# Patient Record
Sex: FEMALE | Race: WHITE | Employment: OTHER | URBAN - METROPOLITAN AREA
[De-identification: names, ages, dates, MRNs, and addresses within clinical notes are randomized per-mention and may not be internally consistent; named-entity substitution may affect disease eponyms.]

---

## 2017-02-03 RX ORDER — FUROSEMIDE 20 MG/1
20 TABLET ORAL DAILY
Qty: 30 TAB | Refills: 3 | Status: SHIPPED | OUTPATIENT
Start: 2017-02-03 | End: 2017-06-14 | Stop reason: SDUPTHER

## 2017-02-27 ENCOUNTER — OFFICE VISIT (OUTPATIENT)
Dept: CARDIOLOGY CLINIC | Age: 62
End: 2017-02-27

## 2017-02-27 VITALS
WEIGHT: 176.9 LBS | RESPIRATION RATE: 16 BRPM | SYSTOLIC BLOOD PRESSURE: 110 MMHG | HEART RATE: 49 BPM | BODY MASS INDEX: 31.34 KG/M2 | DIASTOLIC BLOOD PRESSURE: 68 MMHG | OXYGEN SATURATION: 99 % | HEIGHT: 63 IN

## 2017-02-27 DIAGNOSIS — I20.9 ANGINA, CLASS II (HCC): ICD-10-CM

## 2017-02-27 DIAGNOSIS — E78.00 HYPERCHOLESTEREMIA: ICD-10-CM

## 2017-02-27 DIAGNOSIS — I25.10 ASHD (ARTERIOSCLEROTIC HEART DISEASE): Primary | ICD-10-CM

## 2017-02-27 DIAGNOSIS — I10 ESSENTIAL HYPERTENSION: ICD-10-CM

## 2017-02-27 RX ORDER — NAPROXEN SODIUM 220 MG
220 TABLET ORAL 2 TIMES DAILY WITH MEALS
COMMUNITY
End: 2018-10-16

## 2017-02-27 RX ORDER — BUDESONIDE AND FORMOTEROL FUMARATE DIHYDRATE 160; 4.5 UG/1; UG/1
2 AEROSOL RESPIRATORY (INHALATION) 2 TIMES DAILY
COMMUNITY
Start: 2017-02-21

## 2017-02-27 NOTE — PROGRESS NOTES
NAME:  Tim Hemphill   :   1955   MRN:   [id number]   PCP:  Shane Tee MD           Subjective: The patient is a 57y.o. year old female  who returns for a routine follow-up. Since the last visit, patient reports no change in exercise tolerance, chest pain,  medication intolerance, palpitations, PND/orthopnea wheezing, sputum, syncope, dizziness or light headedness. Recent onset of SOB, edema. Seen by pcp, improved with lasix. Here for further evaluation. Past Medical History:   Diagnosis Date    Asthma     coughing couple weeks ago    Bloating     CAD (coronary artery disease)     Cancer ([facility] [address]     esophageal/GE junction    Chest pain     Chest pain     Chronic pain     left shoulder    Congestion of throat     Cough     Depression     & anxiety    Diabetes (HCC)     IDDM    Dyspepsia and other specified disorders of function of stomach     Fatigue     GERD (gastroesophageal reflux disease)     Headache(784.0)     Hypercholesterolemia     Hypertension     Multinodular goiter     Nausea & vomiting     Stool color black     Stress     Weakness         ICD-10-CM ICD-9-CM    1. ASHD (arteriosclerotic heart disease) I25.10 414.00 STRESS TEST LEXISCAN/CARDIOLITE   2. Hypercholesteremia E78.00 272.0 AMB POC EKG ROUTINE W/ 12 LEADS, INTER & REP   3. Essential hypertension I10 401.9    4.  Angina, class II ([facility] I20.9 413.9       Social History   Substance Use Topics    Smoking status: Never Smoker    Smokeless tobacco: Never Used    Alcohol use 0.5 oz/week     1 Glasses of wine per week      Comment: rarely glass of wine      Family History   Problem Relation Age of Onset    Diabetes Father     Cancer Father      intestinal    Heart Disease Father     Hypertension Father     Heart Disease Brother     Diabetes Brother     Diabetes Mother     Lung Disease Mother     Heart Disease Mother     Hypertension Mother     Diabetes Maternal Grandmother     Diabetes Maternal Grandfather     Elevated Lipids Maternal Aunt     Thyroid Disease Neg Hx         Review of Systems  General: Pt denies excessive weight gain or loss. Pt is able to conduct ADL's  HEENT: Denies blurred vision, headaches, epistaxis and difficulty swallowing. Respiratory: Denies shortness of breath, CAMP, wheezing or stridor. Cardiovascular: Denies precordial pain, palpitations, edema or PND  Gastrointestinal: Denies poor appetite, indigestion, abdominal pain or blood in stool  Musculoskeletal: Denies pain or swelling from muscles or joints  Neurologic: Denies tremor, paresthesias, or sensory motor disturbance  Skin: Denies rash, itching or texture change. Objective:       Vitals:    02/27/17 1350 02/27/17 1401   BP: 118/66 110/68   Pulse: (!) 49    Resp: 16    SpO2: 99%    Weight: 176 lb 14.4 oz (80.2 kg)    Height: 5' 3\" (1.6 m)     Body mass index is 31.34 kg/(m^2). General PE  Mental Status - Alert. General Appearance - Not in acute distress. Chest and Lung Exam   Inspection: Accessory muscles - No use of accessory muscles in breathing. Auscultation:   Breath sounds: - Normal.    Cardiovascular   Inspection: Jugular vein - Bilateral - Inspection Normal.  Palpation/Percussion:   Apical Impulse: - Normal.  Auscultation: Rhythm - Regular. Heart Sounds - S1 WNL and S2 WNL. No S3 or S4. Murmurs & Other Heart Sounds: Auscultation of the heart reveals - No Murmurs. Peripheral Vascular   Upper Extremity: Inspection - Bilateral - No Cyanotic nailbeds or Digital clubbing. Lower Extremity:   Palpation: Edema - Bilateral - No edema. Data Review:     EKG -EKG: normal EKG, normal sinus rhythm, unchanged from previous tracings. Medications reviewed  Current Outpatient Prescriptions   Medication Sig    SYMBICORT 160-4.5 mcg/actuation HFA inhaler Take 2 Puffs by inhalation two (2) times a day.     naproxen sodium (ALEVE) 220 mg tablet Take 220 mg by mouth two (2) times daily (with meals).  furosemide (LASIX) 20 mg tablet Take 1 Tab by mouth daily.  diphenhydrAMINE (BENADRYL ALLERGY) 25 mg tablet Take 25 mg by mouth nightly as needed for Sleep.  valsartan (DIOVAN) 160 mg tablet TAKE 1 TABLET DAILY    ONETOUCH ULTRA TEST strip CHECK TWICE A DAY    RANEXA 500 mg SR tablet TAKE 1 TABLET EVERY 12 HOURS    atorvastatin (LIPITOR) 40 mg tablet TAKE 1 TABLET NIGHTLY    pneumococcal 13 michelle conj dip (PREVNAR 13, PF,) 0.5 mL syrg injection 0.5 mL by IntraMUSCular route PRIOR TO DISCHARGE for 1 dose.  montelukast (SINGULAIR) 10 mg tablet TAKE 1 TABLET DAILY    Insulin Needles, Disposable, (CLICKFINE) 31 gauge x 5/16\" ndle USE DAILY AS DIRECTED    hydrocortisone (ANUSOL-HC) 25 mg supp     lidocaine (XYLOCAINE) 2 % jelly     NITRO-BID 2 % ointment     insulin aspart (NOVOLOG FLEXPEN) 100 unit/mL inpn 4 units every evening with blood sugars over 120    insulin glargine (LANTUS SOLOSTAR) 100 unit/mL (3 mL) pen INJECT 34 UNITS UNDER THE SKIN EVERY MORNING    metoclopramide HCl (REGLAN) 10 mg tablet Take 10 mg by mouth daily.  nystatin (MYCOSTATIN) topical cream     VARICELLA-ZOSTER VACINE LIVE 19,400 unit/0.65 mL susr injection     albuterol (PROVENTIL HFA, VENTOLIN HFA, PROAIR HFA) 90 mcg/actuation inhaler Take 2 Puffs by inhalation as needed.  polyethylene glycol (MIRALAX) 17 gram packet Take 17 g by mouth daily.  cetirizine (ZYRTEC) 10 mg tablet Take 10 mg by mouth daily.  melatonin 5 mg Tab Take 5 mg by mouth daily.  Dexlansoprazole (DEXILANT) 60 mg CpDM Take 60 mg by mouth daily.  aspirin 81 mg tablet Take 81 mg by mouth.  pyridoxine (VITAMIN B-6) 100 mg tablet Take 100 mg by mouth every morning.  ERGOCALCIFEROL, VITAMIN D2, (VITAMIN D PO) Take 2,000 Units by mouth daily.     mometasone (ASMANEX TWISTHALER) 220 mcg (60 doses) inhaler USE 1 INHALATION TWICE A DAY (DISCARD 45 DAYS AFTER OPENING)     No current facility-administered medications for this visit. Assessment:       ICD-10-CM ICD-9-CM    1. ASHD (arteriosclerotic heart disease) I25.10 414.00 STRESS TEST LEXISCAN/CARDIOLITE   2. Hypercholesteremia E78.00 272.0 AMB POC EKG ROUTINE W/ 12 LEADS, INTER & REP   3. Essential hypertension I10 401.9    4. Angina, class II ([address] I20.9 413.9         Plan:     Patient presents with SOB, edema. Diastolic CHF v/s progression of CAD. She had moderate CAD by cath few years ago. Continue diuretic. Evaluate for ischemia. She is unable to exercise due to her SOB.   Continue current care and follow up after stress test.    Jyothi Ernst MD

## 2017-02-27 NOTE — MR AVS SNAPSHOT
Visit Information Date & Time Provider Department Dept. Phone Encounter #  
 2/27/2017  1:45 PM [address], [address] [facility] [phone] [id number] Follow-up Instructions Return in about 4 weeks (around 3/27/2017). Routing History Your Appointments 3/3/2017  1:00 PM  
STRESS TEST with NUCLEAR, [facility]) Appt Note: Per Dr Brandyn Baird met til July 1, 2017 5'3\", 176.14LBS. REM STRESS TEST LEXISCAN/CARDIOLITE [[id number] Custom] [address] [facility]  
[phone] [address] [facility]  
  
    
 3/24/2017  1:30 PM  
ROUTINE CARE with Stevie Goldstein, [address] [place] [address],[address] [facility]) Appt Note: 3 month follow up  
 [facility] [phone] [id number]  
[address] [facility] [address] [facility] Upcoming Health Maintenance Date Due  
 EYE EXAM RETINAL OR DILATED Q1 7/28/2016 Pneumococcal 19-64 Highest Risk (3 of 3 - PPSV23) 1/9/2017 HEMOGLOBIN A1C Q6M 6/22/2017 FOOT EXAM Q1 9/14/2017 MICROALBUMIN Q1 9/14/2017 LIPID PANEL Q1 9/14/2017 COLONOSCOPY 5/13/2018 BREAST CANCER SCRN MAMMOGRAM 7/8/2018 PAP AKA CERVICAL CYTOLOGY 12/4/2018 DTaP/Tdap/Td series (2 - Td) 9/14/2026 Allergies as of 2/27/2017  Review Complete On: 2/27/2017 By: [place] Re Severity Noted Reaction Type Reactions Adhesive Low 04/25/2012   Topical Other (comments)  
 redness Current Immunizations  Reviewed on 4/26/2012 Name Date Influenza Vaccine (Quad) PF 10/13/2016, 10/14/2015 Influenza Vaccine Split 10/26/2011 Pneumococcal Conjugate (PCV-13) 11/14/2016 Pneumococcal Vaccine (Unspecified Type) 4/26/2009 Tdap 9/14/2016 Zoster Vaccine, Live 11/18/2015 Not reviewed this visit You Were Diagnosed With   
  
 Codes Comments ASHD (arteriosclerotic heart disease)    -  Primary ICD-10-CM: I25.10 ICD-9-CM: 414.00 Hypercholesteremia     ICD-10-CM: E78.00 ICD-9-CM: 272.0 Essential hypertension     ICD-10-CM: I10 
ICD-9-CM: 401.9 Angina, class II ([address]     ICD-10-CM: I20.9 ICD-9-CM: 413.9 Vitals BP  
  
  
  
  
  
 110/68 (BP 1 Location: Right arm, BP Patient Position: Sitting) Vitals History BMI and BSA Data Body Mass Index Body Surface Area  
 31.34 kg/m 2 1.89 m 2 Preferred Pharmacy Pharmacy Name Phone [facility] [address] [phone] Your Updated Medication List  
  
   
This list is accurate as of: 2/27/17  2:36 PM.  Always use your most recent med list.  
  
  
  
  
 albuterol 90 mcg/actuation inhaler Commonly known as:  PROVENTIL HFA, VENTOLIN HFA, PROAIR HFA Take 2 Puffs by inhalation as needed. ALEVE 220 mg tablet Generic drug:  naproxen sodium Take 220 mg by mouth two (2) times daily (with meals). aspirin 81 mg tablet Take 81 mg by mouth. atorvastatin 40 mg tablet Commonly known as:  LIPITOR  
TAKE 1 TABLET NIGHTLY  
  
 BENADRYL ALLERGY 25 mg tablet Generic drug:  diphenhydrAMINE Take 25 mg by mouth nightly as needed for Sleep. DEXILANT 60 mg Cpdb Generic drug:  Dexlansoprazole Take 60 mg by mouth daily. furosemide 20 mg tablet Commonly known as:  LASIX Take 1 Tab by mouth daily. hydrocortisone 25 mg Supp Commonly known as:  ANUSOL-HC  
  
 insulin aspart 100 unit/mL Inpn Commonly known as:  Rose Lock 4 units every evening with blood sugars over 120  
  
 insulin glargine 100 unit/mL (3 mL) pen Commonly known as:  LANTUS SOLOSTAR INJECT 34 UNITS UNDER THE SKIN EVERY MORNING Insulin Needles (Disposable) 31 gauge x 5/16\" Ndle Commonly known as:  CLICKFINE  
USE DAILY AS DIRECTED  
  
 lidocaine 2 % jelly Commonly known as:  XYLOCAINE  
  
 melatonin Tab tablet Take 5 mg by mouth daily. metoclopramide HCl 10 mg tablet Commonly known as:  REGLAN Take 10 mg by mouth daily. MIRALAX 17 gram packet Generic drug:  polyethylene glycol Take 17 g by mouth daily. mometasone 220 mcg (60 doses) inhaler Commonly known as:  ASMANEX TWISTHALER  
USE 1 INHALATION TWICE A DAY (DISCARD 45 DAYS AFTER OPENING)  
  
 montelukast 10 mg tablet Commonly known as:  SINGULAIR  
TAKE 1 TABLET DAILY NITRO-BID 2 % ointment Generic drug:  nitroglycerin  
  
 nystatin topical cream  
Commonly known as:  MYCOSTATIN  
  
 ONETOUCH ULTRA TEST strip Generic drug:  glucose blood VI test strips CHECK TWICE A DAY  
  
 pneumococcal 13 michelle conj dip 0.5 mL Syrg injection Commonly known as:  PREVNAR 13 (PF)  
0.5 mL by IntraMUSCular route PRIOR TO DISCHARGE for 1 dose. RANEXA 500 mg SR tablet Generic drug:  ranolazine ER  
TAKE 1 TABLET EVERY 12 HOURS  
  
 SYMBICORT 160-4.5 mcg/actuation HFA inhaler Generic drug:  budesonide-formoterol Take 2 Puffs by inhalation two (2) times a day. valsartan 160 mg tablet Commonly known as:  DIOVAN  
TAKE 1 TABLET DAILY  
  
 varicella-zoster vacine live 19,400 unit/0.65 mL Susr injection Generic drug:  varicella zoster vacine live VITAMIN B-6 100 mg tablet Generic drug:  pyridoxine (vitamin B6) Take 100 mg by mouth every morning. VITAMIN D2 PO Take 2,000 Units by mouth daily. ZyrTEC 10 mg tablet Generic drug:  cetirizine Take 10 mg by mouth daily. We Performed the Following AMB POC EKG ROUTINE W/ 12 LEADS, INTER & REP [83351 CPT(R)] Follow-up Instructions Return in about 4 weeks (around 3/27/2017). To-Do List   
 Around 02/28/2017 ECG:  STRESS TEST LEXISCAN/CARDIOLITE Introducing [facility]! Dear Lalo Perez: Thank you for requesting a [employer] account.   Our records indicate that you already have an active [employer] account. You can access your account anytime at [email or web address] Did you know that you can access your hospital and ER discharge instructions at any time in [employer]? You can also review all of your test results from your hospital stay or ER visit. Additional Information If you have questions, please visit the Frequently Asked Questions section of the [employer] website at [email or web address] Remember, [employer] is NOT to be used for urgent needs. For medical emergencies, dial 911. Now available from your iPhone and Android! Please provide this summary of care documentation to your next provider. Your primary care clinician is listed as Stevie Goldstein. If you have any questions after today's visit, please call [phone].

## 2017-02-27 NOTE — PROGRESS NOTES
Chief Complaint   Patient presents with    Cholesterol Problem     annual f/u    Hypertension     \"    Foot Swelling     minimal swelling to R foot    Hand Swelling     R hand

## 2017-03-03 ENCOUNTER — CLINICAL SUPPORT (OUTPATIENT)
Dept: CARDIOLOGY CLINIC | Age: 62
End: 2017-03-03

## 2017-03-03 DIAGNOSIS — R93.1 ABNORMAL NUCLEAR CARDIAC IMAGING TEST: Primary | ICD-10-CM

## 2017-03-03 DIAGNOSIS — I25.10 ASHD (ARTERIOSCLEROTIC HEART DISEASE): ICD-10-CM

## 2017-03-07 ENCOUNTER — TELEPHONE (OUTPATIENT)
Dept: CARDIOLOGY CLINIC | Age: 62
End: 2017-03-07

## 2017-03-07 NOTE — TELEPHONE ENCOUNTER
----- Message from [address], MD sent at 3/7/2017 12:19 PM EST -----  Stress test abnormal, f/u to discuss.  thx.

## 2017-03-07 NOTE — TELEPHONE ENCOUNTER
Verified patient with two identifiers. Spoke with patient regarding STRESS test results.   Pt has an appt with Dr. Emile Brown on 3/27

## 2017-03-08 ENCOUNTER — TELEPHONE (OUTPATIENT)
Dept: CARDIOLOGY CLINIC | Age: 62
End: 2017-03-08

## 2017-03-08 NOTE — TELEPHONE ENCOUNTER
Verified patient with two identifiers. Spoke with pt c/o stress test, pt not having any symptoms or distress. Dr. Elham Willis would like to see pt sooner than 3/27 if possible. Agustin, can you call pt and schedule for this week or next, thanks!

## 2017-03-14 ENCOUNTER — OFFICE VISIT (OUTPATIENT)
Dept: CARDIOLOGY CLINIC | Age: 62
End: 2017-03-14

## 2017-03-14 VITALS
SYSTOLIC BLOOD PRESSURE: 94 MMHG | DIASTOLIC BLOOD PRESSURE: 68 MMHG | HEART RATE: 56 BPM | HEIGHT: 63 IN | OXYGEN SATURATION: 97 % | WEIGHT: 176.8 LBS | RESPIRATION RATE: 16 BRPM | BODY MASS INDEX: 31.33 KG/M2

## 2017-03-14 DIAGNOSIS — E11.9 DIABETES MELLITUS WITHOUT COMPLICATION (HCC): ICD-10-CM

## 2017-03-14 DIAGNOSIS — I10 ESSENTIAL HYPERTENSION: ICD-10-CM

## 2017-03-14 DIAGNOSIS — E78.00 HYPERCHOLESTEREMIA: ICD-10-CM

## 2017-03-14 DIAGNOSIS — I25.10 ASHD (ARTERIOSCLEROTIC HEART DISEASE): Primary | ICD-10-CM

## 2017-03-14 DIAGNOSIS — I20.9 ANGINA, CLASS II (HCC): ICD-10-CM

## 2017-03-14 NOTE — PROGRESS NOTES
Erick Rooney NP  Subjective:    Samantha Forbes is a 57 y.o. female is here for test results. Continues with CAMP and experiences at times left anterior chest discomfort which she is unsure if reflux related. Findings: The overall quality of the study is excellent. Attenuation artifact was noted. Left ventricular cavity is noted to be normal on the rest and stress studies. No TID noted. SPECT images demonstrate a medium, reversible abnormality of mild severity in the inferior region on the stress and rest images. Gated SPECT images reveals normal myocardial thickening and wall motion. The left ventricular ejection fraction was calculated to be 74 %. Impression:   Myocardial perfusion imaging is abnormal: there is a medium area of ischemia in the inferior region. Overall left ventricular systolic function was normal. Compared to the study from 07/02/2014, the current study reveals inferior wall ischemia. These test results indicate high likelihood for the presence of angiographically significant coronary artery disease.         SUMMARY:  Left ventricle: Systolic function was vigorous. Ejection fraction was  estimated in the range of 55 % to 60 %. No obvious wall motion  abnormalities identified in the views obtained. INDICATIONS: Evaluate for heart failure    PROCEDURE: This was a routine study. The study included complete 2D  imaging, M-mode, complete spectral Doppler, and color Doppler. Systolic  blood pressure was 137 mmHg, at the start of the study. Diastolic blood  pressure was 69 mmHg, at the start of the study. This was a technically  difficult study. LEFT VENTRICLE: Size was normal. Systolic function was vigorous. Ejection  fraction was estimated in the range of 55 % to 60 %. No obvious wall  motion abnormalities identified in the views obtained.  Wall thickness was  normal.    RIGHT VENTRICLE: The size was normal. Systolic function was normal.    LEFT ATRIUM: Size was normal.    RIGHT ATRIUM: Size was normal.    MITRAL VALVE: Normal valve structure. There was normal leaflet separation. DOPPLER: There was no evidence for stenosis. There was no regurgitation. AORTIC VALVE: The valve was trileaflet. Leaflets exhibited normal  thickness and normal cuspal separation. DOPPLER: There was no stenosis. There was no regurgitation. TRICUSPID VALVE: Normal valve structure. There was normal leaflet  separation. DOPPLER: There was trivial regurgitation. Pulmonary artery  systolic pressure was within the normal range. PULMONIC VALVE: Normal valve structure. DOPPLER: There was no  regurgitation.       Patient Active Problem List    Diagnosis Date Noted    Diabetes mellitus without complication ([address] 64/07/9905    ASHD (arteriosclerotic heart disease) 06/18/2014    Syncope 06/18/2014    Bradycardia 06/18/2014    Multinodular goiter     Angina, class II ([address] 04/18/2013    Family history of coronary artery disease 04/18/2013    S/P cardiac cath 04/18/2013    Hypercholesteremia 02/18/2013    HTN (hypertension) 02/18/2013    Asthma 02/18/2013    Depression 02/18/2013    Thyroid nodule 02/18/2013    Shoulder capsulitis 02/18/2013    Incisional hernia 02/10/2011    Esophageal cancer ([address] 02/10/2011      Hui Flaherty MD  Past Medical History:   Diagnosis Date    Asthma     coughing couple weeks ago    Bloating     CAD (coronary artery disease)     Cancer ([address] 2010    esophageal/GE junction    Chest pain     Chest pain     Chronic pain     left shoulder    Congestion of throat     Cough     Depression     & anxiety    Diabetes (HCC)     IDDM    Dyspepsia and other specified disorders of function of stomach     Fatigue     GERD (gastroesophageal reflux disease)     Headache(784.0)     Hypercholesterolemia     Hypertension     Multinodular goiter     Nausea & vomiting     Stool color black     Stress     Weakness       Past Surgical History:   Procedure Laterality Date    EXTRACTION ERUPTED TOOTH/EXR      HX ENDOSCOPY  4/2016    HX GI  2010    gastroesophagectomy    HX GYN  1983    lap btl    HX HEENT  1957    tonsils as child    HX HERNIA REPAIR  04/26/2012    HX LAP CHOLECYSTECTOMY  1995    HX ORTHOPAEDIC  02/20/2015    right shoulder manipulation    HX OTHER SURGICAL      left shoulder surgery for frozen surgery    HX OTHER SURGICAL  6/24/2015    mammogram     HX PELVIC LAPAROSCOPY  1989    HX VASCULAR ACCESS      port a cath and removal    NV EGD BALLOON DILATION ESOPHAGUS <30 MM DIAM  4/20/2010          Allergies   Allergen Reactions    Adhesive Other (comments)     redness      Family History   Problem Relation Age of Onset    Diabetes Father     Cancer Father      intestinal    Heart Disease Father     Hypertension Father     Heart Disease Brother     Diabetes Brother     Diabetes Mother     Lung Disease Mother     Heart Disease Mother     Hypertension Mother     Diabetes Maternal Grandmother     Diabetes Maternal Grandfather     Elevated Lipids Maternal Aunt     Thyroid Disease Neg Hx       Current Outpatient Prescriptions   Medication Sig    SYMBICORT 160-4.5 mcg/actuation HFA inhaler Take 2 Puffs by inhalation two (2) times a day.  naproxen sodium (ALEVE) 220 mg tablet Take 220 mg by mouth two (2) times daily (with meals).  furosemide (LASIX) 20 mg tablet Take 1 Tab by mouth daily.  diphenhydrAMINE (BENADRYL ALLERGY) 25 mg tablet Take 25 mg by mouth nightly as needed for Sleep.  valsartan (DIOVAN) 160 mg tablet TAKE 1 TABLET DAILY    ONETOUCH ULTRA TEST strip CHECK TWICE A DAY    RANEXA 500 mg SR tablet TAKE 1 TABLET EVERY 12 HOURS    atorvastatin (LIPITOR) 40 mg tablet TAKE 1 TABLET NIGHTLY    pneumococcal 13 michelle conj dip (PREVNAR 13, PF,) 0.5 mL syrg injection 0.5 mL by IntraMUSCular route PRIOR TO DISCHARGE for 1 dose.     montelukast (SINGULAIR) 10 mg tablet TAKE 1 TABLET DAILY    Insulin Needles, Disposable, (CLICKFINE) 31 gauge x 5/16\" ndle USE DAILY AS DIRECTED    hydrocortisone (ANUSOL-HC) 25 mg supp     lidocaine (XYLOCAINE) 2 % jelly     NITRO-BID 2 % ointment     insulin aspart (NOVOLOG FLEXPEN) 100 unit/mL inpn 4 units every evening with blood sugars over 120    insulin glargine (LANTUS SOLOSTAR) 100 unit/mL (3 mL) pen INJECT 34 UNITS UNDER THE SKIN EVERY MORNING    metoclopramide HCl (REGLAN) 10 mg tablet Take 10 mg by mouth daily.  nystatin (MYCOSTATIN) topical cream     VARICELLA-ZOSTER VACINE LIVE 19,400 unit/0.65 mL susr injection     albuterol (PROVENTIL HFA, VENTOLIN HFA, PROAIR HFA) 90 mcg/actuation inhaler Take 2 Puffs by inhalation as needed.  polyethylene glycol (MIRALAX) 17 gram packet Take 17 g by mouth daily.  cetirizine (ZYRTEC) 10 mg tablet Take 10 mg by mouth daily.  melatonin 5 mg Tab Take 5 mg by mouth daily.  Dexlansoprazole (DEXILANT) 60 mg CpDM Take 60 mg by mouth daily.  aspirin 81 mg tablet Take 81 mg by mouth.  pyridoxine (VITAMIN B-6) 100 mg tablet Take 100 mg by mouth every morning.  ERGOCALCIFEROL, VITAMIN D2, (VITAMIN D PO) Take 2,000 Units by mouth daily.  mometasone (ASMANEX TWISTHALER) 220 mcg (60 doses) inhaler USE 1 INHALATION TWICE A DAY (DISCARD 45 DAYS AFTER OPENING)     No current facility-administered medications for this visit. Vitals:    03/14/17 1026 03/14/17 1036   BP: 110/68 94/68   Pulse: (!) 56    Resp: 16    SpO2: 97%    Weight: 176 lb 12.8 oz (80.2 kg)    Height: 5' 3\" (1.6 m)      Social History     Social History    Marital status:      Spouse name: N/A    Number of children: N/A    Years of education: N/A     Occupational History    Not on file.      Social History Main Topics    Smoking status: Never Smoker    Smokeless tobacco: Never Used    Alcohol use 0.5 oz/week     1 Glasses of wine per week      Comment: rarely glass of wine    Drug use: No    Sexual activity: Not on file     Other Topics Concern    Not on file     Social History Narrative    Lives in [facility] with . Has a 45 yo daughter and an adopted 26 yo daughter. 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 I have reviewed the nurses notes, vitals, problem list, allergy list, medical history, family medical, social history and medications. Review of Symptoms:    General: Pt denies excessive weight gain or loss. Pt is able to conduct ADL's  HEENT: Denies blurred vision, headaches, epistaxis and difficulty swallowing. Respiratory: Denies shortness of breath, +CAMP, no wheezing or stridor. Cardiovascular: Denies precordial pain, palpitations, edema or PND  Gastrointestinal: Denies poor appetite, indigestion, abdominal pain or blood in stool      Physical Exam:      General: Well developed, in no acute distress. HEENT: No carotid bruits, no JVD, trach is midline. Heart:  Normal S1/S2 negative S3 or S4. Regular, no murmur, gallop or rub.   Respiratory: Clear bilaterally x 4, no wheezing or rales  Abdomen:   Soft, non-tender, bowel sounds are active.   Extremities:  No edema, normal cap refill, no cyanosis. Neuro: A&Ox3, speech clear, gait stable. Skin: Skin color is normal. No rashes or lesions. Non diaphoretic  Vascular: 2+ pulses symmetric in all extremities      Cardiographics    ECG:   Results for orders placed or performed in visit on 10/15/15   CARDIAC HOLTER MONITOR, 24 HOURS    Narrative    ECG Monitor/24 hours, Complete    Reason for Holter Monitor   BRADYCARDIA    Heartbeat    Slowest 40  Average 52  Fastest  89          Results:   Underlying Rhythm: Normal sinus rhythm      Atrial Arrhythmias: premature atrial contractions; frequent             AV Conduction: normal    Ventricular Arrhythmias: premature ventricular contractions; rare    ST Segment Analysis:non-specific changes     Symptom Correlation:  yes    Comment:   Frequent PAC's.      Laila Lozano MD             Results for orders placed or performed during the hospital encounter of 02/04/11   EKG, 12 LEAD, INITIAL   Result Value Ref Range    Ventricular Rate 45 BPM    Atrial Rate 45 BPM    P-R Interval 152 ms    QRS Duration 122 ms    Q-T Interval 466 ms    QTC Calculation (Bezet) 403 ms    Calculated P Axis 40 degrees    Calculated R Axis -16 degrees    Calculated T Axis 25 degrees    Diagnosis       Marked sinus bradycardia  Right bundle branch block  Left ventricular hypertrophy with QRS widening  No previous ECGs available  Confirmed by Jhon Lopez ([id number]) on 2/4/2011 11:15:10 AM        Labs:  Lab Results   Component Value Date/Time    Sodium 139 12/22/2016 02:00 PM    Potassium 4.4 12/22/2016 02:00 PM    Chloride 99 12/22/2016 02:00 PM    CO2 23 12/22/2016 02:00 PM    Anion gap 5 04/26/2012 10:00 AM    Glucose 158 12/22/2016 02:00 PM    BUN 14 12/22/2016 02:00 PM    Creatinine 0.89 12/22/2016 02:00 PM    BUN/Creatinine ratio 16 12/22/2016 02:00 PM    GFR est AA 80 12/22/2016 02:00 PM    GFR est non-AA 69 12/22/2016 02:00 PM    Calcium 8.8 12/22/2016 02:00 PM    AST (SGOT) 28 09/14/2016 01:49 PM    Alk. phosphatase 62 09/14/2016 01:49 PM    Protein, total 6.4 09/14/2016 01:49 PM    Albumin 4.4 09/14/2016 01:49 PM    Globulin 3.4 02/01/2010 04:40 AM    A-G Ratio 2.2 09/14/2016 01:49 PM    ALT (SGPT) 34 09/14/2016 01:49 PM      Lab Results   Component Value Date/Time    WBC 6.4 02/02/2016 01:33 PM    HGB 10.5 02/02/2016 01:33 PM    HCT 32.4 02/02/2016 01:33 PM    PLATELET 398 87/25/0292 01:33 PM    MCV 83 02/02/2016 01:33 PM    All Cardiac Markers in the last 24 hours:  No results found for: CPK, CKMMB, CKMB, RCK3, CKMBT, CKNDX, CKND1, PELON, TROPT, TROIQ, AGAPITO, TROPT, TNIPOC, BNP, BNPP              Assessment:     Assessment:         ICD-10-CM ICD-9-CM    1. ASHD (arteriosclerotic heart disease) I25.10 414.00    2. Hypercholesteremia E78.00 272.0    3. Essential hypertension I10 401.9    4.  Diabetes mellitus without complication ([facility] U79.7 250. 00    5. Angina, class II ([facility] I20.9 413.9        No orders of the defined types were placed in this encounter. Plan:     Patient presents with abnormal NST with continued CAMP and episodes of chest discomfort. Presently on Ranexa, due to low BP unable to add additional therapy risking hypotension. Discussed risk and benefits of coronary angiography and is willing to proceed. Sangita Og NP        [facility]    3/14/2017         Agree with note as outlined by  NP. I confirm findings in history and physical exam. No additional findings noted. Agree with plan as outlined above.      Rosa Blount MD

## 2017-03-15 LAB
ALBUMIN SERPL-MCNC: 4.2 G/DL (ref 3.6–4.8)
ALBUMIN/GLOB SERPL: 1.6 {RATIO} (ref 1.2–2.2)
ALP SERPL-CCNC: 73 IU/L (ref 39–117)
ALT SERPL-CCNC: 24 IU/L (ref 0–32)
AST SERPL-CCNC: 17 IU/L (ref 0–40)
BASOPHILS # BLD AUTO: 0.1 X10E3/UL (ref 0–0.2)
BASOPHILS NFR BLD AUTO: 0 %
BILIRUB SERPL-MCNC: 0.3 MG/DL (ref 0–1.2)
BUN SERPL-MCNC: 17 MG/DL (ref 8–27)
BUN/CREAT SERPL: 17 (ref 11–26)
CALCIUM SERPL-MCNC: 9.3 MG/DL (ref 8.7–10.3)
CHLORIDE SERPL-SCNC: 96 MMOL/L (ref 96–106)
CK SERPL-CCNC: 31 U/L (ref 24–173)
CO2 SERPL-SCNC: 24 MMOL/L (ref 18–29)
CREAT SERPL-MCNC: 1.03 MG/DL (ref 0.57–1)
EOSINOPHIL # BLD AUTO: 0.1 X10E3/UL (ref 0–0.4)
EOSINOPHIL NFR BLD AUTO: 1 %
ERYTHROCYTE [DISTWIDTH] IN BLOOD BY AUTOMATED COUNT: 15.3 % (ref 12.3–15.4)
GLOBULIN SER CALC-MCNC: 2.7 G/DL (ref 1.5–4.5)
GLUCOSE SERPL-MCNC: 118 MG/DL (ref 65–99)
HCT VFR BLD AUTO: 39.1 % (ref 34–46.6)
HGB BLD-MCNC: 12.6 G/DL (ref 11.1–15.9)
IMM GRANULOCYTES # BLD: 0.1 X10E3/UL (ref 0–0.1)
IMM GRANULOCYTES NFR BLD: 1 %
INTERPRETATION: NORMAL
LYMPHOCYTES # BLD AUTO: 1.3 X10E3/UL (ref 0.7–3.1)
LYMPHOCYTES NFR BLD AUTO: 11 %
Lab: NORMAL
MCH RBC QN AUTO: 29.4 PG (ref 26.6–33)
MCHC RBC AUTO-ENTMCNC: 32.2 G/DL (ref 31.5–35.7)
MCV RBC AUTO: 91 FL (ref 79–97)
MONOCYTES # BLD AUTO: 0.9 X10E3/UL (ref 0.1–0.9)
MONOCYTES NFR BLD AUTO: 8 %
NEUTROPHILS # BLD AUTO: 9.4 X10E3/UL (ref 1.4–7)
NEUTROPHILS NFR BLD AUTO: 79 %
PLATELET # BLD AUTO: 283 X10E3/UL (ref 150–379)
POTASSIUM SERPL-SCNC: 4.6 MMOL/L (ref 3.5–5.2)
PROT SERPL-MCNC: 6.9 G/DL (ref 6–8.5)
RBC # BLD AUTO: 4.28 X10E6/UL (ref 3.77–5.28)
SODIUM SERPL-SCNC: 139 MMOL/L (ref 134–144)
WBC # BLD AUTO: 11.8 X10E3/UL (ref 3.4–10.8)

## 2017-03-23 DIAGNOSIS — I20.9 ANGINA, CLASS II (HCC): Primary | ICD-10-CM

## 2017-03-23 DIAGNOSIS — I25.10 ASHD (ARTERIOSCLEROTIC HEART DISEASE): ICD-10-CM

## 2017-03-23 DIAGNOSIS — I10 ESSENTIAL HYPERTENSION: ICD-10-CM

## 2017-03-23 DIAGNOSIS — E78.00 HYPERCHOLESTEREMIA: ICD-10-CM

## 2017-03-24 ENCOUNTER — OFFICE VISIT (OUTPATIENT)
Dept: INTERNAL MEDICINE CLINIC | Age: 62
End: 2017-03-24

## 2017-03-24 VITALS
WEIGHT: 176.4 LBS | SYSTOLIC BLOOD PRESSURE: 113 MMHG | TEMPERATURE: 97.9 F | BODY MASS INDEX: 31.25 KG/M2 | HEIGHT: 63 IN | HEART RATE: 60 BPM | DIASTOLIC BLOOD PRESSURE: 72 MMHG | OXYGEN SATURATION: 96 %

## 2017-03-24 DIAGNOSIS — E78.00 HYPERCHOLESTEREMIA: ICD-10-CM

## 2017-03-24 DIAGNOSIS — M19.049 HAND ARTHRITIS: ICD-10-CM

## 2017-03-24 DIAGNOSIS — R06.09 DOE (DYSPNEA ON EXERTION): ICD-10-CM

## 2017-03-24 DIAGNOSIS — R60.0 LOCALIZED EDEMA: ICD-10-CM

## 2017-03-24 DIAGNOSIS — J45.20 MILD INTERMITTENT ASTHMA WITHOUT COMPLICATION: ICD-10-CM

## 2017-03-24 DIAGNOSIS — E11.9 DIABETES MELLITUS WITHOUT COMPLICATION (HCC): Primary | ICD-10-CM

## 2017-03-24 DIAGNOSIS — I20.9 ANGINA, CLASS II (HCC): ICD-10-CM

## 2017-03-24 DIAGNOSIS — I10 ESSENTIAL HYPERTENSION: ICD-10-CM

## 2017-03-24 DIAGNOSIS — C15.9 MALIGNANT NEOPLASM OF ESOPHAGUS, UNSPECIFIED LOCATION (HCC): Chronic | ICD-10-CM

## 2017-03-24 LAB
ALBUMIN SERPL-MCNC: 3.8 G/DL (ref 3.6–4.8)
ALBUMIN/GLOB SERPL: 1.7 {RATIO} (ref 1.2–2.2)
ALP SERPL-CCNC: 62 IU/L (ref 39–117)
ALT SERPL-CCNC: 24 IU/L (ref 0–32)
AST SERPL-CCNC: 24 IU/L (ref 0–40)
BILIRUB SERPL-MCNC: 0.2 MG/DL (ref 0–1.2)
BUN SERPL-MCNC: 17 MG/DL (ref 8–27)
BUN/CREAT SERPL: 18 (ref 11–26)
CALCIUM SERPL-MCNC: 8.3 MG/DL (ref 8.7–10.3)
CHLORIDE SERPL-SCNC: 101 MMOL/L (ref 96–106)
CO2 SERPL-SCNC: 22 MMOL/L (ref 18–29)
CREAT SERPL-MCNC: 0.96 MG/DL (ref 0.57–1)
ERYTHROCYTE [DISTWIDTH] IN BLOOD BY AUTOMATED COUNT: 15.5 % (ref 12.3–15.4)
GLOBULIN SER CALC-MCNC: 2.3 G/DL (ref 1.5–4.5)
GLUCOSE SERPL-MCNC: 130 MG/DL (ref 65–99)
HBA1C MFR BLD HPLC: 6.8 %
HCT VFR BLD AUTO: 36.9 % (ref 34–46.6)
HGB BLD-MCNC: 12 G/DL (ref 11.1–15.9)
INR PPP: 1.1 (ref 0.8–1.2)
MCH RBC QN AUTO: 29.3 PG (ref 26.6–33)
MCHC RBC AUTO-ENTMCNC: 32.5 G/DL (ref 31.5–35.7)
MCV RBC AUTO: 90 FL (ref 79–97)
PLATELET # BLD AUTO: 250 X10E3/UL (ref 150–379)
POTASSIUM SERPL-SCNC: 3.9 MMOL/L (ref 3.5–5.2)
PROT SERPL-MCNC: 6.1 G/DL (ref 6–8.5)
PROTHROMBIN TIME: 11.2 SEC (ref 9.1–12)
RBC # BLD AUTO: 4.1 X10E6/UL (ref 3.77–5.28)
SODIUM SERPL-SCNC: 139 MMOL/L (ref 134–144)
WBC # BLD AUTO: 6.9 X10E3/UL (ref 3.4–10.8)

## 2017-03-24 NOTE — PROGRESS NOTES
HISTORY OF PRESENT ILLNESS  Joyce Edwards is a 57 y.o. female. HPI   Last here 12/22/16. Pt is here to f/u on chronic conditions    BP today is 113/72  Continues diovan 160mg daily   She has been taking lasix daily with improvement     Last visit, pt had been having significant sob   Pt has seen pulmonologist since this- saw Dr. Anne Weathers (pulm)  Reviewed his notes: adult asthma, PFTs completed, gave symbicort BID, d/c asmanex   She has been seeing cardio since this- has seen Dr. Wen Stewart twice   Reviewed stress test: abnormal  Reviewed last note from Dr. Wen Stewart (cardio) 3/14/17  Patient presents with abnormal NST with continued CAMP and episodes of chest discomfort. Presently on Ranexa, due to low BP unable to add additional therapy risking hypotension. Discussed risk and benefits of coronary angiography and is willing to proceed.    Pt had cardiac cath scheduled 3/20/17 but did not make this, has rescheduled   She has cardiac cath pending on 3/27/17 to evaluate further      BS at home running around 90s-108 in the AM fasting, this AM was 44- this is her only low reading   Pt is not sure why she had the low sugar of 44 as she ate sandwich and ice cream last night   Pt felt fatigued with this reading but otherwise did not have sx   Denies feeling jittery and shaking with this   The low sugar was recorded when she woke up, did not wake her up in the night   Her next lowest sugar reading was 65 and did not have sx with this  She has not been checking her sugars prior to bed or in the afternoons   Continues lantus 27 U qam and novolog 4 U with dinner with BS over 120  She also takes ASA 81mg daily  Discussed she should be having sx with very low sugars and that these are life-saving  She is hesitant to change her insulin dosing, addressed that a low reading of 44 with no sx is concerning, she should be having sx with this   As last a1c was at goal, will decrease her lantus from 27 to 25 U qhs   Advised monitoring her sugars more frequently    Reviewed last labs 12/16  Heart failure lab elevated, a1c improved   Reviewed labs per cardio  CBC nl, CK nl, kidney nl, liver nl    Pt follows with Dr. Yasmani Salcido (hemo/onc), last saw him in 6/16  This physician follows her blood counts    Continues lipitor 40mg daily for cholesterol-- at goal in September     Wt is down 2 lbs since last visit  Discussed diet and weight loss     Pt is now taking symbicort BID per pulmonary  Also continues on sinuglair for her asthma sx which works well   This has improved her breathing overall     Continues dexilant BID and reglan daily, controls reflux sx  She occasionally takes additional dose reglan in the evenings with sx  Denies significant sx of dysphagia recently   She avoids trigger foods and rarely has reflux or aspiration  Pt follows with Dr. Rose Angeles, (GI), last saw him in 4/16  Recall has h/o esophageal cancer    Last visit, pt had been having significant stiffness to her hands and had deformed joints with this   She has completed hand therapy in the past with this and wears a brace  I had referred her to f/u with Dr. Antonia Hughes (rheum)   She has not yet had a chance to do this as she has been busy with other evaluations  Advised her to work on scheduling this     Of note, her  has bladder cancer and she is caring for him at home     PREVENTIVE:    Colonoscopy: 5/2013, Dr. Rose Angeles, repeat 5 years    EGD: 4/16, Dr. Rose Angeles, h/o esophageal cancer, polyp on recent EGD, biopsy nl  Pap: Dr. Xiang Hernandez, 1/31/17  Mammogram: 7/16 negative  Dexa: 7/29/15 normal, due 2018  Tdap: 9/14/2016  Pneumovax: 4/26/2009  [id number]: 11/14/2016  Zostavax: 11/18/2016  Flu shot: 10/13/2016  Foot exam: 3/24/17 normal  Microalbumin: 9/16 minimal  A1c:11/12 6.8, 2/13 7.1, 5/13 7.4, 9/13 7.1, 12/13 6.4, 3/14 6.2, 6/14 6.7 , 9/14 6.0, 12/14 6.8, 4/15 7.8, 7/15 7.1, 10/15 7.3, 1/16 7.2, 5/16 7.0, 9/16 6.7, 12/16 6.2 3/17 6.8  Eye exam: Dr. Yesi Caban, 11/09/16, will get notes  Hep C screen: 11/15 negative  Lipids: 9/16, LDL 73    Patient Active Problem List    Diagnosis Date Noted    Diabetes mellitus without complication ([facility] 35/03/0962    ASHD (arteriosclerotic heart disease) 06/18/2014    Syncope 06/18/2014    Bradycardia 06/18/2014    Multinodular goiter     Angina, class II ([facility] 04/18/2013    Family history of coronary artery disease 04/18/2013    S/P cardiac cath 04/18/2013    Hypercholesteremia 02/18/2013    HTN (hypertension) 02/18/2013    Asthma 02/18/2013    Depression 02/18/2013    Thyroid nodule 02/18/2013    Shoulder capsulitis 02/18/2013    Incisional hernia 02/10/2011    Esophageal cancer ([facility] 02/10/2011     Current Outpatient Prescriptions   Medication Sig Dispense Refill    SYMBICORT 160-4.5 mcg/actuation HFA inhaler Take 2 Puffs by inhalation two (2) times a day.  naproxen sodium (ALEVE) 220 mg tablet Take 220 mg by mouth two (2) times daily (with meals).  furosemide (LASIX) 20 mg tablet Take 1 Tab by mouth daily. 30 Tab 3    diphenhydrAMINE (BENADRYL ALLERGY) 25 mg tablet Take 25 mg by mouth nightly as needed for Sleep.  mometasone (ASMANEX TWISTHALER) 220 mcg (60 doses) inhaler USE 1 INHALATION TWICE A DAY (DISCARD 45 DAYS AFTER OPENING) 3 Inhaler 1    valsartan (DIOVAN) 160 mg tablet TAKE 1 TABLET DAILY 90 Tab 1    ONETOUCH ULTRA TEST strip CHECK TWICE A  Strip 12    RANEXA 500 mg SR tablet TAKE 1 TABLET EVERY 12 HOURS 180 Tab 1    atorvastatin (LIPITOR) 40 mg tablet TAKE 1 TABLET NIGHTLY 90 Tab 1    pneumococcal 13 michelle conj dip (PREVNAR 13, PF,) 0.5 mL syrg injection 0.5 mL by IntraMUSCular route PRIOR TO DISCHARGE for 1 dose.  1 Syringe 0    montelukast (SINGULAIR) 10 mg tablet TAKE 1 TABLET DAILY 90 Tab 2    Insulin Needles, Disposable, (CLICKFINE) 31 gauge x 5/16\" ndle USE DAILY AS DIRECTED 100 Package 10    hydrocortisone (ANUSOL-HC) 25 mg supp   2    lidocaine (XYLOCAINE) 2 % jelly   2    NITRO-BID 2 % ointment   2    insulin aspart (NOVOLOG FLEXPEN) 100 unit/mL inpn 4 units every evening with blood sugars over 120 5 Pen 3    insulin glargine (LANTUS SOLOSTAR) 100 unit/mL (3 mL) pen INJECT 34 UNITS UNDER THE SKIN EVERY MORNING 3 Each 3    metoclopramide HCl (REGLAN) 10 mg tablet Take 10 mg by mouth daily.  nystatin (MYCOSTATIN) topical cream   0    VARICELLA-ZOSTER VACINE LIVE 19,400 unit/0.65 mL susr injection   0    albuterol (PROVENTIL HFA, VENTOLIN HFA, PROAIR HFA) 90 mcg/actuation inhaler Take 2 Puffs by inhalation as needed. 1 Inhaler 3    polyethylene glycol (MIRALAX) 17 gram packet Take 17 g by mouth daily.  cetirizine (ZYRTEC) 10 mg tablet Take 10 mg by mouth daily.  melatonin 5 mg Tab Take 5 mg by mouth daily.  Dexlansoprazole (DEXILANT) 60 mg CpDM Take 60 mg by mouth daily.  aspirin 81 mg tablet Take 81 mg by mouth.  pyridoxine (VITAMIN B-6) 100 mg tablet Take 100 mg by mouth every morning.  ERGOCALCIFEROL, VITAMIN D2, (VITAMIN D PO) Take 2,000 Units by mouth daily.        Past Surgical History:   Procedure Laterality Date    EXTRACTION ERUPTED TOOTH/EXR      HX ENDOSCOPY  4/2016    HX GI  2010    gastroesophagectomy    HX GYN  1983    lap btl    HX HEENT  1957    tonsils as child    HX HERNIA REPAIR  04/26/2012    HX LAP CHOLECYSTECTOMY  1995    HX ORTHOPAEDIC  02/20/2015    right shoulder manipulation    HX OTHER SURGICAL      left shoulder surgery for frozen surgery    HX OTHER SURGICAL  6/24/2015    mammogram     HX PELVIC LAPAROSCOPY  1989    HX VASCULAR ACCESS      port a cath and removal    FL EGD BALLOON DILATION ESOPHAGUS <30 MM DIAM  4/20/2010           Lab Results  Component Value Date/Time   WBC 6.9 03/23/2017 12:09 PM   Hemoglobin (POC) 11.9 01/28/2010 12:02 PM   HGB 12.0 03/23/2017 12:09 PM   Hematocrit (POC) 35 01/28/2010 12:02 PM   HCT 36.9 03/23/2017 12:09 PM   PLATELET 470 90/15/1544 12:09 PM   MCV 90 03/23/2017 12:09 PM       Lab Results  Component Value Date/Time   Cholesterol, total 147 09/14/2016 01:49 PM   HDL Cholesterol 55 09/14/2016 01:49 PM   LDL, calculated 73 09/14/2016 01:49 PM   Triglyceride 95 09/14/2016 01:49 PM       Lab Results  Component Value Date/Time   GFR est AA 72 03/23/2017 12:09 PM   GFR est non-AA 63 03/23/2017 12:09 PM   Creatinine (POC) 0.9 01/06/2012 10:42 AM   Creatinine 0.96 03/23/2017 12:09 PM   BUN 17 03/23/2017 12:09 PM   BUN (POC) 14 01/28/2010 12:02 PM   Sodium (POC) 141 01/28/2010 12:02 PM   Sodium 139 03/23/2017 12:09 PM   Potassium 3.9 03/23/2017 12:09 PM   Potassium (POC) 4.0 01/28/2010 12:02 PM   Chloride (POC) 103 01/28/2010 12:02 PM   Chloride 101 03/23/2017 12:09 PM   CO2 22 03/23/2017 12:09 PM         Review of Systems   Respiratory: Negative for shortness of breath. Cardiovascular: Negative for chest pain. Physical Exam   Constitutional: She is oriented to person, place, and time. She appears well-developed and well-nourished. No distress. HENT:   Head: Normocephalic and atraumatic. Mouth/Throat: Oropharynx is clear and moist. No oropharyngeal exudate. Eyes: Conjunctivae and EOM are normal. Right eye exhibits no discharge. Left eye exhibits no discharge. Neck: Normal range of motion. Neck supple. Cardiovascular: Normal rate, regular rhythm, normal heart sounds and intact distal pulses. Exam reveals no gallop and no friction rub. No murmur heard. Pulmonary/Chest: Effort normal and breath sounds normal. No respiratory distress. She has no wheezes. She has no rales. She exhibits no tenderness. Musculoskeletal: Normal range of motion. She exhibits edema (1+ pitting BLE). She exhibits no tenderness or deformity. Lymphadenopathy:     She has no cervical adenopathy. Neurological: She is alert and oriented to person, place, and time. Coordination normal.   Monofilament nl BLE, good peripheral pulses, no ulcers     Skin: Skin is warm and dry. No rash noted.  She is not diaphoretic. No erythema. No pallor. Psychiatric: She has a normal mood and affect. Her behavior is normal.       ASSESSMENT and PLAN    ICD-10-CM ICD-9-CM    1. Diabetes mellitus without complication ([facility]    BS have been well controlled recently, of concern is she reports low BS this mornings with minimal sx, she notes she did not really pay attention and does not think this occurs frequently, will have her decrease her insulin to 25 U from 27 U if recurrent lows     discussed persistent low sugars are quite dangerous and that if she has recurrent lows without sx we will need to decrease her insulin significantly lower. continue novolog 4 U with BS over 120 prior to dinner. E11.9 250.00 AMB POC HEMOGLOBIN A1C      HM DIABETES FOOT EXAM   2. Angina, class II ([facility]    Has heart cath scheduled for Monday   I20.9 413.9    3. Essential hypertension    Well controlled on diovan 160mg, continue   I10 401.9    4. Hypercholesteremia    Controlled on lipitor   E78.00 272.0    5. Mild intermittent asthma without complication    Now on symbicort, asmanex has been stopped, continues to follow with Dr. Mccarty Dire   J45.20 493.90    6. CAMP (dyspnea on exertion)    Multifactorial, likely related to cardiac etiology and asthma playing a role as well, now on lasix which seems to be improving her sx, had elevated BMP, has heart cath pending and her inhalers have been adjusted   R06.09 786.09    7. Localized edema    Stable, continue lasix   R60.0 782.3    8. Malignant neoplasm of esophagus, unspecified location [facility])    Sees Dr. Trisha Wills for this, continues on dexilant and reglan daily per him   C15.9 150.9    9. Hand arthritis    She will see rheumatology once her cardiac evaluation is complete M12.9 716.94           Written by Renetta Ding, as dictated by Ivan Koo MD.    Current diagnosis and concerns discussed with pt at length.  Understands risks and benefits or current treatment plan and medications and accepts the treatment and medication with any possible risks.   Pt asks appropriate questions which were answered.   Pt instructed to call with any concerns or problems. This note will not be viewable in [address]

## 2017-03-24 NOTE — MR AVS SNAPSHOT
Visit Information Date & Time Provider Department Dept. Phone Encounter #  
 3/24/2017  1:30 PM Ricky Mcmullen, [address] [place] [address],[address] [phone] [id number] Follow-up Instructions Return in about 3 months (around 6/24/2017). Upcoming Health Maintenance Date Due  
 EYE EXAM RETINAL OR DILATED Q1 7/28/2016 Pneumococcal 19-64 Highest Risk (3 of 3 - PPSV23) 1/9/2017 HEMOGLOBIN A1C Q6M 6/22/2017 FOOT EXAM Q1 9/14/2017 MICROALBUMIN Q1 9/14/2017 LIPID PANEL Q1 9/14/2017 COLONOSCOPY 5/13/2018 BREAST CANCER SCRN MAMMOGRAM 7/8/2018 PAP AKA CERVICAL CYTOLOGY 12/4/2018 DTaP/Tdap/Td series (2 - Td) 9/14/2026 Allergies as of 3/24/2017  Review Complete On: 3/24/2017 By: Jhonny Leos Severity Noted Reaction Type Reactions Adhesive Low 04/25/2012   Topical Other (comments)  
 redness Current Immunizations  Reviewed on 4/26/2012 Name Date Influenza Vaccine (Quad) PF 10/13/2016, 10/14/2015 Influenza Vaccine Split 10/26/2011 Pneumococcal Conjugate (PCV-13) 11/14/2016 Pneumococcal Vaccine (Unspecified Type) 4/26/2009 Tdap 9/14/2016 Zoster Vaccine, Live 11/18/2015 Not reviewed this visit You Were Diagnosed With   
  
 Codes Comments Diabetes mellitus without complication ([facility]    -  Primary ICD-10-CM: E11.9 ICD-9-CM: 250.00 Angina, class II ([facility]     ICD-10-CM: I20.9 ICD-9-CM: 413.9 Essential hypertension     ICD-10-CM: I10 
ICD-9-CM: 401.9 Hypercholesteremia     ICD-10-CM: E78.00 ICD-9-CM: 272.0 Mild intermittent asthma without complication     INJ-22-AH: J45.20 ICD-9-CM: 493.90   
 CAMP (dyspnea on exertion)     ICD-10-CM: R06.09 
ICD-9-CM: 786.09 Localized edema     ICD-10-CM: R60.0 ICD-9-CM: 782.3 Malignant neoplasm of esophagus, unspecified location [facility])     ICD-10-CM: C15.9 ICD-9-CM: 150.9 Hand arthritis     ICD-10-CM: M12.9 ICD-9-CM: 716.94 Vitals BP Pulse Temp Height(growth percentile) Weight(growth percentile) SpO2  
 113/72 (BP 1 Location: Right arm, BP Patient Position: Sitting) 60 97.9 °F (36.6 °C) (Oral) 5' 3\" (1.6 m) 176 lb 6.4 oz (80 kg) 96% BMI OB Status Smoking Status 31.25 kg/m2 Postmenopausal Never Smoker BMI and BSA Data Body Mass Index Body Surface Area  
 31.25 kg/m 2 1.89 m 2 Preferred Pharmacy Pharmacy Name Phone [facility]PHARM[facility] [address] [place] [address] [phone] Your Updated Medication List  
  
   
This list is accurate as of: 3/24/17  2:01 PM.  Always use your most recent med list.  
  
  
  
  
 albuterol 90 mcg/actuation inhaler Commonly known as:  PROVENTIL HFA, VENTOLIN HFA, PROAIR HFA Take 2 Puffs by inhalation as needed. ALEVE 220 mg tablet Generic drug:  naproxen sodium Take 220 mg by mouth two (2) times daily (with meals). aspirin 81 mg tablet Take 81 mg by mouth. atorvastatin 40 mg tablet Commonly known as:  LIPITOR  
TAKE 1 TABLET NIGHTLY  
  
 BENADRYL ALLERGY 25 mg tablet Generic drug:  diphenhydrAMINE Take 25 mg by mouth nightly as needed for Sleep. DEXILANT 60 mg Cpdb Generic drug:  Dexlansoprazole Take 60 mg by mouth daily. furosemide 20 mg tablet Commonly known as:  LASIX Take 1 Tab by mouth daily. hydrocortisone 25 mg Supp Commonly known as:  ANUSOL-HC  
  
 insulin aspart 100 unit/mL Inpn Commonly known as:  Jordanldjossie Brennanino 4 units every evening with blood sugars over 120  
  
 insulin glargine 100 unit/mL (3 mL) pen Commonly known as:  LANTUS SOLOSTAR INJECT 34 UNITS UNDER THE SKIN EVERY MORNING Insulin Needles (Disposable) 31 gauge x 5/16\" Ndle Commonly known as:  CLICKFINE  
USE DAILY AS DIRECTED  
  
 lidocaine 2 % jelly Commonly known as:  XYLOCAINE  
  
 melatonin Tab tablet Take 5 mg by mouth daily. metoclopramide HCl 10 mg tablet Commonly known as:  REGLAN Take 10 mg by mouth daily. MIRALAX 17 gram packet Generic drug:  polyethylene glycol Take 17 g by mouth daily. mometasone 220 mcg (60 doses) inhaler Commonly known as:  ASMANEX TWISTHALER  
USE 1 INHALATION TWICE A DAY (DISCARD 45 DAYS AFTER OPENING)  
  
 montelukast 10 mg tablet Commonly known as:  SINGULAIR  
TAKE 1 TABLET DAILY NITRO-BID 2 % ointment Generic drug:  nitroglycerin  
  
 nystatin topical cream  
Commonly known as:  MYCOSTATIN  
  
 ONETOUCH ULTRA TEST strip Generic drug:  glucose blood VI test strips CHECK TWICE A DAY  
  
 pneumococcal 13 michelle conj dip 0.5 mL Syrg injection Commonly known as:  PREVNAR 13 (PF)  
0.5 mL by IntraMUSCular route PRIOR TO DISCHARGE for 1 dose. RANEXA 500 mg SR tablet Generic drug:  ranolazine ER  
TAKE 1 TABLET EVERY 12 HOURS  
  
 SYMBICORT 160-4.5 mcg/actuation HFA inhaler Generic drug:  budesonide-formoterol Take 2 Puffs by inhalation two (2) times a day. valsartan 160 mg tablet Commonly known as:  DIOVAN  
TAKE 1 TABLET DAILY  
  
 varicella-zoster vacine live 19,400 unit/0.65 mL Susr injection Generic drug:  varicella zoster vacine live VITAMIN B-6 100 mg tablet Generic drug:  pyridoxine (vitamin B6) Take 100 mg by mouth every morning. VITAMIN D2 PO Take 2,000 Units by mouth daily. ZyrTEC 10 mg tablet Generic drug:  cetirizine Take 10 mg by mouth daily. We Performed the Following AMB POC HEMOGLOBIN A1C [58586 CPT(R)]  DIABETES FOOT EXAM [7 Custom] Follow-up Instructions Return in about 3 months (around 6/24/2017). Patient Instructions Decrease lantus to 25units Introducing [facility]! Dear Shayy Mcdonald: Thank you for requesting a [employer] account. Our records indicate that you already have an active [employer] account. You can access your account anytime at [email or web address] Did you know that you can access your hospital and ER discharge instructions at any time in [employer]? You can also review all of your test results from your hospital stay or ER visit. Additional Information If you have questions, please visit the Frequently Asked Questions section of the [employer] website at [email or web address] Remember, [employer] is NOT to be used for urgent needs. For medical emergencies, dial 911. Now available from your iPhone and Android! Please provide this summary of care documentation to your next provider. Your primary care clinician is listed as Jamia Gaston. If you have any questions after today's visit, please call [phone].

## 2017-03-27 ENCOUNTER — HOSPITAL ENCOUNTER (OUTPATIENT)
Dept: CARDIAC CATH/INVASIVE PROCEDURES | Age: 62
Setting detail: OBSERVATION
Discharge: HOME | End: 2017-03-28
Attending: INTERNAL MEDICINE | Admitting: INTERNAL MEDICINE
Payer: COMMERCIAL

## 2017-03-27 LAB
ATRIAL RATE: 56 BPM
CALCULATED P AXIS, ECG09: 28 DEGREES
CALCULATED R AXIS, ECG10: -19 DEGREES
CALCULATED T AXIS, ECG11: 9 DEGREES
DIAGNOSIS, 93000: NORMAL
GLUCOSE BLD STRIP.AUTO-MCNC: 115 MG/DL (ref 65–100)
GLUCOSE BLD STRIP.AUTO-MCNC: 139 MG/DL (ref 65–100)
GLUCOSE BLD STRIP.AUTO-MCNC: 148 MG/DL (ref 65–100)
GLUCOSE BLD STRIP.AUTO-MCNC: 63 MG/DL (ref 65–100)
GLUCOSE BLD STRIP.AUTO-MCNC: 69 MG/DL (ref 65–100)
GLUCOSE BLD STRIP.AUTO-MCNC: 80 MG/DL (ref 65–100)
P-R INTERVAL, ECG05: 170 MS
Q-T INTERVAL, ECG07: 488 MS
QRS DURATION, ECG06: 118 MS
QTC CALCULATION (BEZET), ECG08: 470 MS
SERVICE CMNT-IMP: ABNORMAL
SERVICE CMNT-IMP: NORMAL
SERVICE CMNT-IMP: NORMAL
VENTRICULAR RATE, ECG03: 56 BPM

## 2017-03-27 PROCEDURE — 77030015766

## 2017-03-27 PROCEDURE — 93041 RHYTHM ECG TRACING: CPT

## 2017-03-27 PROCEDURE — 82962 GLUCOSE BLOOD TEST: CPT

## 2017-03-27 PROCEDURE — 99218 HC RM OBSERVATION: CPT

## 2017-03-27 PROCEDURE — C1769 GUIDE WIRE: HCPCS

## 2017-03-27 PROCEDURE — 74011250636 HC RX REV CODE- 250/636: Performed by: INTERNAL MEDICINE

## 2017-03-27 PROCEDURE — 77030004549 HC CATH ANGI DX PRF MRTM -A

## 2017-03-27 PROCEDURE — 77030019698 HC SYR ANGI MDLON MRTM -A

## 2017-03-27 PROCEDURE — 77030012468 HC VLV BLEEDBK CNTRL ABBT -B

## 2017-03-27 PROCEDURE — 77030028837 HC SYR ANGI PWR INJ COEU -A

## 2017-03-27 PROCEDURE — 77030008543 HC TBNG MON PRSS MRTM -A

## 2017-03-27 PROCEDURE — 74011636320 HC RX REV CODE- 636/320: Performed by: INTERNAL MEDICINE

## 2017-03-27 PROCEDURE — 74011250637 HC RX REV CODE- 250/637

## 2017-03-27 PROCEDURE — 74011000250 HC RX REV CODE- 250

## 2017-03-27 PROCEDURE — 74011000258 HC RX REV CODE- 258: Performed by: INTERNAL MEDICINE

## 2017-03-27 PROCEDURE — C1874 STENT, COATED/COV W/DEL SYS: HCPCS

## 2017-03-27 PROCEDURE — 74011250636 HC RX REV CODE- 250/636

## 2017-03-27 PROCEDURE — C1894 INTRO/SHEATH, NON-LASER: HCPCS

## 2017-03-27 PROCEDURE — 74011636320 HC RX REV CODE- 636/320

## 2017-03-27 PROCEDURE — 93458 L HRT ARTERY/VENTRICLE ANGIO: CPT

## 2017-03-27 PROCEDURE — 74011250637 HC RX REV CODE- 250/637: Performed by: INTERNAL MEDICINE

## 2017-03-27 PROCEDURE — C1887 CATHETER, GUIDING: HCPCS

## 2017-03-27 PROCEDURE — 77030019569 HC BND COMPR RAD TERU -B

## 2017-03-27 PROCEDURE — 77030010221 HC SPLNT WR POS TELE -B

## 2017-03-27 RX ORDER — LANOLIN ALCOHOL/MO/W.PET/CERES
6 CREAM (GRAM) TOPICAL DAILY
Status: DISCONTINUED | OUTPATIENT
Start: 2017-03-28 | End: 2017-03-28 | Stop reason: HOSPADM

## 2017-03-27 RX ORDER — FENTANYL CITRATE 50 UG/ML
25-50 INJECTION, SOLUTION INTRAMUSCULAR; INTRAVENOUS
Status: DISCONTINUED | OUTPATIENT
Start: 2017-03-27 | End: 2017-03-27

## 2017-03-27 RX ORDER — DEXTROSE 50 % IN WATER (D50W) INTRAVENOUS SYRINGE
Status: COMPLETED
Start: 2017-03-27 | End: 2017-03-27

## 2017-03-27 RX ORDER — CLOPIDOGREL 300 MG/1
600 TABLET, FILM COATED ORAL ONCE
Status: COMPLETED | OUTPATIENT
Start: 2017-03-27 | End: 2017-03-27

## 2017-03-27 RX ORDER — VERAPAMIL HYDROCHLORIDE 2.5 MG/ML
INJECTION, SOLUTION INTRAVENOUS
Status: COMPLETED
Start: 2017-03-27 | End: 2017-03-27

## 2017-03-27 RX ORDER — MIDAZOLAM HYDROCHLORIDE 1 MG/ML
.5-2 INJECTION, SOLUTION INTRAMUSCULAR; INTRAVENOUS
Status: DISCONTINUED | OUTPATIENT
Start: 2017-03-27 | End: 2017-03-27

## 2017-03-27 RX ORDER — HEPARIN SODIUM 200 [USP'U]/100ML
INJECTION, SOLUTION INTRAVENOUS
Status: COMPLETED
Start: 2017-03-27 | End: 2017-03-27

## 2017-03-27 RX ORDER — LIDOCAINE HYDROCHLORIDE 10 MG/ML
INJECTION, SOLUTION EPIDURAL; INFILTRATION; INTRACAUDAL; PERINEURAL
Status: COMPLETED
Start: 2017-03-27 | End: 2017-03-27

## 2017-03-27 RX ORDER — SODIUM CHLORIDE 0.9 % (FLUSH) 0.9 %
5-10 SYRINGE (ML) INJECTION EVERY 8 HOURS
Status: DISCONTINUED | OUTPATIENT
Start: 2017-03-27 | End: 2017-03-28 | Stop reason: HOSPADM

## 2017-03-27 RX ORDER — HEPARIN SODIUM 200 [USP'U]/100ML
500 INJECTION, SOLUTION INTRAVENOUS ONCE
Status: COMPLETED | OUTPATIENT
Start: 2017-03-27 | End: 2017-03-27

## 2017-03-27 RX ORDER — VALSARTAN 80 MG/1
160 TABLET ORAL DAILY
Status: DISCONTINUED | OUTPATIENT
Start: 2017-03-28 | End: 2017-03-28 | Stop reason: HOSPADM

## 2017-03-27 RX ORDER — LANOLIN ALCOHOL/MO/W.PET/CERES
100 CREAM (GRAM) TOPICAL
Status: DISCONTINUED | OUTPATIENT
Start: 2017-03-28 | End: 2017-03-28 | Stop reason: HOSPADM

## 2017-03-27 RX ORDER — BIVALIRUDIN 250 MG/5ML
INJECTION, POWDER, LYOPHILIZED, FOR SOLUTION INTRAVENOUS
Status: DISCONTINUED
Start: 2017-03-27 | End: 2017-03-28 | Stop reason: HOSPADM

## 2017-03-27 RX ORDER — HEPARIN SODIUM 1000 [USP'U]/ML
INJECTION, SOLUTION INTRAVENOUS; SUBCUTANEOUS
Status: COMPLETED
Start: 2017-03-27 | End: 2017-03-27

## 2017-03-27 RX ORDER — MIDAZOLAM HYDROCHLORIDE 1 MG/ML
INJECTION, SOLUTION INTRAMUSCULAR; INTRAVENOUS
Status: COMPLETED
Start: 2017-03-27 | End: 2017-03-27

## 2017-03-27 RX ORDER — DEXTROSE 50 % IN WATER (D50W) INTRAVENOUS SYRINGE
12.5-25 AS NEEDED
Status: DISCONTINUED | OUTPATIENT
Start: 2017-03-27 | End: 2017-03-28 | Stop reason: HOSPADM

## 2017-03-27 RX ORDER — POLYETHYLENE GLYCOL 3350 17 G/17G
17 POWDER, FOR SOLUTION ORAL DAILY
Status: DISCONTINUED | OUTPATIENT
Start: 2017-03-28 | End: 2017-03-28 | Stop reason: HOSPADM

## 2017-03-27 RX ORDER — SODIUM CHLORIDE 9 MG/ML
INJECTION, SOLUTION INTRAVENOUS
Status: DISCONTINUED
Start: 2017-03-27 | End: 2017-03-28 | Stop reason: HOSPADM

## 2017-03-27 RX ORDER — HEPARIN SODIUM 1000 [USP'U]/ML
2500 INJECTION, SOLUTION INTRAVENOUS; SUBCUTANEOUS ONCE
Status: COMPLETED | OUTPATIENT
Start: 2017-03-27 | End: 2017-03-27

## 2017-03-27 RX ORDER — LIDOCAINE HYDROCHLORIDE 10 MG/ML
1-30 INJECTION, SOLUTION EPIDURAL; INFILTRATION; INTRACAUDAL; PERINEURAL
Status: DISCONTINUED | OUTPATIENT
Start: 2017-03-27 | End: 2017-03-27

## 2017-03-27 RX ORDER — SODIUM CHLORIDE 0.9 % (FLUSH) 0.9 %
5-10 SYRINGE (ML) INJECTION AS NEEDED
Status: DISCONTINUED | OUTPATIENT
Start: 2017-03-27 | End: 2017-03-28 | Stop reason: HOSPADM

## 2017-03-27 RX ORDER — MAGNESIUM SULFATE 100 %
4 CRYSTALS MISCELLANEOUS AS NEEDED
Status: DISCONTINUED | OUTPATIENT
Start: 2017-03-27 | End: 2017-03-28 | Stop reason: HOSPADM

## 2017-03-27 RX ORDER — SODIUM CHLORIDE 9 MG/ML
75 INJECTION, SOLUTION INTRAVENOUS CONTINUOUS
Status: DISPENSED | OUTPATIENT
Start: 2017-03-27 | End: 2017-03-27

## 2017-03-27 RX ORDER — INSULIN GLARGINE 100 [IU]/ML
34 INJECTION, SOLUTION SUBCUTANEOUS DAILY
Status: DISCONTINUED | OUTPATIENT
Start: 2017-03-28 | End: 2017-03-28 | Stop reason: HOSPADM

## 2017-03-27 RX ORDER — CLOPIDOGREL BISULFATE 75 MG/1
75 TABLET ORAL DAILY
Status: DISCONTINUED | OUTPATIENT
Start: 2017-03-28 | End: 2017-03-28 | Stop reason: HOSPADM

## 2017-03-27 RX ORDER — PANTOPRAZOLE SODIUM 40 MG/1
40 TABLET, DELAYED RELEASE ORAL
Status: DISCONTINUED | OUTPATIENT
Start: 2017-03-27 | End: 2017-03-28 | Stop reason: HOSPADM

## 2017-03-27 RX ORDER — ATORVASTATIN CALCIUM 40 MG/1
40 TABLET, FILM COATED ORAL
Status: DISCONTINUED | OUTPATIENT
Start: 2017-03-27 | End: 2017-03-28 | Stop reason: HOSPADM

## 2017-03-27 RX ORDER — INSULIN LISPRO 100 [IU]/ML
INJECTION, SOLUTION INTRAVENOUS; SUBCUTANEOUS
Status: DISCONTINUED | OUTPATIENT
Start: 2017-03-27 | End: 2017-03-28 | Stop reason: HOSPADM

## 2017-03-27 RX ORDER — ADENOSINE 3 MG/ML
INJECTION, SOLUTION INTRAVENOUS
Status: DISCONTINUED
Start: 2017-03-27 | End: 2017-03-28 | Stop reason: HOSPADM

## 2017-03-27 RX ORDER — SODIUM CHLORIDE 9 MG/ML
100 INJECTION, SOLUTION INTRAVENOUS CONTINUOUS
Status: DISCONTINUED | OUTPATIENT
Start: 2017-03-27 | End: 2017-03-27

## 2017-03-27 RX ORDER — CLOPIDOGREL 300 MG/1
TABLET, FILM COATED ORAL
Status: COMPLETED
Start: 2017-03-27 | End: 2017-03-27

## 2017-03-27 RX ORDER — ASPIRIN 81 MG/1
81 TABLET ORAL DAILY
Status: DISCONTINUED | OUTPATIENT
Start: 2017-03-28 | End: 2017-03-28 | Stop reason: HOSPADM

## 2017-03-27 RX ORDER — VERAPAMIL HYDROCHLORIDE 2.5 MG/ML
2.5 INJECTION, SOLUTION INTRAVENOUS ONCE
Status: COMPLETED | OUTPATIENT
Start: 2017-03-27 | End: 2017-03-27

## 2017-03-27 RX ORDER — FENTANYL CITRATE 50 UG/ML
INJECTION, SOLUTION INTRAMUSCULAR; INTRAVENOUS
Status: COMPLETED
Start: 2017-03-27 | End: 2017-03-27

## 2017-03-27 RX ORDER — FLUTICASONE FUROATE AND VILANTEROL 100; 25 UG/1; UG/1
1 POWDER RESPIRATORY (INHALATION) DAILY
Status: DISCONTINUED | OUTPATIENT
Start: 2017-03-28 | End: 2017-03-28 | Stop reason: HOSPADM

## 2017-03-27 RX ORDER — DIPHENHYDRAMINE HCL 25 MG
25 CAPSULE ORAL
Status: DISCONTINUED | OUTPATIENT
Start: 2017-03-27 | End: 2017-03-28 | Stop reason: HOSPADM

## 2017-03-27 RX ORDER — MONTELUKAST SODIUM 10 MG/1
10 TABLET ORAL
Status: DISCONTINUED | OUTPATIENT
Start: 2017-03-27 | End: 2017-03-28 | Stop reason: HOSPADM

## 2017-03-27 RX ADMIN — FENTANYL CITRATE 25 MCG: 50 INJECTION, SOLUTION INTRAMUSCULAR; INTRAVENOUS at 13:19

## 2017-03-27 RX ADMIN — FENTANYL CITRATE 25 MCG: 50 INJECTION, SOLUTION INTRAMUSCULAR; INTRAVENOUS at 13:40

## 2017-03-27 RX ADMIN — CLOPIDOGREL BISULFATE 600 MG: 300 TABLET, FILM COATED ORAL at 13:47

## 2017-03-27 RX ADMIN — FENTANYL CITRATE 25 MCG: 50 INJECTION, SOLUTION INTRAMUSCULAR; INTRAVENOUS at 13:29

## 2017-03-27 RX ADMIN — HEPARIN SODIUM 2500 UNITS: 1000 INJECTION, SOLUTION INTRAVENOUS; SUBCUTANEOUS at 12:59

## 2017-03-27 RX ADMIN — ADENOSINE 140 MCG/KG/MIN: 3 SOLUTION INTRAVENOUS at 13:39

## 2017-03-27 RX ADMIN — MIDAZOLAM HYDROCHLORIDE 1 MG: 1 INJECTION, SOLUTION INTRAMUSCULAR; INTRAVENOUS at 12:44

## 2017-03-27 RX ADMIN — IOPAMIDOL 110 ML: 755 INJECTION, SOLUTION INTRAVENOUS at 13:44

## 2017-03-27 RX ADMIN — LIDOCAINE HYDROCHLORIDE 1 ML: 10 INJECTION, SOLUTION EPIDURAL; INFILTRATION; INTRACAUDAL; PERINEURAL at 12:58

## 2017-03-27 RX ADMIN — Medication 10 ML: at 21:21

## 2017-03-27 RX ADMIN — MIDAZOLAM HYDROCHLORIDE 1 MG: 1 INJECTION, SOLUTION INTRAMUSCULAR; INTRAVENOUS at 13:19

## 2017-03-27 RX ADMIN — VERAPAMIL HYDROCHLORIDE 2.5 MG: 2.5 INJECTION INTRAVENOUS at 12:59

## 2017-03-27 RX ADMIN — IOPAMIDOL 18 ML: 755 INJECTION, SOLUTION INTRAVENOUS at 13:11

## 2017-03-27 RX ADMIN — HEPARIN SODIUM 1000 UNITS: 200 INJECTION, SOLUTION INTRAVENOUS at 12:51

## 2017-03-27 RX ADMIN — CLOPIDOGREL 600 MG: 300 TABLET, FILM COATED ORAL at 13:47

## 2017-03-27 RX ADMIN — FENTANYL CITRATE 25 MCG: 50 INJECTION, SOLUTION INTRAMUSCULAR; INTRAVENOUS at 12:51

## 2017-03-27 RX ADMIN — HEPARIN SODIUM 1000 UNITS: 200 INJECTION, SOLUTION INTRAVENOUS at 12:52

## 2017-03-27 RX ADMIN — VERAPAMIL HYDROCHLORIDE 2.5 MG: 2.5 INJECTION, SOLUTION INTRAVENOUS at 12:59

## 2017-03-27 RX ADMIN — NITROGLYCERIN 200 MCG: 5 INJECTION, SOLUTION INTRAVENOUS at 12:59

## 2017-03-27 RX ADMIN — MONTELUKAST SODIUM 10 MG: 10 TABLET, FILM COATED ORAL at 21:21

## 2017-03-27 RX ADMIN — BIVALIRUDIN 1.75 MG/KG/HR: 250 INJECTION, POWDER, LYOPHILIZED, FOR SOLUTION INTRAVENOUS at 13:24

## 2017-03-27 RX ADMIN — SODIUM CHLORIDE 100 ML/HR: 900 INJECTION, SOLUTION INTRAVENOUS at 11:37

## 2017-03-27 RX ADMIN — DEXTROSE MONOHYDRATE 12.5 G: 25 INJECTION, SOLUTION INTRAVENOUS at 14:11

## 2017-03-27 NOTE — PROCEDURES
LM-normal  LAD-mid 60% at D! LCX-normal .   RCA-PLB 75%. LV-605. Right radial access. No complications. EBL-minimal.   Specimen-none. Reviewed with Dr. Green Noss. FFR of LAD, PCI of PLB.

## 2017-03-27 NOTE — PROGRESS NOTES
Pt is a 57 y.o  female admitted for a Cardiac Cath. Pt was alert, oriented and in no distress resting in bed with her  at the bedside. Demographic information verified and all is correct. Pt lives with her  in a 1 story home with 3 steps to the entrance. Prior to admission, pt was independent with her ADL's and IADL's and drives. Denies using DME or having home health in the past. Pt had outpt PT for her shoulders in the past. Preferred pharmacy is [facility] on [facility] [employer]. Pt visits her PCP q 3 mths. Pt's  can transport her home at discharge. CM provided pt with the Observation letter, pt understood and signed it. Copy given to pt. Care Management Interventions  PCP Verified by CM: Yes (Dr. Jez Enamorado - saw PCP last Friday)  Mode of Transport at Discharge:  Other (see comment) ( can transport)  Transition of Care Consult (CM Consult): Discharge Planning  Discharge Durable Medical Equipment: No  Physical Therapy Consult: No  Occupational Therapy Consult: No  Speech Therapy Consult: No  Current Support Network: Lives with Spouse, Own Home (lives with  in a 1 story ranch with 3 steps to the entrance)  Confirm Follow Up Transport: Family  Discharge Location  Discharge Placement: [address] [employer] [address], [address]

## 2017-03-27 NOTE — H&P (VIEW-ONLY)
Ade Medel NP  Subjective:    Diane David is a 57 y.o. female is here for test results. Continues with CAMP and experiences at times left anterior chest discomfort which she is unsure if reflux related. Findings: The overall quality of the study is excellent. Attenuation artifact was noted. Left ventricular cavity is noted to be normal on the rest and stress studies. No TID noted. SPECT images demonstrate a medium, reversible abnormality of mild severity in the inferior region on the stress and rest images. Gated SPECT images reveals normal myocardial thickening and wall motion. The left ventricular ejection fraction was calculated to be 74 %. Impression:   Myocardial perfusion imaging is abnormal: there is a medium area of ischemia in the inferior region. Overall left ventricular systolic function was normal. Compared to the study from 07/02/2014, the current study reveals inferior wall ischemia. These test results indicate high likelihood for the presence of angiographically significant coronary artery disease.         SUMMARY:  Left ventricle: Systolic function was vigorous. Ejection fraction was  estimated in the range of 55 % to 60 %. No obvious wall motion  abnormalities identified in the views obtained. INDICATIONS: Evaluate for heart failure    PROCEDURE: This was a routine study. The study included complete 2D  imaging, M-mode, complete spectral Doppler, and color Doppler. Systolic  blood pressure was 137 mmHg, at the start of the study. Diastolic blood  pressure was 69 mmHg, at the start of the study. This was a technically  difficult study. LEFT VENTRICLE: Size was normal. Systolic function was vigorous. Ejection  fraction was estimated in the range of 55 % to 60 %. No obvious wall  motion abnormalities identified in the views obtained.  Wall thickness was  normal.    RIGHT VENTRICLE: The size was normal. Systolic function was normal.    LEFT ATRIUM: Size was normal.    RIGHT ATRIUM: Size was normal.    MITRAL VALVE: Normal valve structure. There was normal leaflet separation. DOPPLER: There was no evidence for stenosis. There was no regurgitation. AORTIC VALVE: The valve was trileaflet. Leaflets exhibited normal  thickness and normal cuspal separation. DOPPLER: There was no stenosis. There was no regurgitation. TRICUSPID VALVE: Normal valve structure. There was normal leaflet  separation. DOPPLER: There was trivial regurgitation. Pulmonary artery  systolic pressure was within the normal range. PULMONIC VALVE: Normal valve structure. DOPPLER: There was no  regurgitation.       Patient Active Problem List    Diagnosis Date Noted    Diabetes mellitus without complication ([address] 65/92/9412    ASHD (arteriosclerotic heart disease) 06/18/2014    Syncope 06/18/2014    Bradycardia 06/18/2014    Multinodular goiter     Angina, class II ([address] 04/18/2013    Family history of coronary artery disease 04/18/2013    S/P cardiac cath 04/18/2013    Hypercholesteremia 02/18/2013    HTN (hypertension) 02/18/2013    Asthma 02/18/2013    Depression 02/18/2013    Thyroid nodule 02/18/2013    Shoulder capsulitis 02/18/2013    Incisional hernia 02/10/2011    Esophageal cancer ([address] 02/10/2011      Roberto Wells MD  Past Medical History:   Diagnosis Date    Asthma     coughing couple weeks ago    Bloating     CAD (coronary artery disease)     Cancer ([address] 2010    esophageal/GE junction    Chest pain     Chest pain     Chronic pain     left shoulder    Congestion of throat     Cough     Depression     & anxiety    Diabetes (HCC)     IDDM    Dyspepsia and other specified disorders of function of stomach     Fatigue     GERD (gastroesophageal reflux disease)     Headache(784.0)     Hypercholesterolemia     Hypertension     Multinodular goiter     Nausea & vomiting     Stool color black     Stress     Weakness       Past Surgical History:   Procedure Laterality Date    EXTRACTION ERUPTED TOOTH/EXR      HX ENDOSCOPY  4/2016    HX GI  2010    gastroesophagectomy    HX GYN  1983    lap btl    HX HEENT  1957    tonsils as child    HX HERNIA REPAIR  04/26/2012    HX LAP CHOLECYSTECTOMY  1995    HX ORTHOPAEDIC  02/20/2015    right shoulder manipulation    HX OTHER SURGICAL      left shoulder surgery for frozen surgery    HX OTHER SURGICAL  6/24/2015    mammogram     HX PELVIC LAPAROSCOPY  1989    HX VASCULAR ACCESS      port a cath and removal    DC EGD BALLOON DILATION ESOPHAGUS <30 MM DIAM  4/20/2010          Allergies   Allergen Reactions    Adhesive Other (comments)     redness      Family History   Problem Relation Age of Onset    Diabetes Father     Cancer Father      intestinal    Heart Disease Father     Hypertension Father     Heart Disease Brother     Diabetes Brother     Diabetes Mother     Lung Disease Mother     Heart Disease Mother     Hypertension Mother     Diabetes Maternal Grandmother     Diabetes Maternal Grandfather     Elevated Lipids Maternal Aunt     Thyroid Disease Neg Hx       Current Outpatient Prescriptions   Medication Sig    SYMBICORT 160-4.5 mcg/actuation HFA inhaler Take 2 Puffs by inhalation two (2) times a day.  naproxen sodium (ALEVE) 220 mg tablet Take 220 mg by mouth two (2) times daily (with meals).  furosemide (LASIX) 20 mg tablet Take 1 Tab by mouth daily.  diphenhydrAMINE (BENADRYL ALLERGY) 25 mg tablet Take 25 mg by mouth nightly as needed for Sleep.  valsartan (DIOVAN) 160 mg tablet TAKE 1 TABLET DAILY    ONETOUCH ULTRA TEST strip CHECK TWICE A DAY    RANEXA 500 mg SR tablet TAKE 1 TABLET EVERY 12 HOURS    atorvastatin (LIPITOR) 40 mg tablet TAKE 1 TABLET NIGHTLY    pneumococcal 13 michelle conj dip (PREVNAR 13, PF,) 0.5 mL syrg injection 0.5 mL by IntraMUSCular route PRIOR TO DISCHARGE for 1 dose.     montelukast (SINGULAIR) 10 mg tablet TAKE 1 TABLET DAILY    Insulin Needles, Disposable, (CLICKFINE) 31 gauge x 5/16\" ndle USE DAILY AS DIRECTED    hydrocortisone (ANUSOL-HC) 25 mg supp     lidocaine (XYLOCAINE) 2 % jelly     NITRO-BID 2 % ointment     insulin aspart (NOVOLOG FLEXPEN) 100 unit/mL inpn 4 units every evening with blood sugars over 120    insulin glargine (LANTUS SOLOSTAR) 100 unit/mL (3 mL) pen INJECT 34 UNITS UNDER THE SKIN EVERY MORNING    metoclopramide HCl (REGLAN) 10 mg tablet Take 10 mg by mouth daily.  nystatin (MYCOSTATIN) topical cream     VARICELLA-ZOSTER VACINE LIVE 19,400 unit/0.65 mL susr injection     albuterol (PROVENTIL HFA, VENTOLIN HFA, PROAIR HFA) 90 mcg/actuation inhaler Take 2 Puffs by inhalation as needed.  polyethylene glycol (MIRALAX) 17 gram packet Take 17 g by mouth daily.  cetirizine (ZYRTEC) 10 mg tablet Take 10 mg by mouth daily.  melatonin 5 mg Tab Take 5 mg by mouth daily.  Dexlansoprazole (DEXILANT) 60 mg CpDM Take 60 mg by mouth daily.  aspirin 81 mg tablet Take 81 mg by mouth.  pyridoxine (VITAMIN B-6) 100 mg tablet Take 100 mg by mouth every morning.  ERGOCALCIFEROL, VITAMIN D2, (VITAMIN D PO) Take 2,000 Units by mouth daily.  mometasone (ASMANEX TWISTHALER) 220 mcg (60 doses) inhaler USE 1 INHALATION TWICE A DAY (DISCARD 45 DAYS AFTER OPENING)     No current facility-administered medications for this visit. Vitals:    03/14/17 1026 03/14/17 1036   BP: 110/68 94/68   Pulse: (!) 56    Resp: 16    SpO2: 97%    Weight: 176 lb 12.8 oz (80.2 kg)    Height: 5' 3\" (1.6 m)      Social History     Social History    Marital status:      Spouse name: N/A    Number of children: N/A    Years of education: N/A     Occupational History    Not on file.      Social History Main Topics    Smoking status: Never Smoker    Smokeless tobacco: Never Used    Alcohol use 0.5 oz/week     1 Glasses of wine per week      Comment: rarely glass of wine    Drug use: No    Sexual activity: Not on file     Other Topics Concern    Not on file     Social History Narrative    Lives in [facility] with . Has a 45 yo daughter and an adopted 24 yo daughter. 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 I have reviewed the nurses notes, vitals, problem list, allergy list, medical history, family medical, social history and medications. Review of Symptoms:    General: Pt denies excessive weight gain or loss. Pt is able to conduct ADL's  HEENT: Denies blurred vision, headaches, epistaxis and difficulty swallowing. Respiratory: Denies shortness of breath, +CAMP, no wheezing or stridor. Cardiovascular: Denies precordial pain, palpitations, edema or PND  Gastrointestinal: Denies poor appetite, indigestion, abdominal pain or blood in stool      Physical Exam:      General: Well developed, in no acute distress. HEENT: No carotid bruits, no JVD, trach is midline. Heart:  Normal S1/S2 negative S3 or S4. Regular, no murmur, gallop or rub.   Respiratory: Clear bilaterally x 4, no wheezing or rales  Abdomen:   Soft, non-tender, bowel sounds are active.   Extremities:  No edema, normal cap refill, no cyanosis. Neuro: A&Ox3, speech clear, gait stable. Skin: Skin color is normal. No rashes or lesions. Non diaphoretic  Vascular: 2+ pulses symmetric in all extremities      Cardiographics    ECG:   Results for orders placed or performed in visit on 10/15/15   CARDIAC HOLTER MONITOR, 24 HOURS    Narrative    ECG Monitor/24 hours, Complete    Reason for Holter Monitor   BRADYCARDIA    Heartbeat    Slowest 40  Average 52  Fastest  89          Results:   Underlying Rhythm: Normal sinus rhythm      Atrial Arrhythmias: premature atrial contractions; frequent             AV Conduction: normal    Ventricular Arrhythmias: premature ventricular contractions; rare    ST Segment Analysis:non-specific changes     Symptom Correlation:  yes    Comment:   Frequent PAC's.      Elaine John MD             Results for orders placed or performed during the hospital encounter of 02/04/11   EKG, 12 LEAD, INITIAL   Result Value Ref Range    Ventricular Rate 45 BPM    Atrial Rate 45 BPM    P-R Interval 152 ms    QRS Duration 122 ms    Q-T Interval 466 ms    QTC Calculation (Bezet) 403 ms    Calculated P Axis 40 degrees    Calculated R Axis -16 degrees    Calculated T Axis 25 degrees    Diagnosis       Marked sinus bradycardia  Right bundle branch block  Left ventricular hypertrophy with QRS widening  No previous ECGs available  Confirmed by Triny Alvarez ([id number]) on 2/4/2011 11:15:10 AM        Labs:  Lab Results   Component Value Date/Time    Sodium 139 12/22/2016 02:00 PM    Potassium 4.4 12/22/2016 02:00 PM    Chloride 99 12/22/2016 02:00 PM    CO2 23 12/22/2016 02:00 PM    Anion gap 5 04/26/2012 10:00 AM    Glucose 158 12/22/2016 02:00 PM    BUN 14 12/22/2016 02:00 PM    Creatinine 0.89 12/22/2016 02:00 PM    BUN/Creatinine ratio 16 12/22/2016 02:00 PM    GFR est AA 80 12/22/2016 02:00 PM    GFR est non-AA 69 12/22/2016 02:00 PM    Calcium 8.8 12/22/2016 02:00 PM    AST (SGOT) 28 09/14/2016 01:49 PM    Alk. phosphatase 62 09/14/2016 01:49 PM    Protein, total 6.4 09/14/2016 01:49 PM    Albumin 4.4 09/14/2016 01:49 PM    Globulin 3.4 02/01/2010 04:40 AM    A-G Ratio 2.2 09/14/2016 01:49 PM    ALT (SGPT) 34 09/14/2016 01:49 PM      Lab Results   Component Value Date/Time    WBC 6.4 02/02/2016 01:33 PM    HGB 10.5 02/02/2016 01:33 PM    HCT 32.4 02/02/2016 01:33 PM    PLATELET 095 60/11/9481 01:33 PM    MCV 83 02/02/2016 01:33 PM    All Cardiac Markers in the last 24 hours:  No results found for: CPK, CKMMB, CKMB, RCK3, CKMBT, CKNDX, CKND1, PELON, TROPT, TROIQ, AGAPITO, TROPT, TNIPOC, BNP, BNPP              Assessment:     Assessment:         ICD-10-CM ICD-9-CM    1. ASHD (arteriosclerotic heart disease) I25.10 414.00    2. Hypercholesteremia E78.00 272.0    3. Essential hypertension I10 401.9    4.  Diabetes mellitus without complication ([facility] B51.6 250. 00    5. Angina, class II ([facility] I20.9 413.9        No orders of the defined types were placed in this encounter. Plan:     Patient presents with abnormal NST with continued CAMP and episodes of chest discomfort. Presently on Ranexa, due to low BP unable to add additional therapy risking hypotension. Discussed risk and benefits of coronary angiography and is willing to proceed. Yayo Carrero NP        [facility]    3/14/2017         Agree with note as outlined by  NP. I confirm findings in history and physical exam. No additional findings noted. Agree with plan as outlined above.      [address] MD

## 2017-03-27 NOTE — INTERVAL H&P NOTE
H&P Update:  Shane Mcknight was seen and examined. History and physical has been reviewed. The patient has been examined.  There have been no significant clinical changes since the completion of the originally dated History and Physical.    Signed By: Chuck May MD     March 27, 2017 11:14 AM

## 2017-03-27 NOTE — IP AVS SNAPSHOT
[address] [facility] 
[phone] Patient: Yaneth Kuhn MRN: [id number] OZC:[id number] You are allergic to the following Allergen Reactions Adhesive Other (comments)  
 redness Recent Documentation Height Weight Breastfeeding? BMI OB Status Smoking Status 1.6 m 79.8 kg No 31.18 kg/m2 Postmenopausal Never Smoker Emergency Contacts Name Discharge Info Relation Home Work Mobile Brad Osborn DISCHARGE CAREGIVER [3] Spouse [3] [phone]  [phone] About your hospitalization You were admitted on:  March 27, 2017 You last received care in the:  [facility] 2 [facility] You were discharged on:  March 28, 2017 Unit phone number:  [phone] Why you were hospitalized Your primary diagnosis was:  Not on File Your diagnoses also included:  S/P Cardiac Cath Providers Seen During Your Hospitalizations Provider Role Specialty Primary office phone Marion Everett MD Attending Provider Cardiology [phone] Your Primary Care Physician (PCP) Primary Care Physician Office Phone Office Fax Carter Ley [phone] Follow-up Information Follow up With Details Comments Contact Info Celeste Horton, [address] [facility] [address] [facility] 
[phone] Marion Everett MD Schedule an appointment as soon as possible for a visit in 2 weeks  [address] [place] [address] 
[phone] Marion Everett MD In 2 weeks April 14 at 10:00 am [address] [place] [address] 
[phone] Your Appointments Friday April 14, 2017 10:00 AM EDT HOSPITAL FOLLOW-UP with Marion Everett MD  
[facility] [address] [address] [phone] Current Discharge Medication List  
  
START taking these medications Dose & Instructions Dispensing Information Comments Morning Noon Evening Bedtime  
 clopidogrel 75 mg Tab Commonly known as:  PLAVIX Your last dose was: Your next dose is:    
   
   
 Dose:  75 mg Take 1 Tab by mouth daily. Indications: Thrombosis Prevention after PCI Quantity:  30 Tab Refills:  11 CONTINUE these medications which have NOT CHANGED Dose & Instructions Dispensing Information Comments Morning Noon Evening Bedtime  
 albuterol 90 mcg/actuation inhaler Commonly known as:  PROVENTIL HFA, VENTOLIN HFA, PROAIR HFA Your last dose was: Your next dose is:    
   
   
 Dose:  2 Puff Take 2 Puffs by inhalation as needed. Quantity:  1 Inhaler Refills:  3 ALEVE 220 mg tablet Generic drug:  naproxen sodium Your last dose was: Your next dose is:    
   
   
 Dose:  220 mg Take 220 mg by mouth two (2) times daily (with meals). Refills:  0  
     
   
   
   
  
 aspirin 81 mg tablet Your last dose was: Your next dose is:    
   
   
 Dose:  81 mg Take 81 mg by mouth. Refills:  0  
     
   
   
   
  
 atorvastatin 40 mg tablet Commonly known as:  LIPITOR Your last dose was: Your next dose is: TAKE 1 TABLET NIGHTLY Quantity:  90 Tab Refills:  1 BENADRYL ALLERGY 25 mg tablet Generic drug:  diphenhydrAMINE Your last dose was: Your next dose is:    
   
   
 Dose:  25 mg Take 25 mg by mouth nightly as needed for Sleep. Refills:  0 DEXILANT 60 mg Cpdb Generic drug:  Dexlansoprazole Your last dose was: Your next dose is:    
   
   
 Dose:  60 mg Take 60 mg by mouth daily. Refills:  0  
     
   
   
   
  
 furosemide 20 mg tablet Commonly known as:  LASIX Your last dose was: Your next dose is:    
   
   
 Dose:  20 mg Take 1 Tab by mouth daily. Quantity:  30 Tab Refills:  3  
     
   
   
   
  
 hydrocortisone 25 mg Supp Commonly known as:  ANUSOL-HC Your last dose was: Your next dose is:    
   
   
  Refills:  2  
     
   
   
   
  
 insulin aspart 100 unit/mL Inpn Commonly known as:  Rose Lock Your last dose was: Your next dose is:    
   
   
 4 units every evening with blood sugars over 120 Quantity:  5 Pen Refills:  3  
     
   
   
   
  
 insulin glargine 100 unit/mL (3 mL) pen Commonly known as:  LANTUS SOLOSTAR Your last dose was: Your next dose is: INJECT 34 UNITS UNDER THE SKIN EVERY MORNING Quantity:  3 Each Refills:  3 Insulin Needles (Disposable) 31 gauge x 5/16\" Ndle Commonly known as:  CLICKFINE Your last dose was: Your next dose is: USE DAILY AS DIRECTED Quantity:  100 Package Refills:  10  
     
   
   
   
  
 lidocaine 2 % jelly Commonly known as:  XYLOCAINE Your last dose was: Your next dose is:    
   
   
  Refills:  2  
     
   
   
   
  
 melatonin Tab tablet Your last dose was: Your next dose is:    
   
   
 Dose:  5 mg Take 5 mg by mouth daily. Refills:  0  
     
   
   
   
  
 metoclopramide HCl 10 mg tablet Commonly known as:  REGLAN Your last dose was: Your next dose is:    
   
   
 Dose:  10 mg Take 10 mg by mouth daily. Refills:  0 MIRALAX 17 gram packet Generic drug:  polyethylene glycol Your last dose was: Your next dose is:    
   
   
 Dose:  17 g Take 17 g by mouth daily. Refills:  0  
     
   
   
   
  
 montelukast 10 mg tablet Commonly known as:  SINGULAIR Your last dose was: Your next dose is: TAKE 1 TABLET DAILY Quantity:  90 Tab Refills:  2 NITRO-BID 2 % ointment Generic drug:  nitroglycerin Your last dose was: Your next dose is:    
   
   
  Refills:  2  
     
   
   
   
  
 nystatin topical cream  
Commonly known as:  MYCOSTATIN Your last dose was: Your next dose is:    
   
   
  Refills:  0  
     
   
   
   
  
 ONETOUCH ULTRA TEST strip Generic drug:  glucose blood VI test strips Your last dose was: Your next dose is: CHECK TWICE A DAY Quantity:  200 Strip Refills:  12 RANEXA 500 mg SR tablet Generic drug:  ranolazine ER Your last dose was: Your next dose is: TAKE 1 TABLET EVERY 12 HOURS Quantity:  180 Tab Refills:  1 SYMBICORT 160-4.5 mcg/actuation HFA inhaler Generic drug:  budesonide-formoterol Your last dose was: Your next dose is:    
   
   
 Dose:  2 Puff Take 2 Puffs by inhalation two (2) times a day. Refills:  0  
     
   
   
   
  
 valsartan 160 mg tablet Commonly known as:  DIOVAN Your last dose was: Your next dose is: TAKE 1 TABLET DAILY Quantity:  90 Tab Refills:  1 VITAMIN B-6 100 mg tablet Generic drug:  pyridoxine (vitamin B6) Your last dose was: Your next dose is:    
   
   
 Dose:  100 mg Take 100 mg by mouth every morning. Refills:  0  
     
   
   
   
  
 VITAMIN D2 PO Your last dose was: Your next dose is:    
   
   
 Dose:  2000 Units Take 2,000 Units by mouth daily. Refills:  0 ZyrTEC 10 mg tablet Generic drug:  cetirizine Your last dose was: Your next dose is:    
   
   
 Dose:  10 mg Take 10 mg by mouth daily. Refills:  0 Where to Get Your Medications These medications were sent to [address] [facility], [address] [place] [address] Phone:  [phone]  
  clopidogrel 75 mg Tab Discharge Instructions [address] [facility], [place], [address] [facility]  [phone] Patient ID: 
Mary Gonzales [id number] 
19 y.o. 
1955 Admit Date: 3/27/2017 Discharge Date: 3/28/2017 Admitting Physician: Rere Kennedy MD  
 
Discharge Physician: Gisel Morgan NP/ Dr. Donna Russell Admission Diagnoses:  
CATH 75 CATH 75 Discharge Diagnoses: Active Problems: S/P cardiac cath (4/18/2013) Overview: 4/18/13 LM-normal. LAD-mid 50-70% after D!. LCX-normal. RCA-dominant, PDA  
    30%, PLB-30%. LV-60%. FFR of mid LAD. Rufus Olivia 3/27/17: KATHERINE PDA Discharge Condition: Good Cardiology Procedures this Admission:  Left heart catheterization with PCI Disposition: home Reference discharge instructions provided by nursing for diet and activity. Signed: 
Gisel Morgan NP 
3/28/2017 
9:26 AM 
 
 
Radial Cardiac Catheterization/Angiography Discharge Instructions It is normal to feel tired the first couple days. Take it easy and follow the physicians instructions. CHECK THE CATHETER INSERTION SITE DAILY: 
 
Remove the wrist dressing 24 hours after the procedure. You may shower 24 hours after the procedure. Wash with soap and water and pat dry. Gentle cleaning of the site with soap and water is sufficient, cover with a dry clean dressing or bandage. Do not apply creams or powders to the area. No soaking the wrist for 3 days. Leave the puncture site open to air after 24 hours post-procedure. CALL THE PHYSICIANS:  
 
If the site becomes red, swollen or feels warm to the touch If there is bleeding or drainage or if there is unusual pain at the radial site. If there is any minor oozing, you may apply a band-aid and remove after 12 hours. If the bleeding continues, hold pressure with the middle finger against the puncture site and the thumb against the back of the wrist,call 911 to be transported to the hospital. 
DO NOT DRIVE YOURSELF, [address]. ACTIVITY:  
For the first 24 hours do not manipulate the wrist. 
No lifting, pushing or pulling over 3-5 pounds with the affected wrist for 7 daysand no straining the insertion site. Do not life grocery bags or the garbage can, do not run the vacuum  or  for 7 days. Start with short walks as in the hospital and gradually increase as tolerated each day. It is recommended to walk 30 minutes 5-7 days per week. Follow your physicians instructions on activity. Avoid walking outside in extremes of heat or cold. Walk inside when it is cold and windy or hot and humid. Things to keep in mind: 
No driving for at least 24 hours, or as designated by your physician. Limit the number of times you go up and down the stairs Take rests and pace yourself with activity. Be careful and do not strain with bowel movements. MEDICATIONS: 
 
Take all medications as prescribed Call your physician if you have any questions Keep an updated list of your medications with you at all times and give a list to your physician and pharmacist 
 
SIGNS AND SYMPTOMS:  
Be cautious of symptoms of angina or recurrent symptoms such as chest discomfort, unusual shortness of breath or fatigue. These could be symptoms of restenosis, a new blockage or a heart attack. If your symptoms are relieved with rest it is still recommended that you notify your physician of recurrent chest pain or discomfort.  
For CHEST PAIN or symptoms of angina not relieved with rest:  If the discomfort is not relieved with rest, and you have been prescribed Nitroglycerin, take as directed (taken under the tongue, one at a time 5 minutes apart for a total of 3 doses). If the discomfort is not relieved after the 3rd nitroglycerin, call 911. If you have not been prescribed Nitroglycerin  and your chest discomfort is not relieved with rest, call 911. AFTER CARE:  
Follow up with your physician as instructed. Follow a heart healthy diet with proper portion control, daily stress management, daily exercise, blood pressure and cholesterol control , and smoking cessation. Discharge Orders None [facility]! Dear Suni Hernandez: Thank you for requesting a [employer] account. Our records indicate that you already have an active [employer] account. You can access your account anytime at [email or web address] Did you know that you can access your hospital and ER discharge instructions at any time in [employer]? You can also review all of your test results from your hospital stay or ER visit. Additional Information If you have questions, please visit the Frequently Asked Questions section of the [employer] website at [email or web address] Remember, [employer] is NOT to be used for urgent needs. For medical emergencies, dial 911. Now available from your iPhone and Android! General Information Please provide this summary of care documentation to your next provider. Patient Signature:  ____________________________________________________________ Date:  ____________________________________________________________  
  
Charity Artist Provider Signature:  ____________________________________________________________ Date:  ____________________________________________________________

## 2017-03-28 VITALS
TEMPERATURE: 98.1 F | OXYGEN SATURATION: 98 % | HEART RATE: 68 BPM | SYSTOLIC BLOOD PRESSURE: 141 MMHG | HEIGHT: 63 IN | WEIGHT: 176 LBS | RESPIRATION RATE: 18 BRPM | DIASTOLIC BLOOD PRESSURE: 67 MMHG | BODY MASS INDEX: 31.18 KG/M2

## 2017-03-28 LAB
GLUCOSE BLD STRIP.AUTO-MCNC: 89 MG/DL (ref 65–100)
SERVICE CMNT-IMP: NORMAL

## 2017-03-28 PROCEDURE — 82962 GLUCOSE BLOOD TEST: CPT

## 2017-03-28 PROCEDURE — 74011250637 HC RX REV CODE- 250/637: Performed by: INTERNAL MEDICINE

## 2017-03-28 PROCEDURE — 74011250636 HC RX REV CODE- 250/636: Performed by: INTERNAL MEDICINE

## 2017-03-28 PROCEDURE — 99218 HC RM OBSERVATION: CPT

## 2017-03-28 RX ORDER — CLOPIDOGREL BISULFATE 75 MG/1
75 TABLET ORAL DAILY
Qty: 30 TAB | Refills: 11 | Status: SHIPPED | OUTPATIENT
Start: 2017-03-28 | End: 2017-04-14 | Stop reason: SDUPTHER

## 2017-03-28 RX ADMIN — INSULIN GLARGINE 27 UNITS: 100 INJECTION, SOLUTION SUBCUTANEOUS at 08:28

## 2017-03-28 RX ADMIN — ASPIRIN 81 MG: 81 TABLET, COATED ORAL at 08:29

## 2017-03-28 RX ADMIN — VALSARTAN 160 MG: 80 TABLET ORAL at 08:30

## 2017-03-28 RX ADMIN — POLYETHYLENE GLYCOL 3350 17 G: 17 POWDER, FOR SOLUTION ORAL at 08:29

## 2017-03-28 RX ADMIN — FLUTICASONE FUROATE AND VILANTEROL TRIFENATATE 1 PUFF: 100; 25 POWDER RESPIRATORY (INHALATION) at 08:29

## 2017-03-28 RX ADMIN — CLOPIDOGREL BISULFATE 75 MG: 75 TABLET, FILM COATED ORAL at 08:30

## 2017-03-28 RX ADMIN — Medication 100 MG: at 08:28

## 2017-03-28 NOTE — CARDIO/PULMONARY
C/P Rehab Note:    Chart Reviewed. Pt with Abnormal Stress Testing S/p PCI/STENT,(3/27/17), LV EF 60%. Pt is on Plavix. PMH significant for:  -DM  -Depression  -HTN  hyperlipidemia  -CAD    Pt is a non smoker. Met with pt who was sitting up in bed,  at her side. Reviewed Role of Cardiac Rehab Nurse. Printed material given and discussed re: heart healthy habits, the cardiac diet, medication management, what to expect following coronary angioplasty, and post cardiac catheterization instructions. Discussed post catheterization restrictions, including:no manipulating the wrist for 24 hours, no lifting for 7 days, no soaking the wrist for 3 days . Also discussed what to do if bleeding or bruising at the cath insertion site is observed. Reviewed the cardiac diet (low NA/fat/CHOL), the importance of medication compliance. Reviewed indications, dosing instructions and side effects of Plavix. Pt to monitor for any unusual signs & symptoms and when to call the doctor. Discussed foods to avoid in diet and heart healthy substitutes. Discussed benefits of C/P Rehab. Pt replied not interested in attendning. Reviewed rational for exercising and recommended a walking program.     Pt and  verbalized understanding and without further questions.

## 2017-03-28 NOTE — PROGRESS NOTES
[address] [address]  [phone]      Cardiology Progress Note      3/28/2017 9:27 AM    Admit Date: 3/27/2017    Admit Diagnosis:   CATH 75  CATH 75    Subjective:     Cordelia Pat is a 57 y.o. female who underwent an elective cardiac cath yesterday and received a stent. Ms. Nguyen Case states she's feeling well today. She denies any chest pain, SOB, palpitations.       Visit Vitals    /67    Pulse 68    Temp 98.1 °F (36.7 °C)    Resp 18    Ht 5' 3\" (1.6 m)    Wt 79.8 kg (176 lb)    SpO2 98%    Breastfeeding No    BMI 31.18 kg/m2       Current Facility-Administered Medications   Medication Dose Route Frequency    bivalirudin (ANGIOMAX) 250 mg injection        0.9% sodium chloride infusion        adenosine (ADENOSCAN) 3 mg/mL injection        ADDaptor        sodium chloride (NS) flush 5-10 mL  5-10 mL IntraVENous Q8H    sodium chloride (NS) flush 5-10 mL  5-10 mL IntraVENous PRN    fluticasone-vilanterol (BREO ELLIPTA) 100mcg-25mcg/puff  1 Puff Inhalation DAILY    diphenhydrAMINE (BENADRYL) capsule 25 mg  25 mg Oral QHS PRN    valsartan (DIOVAN) tablet 160 mg  160 mg Oral DAILY    atorvastatin (LIPITOR) tablet 40 mg  40 mg Oral QHS    montelukast (SINGULAIR) tablet 10 mg  10 mg Oral QHS    insulin glargine (LANTUS) injection 34 Units  34 Units SubCUTAneous DAILY    polyethylene glycol (MIRALAX) packet 17 g  17 g Oral DAILY    melatonin tablet 6 mg  6 mg Oral DAILY    pantoprazole (PROTONIX) tablet 40 mg  40 mg Oral QHS    pyridoxine (vitamin B6) (VITAMIN B-6) tablet 100 mg  100 mg Oral 7am    aspirin delayed-release tablet 81 mg  81 mg Oral DAILY    clopidogrel (PLAVIX) tablet 75 mg  75 mg Oral DAILY    insulin lispro (HUMALOG) injection   SubCUTAneous AC&HS    glucose chewable tablet 16 g  4 Tab Oral PRN    dextrose (D50W) injection syrg 12.5-25 g  12.5-25 g IntraVENous PRN    glucagon (GLUCAGEN) injection 1 mg  1 mg IntraMUSCular PRN Objective:      Physical Exam:  General Appearance:  pleasant, obese,  female sitting in bed in NAD   Chest:   Clear  Cardiovascular:  Regular rate and rhythm, no murmur.   Abdomen:   Soft, non-tender, bowel sounds are active.   Extremities: palpable distal pulses; no edema   Skin:  Warm and dry. R radial site CDI     Data Review:   No results for input(s): WBC, HGB, HCT, PLT, HGBEXT, HCTEXT, PLTEXT in the last 72 hours. No results for input(s): NA, K, CL, CO2, GLU, BUN, CREA, CA, MG, PHOS, ALB, TBIL, TBILI, SGOT, ALT, INR in the last 72 hours. No lab exists for component: INREXT    No results for input(s): TROIQ, CPK, CKMB in the last 72 hours. Intake/Output Summary (Last 24 hours) at 03/28/17 0901  Last data filed at 03/27/17 1732   Gross per 24 hour   Intake              480 ml   Output                0 ml   Net              480 ml        Telemetry: SB-SR      Assessment:     Active Problems:    S/P cardiac cath (4/18/2013)      Overview: 4/18/13 LM-normal. LAD-mid 50-70% after D!. LCX-normal. RCA-dominant, PDA       30%, PLB-30%. LV-60%. FFR of mid LAD. ta            3/27/17: KATHERINE PDA        Plan:     Kendra De La Paz is recovering post-procedure. R radial site dressing is CDI without swelling or bleeding. VSS. Rhythm sinus. Kendra De La Paz denies complaints at this time. If recovery continues to progress without complication, discharge is planned for later today. Ms. Zuly Mcnamara is starting on plavix. She is continuing ASA, statin, and ranexa. She is not on a BB due to bradycardia.         Rahul Underwood, JOS  DNP, RN, AGACNP-BC

## 2017-03-28 NOTE — PROGRESS NOTES
oob and ambulated with steady gait. No assist needed. Tolerated well. pts hr running 48-60 per pt this is her baseline. She is asymptomatic. communicated this with Maricel Regalado NP, Dc paperwork reviewed with patient and her . Aware of fu apts, medications including side effects. Verbalized understanding with no further questions.  Cath site c/d/i no bleeding or hematoma

## 2017-03-28 NOTE — DISCHARGE INSTRUCTIONS
[address] [place] [address], [address] [facility]  [phone]        Patient ID:  Karen Kc  [id number]  16 y.o.  1955    Admit Date: 3/27/2017    Discharge Date: 3/28/2017     Admitting Physician: Robina Causey MD     Discharge Physician: Lani Campa NP/ Dr. Tamara Hugo    Admission Diagnoses:   CATH 75  CATH 75    Discharge Diagnoses: Active Problems:    S/P cardiac cath (4/18/2013)      Overview: 4/18/13 LM-normal. LAD-mid 50-70% after D!. LCX-normal. RCA-dominant, PDA       30%, PLB-30%. LV-60%. FFR of mid LAD. ta            3/27/17: KATHERINE PDA        Discharge Condition: Good    Cardiology Procedures this Admission:  Left heart catheterization with PCI    Disposition: home    Reference discharge instructions provided by nursing for diet and activity. Signed:  Lani Campa NP  3/28/2017  9:26 AM      Radial Cardiac Catheterization/Angiography Discharge Instructions    It is normal to feel tired the first couple days. Take it easy and follow the physicians instructions. CHECK THE CATHETER INSERTION SITE DAILY:    Remove the wrist dressing 24 hours after the procedure. You may shower 24 hours after the procedure. Wash with soap and water and pat dry. Gentle cleaning of the site with soap and water is sufficient, cover with a dry clean dressing or bandage. Do not apply creams or powders to the area. No soaking the wrist for 3 days. Leave the puncture site open to air after 24 hours post-procedure. CALL THE PHYSICIANS:     If the site becomes red, swollen or feels warm to the touch  If there is bleeding or drainage or if there is unusual pain at the radial site. If there is any minor oozing, you may apply a band-aid and remove after 12 hours.    If the bleeding continues, hold pressure with the middle finger against the puncture site and the thumb against the back of the wrist,call 911 to be transported to the hospital.  DO NOT DRIVE YOURSELF, OR HAVE ANYONE ELSE DRIVE YOU - CALL 888. ACTIVITY:   For the first 24 hours do not manipulate the wrist.  No lifting, pushing or pulling over 3-5 pounds with the affected wrist for 7 daysand no straining the insertion site. Do not life grocery bags or the garbage can, do not run the vacuum  or  for 7 days. Start with short walks as in the hospital and gradually increase as tolerated each day. It is recommended to walk 30 minutes 5-7 days per week. Follow your physicians instructions on activity. Avoid walking outside in extremes of heat or cold. Walk inside when it is cold and windy or hot and humid. Things to keep in mind:  No driving for at least 24 hours, or as designated by your physician. Limit the number of times you go up and down the stairs  Take rests and pace yourself with activity. Be careful and do not strain with bowel movements. MEDICATIONS:    Take all medications as prescribed  Call your physician if you have any questions  Keep an updated list of your medications with you at all times and give a list to your physician and pharmacist    SIGNS AND SYMPTOMS:   Be cautious of symptoms of angina or recurrent symptoms such as chest discomfort, unusual shortness of breath or fatigue. These could be symptoms of restenosis, a new blockage or a heart attack. If your symptoms are relieved with rest it is still recommended that you notify your physician of recurrent chest pain or discomfort. For CHEST PAIN or symptoms of angina not relieved with rest:  If the discomfort is not relieved with rest, and you have been prescribed Nitroglycerin, take as directed (taken under the tongue, one at a time 5 minutes apart for a total of 3 doses). If the discomfort is not relieved after the 3rd nitroglycerin, call 911. If you have not been prescribed Nitroglycerin  and your chest discomfort is not relieved with rest, call 911.      AFTER CARE:   Follow up with your physician as instructed. Follow a heart healthy diet with proper portion control, daily stress management, daily exercise, blood pressure and cholesterol control , and smoking cessation.

## 2017-03-28 NOTE — PROGRESS NOTES
Bedside shift change report given to Elbert French (oncoming nurse) by Melina Biswas (offgoing nurse). Report included the following information SBAR, Kardex, ED Summary, Intake/Output, Recent Results and Cardiac Rhythm Sinus Giovanni France.

## 2017-03-29 ENCOUNTER — PATIENT OUTREACH (OUTPATIENT)
Dept: INTERNAL MEDICINE CLINIC | Age: 62
End: 2017-03-29

## 2017-03-29 NOTE — Clinical Note
ALISA Call completed; declined to schedule ALISA w/ pcp due to recent f/u on 3/24/17 and being advised to f/u in 3 months. Patient has f/u scheduled with Dr. Clarissa Alvarez; reports compliance with Plavix. Able to resolve on 4/29/17.

## 2017-03-29 NOTE — PROGRESS NOTES
[address] Hospitalization           Referral from [place]. Patient admitted to [facility] (Outpatient/ Observation) on 3/27/17 and discharged on 3/28/17 s/p cardiac catheterization (Dr. Rito Carias). NN has updated care team members in the patient's chart. RRAT score:   Low Risk            10       Total Score        3 Relationship with PCP    4 More than 1 Admission in calendar year    3 Charlson Comorbidity Score        Criteria that do not apply:    Patient Living Status    Patient Length of Stay > 5    Patient Insurance is Medicare, Medicaid or Self Pay                  Advance Medical Directive on file in EMR? No.  Patient was evaluated by care management during hospitalization. Per notes, Discharge Disposition from [facility]: Home. Recommended Post-Hospital Discharge follow-up (see below as listed on Hospital Discharge AVS/Instructions). Follow-up Information     Follow up With Details Comments John Ortega MD     [address] [facility]  [address] [facility]  [phone]     Renata Mullen MD Schedule an appointment as soon as possible for a visit in 2 weeks   [address] [facility]  [phone]     Clemente Leiva MD In 2 weeks April 14 at 10:00 am [address] [facility]  [phone]                 Most recent HIPAA form in the patient's chart (dated 9/14/16) was reviewed; authorized individual(s) listed on HIPAA form include Remedios Augustin. NN follow-up phone call  NN contacted the patient today to complete NN post-hospital discharge assessment. Two patient identifiers verified. NN introduced self to the patient, including NN role and purpose of NN follow-up phone call; patient verbalizes understanding.      NN inquired about the patient's current condition and how the patient is feeling; patient states, \"Just tired, but they say that's normal.\"    Patient denies fever, chills, dizziness, chest pain, nausea, vomiting, lower extremity swelling. Patient reports dyspnea on exertion at baseline due to COPD; denies increased shortness of breath with ambulation/activity and shortness of breath at rest.     Patient denies redness, swelling, bleeding, drainage, warmth at catheter insertion site (right radial); states, \"It looks fine. \"          See details of Functional Assessment below as reported by the Patient. Living Situation/Support System: Lives with her ; reports a good support system. ADLs: Independent with ADLs. Mobility: Independent with Ambulation; denies history of falls within the past 6 months. Transportation: Denies any concerns regarding transportation. Medication Management: Independently manages her medications. Financial Status: Denies any financial concerns regarding her medications. Barriers to care? no       Allergies Reviewed with patient and Medication Reconciliation completed and updated. Patient verbalizes understanding of self management of medications and reports compliance with prescribed medication regimen. Med Rec during this call  Current Outpatient Prescriptions   Medication Sig    clopidogrel (PLAVIX) 75 mg tab Take 1 Tab by mouth daily. Indications: Thrombosis Prevention after PCI    SYMBICORT 160-4.5 mcg/actuation HFA inhaler Take 2 Puffs by inhalation two (2) times a day.  naproxen sodium (ALEVE) 220 mg tablet Take 220 mg by mouth two (2) times daily (with meals).  furosemide (LASIX) 20 mg tablet Take 1 Tab by mouth daily.  diphenhydrAMINE (BENADRYL ALLERGY) 25 mg tablet Take 25 mg by mouth nightly as needed for Sleep.     valsartan (DIOVAN) 160 mg tablet TAKE 1 TABLET DAILY    ONETOUCH ULTRA TEST strip CHECK TWICE A DAY    RANEXA 500 mg SR tablet TAKE 1 TABLET EVERY 12 HOURS    atorvastatin (LIPITOR) 40 mg tablet TAKE 1 TABLET NIGHTLY    montelukast (SINGULAIR) 10 mg tablet TAKE 1 TABLET DAILY    Insulin Needles, Disposable, (CLICKFINE) 31 gauge x 5/16\" ndle USE DAILY AS DIRECTED    hydrocortisone (ANUSOL-HC) 25 mg supp     lidocaine (XYLOCAINE) 2 % jelly     NITRO-BID 2 % ointment     insulin aspart (NOVOLOG FLEXPEN) 100 unit/mL inpn 4 units every evening with blood sugars over 120    insulin glargine (LANTUS SOLOSTAR) 100 unit/mL (3 mL) pen INJECT 34 UNITS UNDER THE SKIN EVERY MORNING    metoclopramide HCl (REGLAN) 10 mg tablet Take 10 mg by mouth daily.  nystatin (MYCOSTATIN) topical cream     albuterol (PROVENTIL HFA, VENTOLIN HFA, PROAIR HFA) 90 mcg/actuation inhaler Take 2 Puffs by inhalation as needed.  polyethylene glycol (MIRALAX) 17 gram packet Take 17 g by mouth daily.  cetirizine (ZYRTEC) 10 mg tablet Take 10 mg by mouth daily.  melatonin 5 mg Tab Take 5 mg by mouth daily.  Dexlansoprazole (DEXILANT) 60 mg CpDM Take 60 mg by mouth daily.  aspirin 81 mg tablet Take 81 mg by mouth.  pyridoxine (VITAMIN B-6) 100 mg tablet Take 100 mg by mouth every morning.  ERGOCALCIFEROL, VITAMIN D2, (VITAMIN D PO) Take 2,000 Units by mouth daily. No current facility-administered medications for this visit. There are no discontinued medications. New medications at discharge include: Plavix  Medication(s) changed at hospital discharge include: N/A  Medication(s) discontinued at hospital discharge include: N/A  Patient able to fill/pickup new medications without difficulty: Yes; reports compliance with Plavix as prescribed at hospital discharge. Any Questions/Concerns regarding new Medication(s) and/or Medication Change(s): Denies any questions/concerns. Patient verbalizes understanding of discharge instructions that were provided to her upon discharge from [facility], including activity restrictions. Red Flags reviewed with patient and patient understands when to contact Dr. Deonna Pardo office versus use of EMS.  Patient states that she has a follow-up appointment scheduled with Dr. Lotus Waite on 4/14/17. Opportunity for patient to ask NN questions was provided. NN contact information provided to patient and patient advised to contact NN as needed. Red Flags:  CALL THE PHYSICIANS:      If the site becomes red, swollen or feels warm to the touch  If there is bleeding or drainage or if there is unusual pain at the radial site. If there is any minor oozing, you may apply a band-aid and remove after 12 hours. If the bleeding continues, hold pressure with the middle finger against the puncture site and the thumb against the back of the wrist,call 911 to be transported to the hospital.  DO NOT DRIVE YOURSELF, [address] PLAN        -Patient to attend Transitions Of Care Appointment with Dr. Vanessa Laura - patient declines to schedule at this time; reports has f/u scheduled with Dr. Lotus Waite and recently saw PCP on 3/24/17 and was advised to f/u in 3 months.  -Patient to attend follow-up appointment(s) with Surgeon(if applicable) and/or Speciality Provider(s): Dr. Lotus Waite on 4/14/17.    -Patient to contact this NN and/or PCP office with any questions/concerns.  -Notified of availability of PCP on-call physician after-hours and on the weekends for non-emergent/non-life threatening medical questions/concerns; patient verbalizes understanding.  -Goals:  Goals        Post Hospitalization     Prevent complications post hospitalization. 3/29/17: NN Initial ALISA call completed; declines to schedule ALISA appt with PCP due to recent f/u with PCP on 3/24/17 at which time she was advised to f/u in 3 months. Patient has f/u scheduled with Dr. Lotus Waite (cardiology) on 4/14/17. Patient reports compliance with Plavix as prescribed at hospital discharge. Patient expressed no questions, concerns or needs for this NN at this time. Patient verbalized understanding of all information discussed.   NN contact information provided and patient advised to contact NN as needed. NN will route this encounter to Dr. Sabino Gonzales for notification/review. NN will route this encounter to Ambulatory CM NN for notification/review and continued follow-up during ALISA period. NN will route this encounter to Ambulatory Cardiology NN associated with [facility] for notification/review. This note will not be viewable in [address]

## 2017-04-12 RX ORDER — ATORVASTATIN CALCIUM 40 MG/1
TABLET, FILM COATED ORAL
Qty: 90 TAB | Refills: 0 | Status: SHIPPED | OUTPATIENT
Start: 2017-04-12 | End: 2017-07-11 | Stop reason: SDUPTHER

## 2017-04-14 ENCOUNTER — OFFICE VISIT (OUTPATIENT)
Dept: CARDIOLOGY CLINIC | Age: 62
End: 2017-04-14

## 2017-04-14 VITALS
WEIGHT: 178.3 LBS | OXYGEN SATURATION: 98 % | RESPIRATION RATE: 18 BRPM | HEIGHT: 63 IN | DIASTOLIC BLOOD PRESSURE: 68 MMHG | SYSTOLIC BLOOD PRESSURE: 122 MMHG | HEART RATE: 48 BPM | BODY MASS INDEX: 31.59 KG/M2

## 2017-04-14 DIAGNOSIS — I10 ESSENTIAL HYPERTENSION: ICD-10-CM

## 2017-04-14 DIAGNOSIS — E78.00 HYPERCHOLESTEREMIA: ICD-10-CM

## 2017-04-14 DIAGNOSIS — I25.10 ASHD (ARTERIOSCLEROTIC HEART DISEASE): Primary | ICD-10-CM

## 2017-04-14 DIAGNOSIS — R00.1 BRADYCARDIA: ICD-10-CM

## 2017-04-14 RX ORDER — CLOPIDOGREL BISULFATE 75 MG/1
75 TABLET ORAL DAILY
Qty: 90 TAB | Refills: 3 | Status: SHIPPED | OUTPATIENT
Start: 2017-04-14 | End: 2018-07-02

## 2017-04-14 NOTE — PROGRESS NOTES
Subjective/HPI:     Jaja Bowen is a 57 y.o. female is here for routine f/u from [facility] and stenting. .  The patient denies chest pain/ shortness of breath, orthopnea, PND, LE edema, palpitations, syncope, presyncope or fatigue. SUMMARY:    --  CORONARY CIRCULATION:  --  Right PDA: There was a 80 % stenosis. There was TIGIST grade 2 flow  through the vessel (partial perfusion). --  1ST LESION INTERVENTIONS:  --  A successful drug-eluting stent was performed on the 80 % lesion in  the 1st right posterolateral segment. Following intervention there was an  excellent angiographic appearance with a 0 % residual stenosis. --  A  Synergy RX [id number] drug-eluting stent at a maximum inflation pressure of  14 chito. --  2ND LESION INTERVENTIONS:  --  A was performed on the lesion in the proximal LAD. --  Steady baseline values were obtained. Mean arterial pressure and mean  distal coronary pressures were then obtained at maximum hyperemia. FFR was  calculated to be 0.86. Based on the results of this study, the lesion was  judged to be non-significant and no intervention was performed. DISPOSITION: The patient left the catheterization laboratory in stable  condition.       PCP Provider  Elijah Callaway MD  Past Medical History:   Diagnosis Date    Asthma     coughing couple weeks ago    Bloating     CAD (coronary artery disease)     Cancer ([facility] [address] 2010    esophageal/GE junction    Chest pain     Chest pain     Chronic pain     left shoulder    Congestion of throat     Cough     Depression     & anxiety    Diabetes ([facility] [address]     IDDM    Dyspepsia and other specified disorders of function of stomach     Fatigue     GERD (gastroesophageal reflux disease)     Headache     Hypercholesterolemia     Hypertension     Multinodular goiter     Nausea & vomiting     Stool color black     Stress     Weakness       Past Surgical History:   Procedure Laterality Date    EXTRACTION ERUPTED TOOTH/EXR  HX ENDOSCOPY  4/2016    HX GI  2010    gastroesophagectomy    HX GYN  1983    lap btl    HX HEENT  1957    tonsils as child    HX HERNIA REPAIR  04/26/2012    HX LAP CHOLECYSTECTOMY  1995    HX ORTHOPAEDIC  02/20/2015    right shoulder manipulation    HX OTHER SURGICAL      left shoulder surgery for frozen surgery    HX OTHER SURGICAL  6/24/2015    mammogram     HX PELVIC LAPAROSCOPY  1989    HX VASCULAR ACCESS      port a cath and removal    MS EGD BALLOON DILATION ESOPHAGUS <30 MM DIAM  4/20/2010          Allergies   Allergen Reactions    Adhesive Other (comments)     redness      Family History   Problem Relation Age of Onset    Diabetes Father     Cancer Father      intestinal    Heart Disease Father     Hypertension Father     Heart Disease Brother     Diabetes Brother     Diabetes Mother     Lung Disease Mother     Heart Disease Mother     Hypertension Mother     Diabetes Maternal Grandmother     Diabetes Maternal Grandfather     Elevated Lipids Maternal Aunt     Thyroid Disease Neg Hx       Current Outpatient Prescriptions   Medication Sig    atorvastatin (LIPITOR) 40 mg tablet TAKE 1 TABLET NIGHTLY    clopidogrel (PLAVIX) 75 mg tab Take 1 Tab by mouth daily. Indications: Thrombosis Prevention after PCI    SYMBICORT 160-4.5 mcg/actuation HFA inhaler Take 2 Puffs by inhalation two (2) times a day.  naproxen sodium (ALEVE) 220 mg tablet Take 220 mg by mouth two (2) times daily (with meals).  furosemide (LASIX) 20 mg tablet Take 1 Tab by mouth daily.  diphenhydrAMINE (BENADRYL ALLERGY) 25 mg tablet Take 25 mg by mouth nightly as needed for Sleep.     valsartan (DIOVAN) 160 mg tablet TAKE 1 TABLET DAILY    ONETOUCH ULTRA TEST strip CHECK TWICE A DAY    RANEXA 500 mg SR tablet TAKE 1 TABLET EVERY 12 HOURS    montelukast (SINGULAIR) 10 mg tablet TAKE 1 TABLET DAILY    Insulin Needles, Disposable, (CLICKFINE) 31 gauge x 5/16\" ndle USE DAILY AS DIRECTED    hydrocortisone (ANUSOL-HC) 25 mg supp     lidocaine (XYLOCAINE) 2 % jelly     NITRO-BID 2 % ointment     insulin aspart (NOVOLOG FLEXPEN) 100 unit/mL inpn 4 units every evening with blood sugars over 120    insulin glargine (LANTUS SOLOSTAR) 100 unit/mL (3 mL) pen INJECT 34 UNITS UNDER THE SKIN EVERY MORNING    metoclopramide HCl (REGLAN) 10 mg tablet Take 10 mg by mouth daily.  nystatin (MYCOSTATIN) topical cream     albuterol (PROVENTIL HFA, VENTOLIN HFA, PROAIR HFA) 90 mcg/actuation inhaler Take 2 Puffs by inhalation as needed.  polyethylene glycol (MIRALAX) 17 gram packet Take 17 g by mouth daily.  cetirizine (ZYRTEC) 10 mg tablet Take 10 mg by mouth daily.  melatonin 5 mg Tab Take 5 mg by mouth daily.  Dexlansoprazole (DEXILANT) 60 mg CpDM Take 60 mg by mouth daily.  aspirin 81 mg tablet Take 81 mg by mouth.  pyridoxine (VITAMIN B-6) 100 mg tablet Take 100 mg by mouth every morning.  ERGOCALCIFEROL, VITAMIN D2, (VITAMIN D PO) Take 2,000 Units by mouth daily. No current facility-administered medications for this visit. Vitals:    04/14/17 0956 04/14/17 1002   BP: 120/72 122/68   Pulse: (!) 48    Resp: 18    SpO2: 98%    Weight: 178 lb 4.8 oz (80.9 kg)    Height: 5' 3\" (1.6 m)      Social History     Social History    Marital status:      Spouse name: N/A    Number of children: N/A    Years of education: N/A     Occupational History    Not on file. Social History Main Topics    Smoking status: Never Smoker    Smokeless tobacco: Never Used    Alcohol use 0.5 oz/week     1 Glasses of wine per week      Comment: rarely glass of wine    Drug use: No    Sexual activity: Not on file     Other Topics Concern    Not on file     Social History Narrative    Lives in [place] with . Has a 45 yo daughter and an adopted 24 yo daughter. 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          I have reviewed the nurses notes, vitals, problem list, allergy list, medical history, family, social history and medications. Review of Symptoms:    General: Pt denies excessive weight gain or loss. Pt is able to conduct ADL's  HEENT: Denies blurred vision, headaches, epistaxis and difficulty swallowing. Respiratory: Denies shortness of breath, CAMP, wheezing or stridor. Cardiovascular: Denies precordial pain, palpitations, edema or PND  Gastrointestinal: Denies poor appetite, indigestion, abdominal pain or blood in stool  Musculoskeletal: Denies pain or swelling from muscles or joints  Neurologic: Denies tremor, paresthesias, or sensory motor disturbance  Skin: Denies rash, itching or texture change. Physical Exam:      General: Well developed, in no acute distress, cooperative and alert  HEENT: No carotid bruits, no JVD, trach is midline. Neck Supple, PEERL, EOM intact. Heart:  Normal S1/S2 negative S3 or S4. Regular, no murmur, gallop or rub.   Respiratory: Clear bilaterally x 4, no wheezing or rales  Abdomen:   Soft, non-tender, no masses, bowel sounds are active.   Extremities:  No edema, normal cap refill, no cyanosis, atraumatic. Neuro: A&Ox3, speech clear, gait stable. Skin: Skin color is normal. No rashes or lesions. Non diaphoretic  Vascular: 2+ pulses symmetric in all extremities    Cardiographics    ECG: ***  Results for orders placed or performed in visit on 10/15/15   CARDIAC HOLTER MONITOR, 24 HOURS    Narrative    ECG Monitor/24 hours, Complete    Reason for Holter Monitor   BRADYCARDIA    Heartbeat    Slowest 40  Average 52  Fastest  89          Results:   Underlying Rhythm: Normal sinus rhythm      Atrial Arrhythmias: premature atrial contractions; frequent             AV Conduction: normal    Ventricular Arrhythmias: premature ventricular contractions; rare    ST Segment Analysis:non-specific changes     Symptom Correlation:  yes    Comment:   Frequent PAC's.      Ramon Ruiz MD             Results for orders placed or performed during the hospital encounter of 02/04/11   EKG, 12 LEAD, INITIAL   Result Value Ref Range    Ventricular Rate 45 BPM    Atrial Rate 45 BPM    P-R Interval 152 ms    QRS Duration 122 ms    Q-T Interval 466 ms    QTC Calculation (Bezet) 403 ms    Calculated P Axis 40 degrees    Calculated R Axis -16 degrees    Calculated T Axis 25 degrees    Diagnosis       Marked sinus bradycardia  Right bundle branch block  Left ventricular hypertrophy with QRS widening  No previous ECGs available  Confirmed by Cristian Mccoy ([id number]) on 2/4/2011 11:15:10 AM         Cardiology Labs:  Lab Results   Component Value Date/Time    Cholesterol, total 147 09/14/2016 01:49 PM    HDL Cholesterol 55 09/14/2016 01:49 PM    LDL, calculated 73 09/14/2016 01:49 PM    Triglyceride 95 09/14/2016 01:49 PM       Lab Results   Component Value Date/Time    Sodium 139 03/23/2017 12:09 PM    Potassium 3.9 03/23/2017 12:09 PM    Chloride 101 03/23/2017 12:09 PM    CO2 22 03/23/2017 12:09 PM    Anion gap 5 04/26/2012 10:00 AM    Glucose 130 03/23/2017 12:09 PM    BUN 17 03/23/2017 12:09 PM    Creatinine 0.96 03/23/2017 12:09 PM    BUN/Creatinine ratio 18 03/23/2017 12:09 PM    GFR est AA 72 03/23/2017 12:09 PM    GFR est non-AA 63 03/23/2017 12:09 PM    Calcium 8.3 03/23/2017 12:09 PM    Bilirubin, total 0.2 03/23/2017 12:09 PM    AST (SGOT) 24 03/23/2017 12:09 PM    Alk. phosphatase 62 03/23/2017 12:09 PM    Protein, total 6.1 03/23/2017 12:09 PM    Albumin 3.8 03/23/2017 12:09 PM    Globulin 3.4 02/01/2010 04:40 AM    A-G Ratio 1.7 03/23/2017 12:09 PM    ALT (SGPT) 24 03/23/2017 12:09 PM           Assessment:     Assessment:     Brandon Leyden was seen today for heart problem. Diagnoses and all orders for this visit:    ASHD (arteriosclerotic heart disease)  -     AMB POC EKG ROUTINE W/ 12 LEADS, INTER & REP    Hypercholesteremia    Essential hypertension    Bradycardia        ICD-10-CM ICD-9-CM    1.  ASHD (arteriosclerotic heart disease) I25.10 414.00 AMB POC EKG ROUTINE W/ 12 LEADS, INTER & REP   2. Hypercholesteremia E78.00 272.0    3. Essential hypertension I10 401.9    4. Bradycardia R00.1 427.89      Orders Placed This Encounter    AMB POC EKG ROUTINE W/ 12 LEADS, INTER & REP     Order Specific Question:   Reason for Exam:     Answer:   ROUTINE        Plan:     Patient presents doing well and is stable from cardiac stand point. Continue current care and f/u in *** months.

## 2017-04-14 NOTE — PROGRESS NOTES
NAME:  Aron Salas   :   1955   MRN:   [id number]   PCP:  Sonny King MD           Subjective: The patient is a 57y.o. year old female  who returns for a routine follow-up from [facility] to [facility] right posterolateral segment. Since the last visit, patient reports no change in exercise tolerance, chest pain, edema, medication intolerance, palpitations, shortness of breath, PND/orthopnea wheezing, sputum, syncope, dizziness or light headedness. Doing well. Past Medical History:   Diagnosis Date    Asthma     coughing couple weeks ago    Bloating     CAD (coronary artery disease)     Cancer ([facility]     esophageal/GE junction    Chest pain     Chest pain     Chronic pain     left shoulder    Congestion of throat     Cough     Depression     & anxiety    Diabetes ([facility]     IDDM    Dyspepsia and other specified disorders of function of stomach     Fatigue     GERD (gastroesophageal reflux disease)     Headache     Hypercholesterolemia     Hypertension     Multinodular goiter     Nausea & vomiting     Stool color black     Stress     Weakness        Social History   Substance Use Topics    Smoking status: Never Smoker    Smokeless tobacco: Never Used    Alcohol use 0.5 oz/week     1 Glasses of wine per week      Comment: rarely glass of wine      Family History   Problem Relation Age of Onset    Diabetes Father     Cancer Father      intestinal    Heart Disease Father     Hypertension Father     Heart Disease Brother     Diabetes Brother     Diabetes Mother     Lung Disease Mother     Heart Disease Mother     Hypertension Mother     Diabetes Maternal Grandmother     Diabetes Maternal Grandfather     Elevated Lipids Maternal Aunt     Thyroid Disease Neg Hx         Review of Systems  Constitutional: Negative for fever, chills, and diaphoresis. Respiratory: Negative for cough, hemoptysis, sputum production, shortness of breath and wheezing.    Cardiovascular: Negative for chest pain, palpitations, orthopnea, claudication, leg swelling and PND. Gastrointestinal: Negative for heartburn, nausea, vomiting, blood in stool and melena. Genitourinary: Negative for dysuria and flank pain. Musculoskeletal: Negative for joint pain and back pain. Skin: Negative for rash. Neurological: Negative for focal weakness, seizures, loss of consciousness, weakness and headaches. Endo/Heme/Allergies: Does not bruise/bleed easily. Psychiatric/Behavioral: Negative for memory loss. The patient does not have insomnia. Objective:       Vitals:    04/14/17 0956 04/14/17 1002   BP: 120/72 122/68   Pulse: (!) 48    Resp: 18    SpO2: 98%    Weight: 178 lb 4.8 oz (80.9 kg)    Height: 5' 3\" (1.6 m)     Body mass index is 31.58 kg/(m^2). General PE    Gen: NAD     Mental Status - Alert. General Appearance - Not in acute distress. Neck - no JVD     Chest and Lung Exam     Inspection: Accessory muscles - No use of accessory muscles in breathing. Auscultation:   Breath sounds: - Normal.     Cardiovascular   Inspection: Jugular vein - Bilateral - Inspection Normal.   Palpation/Percussion:   Apical Impulse: - Normal.   Auscultation: Rhythm - Regular. Heart Sounds - S1 WNL and S2 WNL. No S3 or S4. Murmurs & Other Heart Sounds: Auscultation of the heart reveals - No Murmurs. Peripheral Vascular   Upper Extremity: Inspection - Bilateral - No Cyanotic nailbeds or Digital clubbing. Normal right radial.   Lower Extremity:   Palpation: Edema - Bilateral - No edema. Abdomen: Soft, non-tender, bowel sounds are active. Neuro: A&O times 3, CN and motor grossly WNL      Data Review:     EKG -  Marked sinus  Bradycardia   -Right bundle branch block. Ventricular rate 45.          Allergies reviewed  Allergies   Allergen Reactions    Adhesive Other (comments)     redness       Medications reviewed  Current Outpatient Prescriptions   Medication Sig    clopidogrel (PLAVIX) 75 mg tab Take 1 Tab by mouth daily. Indications: Thrombosis Prevention after PCI    atorvastatin (LIPITOR) 40 mg tablet TAKE 1 TABLET NIGHTLY    SYMBICORT 160-4.5 mcg/actuation HFA inhaler Take 2 Puffs by inhalation two (2) times a day.  naproxen sodium (ALEVE) 220 mg tablet Take 220 mg by mouth two (2) times daily (with meals).  furosemide (LASIX) 20 mg tablet Take 1 Tab by mouth daily.  diphenhydrAMINE (BENADRYL ALLERGY) 25 mg tablet Take 25 mg by mouth nightly as needed for Sleep.  valsartan (DIOVAN) 160 mg tablet TAKE 1 TABLET DAILY    ONETOUCH ULTRA TEST strip CHECK TWICE A DAY    RANEXA 500 mg SR tablet TAKE 1 TABLET EVERY 12 HOURS    montelukast (SINGULAIR) 10 mg tablet TAKE 1 TABLET DAILY    Insulin Needles, Disposable, (CLICKFINE) 31 gauge x 5/16\" ndle USE DAILY AS DIRECTED    hydrocortisone (ANUSOL-HC) 25 mg supp     lidocaine (XYLOCAINE) 2 % jelly     NITRO-BID 2 % ointment     insulin aspart (NOVOLOG FLEXPEN) 100 unit/mL inpn 4 units every evening with blood sugars over 120    insulin glargine (LANTUS SOLOSTAR) 100 unit/mL (3 mL) pen INJECT 34 UNITS UNDER THE SKIN EVERY MORNING    metoclopramide HCl (REGLAN) 10 mg tablet Take 10 mg by mouth daily.  nystatin (MYCOSTATIN) topical cream     albuterol (PROVENTIL HFA, VENTOLIN HFA, PROAIR HFA) 90 mcg/actuation inhaler Take 2 Puffs by inhalation as needed.  polyethylene glycol (MIRALAX) 17 gram packet Take 17 g by mouth daily.  cetirizine (ZYRTEC) 10 mg tablet Take 10 mg by mouth daily.  melatonin 5 mg Tab Take 5 mg by mouth daily.  Dexlansoprazole (DEXILANT) 60 mg CpDM Take 60 mg by mouth daily.  aspirin 81 mg tablet Take 81 mg by mouth.  pyridoxine (VITAMIN B-6) 100 mg tablet Take 100 mg by mouth every morning.  ERGOCALCIFEROL, VITAMIN D2, (VITAMIN D PO) Take 2,000 Units by mouth daily. No current facility-administered medications for this visit. Assessment:       ICD-10-CM ICD-9-CM    1.  ASHD (arteriosclerotic heart disease) I25.10 414.00 AMB POC EKG ROUTINE W/ 12 LEADS, INTER & REP   2. Hypercholesteremia E78.00 272.0    3. Essential hypertension I10 401.9    4. Bradycardia R00.1 427.89         Orders Placed This Encounter    AMB POC EKG ROUTINE W/ 12 LEADS, INTER & REP     Order Specific Question:   Reason for Exam:     Answer:   ROUTINE    clopidogrel (PLAVIX) 75 mg tab     Sig: Take 1 Tab by mouth daily. Indications: Thrombosis Prevention after PCI     Dispense:  90 Tab     Refill:  3       Patient Active Problem List   Diagnosis Code    Incisional hernia K43.2    Esophageal cancer (HCC) C15.9    Hypercholesteremia E78.00    HTN (hypertension) I10    Asthma J45.909    Depression F32.9    Thyroid nodule E04.1    Shoulder capsulitis M75.80    Angina, class II ([facility] I20.9    Family history of coronary artery disease Z80.55    S/P cardiac cath Z98.890    Multinodular goiter E04.2    ASHD (arteriosclerotic heart disease) I25.10    Syncope R55    Bradycardia R00.1    Diabetes mellitus without complication ([facility] F18.8       Plan:     Patient presents for follow up. We will see her back in 6 mo. 1. ASHD -s/p KATHERINE 1st right posterolateral segment. Symptoms resolved, on plavix. 2. Bradycardia - remains unchanged and asymptomatic. 3. On statin. 4. HTN - normotensive. Missy Galarza, [address] Cardiology    4/14/2017         Agree with note as outlined by  NP. I confirm findings in history and physical exam. No additional findings noted. Agree with plan as outlined above.      Elyse Abrams MD

## 2017-04-30 RX ORDER — MONTELUKAST SODIUM 10 MG/1
TABLET ORAL
Qty: 90 TAB | Refills: 1 | Status: SHIPPED | OUTPATIENT
Start: 2017-04-30 | End: 2017-10-27 | Stop reason: SDUPTHER

## 2017-05-02 ENCOUNTER — PATIENT OUTREACH (OUTPATIENT)
Dept: INTERNAL MEDICINE CLINIC | Age: 62
End: 2017-05-02

## 2017-05-02 NOTE — PROGRESS NOTES
NNTOCIP Post Hospitalization       - Patient attended [facility] appointment with Dr. Clara Jeffries on 4/14/17; was advised to follow-up in 6 months. To the best of this NN's knowledge, this patient had no additional ED visits or Hospital Admissions during 30 day ALISA period following admission to [facility]. ALISA period has ended. Post-Hospitalization Episode Resolved. Patient has NN contact information if any questions/concerns arise in the future.       Future Appointments:  6/28/17 - Dr. Sherly Salazar  11/2/17 - Dr. Clara Jeffries

## 2017-05-03 RX ORDER — VALSARTAN 160 MG/1
TABLET ORAL
Qty: 90 TAB | Refills: 0 | Status: SHIPPED | OUTPATIENT
Start: 2017-05-03 | End: 2017-07-19 | Stop reason: SDUPTHER

## 2017-05-04 RX ORDER — RANOLAZINE 500 MG/1
TABLET, FILM COATED, EXTENDED RELEASE ORAL
Qty: 180 TAB | Refills: 0 | Status: SHIPPED | OUTPATIENT
Start: 2017-05-04 | End: 2017-07-19 | Stop reason: SDUPTHER

## 2017-05-22 RX ORDER — PEN NEEDLE, DIABETIC 30 GX3/16"
NEEDLE, DISPOSABLE MISCELLANEOUS
Qty: 100 PACKAGE | Refills: 10 | Status: SHIPPED | OUTPATIENT
Start: 2017-05-22 | End: 2018-04-09 | Stop reason: SDUPTHER

## 2017-06-01 ENCOUNTER — OFFICE VISIT (OUTPATIENT)
Dept: INTERNAL MEDICINE CLINIC | Age: 62
End: 2017-06-01

## 2017-06-01 VITALS
HEIGHT: 63 IN | TEMPERATURE: 98.2 F | HEART RATE: 53 BPM | SYSTOLIC BLOOD PRESSURE: 144 MMHG | RESPIRATION RATE: 16 BRPM | WEIGHT: 180 LBS | BODY MASS INDEX: 31.89 KG/M2 | OXYGEN SATURATION: 97 % | DIASTOLIC BLOOD PRESSURE: 76 MMHG

## 2017-06-01 DIAGNOSIS — L02.214 ABSCESS OF GROIN, LEFT: Primary | ICD-10-CM

## 2017-06-01 RX ORDER — SULFAMETHOXAZOLE AND TRIMETHOPRIM 800; 160 MG/1; MG/1
1 TABLET ORAL 2 TIMES DAILY
Qty: 20 TAB | Refills: 0 | Status: SHIPPED | OUTPATIENT
Start: 2017-06-01 | End: 2017-06-11

## 2017-06-01 NOTE — PROGRESS NOTES
Reviewed record in preparation for visit and have obtained necessary documentation. Identified pt with two pt identifiers(name and ). Chief Complaint   Patient presents with    Cyst     in groin area       Health Maintenance Due   Topic Date Due    EYE EXAM RETINAL OR DILATED Q1  2016    Pneumococcal 19-64 Highest Risk (3 of 3 - PPSV23) 2017       Ms. Asad Michael has a reminder for a \"due or due soon\" health maintenance. I have asked that she discuss health maintenance topic(s) due with Her  primary care provider. Coordination of Care Questionnaire:  :     1) Have you been to an emergency room, urgent care clinic since your last visit? no   Hospitalized since your last visit? no             2) Have you seen or consulted any other health care providers outside of [address] [place] [address] since your last visit? no  (Include any pap smears or colon screenings in this section.)      Patient is accompanied by self I have received verbal consent from Dina Coto to discuss any/all medical information while they are present in the room.

## 2017-06-01 NOTE — PATIENT INSTRUCTIONS

## 2017-06-01 NOTE — PROGRESS NOTES
Demi Esquivel is a 57 y.o. female who presents for evaluation of ruptured cyst/abscess in left groin/labial area. Ruptured yesterday. Unable to express any more purulent material today when in shower. Had cyst in same area last year. Denies f/c or other systemic issues.       ROS:  Constitutional: negative for fevers, chills, anorexia and weight loss  Eyes:   negative for visual disturbance and irritation  ENT:   negative for tinnitus,sore throat,nasal congestion,ear pain,hoarseness  Respiratory:  negative for cough, hemoptysis, dyspnea,wheezing  CV:   negative for chest pain, palpitations, lower extremity edema  GI:   negative for nausea, vomiting, diarrhea, abdominal pain,melena  Genitourinary: negative for frequency, dysuria and hematuria  Musculoskel: negative for myalgias, arthralgias, back pain, muscle weakness, joint pain  Neurological:  negative for headaches, dizziness, focal weakness, numbness  Psychiatric:     Negative for depression or anxiety      Past Medical History:   Diagnosis Date    Asthma     coughing couple weeks ago    Bloating     CAD (coronary artery disease)     Cancer ([facility] 2010    esophageal/GE junction    Chest pain     Chest pain     Chronic pain     left shoulder    Congestion of throat     Cough     Depression     & anxiety    Diabetes ([facility]     IDDM    Dyspepsia and other specified disorders of function of stomach     Fatigue     GERD (gastroesophageal reflux disease)     Headache     Hypercholesterolemia     Hypertension     Multinodular goiter     Nausea & vomiting     Stool color black     Stress     Weakness        Past Surgical History:   Procedure Laterality Date    EXTRACTION ERUPTED TOOTH/EXR      HX ENDOSCOPY  4/2016    HX GI  2010    gastroesophagectomy    HX GYN  1983    lap btl    HX HEENT  1957    tonsils as child    HX HERNIA REPAIR  04/26/2012    HX LAP CHOLECYSTECTOMY  1995    HX ORTHOPAEDIC  02/20/2015    right shoulder manipulation    HX OTHER SURGICAL      left shoulder surgery for frozen surgery    HX OTHER SURGICAL  6/24/2015    mammogram     HX PELVIC LAPAROSCOPY  1989    HX VASCULAR ACCESS      port a cath and removal    HI EGD BALLOON DILATION ESOPHAGUS <30 MM DIAM  4/20/2010            Family History   Problem Relation Age of Onset    Diabetes Father     Cancer Father      intestinal    Heart Disease Father     Hypertension Father     Heart Disease Brother     Diabetes Brother     Diabetes Mother     Lung Disease Mother     Heart Disease Mother     Hypertension Mother     Diabetes Maternal Grandmother     Diabetes Maternal Grandfather     Elevated Lipids Maternal Aunt     Thyroid Disease Neg Hx        Social History     Social History    Marital status:      Spouse name: N/A    Number of children: N/A    Years of education: N/A     Occupational History    Not on file. Social History Main Topics    Smoking status: Never Smoker    Smokeless tobacco: Never Used    Alcohol use 0.5 oz/week     1 Glasses of wine per week      Comment: rarely glass of wine    Drug use: No    Sexual activity: Not on file     Other Topics Concern    Not on file     Social History Narrative    Lives in [place] with . Has a 43 yo daughter and an adopted 26 yo daughter. 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               Visit Vitals    /76 (BP 1 Location: Left arm, BP Patient Position: Sitting)    Pulse (!) 53    Temp 98.2 °F (36.8 °C) (Oral)    Resp 16    Ht 5' 3\" (1.6 m)    Wt 180 lb (81.6 kg)    SpO2 97%    BMI 31.89 kg/m2       Physical Examination:   General - Well appearing female  HEENT - PERRL, TM no erythema/opacification, normal nasal turbinates, no oropharyngeal erythema or exudate, MMM  Neck - supple, no bruits, no thyroidomegaly, no lymphadenopathy  Pulm - clear to auscultation bilaterally  Cardio - RRR, normal S1 S2, no murmur  Abd - soft, nontender, no masses, no HSM  Extrem - no edema, +2 distal pulses  Neuro-  No focal deficits, CN intact     Assessment/Plan:    1. Left groin cyst--has already ruptured. rx for bactrim. Offered to have her see gen sx, but she declines at this time. Has seen dr Roddy Lefort in past.  2.  Dm, type 2--last a1c 6.8, continue same meds    rtc prn, follows with dr Buddy Webber. Has appt end of June.         Leavy Carrel III, DO

## 2017-06-07 RX ORDER — INSULIN ASPART 100 [IU]/ML
INJECTION, SOLUTION INTRAVENOUS; SUBCUTANEOUS
Qty: 5 PEN | Refills: 3 | Status: SHIPPED | OUTPATIENT
Start: 2017-06-07 | End: 2017-06-28

## 2017-06-13 RX ORDER — INSULIN LISPRO 100 [IU]/ML
INJECTION, SOLUTION INTRAVENOUS; SUBCUTANEOUS
Qty: 1 PACKAGE | Refills: 0 | Status: SHIPPED | OUTPATIENT
Start: 2017-06-13 | End: 2018-07-19 | Stop reason: SDUPTHER

## 2017-06-14 RX ORDER — FUROSEMIDE 20 MG/1
TABLET ORAL
Qty: 30 TAB | Refills: 3 | Status: SHIPPED | OUTPATIENT
Start: 2017-06-14 | End: 2017-10-16 | Stop reason: SDUPTHER

## 2017-06-28 ENCOUNTER — OFFICE VISIT (OUTPATIENT)
Dept: INTERNAL MEDICINE CLINIC | Age: 62
End: 2017-06-28

## 2017-06-28 VITALS
HEIGHT: 63 IN | DIASTOLIC BLOOD PRESSURE: 73 MMHG | BODY MASS INDEX: 31.89 KG/M2 | OXYGEN SATURATION: 97 % | RESPIRATION RATE: 16 BRPM | WEIGHT: 180 LBS | HEART RATE: 60 BPM | TEMPERATURE: 98.1 F | SYSTOLIC BLOOD PRESSURE: 131 MMHG

## 2017-06-28 DIAGNOSIS — E11.9 DIABETES MELLITUS WITHOUT COMPLICATION (HCC): ICD-10-CM

## 2017-06-28 DIAGNOSIS — R60.0 LOCALIZED EDEMA: ICD-10-CM

## 2017-06-28 DIAGNOSIS — Z00.00 PHYSICAL EXAM, ANNUAL: Primary | ICD-10-CM

## 2017-06-28 DIAGNOSIS — E78.00 HYPERCHOLESTEREMIA: ICD-10-CM

## 2017-06-28 DIAGNOSIS — I10 ESSENTIAL HYPERTENSION: ICD-10-CM

## 2017-06-28 DIAGNOSIS — I25.10 ASHD (ARTERIOSCLEROTIC HEART DISEASE): ICD-10-CM

## 2017-06-28 DIAGNOSIS — J45.20 MILD INTERMITTENT ASTHMA WITHOUT COMPLICATION: ICD-10-CM

## 2017-06-28 RX ORDER — ALBUTEROL SULFATE 90 UG/1
2 AEROSOL, METERED RESPIRATORY (INHALATION) AS NEEDED
Qty: 1 INHALER | Refills: 3 | Status: SHIPPED | OUTPATIENT
Start: 2017-06-28 | End: 2017-12-29 | Stop reason: SDUPTHER

## 2017-06-28 NOTE — PROGRESS NOTES
HISTORY OF PRESENT ILLNESS  Shahbaz Martinez is a 57 y.o. female. HPI   Last here 3/24/17. Pt is here to f/u on chronic conditions    BP today is 131/73  BP checked at home occasionally within goal ranges  Continues diovan 160mg daily   She has been taking lasix daily with improvement   Pt is also on ranexa to prevent cp which helps    Pt had been having some sob   She had cardiac cath completed 3/27/17  Reviewed last notes from Dr. Filiberto Douglas (cardio) 4/14/17  1. ASHD -s/p KATHERINE 1st right posterolateral segment. Symptoms resolved, on plavix. 2. Bradycardia - remains unchanged and asymptomatic. 3. On statin. 4. HTN - normotensive.    Sx have resolved- doing well      BS at home running around 90s-106 in the AM fasting, a few lower in the 80s  No further low readings as she had last visit   Continues lantus 27 U qam and now on humalog 4 U with dinner with BS over 120  She takes the humalog about 3-4 days per week  She also takes ASA 81mg daily    Pt has not been evaluated for DANIELLE in the past  She snores rarely per her   Her airway is quite obstructed on exam  Discussed completing sleep evaluation   She declines further evaluation for now      Reviewed last labs 3/17  Kidney nl, liver nl   Repeat labs today     Pt follows with Dr. Mario Faulkner (hemo/onc)  Last saw him yesterday, 6/28/17  She brought in records from this- reviewed  CMP pending, blood counts normal  Per pt, all stable and will follow annually     Continues lipitor 40mg daily for cholesterol     Wt is up 4 lbs since last visit   Discussed diet and weight loss       Pt follows with Dr. Maria Luisa Santos (pulm)   She has f/u pending 7/24/17 with him  Continues symbicort BID for breathing/asthma per pulm which helps  Also continues on sinuglair for her asthma sx which works well   This has improved her breathing overall   She requests refill of her albuterol     Continues dexilant BID and reglan daily, controls reflux sx  She occasionally takes additional dose reglan in the evenings with sx  Denies significant sx of dysphagia recently   She avoids trigger foods and rarely has reflux or aspiration  Recall has h/o esophageal cancer    I have provided referral to see rheumatology  She has not yet followed up with Dr. Janelle Vazquez (rheum)    Pt has abscess to groin in 6/17   She saw Dr. Kimi Guerra (PCP) for this  She was treated with bactrim for this  Resolved     Of note, her  has bladder cancer and she is caring for him at home      PREVENTIVE:    Colonoscopy: 5/2013, Dr. Daiana Pinto, repeat 5 years    EGD: 4/16, Dr. Daiana Pinto, h/o esophageal cancer, polyp on recent EGD, biopsy nl  Pap: Dr. Juan Herrera, 1/31/17  Mammogram: 7/16 negative  Dexa: 7/29/15 normal, due 2018  Tdap: 9/14/2016  Pneumovax: 4/26/2009  [id number]: 11/14/2016  Zostavax: 11/18/2016  Flu shot: 10/13/2016  Foot exam: 6/28/17  Microalbumin: 6/17 ordered  A1c:11/12 6.8, 2/13 7.1, 5/13 7.4, 9/13 7.1, 12/13 6.4, 3/14 6.2, 6/14 6.7 , 9/14 6.0, 12/14 6.8, 4/15 7.8, 7/15 7.1, 10/15 7.3, 1/16 7.2, 5/16 7.0, 9/16 6.7, 12/16 6.2, 3/17 6.8, 6/17   Eye exam: Dr. Abram Garcia, 11/09/16, will get notes  Hep C screen: 11/15 negative  Lipids: 9/16 LDL 73       Patient Active Problem List    Diagnosis Date Noted    Diabetes mellitus without complication ([facility] 30/88/5624    ASHD (arteriosclerotic heart disease) 06/18/2014    Syncope 06/18/2014    Bradycardia 06/18/2014    Multinodular goiter     Angina, class II ([facility] 04/18/2013    Family history of coronary artery disease 04/18/2013    S/P cardiac cath 04/18/2013    Hypercholesteremia 02/18/2013    HTN (hypertension) 02/18/2013    Asthma 02/18/2013    Depression 02/18/2013    Thyroid nodule 02/18/2013    Shoulder capsulitis 02/18/2013    Incisional hernia 02/10/2011    [facility] 02/10/2011     Current Outpatient Prescriptions   Medication Sig Dispense Refill    furosemide (LASIX) 20 mg tablet TAKE 1 TABLET BY MOUTH EVERY DAY 30 Tab 3    insulin lispro (HUMALOG) 100 unit/mL kwikpen 4 units every evening with blood sugars over 120 1 Package 0    insulin aspart (NOVOLOG FLEXPEN) 100 unit/mL inpn 4 units every evening with blood sugars over 120 5 Pen 3    Insulin Needles, Disposable, (CLICKFINE) 31 gauge x 5/16\" ndle USE DAILY AS DIRECTED 100 Package 10    RANEXA 500 mg SR tablet TAKE 1 TABLET EVERY 12 HOURS 180 Tab 0    valsartan (DIOVAN) 160 mg tablet TAKE 1 TABLET DAILY 90 Tab 0    montelukast (SINGULAIR) 10 mg tablet TAKE 1 TABLET DAILY 90 Tab 1    clopidogrel (PLAVIX) 75 mg tab Take 1 Tab by mouth daily. Indications: Thrombosis Prevention after PCI 90 Tab 3    atorvastatin (LIPITOR) 40 mg tablet TAKE 1 TABLET NIGHTLY 90 Tab 0    SYMBICORT 160-4.5 mcg/actuation HFA inhaler Take 2 Puffs by inhalation two (2) times a day.  naproxen sodium (ALEVE) 220 mg tablet Take 220 mg by mouth two (2) times daily (with meals).  diphenhydrAMINE (BENADRYL ALLERGY) 25 mg tablet Take 25 mg by mouth nightly as needed for Sleep.  ONETOUCH ULTRA TEST strip CHECK TWICE A  Strip 12    hydrocortisone (ANUSOL-HC) 25 mg supp   2    lidocaine (XYLOCAINE) 2 % jelly   2    NITRO-BID 2 % ointment   2    insulin glargine (LANTUS SOLOSTAR) 100 unit/mL (3 mL) pen INJECT 34 UNITS UNDER THE SKIN EVERY MORNING 3 Each 3    metoclopramide HCl (REGLAN) 10 mg tablet Take 10 mg by mouth daily.  nystatin (MYCOSTATIN) topical cream   0    albuterol (PROVENTIL HFA, VENTOLIN HFA, PROAIR HFA) 90 mcg/actuation inhaler Take 2 Puffs by inhalation as needed. 1 Inhaler 3    polyethylene glycol (MIRALAX) 17 gram packet Take 17 g by mouth daily.  cetirizine (ZYRTEC) 10 mg tablet Take 10 mg by mouth daily.  melatonin 5 mg Tab Take 5 mg by mouth daily.  Dexlansoprazole (DEXILANT) 60 mg CpDM Take 60 mg by mouth daily.  aspirin 81 mg tablet Take 81 mg by mouth.  pyridoxine (VITAMIN B-6) 100 mg tablet Take 100 mg by mouth every morning.       ERGOCALCIFEROL, VITAMIN D2, (VITAMIN D PO) Take 2,000 Units by mouth daily. Past Surgical History:   Procedure Laterality Date    EXTRACTION ERUPTED TOOTH/EXR      HX ENDOSCOPY  4/2016    HX GI  2010    gastroesophagectomy    HX GYN  1983    lap btl    HX HEENT  1957    tonsils as child    HX HERNIA REPAIR  04/26/2012    HX LAP CHOLECYSTECTOMY  1995    HX ORTHOPAEDIC  02/20/2015    right shoulder manipulation    HX OTHER SURGICAL      left shoulder surgery for frozen surgery    HX OTHER SURGICAL  6/24/2015    mammogram     HX PELVIC LAPAROSCOPY  1989    HX VASCULAR ACCESS      port a cath and removal    VA EGD BALLOON DILATION ESOPHAGUS <30 MM DIAM  4/20/2010           Lab Results  Component Value Date/Time   WBC 6.9 03/23/2017 12:09 PM   HGB 12.0 03/23/2017 12:09 PM   Hemoglobin (POC) 11.9 01/28/2010 12:02 PM   HCT 36.9 03/23/2017 12:09 PM   Hematocrit (POC) 35 01/28/2010 12:02 PM   PLATELET 450 10/37/2956 12:09 PM   MCV 90 03/23/2017 12:09 PM     Lab Results  Component Value Date/Time   Cholesterol, total 147 09/14/2016 01:49 PM   HDL Cholesterol 55 09/14/2016 01:49 PM   LDL, calculated 73 09/14/2016 01:49 PM   Triglyceride 95 09/14/2016 01:49 PM     Lab Results  Component Value Date/Time   GFR est non-AA 63 03/23/2017 12:09 PM   GFRNA, POC >60 01/06/2012 10:42 AM   GFR est AA 72 03/23/2017 12:09 PM   GFRAA, POC >60 01/06/2012 10:42 AM   Creatinine 0.96 03/23/2017 12:09 PM   Creatinine (POC) 0.9 01/06/2012 10:42 AM   BUN 17 03/23/2017 12:09 PM   BUN (POC) 14 01/28/2010 12:02 PM   Sodium 139 03/23/2017 12:09 PM   Sodium (POC) 141 01/28/2010 12:02 PM   Potassium 3.9 03/23/2017 12:09 PM   Potassium (POC) 4.0 01/28/2010 12:02 PM   Chloride 101 03/23/2017 12:09 PM   Chloride (POC) 103 01/28/2010 12:02 PM   CO2 22 03/23/2017 12:09 PM   Magnesium 1.9 02/08/2010 03:00 AM          Review of Systems   Respiratory: Negative for shortness of breath. Cardiovascular: Negative for chest pain.        Physical Exam   Constitutional: She is oriented to person, place, and time. She appears well-developed and well-nourished. No distress. HENT:   Head: Normocephalic and atraumatic. Right Ear: External ear normal.   Left Ear: External ear normal.   Mouth/Throat: Oropharynx is clear and moist. No oropharyngeal exudate. Mild cerumen BL ears   Eyes: Conjunctivae and EOM are normal. Right eye exhibits no discharge. Left eye exhibits no discharge. Neck: Normal range of motion. Neck supple. Cardiovascular: Normal rate, regular rhythm, normal heart sounds and intact distal pulses. Exam reveals no gallop and no friction rub. No murmur heard. Pulmonary/Chest: Effort normal and breath sounds normal. No respiratory distress. She has no wheezes. She has no rales. She exhibits no tenderness. Abdominal: Soft. She exhibits no distension and no mass. There is no tenderness. There is no rebound and no guarding. Musculoskeletal: Normal range of motion. She exhibits no edema, tenderness or deformity. Lymphadenopathy:     She has no cervical adenopathy. Neurological: She is alert and oriented to person, place, and time. Coordination normal.   Monofilament nl BLE, good peripheral pulses, no ulcers     Skin: Skin is warm and dry. No rash noted. She is not diaphoretic. No erythema. No pallor. Psychiatric: She has a normal mood and affect. Her behavior is normal.       ASSESSMENT and PLAN    ICD-10-CM ICD-9-CM    1. Physical exam, annual    Mammogram due next month, she will schedule, immunizations UTD, will get eye exam report for review, labs UTD except for a1c and microalbumin- will get those today. Z00.00 V70.0    2.  Diabetes mellitus without complication ([address]    Has been adequately controlled on lantus 27 U, no longer getting hypoglycemia, currently using humalog 4 U about 3-4 times per week with dinner, repeat a1c today, adjust medications as needed E11.9 250.00 MICROALBUMIN, UR, RAND W/ MICROALBUMIN/CREA RATIO      HEMOGLOBIN A1C WITH EAG METABOLIC PANEL, COMPREHENSIVE      HEMOGLOBIN A1C WITH EAG      LIPID PANEL      TSH 3RD GENERATION       DIABETES FOOT EXAM       DIABETES EYE EXAM   3. ASHD (arteriosclerotic heart disease)    Follows with Dr. Darius Cruz, [facility], continues on ranexa. Symptomatically stable. A83.16 358.32 METABOLIC PANEL, COMPREHENSIVE      HEMOGLOBIN A1C WITH EAG      LIPID PANEL      TSH 3RD GENERATION   4. Essential hypertension    Well controlled on diovan T39 191.5 METABOLIC PANEL, COMPREHENSIVE      HEMOGLOBIN A1C WITH EAG      LIPID PANEL      TSH 3RD GENERATION   5. Hypercholesteremia    Controlled on lipitor Z90.82 984.5 METABOLIC PANEL, COMPREHENSIVE      HEMOGLOBIN A1C WITH EAG      LIPID PANEL      TSH 3RD GENERATION   6. Mild intermittent asthma without complication    Continues on symbicort BID M78.07 725.15 METABOLIC PANEL, COMPREHENSIVE      HEMOGLOBIN A1C WITH EAG      LIPID PANEL      TSH 3RD GENERATION   7. Localized edema    Continues on lasix daily which improves her sx R60.0 782. 3             Written by Ainsley Silva, as dictated by Diane Mujica MD.    Current diagnosis and concerns discussed with pt at length. Understands risks and benefits or current treatment plan and medications and accepts the treatment and medication with any possible risks.   Pt asks appropriate questions which were answered.   Pt instructed to call with any concerns or problems. This note will not be viewable in [address]

## 2017-06-28 NOTE — MR AVS SNAPSHOT
Visit Information Date & Time Provider Department Dept. Phone Encounter #  
 6/28/2017  1:45 PM Abdoulaye Neal, [address] [place] [address],[address] [phone] [id number] Follow-up Instructions Return in about 3 months (around 9/28/2017). Your Appointments 11/2/2017  1:00 PM  
6 MONTH with Bernarda Reynoso MD  
[facility] [address] [place] [address]) Appt Note: $ 4/14/17ksr [address]  
[phone] [address] Upcoming Health Maintenance Date Due  
 EYE EXAM RETINAL OR DILATED Q1 7/28/2016 Pneumococcal 19-64 Highest Risk (3 of 3 - PPSV23) 1/9/2017 INFLUENZA AGE 9 TO ADULT 8/1/2017 MICROALBUMIN Q1 9/14/2017 LIPID PANEL Q1 9/14/2017 HEMOGLOBIN A1C Q6M 9/24/2017 FOOT EXAM Q1 3/24/2018 COLONOSCOPY 5/13/2018 BREAST CANCER SCRN MAMMOGRAM 7/8/2018 PAP AKA CERVICAL CYTOLOGY 12/4/2018 DTaP/Tdap/Td series (2 - Td) 9/14/2026 Allergies as of 6/28/2017  Review Complete On: 6/28/2017 By: Abdoulaye Neal MD  
  
 Severity Noted Reaction Type Reactions Adhesive Low 04/25/2012   Topical Other (comments)  
 redness Current Immunizations  Reviewed on 4/26/2012 Name Date Influenza Vaccine (Quad) PF 10/13/2016, 10/14/2015 Influenza Vaccine Split 10/26/2011 Pneumococcal Conjugate (PCV-13) 11/14/2016 Pneumococcal Vaccine (Unspecified Type) 4/26/2009 Tdap 9/14/2016 Zoster Vaccine, Live 11/18/2015 Not reviewed this visit You Were Diagnosed With   
  
 Codes Comments Physical exam, annual    -  Primary ICD-10-CM: Z00.00 ICD-9-CM: V70.0 Diabetes mellitus without complication ([facility]     BQH-06-GF: E11.9 ICD-9-CM: 250.00 ASHD (arteriosclerotic heart disease)     ICD-10-CM: I25.10 ICD-9-CM: 414.00 Essential hypertension     ICD-10-CM: I10 
ICD-9-CM: 401.9  Hypercholesteremia     ICD-10-CM: E78.00 
 ICD-9-CM: 272.0 Mild intermittent asthma without complication     HBU-28-QM: J45.20 ICD-9-CM: 493.90 Localized edema     ICD-10-CM: R60.0 ICD-9-CM: 575. 3 Vitals BP Pulse Temp Resp Height(growth percentile) Weight(growth percentile) 131/73 (BP 1 Location: Left arm, BP Patient Position: Sitting) 60 98.1 °F (36.7 °C) (Oral) 16 5' 3\" (1.6 m) 180 lb (81.6 kg) SpO2 BMI OB Status Smoking Status 97% 31.89 kg/m2 Postmenopausal Never Smoker BMI and BSA Data Body Mass Index Body Surface Area  
 31.89 kg/m 2 1.9 m 2 Preferred Pharmacy Pharmacy Name Phone [facility] [address] [place] [address] [phone] Your Updated Medication List  
  
   
This list is accurate as of: 6/28/17  2:04 PM.  Always use your most recent med list.  
  
  
  
  
 albuterol 90 mcg/actuation inhaler Commonly known as:  PROVENTIL HFA, VENTOLIN HFA, PROAIR HFA Take 2 Puffs by inhalation as needed. ALEVE 220 mg tablet Generic drug:  naproxen sodium Take 220 mg by mouth two (2) times daily (with meals). aspirin 81 mg tablet Take 81 mg by mouth. atorvastatin 40 mg tablet Commonly known as:  LIPITOR  
TAKE 1 TABLET NIGHTLY  
  
 BENADRYL ALLERGY 25 mg tablet Generic drug:  diphenhydrAMINE Take 25 mg by mouth nightly as needed for Sleep. clopidogrel 75 mg Tab Commonly known as:  PLAVIX Take 1 Tab by mouth daily. Indications: Thrombosis Prevention after PCI DEXILANT 60 mg Cpdb Generic drug:  Dexlansoprazole Take 60 mg by mouth daily. furosemide 20 mg tablet Commonly known as:  LASIX TAKE 1 TABLET BY MOUTH EVERY DAY  
  
 hydrocortisone 25 mg Supp Commonly known as:  ANUSOL-HC  
  
 insulin glargine 100 unit/mL (3 mL) Inpn Commonly known as:  LANTUS SOLOSTAR INJECT 34 UNITS UNDER THE SKIN EVERY MORNING  
  
 insulin lispro 100 unit/mL kwikpen Commonly known as:  HUMALOG 4 units every evening with blood sugars over 120 Insulin Needles (Disposable) 31 gauge x 5/16\" Ndle Commonly known as:  CLICKFINE  
USE DAILY AS DIRECTED  
  
 lidocaine 2 % jelly Commonly known as:  XYLOCAINE  
  
 melatonin Tab tablet Take 5 mg by mouth daily. metoclopramide HCl 10 mg tablet Commonly known as:  REGLAN Take 10 mg by mouth daily. MIRALAX 17 gram packet Generic drug:  polyethylene glycol Take 17 g by mouth daily. montelukast 10 mg tablet Commonly known as:  SINGULAIR  
TAKE 1 TABLET DAILY  
  
 nystatin topical cream  
Commonly known as:  MYCOSTATIN  
  
 ONETOUCH ULTRA TEST strip Generic drug:  glucose blood VI test strips CHECK TWICE A DAY  
  
 RANEXA 500 mg SR tablet Generic drug:  ranolazine ER  
TAKE 1 TABLET EVERY 12 HOURS  
  
 SYMBICORT 160-4.5 mcg/actuation HFA inhaler Generic drug:  budesonide-formoterol Take 2 Puffs by inhalation two (2) times a day. valsartan 160 mg tablet Commonly known as:  DIOVAN  
TAKE 1 TABLET DAILY  
  
 VITAMIN B-6 100 mg tablet Generic drug:  pyridoxine (vitamin B6) Take 100 mg by mouth every morning. VITAMIN D2 PO Take 2,000 Units by mouth daily. ZyrTEC 10 mg tablet Generic drug:  cetirizine Take 10 mg by mouth daily. We Performed the Following HEMOGLOBIN A1C WITH EAG [00679 CPT(R)]  DIABETES EYE EXAM [6 Custom]  DIABETES FOOT EXAM [7 Custom] MICROALBUMIN, UR, RAND W/ MICROALBUMIN/CREA RATIO [id number] CPT(R)] Follow-up Instructions Return in about 3 months (around 9/28/2017). To-Do List   
 06/29/2017 Lab:  HEMOGLOBIN A1C WITH EAG   
  
 06/29/2017 Lab:  LIPID PANEL   
  
 06/29/2017 Lab:  METABOLIC PANEL, COMPREHENSIVE   
  
 06/29/2017 Lab:  TSH 3RD GENERATION Patient Instructions Think about sleep apnea test 
 
Labs today Introducing [facility]!    
 Dear Lluvia Gamez: 
 Thank you for requesting a OpenBook account. Our records indicate that you already have an active OpenBook account. You can access your account anytime at [email or web address] [email or web address]/Personal Estate Manager Did you know that you can access your hospital and ER discharge instructions at any time in OpenBook? You can also review all of your test results from your hospital stay or ER visit. Additional Information If you have questions, please visit the Frequently Asked Questions section of the OpenBook website at [email or web address] Remember, OpenBook is NOT to be used for urgent needs. For medical emergencies, dial 911. Now available from your iPhone and Android! Please provide this summary of care documentation to your next provider. Your primary care clinician is listed as Marilee Palacios. If you have any questions after today's visit, please call [phone].

## 2017-06-29 LAB
ALBUMIN/CREAT UR: 4.9 MG/G CREAT (ref 0–30)
CREAT UR-MCNC: 129.5 MG/DL
EST. AVERAGE GLUCOSE BLD GHB EST-MCNC: 148 MG/DL
HBA1C MFR BLD: 6.8 % (ref 4.8–5.6)
MICROALBUMIN UR-MCNC: 6.3 UG/ML

## 2017-07-11 RX ORDER — ATORVASTATIN CALCIUM 40 MG/1
TABLET, FILM COATED ORAL
Qty: 90 TAB | Refills: 2 | Status: SHIPPED | OUTPATIENT
Start: 2017-07-11 | End: 2018-04-07 | Stop reason: SDUPTHER

## 2017-07-19 RX ORDER — VALSARTAN 160 MG/1
TABLET ORAL
Qty: 90 TAB | Refills: 1 | Status: SHIPPED | OUTPATIENT
Start: 2017-07-19 | End: 2017-12-27 | Stop reason: SDUPTHER

## 2017-07-19 RX ORDER — RANOLAZINE 500 MG/1
TABLET, FILM COATED, EXTENDED RELEASE ORAL
Qty: 180 TAB | Refills: 1 | Status: SHIPPED | OUTPATIENT
Start: 2017-07-19 | End: 2017-12-27 | Stop reason: SDUPTHER

## 2017-07-20 ENCOUNTER — HOSPITAL ENCOUNTER (OUTPATIENT)
Dept: MAMMOGRAPHY | Age: 62
Discharge: HOME | End: 2017-07-20
Attending: INTERNAL MEDICINE
Payer: COMMERCIAL

## 2017-07-20 DIAGNOSIS — Z12.31 VISIT FOR SCREENING MAMMOGRAM: ICD-10-CM

## 2017-07-20 PROCEDURE — 77067 SCR MAMMO BI INCL CAD: CPT

## 2017-09-20 ENCOUNTER — CLINICAL SUPPORT (OUTPATIENT)
Dept: INTERNAL MEDICINE CLINIC | Age: 62
End: 2017-09-20

## 2017-09-20 DIAGNOSIS — J45.20 MILD INTERMITTENT ASTHMA WITHOUT COMPLICATION: ICD-10-CM

## 2017-09-20 DIAGNOSIS — E78.00 HYPERCHOLESTEREMIA: ICD-10-CM

## 2017-09-20 DIAGNOSIS — E11.9 DIABETES MELLITUS WITHOUT COMPLICATION (HCC): ICD-10-CM

## 2017-09-20 DIAGNOSIS — I25.10 ASHD (ARTERIOSCLEROTIC HEART DISEASE): ICD-10-CM

## 2017-09-20 DIAGNOSIS — I10 ESSENTIAL HYPERTENSION: ICD-10-CM

## 2017-09-21 LAB
ALBUMIN SERPL-MCNC: 4.2 G/DL (ref 3.6–4.8)
ALBUMIN/GLOB SERPL: 2.1 {RATIO} (ref 1.2–2.2)
ALP SERPL-CCNC: 51 IU/L (ref 39–117)
ALT SERPL-CCNC: 30 IU/L (ref 0–32)
AST SERPL-CCNC: 24 IU/L (ref 0–40)
BILIRUB SERPL-MCNC: 0.5 MG/DL (ref 0–1.2)
BUN SERPL-MCNC: 19 MG/DL (ref 8–27)
BUN/CREAT SERPL: 20 (ref 12–28)
CALCIUM SERPL-MCNC: 8.7 MG/DL (ref 8.7–10.3)
CHLORIDE SERPL-SCNC: 100 MMOL/L (ref 96–106)
CHOLEST SERPL-MCNC: 140 MG/DL (ref 100–199)
CO2 SERPL-SCNC: 26 MMOL/L (ref 18–29)
CREAT SERPL-MCNC: 0.97 MG/DL (ref 0.57–1)
EST. AVERAGE GLUCOSE BLD GHB EST-MCNC: 131 MG/DL
GLOBULIN SER CALC-MCNC: 2 G/DL (ref 1.5–4.5)
GLUCOSE SERPL-MCNC: 104 MG/DL (ref 65–99)
HBA1C MFR BLD: 6.2 % (ref 4.8–5.6)
HDLC SERPL-MCNC: 51 MG/DL
LDLC SERPL CALC-MCNC: 71 MG/DL (ref 0–99)
POTASSIUM SERPL-SCNC: 4.2 MMOL/L (ref 3.5–5.2)
PROT SERPL-MCNC: 6.2 G/DL (ref 6–8.5)
SODIUM SERPL-SCNC: 140 MMOL/L (ref 134–144)
TRIGL SERPL-MCNC: 91 MG/DL (ref 0–149)
TSH SERPL DL<=0.005 MIU/L-ACNC: 3.42 UIU/ML (ref 0.45–4.5)
VLDLC SERPL CALC-MCNC: 18 MG/DL (ref 5–40)

## 2017-09-27 ENCOUNTER — OFFICE VISIT (OUTPATIENT)
Dept: INTERNAL MEDICINE CLINIC | Age: 62
End: 2017-09-27

## 2017-09-27 VITALS
DIASTOLIC BLOOD PRESSURE: 75 MMHG | SYSTOLIC BLOOD PRESSURE: 134 MMHG | RESPIRATION RATE: 16 BRPM | BODY MASS INDEX: 31.54 KG/M2 | TEMPERATURE: 97.6 F | HEART RATE: 61 BPM | WEIGHT: 178 LBS | HEIGHT: 63 IN | OXYGEN SATURATION: 97 %

## 2017-09-27 DIAGNOSIS — I10 ESSENTIAL HYPERTENSION: ICD-10-CM

## 2017-09-27 DIAGNOSIS — K21.9 GASTROESOPHAGEAL REFLUX DISEASE WITHOUT ESOPHAGITIS: ICD-10-CM

## 2017-09-27 DIAGNOSIS — I20.9 ANGINA, CLASS II (HCC): ICD-10-CM

## 2017-09-27 DIAGNOSIS — J45.20 MILD INTERMITTENT ASTHMA WITHOUT COMPLICATION: ICD-10-CM

## 2017-09-27 DIAGNOSIS — R60.0 LOCALIZED EDEMA: ICD-10-CM

## 2017-09-27 DIAGNOSIS — E11.9 DIABETES MELLITUS WITHOUT COMPLICATION (HCC): Primary | ICD-10-CM

## 2017-09-27 DIAGNOSIS — E78.00 HYPERCHOLESTEREMIA: ICD-10-CM

## 2017-09-27 NOTE — MR AVS SNAPSHOT
Visit Information Date & Time Provider Department Dept. Phone Encounter #  
 9/27/2017  2:45 PM Hui Fung, [address] [id number] Follow-up Instructions Return in about 3 months (around 12/27/2017). Your Appointments 11/2/2017  1:00 PM  
6 MONTH with Saleem Junior MD  
[facility] [address]) Appt Note: $ 4/14/17ksr [id number] [facility]  
[phone] [address] [facility] Upcoming Health Maintenance Date Due Pneumococcal 19-64 Highest Risk (3 of 3 - PPSV23) 1/9/2017 INFLUENZA AGE 9 TO ADULT 8/1/2017 EYE EXAM RETINAL OR DILATED Q1 11/9/2017 HEMOGLOBIN A1C Q6M 3/20/2018 COLONOSCOPY 5/13/2018 FOOT EXAM Q1 6/28/2018 MICROALBUMIN Q1 6/28/2018 LIPID PANEL Q1 9/20/2018 PAP AKA CERVICAL CYTOLOGY 12/4/2018 BREAST CANCER SCRN MAMMOGRAM 7/20/2019 DTaP/Tdap/Td series (2 - Td) 9/14/2026 Allergies as of 9/27/2017  Review Complete On: 9/27/2017 By: Deborah Tijerina LPN Severity Noted Reaction Type Reactions Adhesive Low 04/25/2012   Topical Other (comments)  
 redness Current Immunizations  Reviewed on 4/26/2012 Name Date Influenza Vaccine (Quad) PF 10/13/2016, 10/14/2015 Influenza Vaccine Split 10/26/2011 Pneumococcal Conjugate (PCV-13) 11/14/2016 Tdap 9/14/2016 ZZZ-RETIRED (DO NOT USE) Pneumococcal Vaccine (Unspecified Type) 4/26/2009 Zoster Vaccine, Live 11/18/2015 Not reviewed this visit You Were Diagnosed With   
  
 Codes Comments Diabetes mellitus without complication ([facility]    -  Primary ICD-10-CM: E11.9 ICD-9-CM: 250.00 Angina, class II ([facility]     ICD-10-CM: I20.9 ICD-9-CM: 413.9 Essential hypertension     ICD-10-CM: I10 
ICD-9-CM: 401.9 Hypercholesteremia     ICD-10-CM: E78.00 ICD-9-CM: 272.0 Mild intermittent asthma without complication     EH-67-GD: J45.20 ICD-9-CM: 493.90 Gastroesophageal reflux disease without esophagitis     ICD-10-CM: K21.9 ICD-9-CM: 530.81 Localized edema     ICD-10-CM: R60.0 ICD-9-CM: 875. 3 Vitals BP Pulse Temp Resp Height(growth percentile) Weight(growth percentile) 134/75 (BP 1 Location: Left arm, BP Patient Position: Sitting) 61 97.6 °F (36.4 °C) (Oral) 16 5' 3\" (1.6 m) 178 lb (80.7 kg) SpO2 BMI OB Status Smoking Status 97% 31.53 kg/m2 Postmenopausal Never Smoker Vitals History BMI and BSA Data Body Mass Index Body Surface Area  
 31.53 kg/m 2 1.89 m 2 Preferred Pharmacy Pharmacy Name Phone [address], [facility] [phone] Your Updated Medication List  
  
   
This list is accurate as of: 9/27/17  3:08 PM.  Always use your most recent med list.  
  
  
  
  
 albuterol 90 mcg/actuation inhaler Commonly known as:  PROVENTIL HFA, VENTOLIN HFA, PROAIR HFA Take 2 Puffs by inhalation as needed. ALEVE 220 mg tablet Generic drug:  naproxen sodium Take 220 mg by mouth two (2) times daily (with meals). aspirin 81 mg tablet Take 81 mg by mouth. atorvastatin 40 mg tablet Commonly known as:  LIPITOR  
TAKE 1 TABLET NIGHTLY  
  
 BENADRYL ALLERGY 25 mg tablet Generic drug:  diphenhydrAMINE Take 25 mg by mouth nightly as needed for Sleep. clopidogrel 75 mg Tab Commonly known as:  PLAVIX Take 1 Tab by mouth daily. Indications: Thrombosis Prevention after PCI DEXILANT 60 mg Cpdb Generic drug:  Dexlansoprazole Take 60 mg by mouth daily. furosemide 20 mg tablet Commonly known as:  LASIX TAKE 1 TABLET BY MOUTH EVERY DAY  
  
 hydrocortisone 25 mg Supp Commonly known as:  ANUSOL-HC  
  
 insulin glargine 100 unit/mL (3 mL) Inpn Commonly known as:  LANTUS SOLOSTAR  
 INJECT 34 UNITS UNDER THE SKIN EVERY MORNING  
  
 insulin lispro 100 unit/mL kwikpen Commonly known as:  HUMALOG  
4 units every evening with blood sugars over 120 Insulin Needles (Disposable) 31 gauge x 5/16\" Ndle Commonly known as:  CLICKFINE  
USE DAILY AS DIRECTED  
  
 lidocaine 2 % jelly Commonly known as:  XYLOCAINE  
  
 melatonin Tab tablet Take 5 mg by mouth daily. metoclopramide HCl 10 mg tablet Commonly known as:  REGLAN Take 10 mg by mouth daily. MIRALAX 17 gram packet Generic drug:  polyethylene glycol Take 17 g by mouth daily. montelukast 10 mg tablet Commonly known as:  SINGULAIR  
TAKE 1 TABLET DAILY  
  
 nystatin topical cream  
Commonly known as:  MYCOSTATIN  
  
 ONETOUCH ULTRA TEST strip Generic drug:  glucose blood VI test strips CHECK TWICE A DAY  
  
 RANEXA 500 mg SR tablet Generic drug:  ranolazine ER  
TAKE 1 TABLET EVERY 12 HOURS  
  
 SYMBICORT 160-4.5 mcg/actuation Hfaa Generic drug:  budesonide-formoterol Take 2 Puffs by inhalation two (2) times a day. valsartan 160 mg tablet Commonly known as:  DIOVAN  
TAKE 1 TABLET DAILY  
  
 VITAMIN B-6 100 mg tablet Generic drug:  pyridoxine (vitamin B6) Take 100 mg by mouth every morning. VITAMIN D2 PO Take 2,000 Units by mouth daily. ZyrTEC 10 mg tablet Generic drug:  cetirizine Take 10 mg by mouth daily. Follow-up Instructions Return in about 3 months (around 12/27/2017). To-Do List   
 09/28/2017 Lab:  HEMOGLOBIN A1C WITH EAG   
  
 09/28/2017 Lab:  METABOLIC PANEL, BASIC Introducing [facility]! Dear Heavenly Noriega: Thank you for requesting a [employer] account. Our records indicate that you already have an active [employer] account. You can access your account anytime at [email or web address] Did you know that you can access your hospital and ER discharge instructions at any time in Kreyonic? You can also review all of your test results from your hospital stay or ER visit. Additional Information If you have questions, please visit the Frequently Asked Questions section of the Kreyonic website at [email or web address] Remember, Kreyonic is NOT to be used for urgent needs. For medical emergencies, dial 911. Now available from your iPhone and Android! Please provide this summary of care documentation to your next provider. Your primary care clinician is listed as Tesha Tatum. If you have any questions after today's visit, please call [phone].

## 2017-09-27 NOTE — PROGRESS NOTES
HISTORY OF PRESENT ILLNESS  Rancho Lopez is a 57 y.o. female. HPI   Last here 6/28/17. Pt is here to f/u on chronic conditions     BP today is 134/75  Continues diovan 160mg daily   She has been taking lasix daily with improvement - stable  Has chronic edema, R chronically greater than L unchanged.    Pt is also on ranexa to prevent cp which helps    Pt follows with Dr. Caroline Mcfadden (cardio)  Last visit was 4/17  Next visit scheduled for 11/2/17  Contoinues on plavix until 3/17 and ranexa to prevent CP  C/o bruising with plavix mostly on arms      BS at home running around 90s in the AM fasting,   Pt denies low BS readings   Continues lantus 27 U qAM and humalog 4 U with dinner for BS >120  She takes the humalog about 3-4 days per week with dinner mostly if having a high carb meal   She also takes ASA 81mg daily     Pt has not been evaluated for DANIELLE in the past  She snores rarely per her   Her airway is quite obstructed on exam  Discussed completing sleep evaluation   She declines further evaluation for now       Reviewed last labs 9/17  Kidney nl, liver nl, thyroid nl  Repeat labs today      Pt follows with Dr. Chase Rodriguez (hemo/onc) for h/o adenoma of esophagus annually  Reviewed last notes 6/28/17: f/u d/t h/o adenoma of esophagus, no evidence of recurrence, more than 7 years out, if good afterwards might not need to go back      Continues lipitor 40mg daily for cholesterol      Wt is stable since last visit   Discussed diet and weight loss      Pt follows with Dr. Leela Chandra (pulm)   Reviewed last notes 7/27/17: asthma flare, continues on symbicort and albuterol, f/u in 6 months  Next visit scheduled for 2/2018  Continues symbicort BID for breathing/asthma per pulm which helps  Also continues on sinuglair for her asthma sx which works well   This has improved her breathing overall      Continues dexilant daily and reglan daily occasionally takes a second one which helps, controls reflux sx  She occasionally takes an additional dose of reglan in the evenings for reflux sx  She avoids trigger foods and rarely has reflux or aspiration  Recall has h/o esophageal cancer     I provided referral to see rheumatology in the past  She has not yet followed up with Dr. Alejandra Blackburn (rheum)  However her jt sx have improved, good ROM in her hands         Pt stated that her ears were bothering her since last visit - resolved  Pt states that she believes she was clenching her teeth at night    Reviewed mammogram 7/20/17: negative     Of note, her  has bladder cancer and she is caring for him at home.      PREVENTIVE:    Colonoscopy: 5/2013, Dr. Brandy Anne, repeat 5 years    EGD: 4/16, Dr. Brandy Anne, h/o esophageal cancer, polyp on recent EGD, biopsy nl  Pap: Dr. Stephane Prader, 1/31/17  Mammogram: 7/20/17, negative  Dexa: 7/29/15 normal, due 2018  Tdap: 9/14/2016  Pneumovax: 4/26/2009  Aruztgb71: 11/14/2016  Zostavax: 11/18/2016  Flu shot: 10/13/2016, will get in 10/17  Foot exam: 6/28/17  Microalbumin: 6/17 minimal  A1c: 9/14 6.0, 12/14 6.8, 4/15 7.8, 7/15 7.1, 10/15 7.3, 1/16 7.2, 5/16 7.0, 9/16 6.7, 12/16 6.2, 3/17 6.8, 6/17 8.8, 9/17 6.2  Eye exam: Dr. Shellie Rivero, 11/09/16, scheduled for 11/11/17  Hep C screen: 11/15 negative  Lipids: 9/17 LDL 71    Patient Active Problem List    Diagnosis Date Noted    Diabetes mellitus without complication ([facility] [address] [facility] 76/59/4194    ASHD (arteriosclerotic heart disease) 06/18/2014    Syncope 06/18/2014    Bradycardia 06/18/2014    Multinodular goiter     Angina, class II ([facility] [address] 04/18/2013    Family history of coronary artery disease 04/18/2013    S/P cardiac cath 04/18/2013    Hypercholesteremia 02/18/2013    HTN (hypertension) 02/18/2013    Asthma 02/18/2013    Depression 02/18/2013    Thyroid nodule 02/18/2013    Shoulder capsulitis 02/18/2013    Incisional hernia 02/10/2011    Esophageal cancer ([facility] [address] 02/10/2011     Current Outpatient Prescriptions   Medication Sig Dispense Refill    RANEXA 500 mg SR tablet TAKE 1 TABLET EVERY 12 HOURS 180 Tab 1    valsartan (DIOVAN) 160 mg tablet TAKE 1 TABLET DAILY 90 Tab 1    atorvastatin (LIPITOR) 40 mg tablet TAKE 1 TABLET NIGHTLY 90 Tab 2    furosemide (LASIX) 20 mg tablet TAKE 1 TABLET BY MOUTH EVERY DAY 30 Tab 3    insulin lispro (HUMALOG) 100 unit/mL kwikpen 4 units every evening with blood sugars over 120 1 Package 0    Insulin Needles, Disposable, (CLICKFINE) 31 gauge x 5/16\" ndle USE DAILY AS DIRECTED 100 Package 10    montelukast (SINGULAIR) 10 mg tablet TAKE 1 TABLET DAILY 90 Tab 1    clopidogrel (PLAVIX) 75 mg tab Take 1 Tab by mouth daily. Indications: Thrombosis Prevention after PCI 90 Tab 3    SYMBICORT 160-4.5 mcg/actuation HFA inhaler Take 2 Puffs by inhalation two (2) times a day.  naproxen sodium (ALEVE) 220 mg tablet Take 220 mg by mouth two (2) times daily (with meals).  ONETOUCH ULTRA TEST strip CHECK TWICE A  Strip 12    insulin glargine (LANTUS SOLOSTAR) 100 unit/mL (3 mL) pen INJECT 34 UNITS UNDER THE SKIN EVERY MORNING 3 Each 3    metoclopramide HCl (REGLAN) 10 mg tablet Take 10 mg by mouth daily.  polyethylene glycol (MIRALAX) 17 gram packet Take 17 g by mouth daily.  cetirizine (ZYRTEC) 10 mg tablet Take 10 mg by mouth daily.  melatonin 5 mg Tab Take 5 mg by mouth daily.  Dexlansoprazole (DEXILANT) 60 mg CpDM Take 60 mg by mouth daily.  aspirin 81 mg tablet Take 81 mg by mouth.  pyridoxine (VITAMIN B-6) 100 mg tablet Take 100 mg by mouth every morning.  ERGOCALCIFEROL, VITAMIN D2, (VITAMIN D PO) Take 2,000 Units by mouth daily.  albuterol (PROVENTIL HFA, VENTOLIN HFA, PROAIR HFA) 90 mcg/actuation inhaler Take 2 Puffs by inhalation as needed. 1 Inhaler 3    diphenhydrAMINE (BENADRYL ALLERGY) 25 mg tablet Take 25 mg by mouth nightly as needed for Sleep.       hydrocortisone (ANUSOL-HC) 25 mg supp   2    lidocaine (XYLOCAINE) 2 % jelly   2    nystatin (MYCOSTATIN) topical cream   0 Past Surgical History:   Procedure Laterality Date    EXTRACTION ERUPTED TOOTH/EXR      HX ENDOSCOPY  4/2016    HX GI  2010    gastroesophagectomy    HX GYN  1983    lap btl    HX HEENT  1957    tonsils as child    HX HERNIA REPAIR  04/26/2012    HX LAP CHOLECYSTECTOMY  1995    HX ORTHOPAEDIC  02/20/2015    right shoulder manipulation    HX OTHER SURGICAL      left shoulder surgery for frozen surgery    HX OTHER SURGICAL  6/24/2015    mammogram     HX PELVIC LAPAROSCOPY  1989    HX VASCULAR ACCESS      port a cath and removal    SD EGD BALLOON DILATION ESOPHAGUS <30 MM DIAM  4/20/2010           Lab Results  Component Value Date/Time   WBC 6.9 03/23/2017 12:09 PM   HGB 12.0 03/23/2017 12:09 PM   Hemoglobin (POC) 11.9 01/28/2010 12:02 PM   HCT 36.9 03/23/2017 12:09 PM   Hematocrit (POC) 35 01/28/2010 12:02 PM   PLATELET 840 61/74/0263 12:09 PM   MCV 90 03/23/2017 12:09 PM     Lab Results  Component Value Date/Time   Cholesterol, total 140 09/20/2017 08:41 AM   HDL Cholesterol 51 09/20/2017 08:41 AM   LDL, calculated 71 09/20/2017 08:41 AM   Triglyceride 91 09/20/2017 08:41 AM     Lab Results  Component Value Date/Time   GFR est non-AA 62 09/20/2017 08:41 AM   GFRNA, POC >60 01/06/2012 10:42 AM   GFR est AA 71 09/20/2017 08:41 AM   GFRAA, POC >60 01/06/2012 10:42 AM   Creatinine 0.97 09/20/2017 08:41 AM   Creatinine (POC) 0.9 01/06/2012 10:42 AM   BUN 19 09/20/2017 08:41 AM   BUN (POC) 14 01/28/2010 12:02 PM   Sodium 140 09/20/2017 08:41 AM   Sodium (POC) 141 01/28/2010 12:02 PM   Potassium 4.2 09/20/2017 08:41 AM   Potassium (POC) 4.0 01/28/2010 12:02 PM   Chloride 100 09/20/2017 08:41 AM   Chloride (POC) 103 01/28/2010 12:02 PM   CO2 26 09/20/2017 08:41 AM   Magnesium 1.9 02/08/2010 03:00 AM          Review of Systems   Respiratory: Negative for shortness of breath. Cardiovascular: Negative for chest pain. Physical Exam   Constitutional: She is oriented to person, place, and time.  She appears well-developed and well-nourished. No distress. HENT:   Head: Normocephalic and atraumatic. Right Ear: External ear normal.   Left Ear: External ear normal.   Mouth/Throat: Oropharynx is clear and moist. No oropharyngeal exudate. Eyes: Conjunctivae and EOM are normal. Right eye exhibits no discharge. Left eye exhibits no discharge. Neck: Normal range of motion. Neck supple. No thyromegaly present. Cardiovascular: Normal rate, regular rhythm and normal heart sounds. Exam reveals no gallop and no friction rub. No murmur heard. Pulmonary/Chest: Effort normal and breath sounds normal. No respiratory distress. She has no wheezes. She has no rales. She exhibits no tenderness. Musculoskeletal: She exhibits edema (1+ pitting BLE). She exhibits no tenderness or deformity. Lymphadenopathy:     She has no cervical adenopathy. Neurological: She is alert and oriented to person, place, and time. She has normal reflexes. She exhibits normal muscle tone. Coordination normal.   Skin: Skin is warm and dry. No rash noted. She is not diaphoretic. No erythema. No pallor. Psychiatric: She has a normal mood and affect. Her behavior is normal.       ASSESSMENT and PLAN    ICD-10-CM ICD-9-CM    1. Diabetes mellitus without complication (HCC)    Well controlled on lantus 27 U qAM, occasionally uses humalog 4U at dinner if glucose higher than 120, denies hypoglycemia, has eye exam scheduled for November M02.4 281.99 METABOLIC PANEL, BASIC      HEMOGLOBIN A1C WITH EAG   2. Angina, class II ([address]    Stable on ranexa, has f/u scheduled with Dr. Rasheeda Hernandez for November, will be on plavix until March 2018 W38.9 310.1 METABOLIC PANEL, BASIC      HEMOGLOBIN A1C WITH EAG   3. Essential hypertension    BP well controlled on diovan 160mg U23 784.7 METABOLIC PANEL, BASIC      HEMOGLOBIN A1C WITH EAG   4. Hypercholesteremia    Controlled on lipitor 40mg P73.28 610.6 METABOLIC PANEL, BASIC      HEMOGLOBIN A1C WITH EAG   5. Mild intermittent asthma without complication    Stable on symbicort BID, UTD with Dr. Leora Cabrera office, will see him again in February G50.67 024.82 METABOLIC PANEL, BASIC      HEMOGLOBIN A1C WITH EAG   6. Gastroesophageal reflux disease without esophagitis    Controlled with dexilant once daily    Also has gastroparesis, takes reglan 1-2 times per day, gets this from dr. Nini Aj, recall she has a h/o esophageal adenoma, more than 7 years out, last egd was 4/16, she will be due for COLO next year and perhaps will get an egd at this time J96.1 829.23 METABOLIC PANEL, BASIC      HEMOGLOBIN A1C WITH EAG   7. Localized edema    Stable on lasix, R leg chronically larger than L leg, duplex to r/o DVT has been done in the past, unchanged I48.4 806.4 METABOLIC PANEL, BASIC      HEMOGLOBIN A1C WITH EAG        Scribed by Kathleen Field of [address] [place] [address] [address], as dictated by Dr. Astrid Foss. Current diagnosis and concerns discussed with pt at length. Pt understands risks and benefits or current treatment plan and medications, and accepts the treatment and medication with any possible risks. Pt asks appropriate questions, which were answered. Pt was instructed to call with any concerns or problems. This note will not be viewable in [address]

## 2017-10-12 RX ORDER — INSULIN GLARGINE 100 [IU]/ML
INJECTION, SOLUTION SUBCUTANEOUS
Qty: 45 ML | Refills: 2 | Status: SHIPPED | OUTPATIENT
Start: 2017-10-12 | End: 2018-10-03 | Stop reason: SDUPTHER

## 2017-10-12 RX ORDER — BLOOD SUGAR DIAGNOSTIC
STRIP MISCELLANEOUS
Qty: 200 STRIP | Refills: 12 | Status: SHIPPED | OUTPATIENT
Start: 2017-10-12 | End: 2019-01-16 | Stop reason: SDUPTHER

## 2017-10-16 RX ORDER — FUROSEMIDE 20 MG/1
TABLET ORAL
Qty: 30 TAB | Refills: 3 | Status: SHIPPED | OUTPATIENT
Start: 2017-10-16 | End: 2018-02-06 | Stop reason: SDUPTHER

## 2017-10-19 ENCOUNTER — CLINICAL SUPPORT (OUTPATIENT)
Dept: INTERNAL MEDICINE CLINIC | Age: 62
End: 2017-10-19

## 2017-10-19 VITALS — TEMPERATURE: 97.9 F

## 2017-10-19 DIAGNOSIS — Z23 ENCOUNTER FOR IMMUNIZATION: Primary | ICD-10-CM

## 2017-10-19 NOTE — PROGRESS NOTES
After obtaining consent, and per verbal order from Dr. Sandi Pineda, patient received influenza vaccine given by Jose David Townsend LPN in Right Deltoid. Influenza Vaccine 0.5 mL IM now. Patient was observed for 10 minutes post injection. Patient tolerated injection well.

## 2017-10-27 RX ORDER — MONTELUKAST SODIUM 10 MG/1
TABLET ORAL
Qty: 90 TAB | Refills: 1 | Status: SHIPPED | OUTPATIENT
Start: 2017-10-27 | End: 2018-04-25 | Stop reason: SDUPTHER

## 2017-11-02 ENCOUNTER — OFFICE VISIT (OUTPATIENT)
Dept: CARDIOLOGY CLINIC | Age: 62
End: 2017-11-02

## 2017-11-02 VITALS
HEART RATE: 50 BPM | DIASTOLIC BLOOD PRESSURE: 70 MMHG | HEIGHT: 63 IN | SYSTOLIC BLOOD PRESSURE: 140 MMHG | BODY MASS INDEX: 31.86 KG/M2 | RESPIRATION RATE: 12 BRPM | WEIGHT: 179.8 LBS | OXYGEN SATURATION: 96 %

## 2017-11-02 DIAGNOSIS — I25.10 ASHD (ARTERIOSCLEROTIC HEART DISEASE): ICD-10-CM

## 2017-11-02 DIAGNOSIS — I10 ESSENTIAL HYPERTENSION: Primary | ICD-10-CM

## 2017-11-02 DIAGNOSIS — E78.00 HYPERCHOLESTEREMIA: ICD-10-CM

## 2017-11-02 DIAGNOSIS — E11.9 DIABETES MELLITUS WITHOUT COMPLICATION (HCC): ICD-10-CM

## 2017-11-02 NOTE — MR AVS SNAPSHOT
Visit Information Date & Time Provider Department Dept. Phone Encounter #  
 11/2/2017  1:00 PM Krista Davidson, [address] [facility] [phone] [id number] Your Appointments 12/29/2017 12:00 PM  
ROUTINE CARE with Hoda Salas, [address] [place] [address],[address]) Appt Note: 3 month follow up  
 [address] [id number]  
[address] [facility]  [address] [facility] 5/17/2018  1:15 PM  
6 MONTH with Krista Davidson MD  
[facility] [address]) Appt Note: Dr. Shelley Dumont [facility]  
[phone] [address] [facility] Upcoming Health Maintenance Date Due Pneumococcal 19-64 Highest Risk (3 of 3 - PPSV23) 1/9/2017 EYE EXAM RETINAL OR DILATED Q1 11/9/2017 HEMOGLOBIN A1C Q6M 3/20/2018 COLONOSCOPY 5/13/2018 FOOT EXAM Q1 6/28/2018 MICROALBUMIN Q1 6/28/2018 LIPID PANEL Q1 9/20/2018 PAP AKA CERVICAL CYTOLOGY 12/4/2018 BREAST CANCER SCRN MAMMOGRAM 7/20/2019 DTaP/Tdap/Td series (2 - Td) 9/14/2026 Allergies as of 11/2/2017  Review Complete On: 11/2/2017 By: Waldo Mg NP Severity Noted Reaction Type Reactions Adhesive Low 04/25/2012   Topical Other (comments)  
 redness Current Immunizations  Reviewed on 4/26/2012 Name Date Influenza Vaccine (Quad) PF 10/19/2017, 10/13/2016, 10/14/2015 Influenza Vaccine Split 10/26/2011 Pneumococcal Conjugate (PCV-13) 11/14/2016 Tdap 9/14/2016 ZZZ-RETIRED (DO NOT USE) Pneumococcal Vaccine (Unspecified Type) 4/26/2009 Zoster Vaccine, Live 11/18/2015 Not reviewed this visit You Were Diagnosed With   
  
 Codes Comments Essential hypertension    -  Primary ICD-10-CM: I10 
ICD-9-CM: 401.9 ASHD (arteriosclerotic heart disease)     ICD-10-CM: I25.10 ICD-9-CM: 414.00 Hypercholesteremia     ICD-10-CM: E78.00 ICD-9-CM: 272.0 Diabetes mellitus without complication ([facility]     COK-12-GA: E11.9 ICD-9-CM: 250.00 Vitals BP Pulse Resp Height(growth percentile) Weight(growth percentile) SpO2  
 140/70 (BP Patient Position: Sitting) (!) 50 12 5' 3\" (1.6 m) 179 lb 12.8 oz (81.6 kg) 96% BMI OB Status Smoking Status 31.85 kg/m2 Postmenopausal Never Smoker BMI and BSA Data Body Mass Index Body Surface Area  
 31.85 kg/m 2 1.9 m 2 Preferred Pharmacy Pharmacy Name Phone [address], [facility] [phone] Your Updated Medication List  
  
   
This list is accurate as of: 11/2/17  1:45 PM.  Always use your most recent med list.  
  
  
  
  
 albuterol 90 mcg/actuation inhaler Commonly known as:  PROVENTIL HFA, VENTOLIN HFA, PROAIR HFA Take 2 Puffs by inhalation as needed. ALEVE 220 mg tablet Generic drug:  naproxen sodium Take 220 mg by mouth two (2) times daily (with meals). aspirin 81 mg tablet Take 81 mg by mouth. atorvastatin 40 mg tablet Commonly known as:  LIPITOR  
TAKE 1 TABLET NIGHTLY  
  
 BENADRYL ALLERGY 25 mg tablet Generic drug:  diphenhydrAMINE Take 25 mg by mouth nightly as needed for Sleep. clopidogrel 75 mg Tab Commonly known as:  PLAVIX Take 1 Tab by mouth daily. Indications: Thrombosis Prevention after PCI DEXILANT 60 mg Cpdb Generic drug:  Dexlansoprazole Take 60 mg by mouth daily. furosemide 20 mg tablet Commonly known as:  LASIX TAKE 1 TABLET BY MOUTH EVERY DAY  
  
 hydrocortisone 25 mg Supp Commonly known as:  ANUSOL-HC  
  
 insulin lispro 100 unit/mL kwikpen Commonly known as:  HUMALOG  
4 units every evening with blood sugars over 120 Insulin Needles (Disposable) 31 gauge x 5/16\" Ndle Commonly known as:  CLICKFINE  
USE DAILY AS DIRECTED  
  
 LANTUS SOLOSTAR 100 unit/mL (3 mL) Inpn Generic drug:  insulin glargine INJECT 34 UNITS UNDER THE SKIN EVERY MORNING  
  
 lidocaine 2 % jelly Commonly known as:  XYLOCAINE  
  
 melatonin Tab tablet Take 5 mg by mouth daily. metoclopramide HCl 10 mg tablet Commonly known as:  REGLAN Take 10 mg by mouth daily. MIRALAX 17 gram packet Generic drug:  polyethylene glycol Take 17 g by mouth daily. montelukast 10 mg tablet Commonly known as:  SINGULAIR  
TAKE 1 TABLET DAILY  
  
 nystatin topical cream  
Commonly known as:  MYCOSTATIN  
  
 ONETOUCH ULTRA TEST strip Generic drug:  glucose blood VI test strips CHECK TWICE A DAY  
  
 RANEXA 500 mg SR tablet Generic drug:  ranolazine ER  
TAKE 1 TABLET EVERY 12 HOURS  
  
 SYMBICORT 160-4.5 mcg/actuation Hfaa Generic drug:  budesonide-formoterol Take 2 Puffs by inhalation two (2) times a day. valsartan 160 mg tablet Commonly known as:  DIOVAN  
TAKE 1 TABLET DAILY  
  
 VITAMIN B-6 100 mg tablet Generic drug:  pyridoxine (vitamin B6) Take 100 mg by mouth every morning. VITAMIN D2 PO Take 2,000 Units by mouth daily. ZyrTEC 10 mg tablet Generic drug:  cetirizine Take 10 mg by mouth daily. We Performed the Following AMB POC EKG ROUTINE W/ 12 LEADS, INTER & REP [03061 CPT(R)] Introducing [facility]! Dear Yoly Theodore: Thank you for requesting a [employer] account. Our records indicate that you already have an active [employer] account. You can access your account anytime at [email or web address] Did you know that you can access your hospital and ER discharge instructions at any time in [employer]? You can also review all of your test results from your hospital stay or ER visit. Additional Information If you have questions, please visit the Frequently Asked Questions section of the [employer] website at [email or web address] Remember, MyChart is NOT to be used for urgent needs. For medical emergencies, dial 911. Now available from your iPhone and Android! Please provide this summary of care documentation to your next provider. Your primary care clinician is listed as Ricky Mcmullen. If you have any questions after today's visit, please call [phone].

## 2017-11-02 NOTE — PROGRESS NOTES
Isacc Mahajan DNP, ANP-BC  Subjective/HPI:     Sal Greenwood is a 57 y.o. female is here for routine f/u. The patient denies chest pain/ shortness of breath, orthopnea, PND, LE edema, palpitations, syncope, presyncope or fatigue. Pt reports feeling well in usual state of health.       PCP Provider  Hui Flaherty MD  Past Medical History:   Diagnosis Date    Asthma     coughing couple weeks ago    Bloating     CAD (coronary artery disease)     Cancer ([facility] 2010    esophageal/GE junction    Chest pain     Chest pain     Chronic pain     left shoulder    Congestion of throat     Cough     Depression     & anxiety    Diabetes ([facility]     IDDM    Dyspepsia and other specified disorders of function of stomach     Fatigue     GERD (gastroesophageal reflux disease)     Headache(784.0)     Hypercholesterolemia     Hypertension     Multinodular goiter     Nausea & vomiting     Stool color black     Stress     Weakness       Past Surgical History:   Procedure Laterality Date    EXTRACTION ERUPTED TOOTH/EXR      HX ENDOSCOPY  4/2016    HX GI  2010    gastroesophagectomy    HX GYN  1983    lap btl    HX HEENT  1957    tonsils as child    HX HERNIA REPAIR  04/26/2012    HX LAP CHOLECYSTECTOMY  1995    HX ORTHOPAEDIC  02/20/2015    right shoulder manipulation    HX OTHER SURGICAL      left shoulder surgery for frozen surgery    HX OTHER SURGICAL  6/24/2015    mammogram     HX PELVIC LAPAROSCOPY  1989    HX VASCULAR ACCESS      port a cath and removal    [place] EGD BALLOON DILATION ESOPHAGUS <30 MM DIAM  4/20/2010          Allergies   Allergen Reactions    Adhesive Other (comments)     redness      Family History   Problem Relation Age of Onset    Diabetes Father     Cancer Father      intestinal    Heart Disease Father     Hypertension Father     Heart Disease Brother     Diabetes Brother     Diabetes Mother     Lung Disease Mother     Heart Disease Mother     Hypertension Mother     Diabetes Maternal Grandmother     Diabetes Maternal Grandfather     Elevated Lipids Maternal Aunt     Thyroid Disease Neg Hx       Current Outpatient Prescriptions   Medication Sig    montelukast (SINGULAIR) 10 mg tablet TAKE 1 TABLET DAILY    furosemide (LASIX) 20 mg tablet TAKE 1 TABLET BY MOUTH EVERY DAY    ONETOUCH ULTRA TEST strip CHECK TWICE A DAY    LANTUS SOLOSTAR 100 unit/mL (3 mL) inpn INJECT 34 UNITS UNDER THE SKIN EVERY MORNING    RANEXA 500 mg SR tablet TAKE 1 TABLET EVERY 12 HOURS    valsartan (DIOVAN) 160 mg tablet TAKE 1 TABLET DAILY    atorvastatin (LIPITOR) 40 mg tablet TAKE 1 TABLET NIGHTLY    albuterol (PROVENTIL HFA, VENTOLIN HFA, PROAIR HFA) 90 mcg/actuation inhaler Take 2 Puffs by inhalation as needed.  insulin lispro (HUMALOG) 100 unit/mL kwikpen 4 units every evening with blood sugars over 120    Insulin Needles, Disposable, (CLICKFINE) 31 gauge x 5/16\" ndle USE DAILY AS DIRECTED    clopidogrel (PLAVIX) 75 mg tab Take 1 Tab by mouth daily. Indications: Thrombosis Prevention after PCI    SYMBICORT 160-4.5 mcg/actuation HFA inhaler Take 2 Puffs by inhalation two (2) times a day.  naproxen sodium (ALEVE) 220 mg tablet Take 220 mg by mouth two (2) times daily (with meals).  diphenhydrAMINE (BENADRYL ALLERGY) 25 mg tablet Take 25 mg by mouth nightly as needed for Sleep.  metoclopramide HCl (REGLAN) 10 mg tablet Take 10 mg by mouth daily.  polyethylene glycol (MIRALAX) 17 gram packet Take 17 g by mouth daily.  cetirizine (ZYRTEC) 10 mg tablet Take 10 mg by mouth daily.  melatonin 5 mg Tab Take 5 mg by mouth daily.  Dexlansoprazole (DEXILANT) 60 mg CpDM Take 60 mg by mouth daily.  aspirin 81 mg tablet Take 81 mg by mouth.  pyridoxine (VITAMIN B-6) 100 mg tablet Take 100 mg by mouth every morning.  ERGOCALCIFEROL, VITAMIN D2, (VITAMIN D PO) Take 2,000 Units by mouth daily.     hydrocortisone (ANUSOL-HC) 25 mg supp     lidocaine (XYLOCAINE) 2 % jelly     nystatin (MYCOSTATIN) topical cream      No current facility-administered medications for this visit. Vitals:    11/02/17 1302   BP: 140/70   Pulse: (!) 50   Resp: 12   SpO2: 96%   Weight: 179 lb 12.8 oz (81.6 kg)   Height: 5' 3\" (1.6 m)     Social History     Social History    Marital status:      Spouse name: N/A    Number of children: N/A    Years of education: N/A     Occupational History    Not on file. Social History Main Topics    Smoking status: Never Smoker    Smokeless tobacco: Never Used    Alcohol use 0.5 oz/week     1 Glasses of wine per week      Comment: rarely glass of wine    Drug use: No    Sexual activity: Not on file     Other Topics Concern    Not on file     Social History Narrative    Lives in [facility] with . Has a 45 yo daughter and an adopted 24 yo daughter. 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 I have reviewed the nurses notes, vitals, problem list, allergy list, medical history, family, social history and medications. Review of Symptoms:    General: Pt denies excessive weight gain or loss. Pt is able to conduct ADL's  HEENT: Denies blurred vision, headaches, epistaxis and difficulty swallowing. Respiratory: Denies shortness of breath, CAMP, wheezing or stridor. Cardiovascular: Denies precordial pain, palpitations, edema or PND  Gastrointestinal: Denies poor appetite, indigestion, abdominal pain or blood in stool  Musculoskeletal: Denies pain or swelling from muscles or joints  Neurologic: Denies tremor, paresthesias, or sensory motor disturbance  Skin: Denies rash, itching or texture change. Physical Exam:      General: Well developed, in no acute distress, cooperative and alert  HEENT: No carotid bruits, no JVD, trach is midline. Neck Supple, PEERL, EOM intact. Heart:  Normal S1/S2 negative S3 or S4.  Regular, no murmur, gallop or rub.   Respiratory: Clear bilaterally x 4, no wheezing or rales  Abdomen:   Soft, non-tender, no masses, bowel sounds are active.   Extremities:  No edema, normal cap refill, no cyanosis, atraumatic. Neuro: A&Ox3, speech clear, gait stable. Skin: Skin color is normal. No rashes or lesions. Non diaphoretic  Vascular: 2+ pulses symmetric in all extremities    Cardiographics    ECG: sinus   Results for orders placed or performed in visit on 10/15/15   CARDIAC HOLTER MONITOR, 24 HOURS    Narrative    ECG Monitor/24 hours, Complete    Reason for Holter Monitor   BRADYCARDIA    Heartbeat    Slowest 40  Average 52  Fastest  89          Results:   Underlying Rhythm: Normal sinus rhythm      Atrial Arrhythmias: premature atrial contractions; frequent             AV Conduction: normal    Ventricular Arrhythmias: premature ventricular contractions; rare    ST Segment Analysis:non-specific changes     Symptom Correlation:  yes    Comment:   Frequent PAC's.      Elaine John MD             Results for orders placed or performed during the hospital encounter of 02/04/11   EKG, 12 LEAD, INITIAL   Result Value Ref Range    Ventricular Rate 45 BPM    Atrial Rate 45 BPM    P-R Interval 152 ms    QRS Duration 122 ms    Q-T Interval 466 ms    QTC Calculation (Bezet) 403 ms    Calculated P Axis 40 degrees    Calculated R Axis -16 degrees    Calculated T Axis 25 degrees    Diagnosis       Marked sinus bradycardia  Right bundle branch block  Left ventricular hypertrophy with QRS widening  No previous ECGs available  Confirmed by Efren Hood ([id number]) on 2/4/2011 11:15:10 AM         Cardiology Labs:  Lab Results   Component Value Date/Time    Cholesterol, total 140 09/20/2017 08:41 AM    HDL Cholesterol 51 09/20/2017 08:41 AM    LDL, calculated 71 09/20/2017 08:41 AM    Triglyceride 91 09/20/2017 08:41 AM       Lab Results   Component Value Date/Time    Sodium 140 09/20/2017 08:41 AM    Potassium 4.2 09/20/2017 08:41 AM    Chloride 100 09/20/2017 08:41 AM    CO2 26 09/20/2017 08:41 AM    Anion gap 5 04/26/2012 10:00 AM    Glucose 104 09/20/2017 08:41 AM    BUN 19 09/20/2017 08:41 AM    Creatinine 0.97 09/20/2017 08:41 AM    BUN/Creatinine ratio 20 09/20/2017 08:41 AM    GFR est AA 71 09/20/2017 08:41 AM    GFR est non-AA 62 09/20/2017 08:41 AM    Calcium 8.7 09/20/2017 08:41 AM    Bilirubin, total 0.5 09/20/2017 08:41 AM    AST (SGOT) 24 09/20/2017 08:41 AM    Alk. phosphatase 51 09/20/2017 08:41 AM    Protein, total 6.2 09/20/2017 08:41 AM    Albumin 4.2 09/20/2017 08:41 AM    Globulin 3.4 02/01/2010 04:40 AM    A-G Ratio 2.1 09/20/2017 08:41 AM    ALT (SGPT) 30 09/20/2017 08:41 AM           Assessment:     Assessment:     Diagnoses and all orders for this visit:    1. Essential hypertension  -     AMB POC EKG ROUTINE W/ 12 LEADS, INTER & REP    2. ASHD (arteriosclerotic heart disease)    3. Hypercholesteremia    4. Diabetes mellitus without complication ([facility]        ICD-10-CM ICD-9-CM    1. Essential hypertension I10 401.9 AMB POC EKG ROUTINE W/ 12 LEADS, INTER & REP   2. ASHD (arteriosclerotic heart disease) I25.10 414.00    3. Hypercholesteremia E78.00 272.0    4. Diabetes mellitus without complication ([facility] G64.2 250.00      Orders Placed This Encounter    AMB POC EKG ROUTINE W/ 12 LEADS, INTER & REP     Order Specific Question:   Reason for Exam:     Answer:   routine        Plan:     Patient presents doing well and is stable from cardiac stand point. Continue current care and f/u in 6 months. 1. CAD: Clinically stable  2. High Cholesterol: LDL 71  3. HTN: Mildly elevated, continue current medications, encouraged diet/exercise weight loss. Leora Belle NP      [facility]    11/2/2017         Agree with note as outlined by  NP. I confirm findings in history and physical exam. No additional findings noted. Agree with plan as outlined above.      Khloe Pearson MD

## 2017-11-20 ENCOUNTER — OFFICE VISIT (OUTPATIENT)
Dept: INTERNAL MEDICINE CLINIC | Age: 62
End: 2017-11-20

## 2017-11-20 VITALS
BODY MASS INDEX: 31.64 KG/M2 | TEMPERATURE: 98.4 F | OXYGEN SATURATION: 98 % | WEIGHT: 178.6 LBS | HEART RATE: 57 BPM | SYSTOLIC BLOOD PRESSURE: 144 MMHG | RESPIRATION RATE: 16 BRPM | DIASTOLIC BLOOD PRESSURE: 73 MMHG | HEIGHT: 63 IN

## 2017-11-20 DIAGNOSIS — L03.90 CELLULITIS, UNSPECIFIED CELLULITIS SITE: Primary | ICD-10-CM

## 2017-11-20 DIAGNOSIS — I87.8 VENOUS STASIS: ICD-10-CM

## 2017-11-20 RX ORDER — CEPHALEXIN 500 MG/1
500 CAPSULE ORAL 4 TIMES DAILY
Qty: 40 CAP | Refills: 0 | Status: SHIPPED | OUTPATIENT
Start: 2017-11-20 | End: 2017-11-30

## 2017-11-20 NOTE — PROGRESS NOTES
SUBJECTIVE  Ms. Cheryle Alicea presents today acutely for     Chief Complaint   Patient presents with    Bleeding/Bruising     pt here today concerned redness to lower L leg; today pt states that the leg is better; but before L lower leg has redness, tender to touch, and warm sensation        \"It was really bad a few days ago, seems to be improving; I couldn't even bear weight on the leg Saturday; I can walk on it today, but it's still so dark. \"    OBJECTIVE  Visit Vitals    /73 (BP 1 Location: Left arm, BP Patient Position: Sitting)    Pulse (!) 57    Temp 98.4 °F (36.9 °C) (Oral)    Resp 16    Ht 5' 3\" (1.6 m)    Wt 178 lb 9.6 oz (81 kg)    SpO2 98%    BMI 31.64 kg/m2     Gen: Pleasant 57 y.o.  female in NAD.   HEART: RRR, No M/G/R.   LUNGS: CTAB No W/R.   ABDOMEN: S, NT, ND, BS+.   EXTREMITIES: Warm. 2+ pitting edema. SKIN: L LE mid shin is darkly erythematous, slightly warm. ASSESSMENT / PLAN    ICD-10-CM ICD-9-CM    1. Cellulitis, unspecified cellulitis site L03.90 682.9 cephALEXin (KEFLEX) 500 mg capsule   2. Venous stasis I87.8 459.81        I have reviewed with the patient details of the assessment and plan and all questions were answered. Relevant patient education was performed. Follow-up Disposition:  Return if symptoms worsen or fail to improve.

## 2017-11-20 NOTE — PROGRESS NOTES
1. Have you been to the ER, urgent care clinic since your last visit? Hospitalized since your last visit?no    2. Have you seen or consulted any other health care providers outside of the [address] [place] [address] since your last visit? Include any pap smears or colon screening.  no

## 2017-11-20 NOTE — MR AVS SNAPSHOT
Visit Information Date & Time Provider Department Dept. Phone Encounter #  
 11/20/2017  2:30 PM Aimee Carson, [address] [phone] [id number] Follow-up Instructions Return if symptoms worsen or fail to improve. Your Appointments 12/29/2017 12:00 PM  
ROUTINE CARE with Angela Wolfe, [address] [facility]) Appt Note: 3 month follow up  
 [facility] [address] [id number]  
[address] [facility] [address] [facility] 5/17/2018  1:15 PM  
6 MONTH with [facility] MD Verónica  
[facility] Appt Note: Dr. Sumeet May [facility]  
[phone] [address] [facility] Upcoming Health Maintenance Date Due Pneumococcal 19-64 Highest Risk (3 of 3 - PPSV23) 1/9/2017 EYE EXAM RETINAL OR DILATED Q1 11/9/2017 COLONOSCOPY 5/13/2018 HEMOGLOBIN A1C Q6M 3/20/2018 FOOT EXAM Q1 6/28/2018 MICROALBUMIN Q1 6/28/2018 LIPID PANEL Q1 9/20/2018 PAP AKA CERVICAL CYTOLOGY 12/4/2018 BREAST CANCER SCRN MAMMOGRAM 7/20/2019 DTaP/Tdap/Td series (2 - Td) 9/14/2026 Allergies as of 11/20/2017  Review Complete On: 11/20/2017 By: Aimee Carson MD  
  
 Severity Noted Reaction Type Reactions Adhesive Low 04/25/2012   Topical Other (comments)  
 redness Current Immunizations  Reviewed on 4/26/2012 Name Date Influenza Vaccine (Quad) PF 10/19/2017, 10/13/2016, 10/14/2015 Influenza Vaccine Split 10/26/2011 Pneumococcal Conjugate (PCV-13) 11/14/2016 Tdap 9/14/2016 ZZZ-RETIRED (DO NOT USE) Pneumococcal Vaccine (Unspecified Type) 4/26/2009 Zoster Vaccine, Live 11/18/2015 Not reviewed this visit You Were Diagnosed With   
  
 Codes Comments Cellulitis, unspecified cellulitis site    -  Primary ICD-10-CM: L03.90 ICD-9-CM: 584. 9 Venous stasis     ICD-10-CM: I87.8 ICD-9-CM: 459.81 Vitals BP Pulse Temp Resp Height(growth percentile) Weight(growth percentile) 144/73 (BP 1 Location: Left arm, BP Patient Position: Sitting) (!) 57 98.4 °F (36.9 °C) (Oral) 16 5' 3\" (1.6 m) 178 lb 9.6 oz (81 kg) SpO2 BMI OB Status Smoking Status 98% 31.64 kg/m2 Postmenopausal Never Smoker Vitals History BMI and BSA Data Body Mass Index Body Surface Area  
 31.64 kg/m 2 1.9 m 2 Preferred Pharmacy Pharmacy Name Phone [facility] [address] [place] [address] [phone] Your Updated Medication List  
  
   
This list is accurate as of: 11/20/17  2:41 PM.  Always use your most recent med list.  
  
  
  
  
 albuterol 90 mcg/actuation inhaler Commonly known as:  PROVENTIL HFA, VENTOLIN HFA, PROAIR HFA Take 2 Puffs by inhalation as needed. ALEVE 220 mg tablet Generic drug:  naproxen sodium Take 220 mg by mouth two (2) times daily (with meals). aspirin 81 mg tablet Take 81 mg by mouth. atorvastatin 40 mg tablet Commonly known as:  LIPITOR  
TAKE 1 TABLET NIGHTLY  
  
 BENADRYL ALLERGY 25 mg tablet Generic drug:  diphenhydrAMINE Take 25 mg by mouth nightly as needed for Sleep. cephALEXin 500 mg capsule Commonly known as:  Radha Craze Take 1 Cap by mouth four (4) times daily for 10 days. clopidogrel 75 mg Tab Commonly known as:  PLAVIX Take 1 Tab by mouth daily. Indications: Thrombosis Prevention after PCI DEXILANT 60 mg Cpdb Generic drug:  Dexlansoprazole Take 60 mg by mouth daily. furosemide 20 mg tablet Commonly known as:  LASIX TAKE 1 TABLET BY MOUTH EVERY DAY  
  
 hydrocortisone 25 mg Supp Commonly known as:  ANUSOL-HC  
  
 insulin lispro 100 unit/mL kwikpen Commonly known as:  HUMALOG  
4 units every evening with blood sugars over 120 Insulin Needles (Disposable) 31 gauge x 5/16\" Ndle Commonly known as:  CLICKFINE  
USE DAILY AS DIRECTED  
  
 LANTUS SOLOSTAR 100 unit/mL (3 mL) Inpn Generic drug:  insulin glargine INJECT 34 UNITS UNDER THE SKIN EVERY MORNING  
  
 lidocaine 2 % jelly Commonly known as:  XYLOCAINE  
  
 melatonin Tab tablet Take 5 mg by mouth daily. metoclopramide HCl 10 mg tablet Commonly known as:  REGLAN Take 10 mg by mouth daily. MIRALAX 17 gram packet Generic drug:  polyethylene glycol Take 17 g by mouth daily. montelukast 10 mg tablet Commonly known as:  SINGULAIR  
TAKE 1 TABLET DAILY  
  
 nystatin topical cream  
Commonly known as:  MYCOSTATIN  
  
 ONETOUCH ULTRA TEST strip Generic drug:  glucose blood VI test strips CHECK TWICE A DAY  
  
 RANEXA 500 mg SR tablet Generic drug:  ranolazine ER  
TAKE 1 TABLET EVERY 12 HOURS  
  
 SYMBICORT 160-4.5 mcg/actuation Hfaa Generic drug:  budesonide-formoterol Take 2 Puffs by inhalation two (2) times a day. valsartan 160 mg tablet Commonly known as:  DIOVAN  
TAKE 1 TABLET DAILY  
  
 VITAMIN B-6 100 mg tablet Generic drug:  pyridoxine (vitamin B6) Take 100 mg by mouth every morning. VITAMIN D2 PO Take 2,000 Units by mouth daily. ZyrTEC 10 mg tablet Generic drug:  cetirizine Take 10 mg by mouth daily. Prescriptions Printed Refills  
 cephALEXin (KEFLEX) 500 mg capsule 0 Sig: Take 1 Cap by mouth four (4) times daily for 10 days. Class: Print Route: Oral  
  
Follow-up Instructions Return if symptoms worsen or fail to improve. Introducing [facility]! Dear Sumeet Amado: Thank you for requesting a iPrint account. Our records indicate that you already have an active iPrint account. You can access your account anytime at [email or web address] Did you know that you can access your hospital and ER discharge instructions at any time in SoCAT? You can also review all of your test results from your hospital stay or ER visit. Additional Information If you have questions, please visit the Frequently Asked Questions section of the SoCAT website at [email or web address] [email or web address]/"[email or web address][email or web address] Remember, SoCAT is NOT to be used for urgent needs. For medical emergencies, dial 911. Now available from your iPhone and Android! Please provide this summary of care documentation to your next provider. Your primary care clinician is listed as Jessica Blanco. If you have any questions after today's visit, please call [phone].

## 2017-12-18 ENCOUNTER — APPOINTMENT (OUTPATIENT)
Dept: INTERNAL MEDICINE CLINIC | Age: 62
End: 2017-12-18

## 2017-12-18 DIAGNOSIS — J45.20 MILD INTERMITTENT ASTHMA WITHOUT COMPLICATION: ICD-10-CM

## 2017-12-18 DIAGNOSIS — K21.9 GASTROESOPHAGEAL REFLUX DISEASE WITHOUT ESOPHAGITIS: ICD-10-CM

## 2017-12-18 DIAGNOSIS — I20.9 ANGINA, CLASS II (HCC): ICD-10-CM

## 2017-12-18 DIAGNOSIS — R60.0 LOCALIZED EDEMA: ICD-10-CM

## 2017-12-18 DIAGNOSIS — E78.00 HYPERCHOLESTEREMIA: ICD-10-CM

## 2017-12-18 DIAGNOSIS — E11.9 DIABETES MELLITUS WITHOUT COMPLICATION (HCC): ICD-10-CM

## 2017-12-18 DIAGNOSIS — I10 ESSENTIAL HYPERTENSION: ICD-10-CM

## 2017-12-19 LAB
BUN SERPL-MCNC: 13 MG/DL (ref 8–27)
BUN/CREAT SERPL: 16 (ref 12–28)
CALCIUM SERPL-MCNC: 8.7 MG/DL (ref 8.7–10.3)
CHLORIDE SERPL-SCNC: 99 MMOL/L (ref 96–106)
CO2 SERPL-SCNC: 26 MMOL/L (ref 18–29)
CREAT SERPL-MCNC: 0.8 MG/DL (ref 0.57–1)
EST. AVERAGE GLUCOSE BLD GHB EST-MCNC: 137 MG/DL
GLUCOSE SERPL-MCNC: 100 MG/DL (ref 65–99)
HBA1C MFR BLD: 6.4 % (ref 4.8–5.6)
POTASSIUM SERPL-SCNC: 4.1 MMOL/L (ref 3.5–5.2)
SODIUM SERPL-SCNC: 140 MMOL/L (ref 134–144)

## 2017-12-28 RX ORDER — RANOLAZINE 500 MG/1
TABLET, FILM COATED, EXTENDED RELEASE ORAL
Qty: 180 TAB | Refills: 1 | Status: SHIPPED | OUTPATIENT
Start: 2017-12-28 | End: 2018-04-05 | Stop reason: SDUPTHER

## 2017-12-28 RX ORDER — VALSARTAN 160 MG/1
TABLET ORAL
Qty: 90 TAB | Refills: 1 | Status: SHIPPED | OUTPATIENT
Start: 2017-12-28 | End: 2018-07-30

## 2017-12-29 ENCOUNTER — OFFICE VISIT (OUTPATIENT)
Dept: INTERNAL MEDICINE CLINIC | Age: 62
End: 2017-12-29

## 2017-12-29 VITALS
BODY MASS INDEX: 31.79 KG/M2 | RESPIRATION RATE: 16 BRPM | SYSTOLIC BLOOD PRESSURE: 115 MMHG | TEMPERATURE: 97.8 F | HEART RATE: 56 BPM | HEIGHT: 63 IN | WEIGHT: 179.4 LBS | DIASTOLIC BLOOD PRESSURE: 70 MMHG | OXYGEN SATURATION: 98 %

## 2017-12-29 DIAGNOSIS — C15.9 MALIGNANT NEOPLASM OF ESOPHAGUS, UNSPECIFIED LOCATION (HCC): Chronic | ICD-10-CM

## 2017-12-29 DIAGNOSIS — I25.10 ASHD (ARTERIOSCLEROTIC HEART DISEASE): ICD-10-CM

## 2017-12-29 DIAGNOSIS — E78.00 HYPERCHOLESTEREMIA: ICD-10-CM

## 2017-12-29 DIAGNOSIS — E11.9 DIABETES MELLITUS WITHOUT COMPLICATION (HCC): Primary | ICD-10-CM

## 2017-12-29 DIAGNOSIS — I10 ESSENTIAL HYPERTENSION: ICD-10-CM

## 2017-12-29 DIAGNOSIS — J45.20 MILD INTERMITTENT ASTHMA WITHOUT COMPLICATION: ICD-10-CM

## 2017-12-29 DIAGNOSIS — R60.0 LOCALIZED EDEMA: ICD-10-CM

## 2017-12-29 DIAGNOSIS — E66.9 OBESITY (BMI 30.0-34.9): ICD-10-CM

## 2017-12-29 PROBLEM — F33.9 RECURRENT DEPRESSION (HCC): Status: ACTIVE | Noted: 2017-12-29

## 2017-12-29 RX ORDER — ALBUTEROL SULFATE 90 UG/1
2 AEROSOL, METERED RESPIRATORY (INHALATION) AS NEEDED
Qty: 1 INHALER | Refills: 3 | Status: SHIPPED | OUTPATIENT
Start: 2017-12-29 | End: 2020-01-11 | Stop reason: SDUPTHER

## 2017-12-29 NOTE — MR AVS SNAPSHOT
Visit Information Date & Time Provider Department Dept. Phone Encounter #  
 12/29/2017 12:00 PM Vanessa Blackburn, [address]  [id number] Follow-up Instructions Return in about 3 months (around 3/29/2018). Your Appointments 5/17/2018  1:15 PM  
6 MONTH with MD Sahra Cornejo [facility]) Appt Note: Dr. Raquel Key [facility]  
[phone] [address] [facility] Upcoming Health Maintenance Date Due Pneumococcal 19-64 Highest Risk (3 of 3 - PPSV23) 1/9/2017 COLONOSCOPY 5/13/2018 HEMOGLOBIN A1C Q6M 6/18/2018 FOOT EXAM Q1 6/28/2018 MICROALBUMIN Q1 6/28/2018 LIPID PANEL Q1 9/20/2018 EYE EXAM RETINAL OR DILATED Q1 11/10/2018 PAP AKA CERVICAL CYTOLOGY 12/4/2018 DTaP/Tdap/Td series (2 - Td) 9/14/2026 Allergies as of 12/29/2017  Review Complete On: 12/29/2017 By: Letitia Reddy LPN Severity Noted Reaction Type Reactions Adhesive Low 04/25/2012   Topical Other (comments)  
 redness Current Immunizations  Reviewed on 4/26/2012 Name Date Influenza Vaccine (Quad) PF 10/19/2017, 10/13/2016, 10/14/2015 Influenza Vaccine Split 10/26/2011 Pneumococcal Conjugate (PCV-13) 11/14/2016 Tdap 9/14/2016 ZZZ-RETIRED (DO NOT USE) Pneumococcal Vaccine (Unspecified Type) 4/26/2009 Zoster Vaccine, Live 11/18/2015 Not reviewed this visit You Were Diagnosed With   
  
 Codes Comments Diabetes mellitus without complication ([facility]    -  Primary ICD-10-CM: E11.9 ICD-9-CM: 250.00 ASHD (arteriosclerotic heart disease)     ICD-10-CM: I25.10 ICD-9-CM: 414.00 Malignant neoplasm of esophagus, unspecified location [facility])     ICD-10-CM: C15.9 ICD-9-CM: 150.9 Hypercholesteremia     ICD-10-CM: E78.00 ICD-9-CM: 272.0  Essential hypertension     ICD-10-CM: I10 
 ICD-9-CM: 401.9 Mild intermittent asthma without complication     TUB-08-VA: J45.20 ICD-9-CM: 493.90 Localized edema     ICD-10-CM: R60.0 ICD-9-CM: 443. 3 Obesity (BMI 30.0-34.9)     ICD-10-CM: Q69.8 ICD-9-CM: 278.00 Vitals BP Pulse Temp Resp Height(growth percentile) Weight(growth percentile) 115/70 (BP 1 Location: Right arm, BP Patient Position: Sitting) (!) 56 97.8 °F (36.6 °C) (Oral) 16 5' 3\" (1.6 m) 179 lb 6.4 oz (81.4 kg) SpO2 BMI OB Status Smoking Status 98% 31.78 kg/m2 Postmenopausal Never Smoker BMI and BSA Data Body Mass Index Body Surface Area 31.78 kg/m 2 1.9 m 2 Preferred Pharmacy [facility] Phone [facility] [address] [place] [address] [phone] Your Updated Medication List  
  
   
This list is accurate as of: 12/29/17 12:13 PM.  Always use your most recent med list.  
  
  
  
  
 albuterol 90 mcg/actuation inhaler Commonly known as:  PROVENTIL HFA, VENTOLIN HFA, PROAIR HFA Take 2 Puffs by inhalation as needed. ALEVE 220 mg tablet Generic drug:  naproxen sodium Take 220 mg by mouth two (2) times daily (with meals). aspirin 81 mg tablet Take 81 mg by mouth. atorvastatin 40 mg tablet Commonly known as:  LIPITOR  
TAKE 1 TABLET NIGHTLY  
  
 BENADRYL ALLERGY 25 mg tablet Generic drug:  diphenhydrAMINE Take 25 mg by mouth nightly as needed for Sleep. clopidogrel 75 mg Tab Commonly known as:  PLAVIX Take 1 Tab by mouth daily. Indications: Thrombosis Prevention after PCI DEXILANT 60 mg Cpdb Generic drug:  Dexlansoprazole Take 60 mg by mouth daily. furosemide 20 mg tablet Commonly known as:  LASIX TAKE 1 TABLET BY MOUTH EVERY DAY  
  
 hydrocortisone 25 mg Supp Commonly known as:  ANUSOL-HC  
  
 insulin lispro 100 unit/mL kwikpen Commonly known as:  HUMALOG  
4 units every evening with blood sugars over 120 Insulin Needles (Disposable) 31 gauge x 5/16\" Ndle Commonly known as:  CLICKFINE  
USE DAILY AS DIRECTED  
  
 LANTUS SOLOSTAR 100 unit/mL (3 mL) Inpn Generic drug:  insulin glargine INJECT 34 UNITS UNDER THE SKIN EVERY MORNING  
  
 lidocaine 2 % jelly Commonly known as:  XYLOCAINE  
  
 melatonin Tab tablet Take 5 mg by mouth daily. metoclopramide HCl 10 mg tablet Commonly known as:  REGLAN Take 10 mg by mouth daily. MIRALAX 17 gram packet Generic drug:  polyethylene glycol Take 17 g by mouth daily. montelukast 10 mg tablet Commonly known as:  SINGULAIR  
TAKE 1 TABLET DAILY  
  
 nystatin topical cream  
Commonly known as:  MYCOSTATIN  
  
 ONETOUCH ULTRA TEST strip Generic drug:  glucose blood VI test strips CHECK TWICE A DAY  
  
 RANEXA 500 mg SR tablet Generic drug:  ranolazine ER  
TAKE 1 TABLET EVERY 12 HOURS  
  
 SYMBICORT 160-4.5 mcg/actuation Hfaa Generic drug:  budesonide-formoterol Take 2 Puffs by inhalation two (2) times a day. valsartan 160 mg tablet Commonly known as:  DIOVAN  
TAKE 1 TABLET DAILY  
  
 VITAMIN B-6 100 mg tablet Generic drug:  pyridoxine (vitamin B6) Take 100 mg by mouth every morning. VITAMIN D2 PO Take 2,000 Units by mouth daily. ZyrTEC 10 mg tablet Generic drug:  cetirizine Take 10 mg by mouth daily. Prescriptions Sent to Pharmacy Refills  
 albuterol (PROVENTIL HFA, VENTOLIN HFA, PROAIR HFA) 90 mcg/actuation inhaler 3 Sig: Take 2 Puffs by inhalation as needed. Class: Normal  
 Pharmacy: [address] [place] [address] #: [phone] Route: Inhalation Follow-up Instructions Return in about 3 months (around 3/29/2018). To-Do List   
 12/29/2017 Lab:  CBC W/O DIFF   
  
 12/29/2017 Lab:  HEMOGLOBIN A1C WITH EAG   
  
 12/29/2017 Lab:  METABOLIC PANEL, COMPREHENSIVE Patient Instructions Schedule colonoscopy in may Introducing [facility]! Dear Vera Yi: Thank you for requesting a [employer] account. Our records indicate that you already have an active [employer] account. You can access your account anytime at [email or web address]. Chroma [email or web address] Did you know that you can access your hospital and ER discharge instructions at any time in [employer]? You can also review all of your test results from your hospital stay or ER visit. Additional Information If you have questions, please visit the Frequently Asked Questions section of the [employer] website at [email or web address] Remember, [employer] is NOT to be used for urgent needs. For medical emergencies, dial 911. Now available from your iPhone and Android! Please provide this summary of care documentation to your next provider. Your primary care clinician is listed as Tere Olson. If you have any questions after today's visit, please call [phone].

## 2017-12-29 NOTE — PROGRESS NOTES
HISTORY OF PRESENT ILLNESS  Judah Stubbs is a 57 y.o. female. HPI   Last here 11/20/17 with Dr. Jadiel Reaves, 09/27/17 with me. Pt is here to f/u on chronic conditions      BP today is 115/70  Checks BP at [facility], reports 130s/70s or loewr  Continues diovan 160mg daily     She has been taking lasix daily with improvement - stable  Has chronic edema, R chronically greater than L unchanged  Using lasix for chronic edema  Uses keflex if she notes any erythema to the area per foster  Pt is also on ranexa to prevent cp which helps     Pt follows with Dr. Carline Garvin (cardio)  Last visit 11/2/17,  Note from Taryn Monreal (NP) reviewed: Patient presents doing well and is stable from cardiac stand point. Continue current care and f/u in 6 months. 1. CAD: Clinically stable  2. High Cholesterol: LDL 71  3. HTN: Mildly elevated, continue current medications, encouraged diet/exercise weight loss. Continues on plavix until 3/17 and ranexa to prevent CP  C/o bruising with plavix mostly on arms at [facility], which continues      BS at home running around 110s in the AM fasting,   Continues lantus 27 U qAM and humalog 4 U with dinner for BS >120  She takes the humalog about 3-4 days per week with dinner. Reports 1 week out of the past three months when she had to use it everyday.   She reports not liking the pen for humalog  She also takes ASA 81mg daily      Pt has not been evaluated for DANIELLE in the past  Recall she declines further evaluation for now       Reviewed last labs 12/17  Future labs ordered      Pt follows with Dr. Ross Estevez (hemo/onc) for h/o adenoma of esophagus annually  Reviewed last notes 6/28/17: f/u d/t h/o adenoma of esophagus, no evidence of recurrence, more than 7 years out, if good afterwards might not need to go back      Continues lipitor 40mg daily for cholesterol      Wt is stable since last visit       Pt follows with Dr. Karlene Avery (pulm)   Next visit scheduled for 2/2018  Continues symbicort BID for breathing/asthma per pulm which helps  Also continues on sinuglair for her asthma sx which works well   Doesn't have to use much  Would like a refill of albuterol - ordered    Wt stable since last visit.  Himanshu Suresh daily and reglan daily occasionally takes a second one which helps, controls reflux sx  She no longer takes an additional dose of reglan in the evenings for reflux sx as it makes her jittery  Reports she still has some trouble emptying, but reglan helps.   She avoids trigger foods and rarely has reflux or aspiration  Recall has h/o esophageal cancer      I provided referral to see rheumatology in the past  She has not yet followed up with Dr. Nathan Hoff (rheum)  However her jt sx have improved, good ROM in her hands      Pt stated that her ears were bothering her since last visit - resolved  Pt states that she believes she was clenching her teeth at night     Reviewed Dr Ekaterina Kendrick (eye) note: no diabetic retinopathy, no macular edema      Of note, her  has bladder cancer and she is caring for him at home.      PREVENTIVE:    Colonoscopy: 5/2013, Dr. Rey Naidu, repeat 5 years    EGD: 4/16, Dr. Rey Naidu, h/o esophageal cancer, polyp on recent EGD, biopsy nl  Pap: Dr. Augusta Morrow, 1/31/17, due in january  Mammogram: 7/20/17, negative  Dexa: 7/29/15 normal, due 2018  Tdap: 9/14/2016  Pneumovax: 4/26/2009  [id number]: 11/14/2016  Zostavax: 11/18/2016  Flu shot: 10/19/17  Foot exam: 6/28/17  Microalbumin: 6/17 minimal  A1c: 9/14 6.0, 12/14 6.8, 4/15 7.8, 7/15 7.1, 10/15 7.3, 1/16 7.2, 5/16 7.0, 9/16 6.7, 12/16 6.2, 3/17 6.8, 6/17 8.8, 9/17 6.2, 12/17 6.4  Eye exam: Dr. Ekaterina Kendrick, 11/09/16, scheduled for 11/11/17  Hep C screen: 11/15 negative  Lipids: 9/17 LDL 71      Patient Active Problem List    Diagnosis Date Noted    Recurrent depression ([facility] 12/29/2017    Diabetes mellitus without complication ([facility] 36/38/7453    ASHD (arteriosclerotic heart disease) 06/18/2014    Syncope 06/18/2014    Bradycardia 06/18/2014    Multinodular goiter     Angina, class II ([facility] 04/18/2013    Family history of coronary artery disease 04/18/2013    S/P cardiac cath 04/18/2013    Hypercholesteremia 02/18/2013    HTN (hypertension) 02/18/2013    Asthma 02/18/2013    Depression 02/18/2013    Thyroid nodule 02/18/2013    Shoulder capsulitis 02/18/2013    Incisional hernia 02/10/2011    Esophageal cancer (HCC) 02/10/2011     Current Outpatient Prescriptions   Medication Sig Dispense Refill    albuterol (PROVENTIL HFA, VENTOLIN HFA, PROAIR HFA) 90 mcg/actuation inhaler Take 2 Puffs by inhalation as needed. 1 Inhaler 3    valsartan (DIOVAN) 160 mg tablet TAKE 1 TABLET DAILY 90 Tab 1    RANEXA 500 mg SR tablet TAKE 1 TABLET EVERY 12 HOURS 180 Tab 1    montelukast (SINGULAIR) 10 mg tablet TAKE 1 TABLET DAILY 90 Tab 1    furosemide (LASIX) 20 mg tablet TAKE 1 TABLET BY MOUTH EVERY DAY 30 Tab 3    ONETOUCH ULTRA TEST strip CHECK TWICE A  Strip 12    LANTUS SOLOSTAR 100 unit/mL (3 mL) inpn INJECT 34 UNITS UNDER THE SKIN EVERY MORNING 45 mL 2    atorvastatin (LIPITOR) 40 mg tablet TAKE 1 TABLET NIGHTLY 90 Tab 2    insulin lispro (HUMALOG) 100 unit/mL kwikpen 4 units every evening with blood sugars over 120 1 Package 0    Insulin Needles, Disposable, (CLICKFINE) 31 gauge x 5/16\" ndle USE DAILY AS DIRECTED 100 Package 10    clopidogrel (PLAVIX) 75 mg tab Take 1 Tab by mouth daily. Indications: Thrombosis Prevention after PCI 90 Tab 3    SYMBICORT 160-4.5 mcg/actuation HFA inhaler Take 2 Puffs by inhalation two (2) times a day.  naproxen sodium (ALEVE) 220 mg tablet Take 220 mg by mouth two (2) times daily (with meals).  diphenhydrAMINE (BENADRYL ALLERGY) 25 mg tablet Take 25 mg by mouth nightly as needed for Sleep.  hydrocortisone (ANUSOL-HC) 25 mg supp   2    lidocaine (XYLOCAINE) 2 % jelly   2    metoclopramide HCl (REGLAN) 10 mg tablet Take 10 mg by mouth daily.       polyethylene glycol (MIRALAX) 17 gram packet Take 17 g by mouth daily.  cetirizine (ZYRTEC) 10 mg tablet Take 10 mg by mouth daily.  melatonin 5 mg Tab Take 5 mg by mouth daily.  Dexlansoprazole (DEXILANT) 60 mg CpDM Take 60 mg by mouth daily.  aspirin 81 mg tablet Take 81 mg by mouth.  pyridoxine (VITAMIN B-6) 100 mg tablet Take 100 mg by mouth every morning.  ERGOCALCIFEROL, VITAMIN D2, (VITAMIN D PO) Take 2,000 Units by mouth daily.       nystatin (MYCOSTATIN) topical cream   0     Past Surgical History:   Procedure Laterality Date    EXTRACTION ERUPTED TOOTH/EXR      HX ENDOSCOPY  4/2016    HX GI  2010    gastroesophagectomy    HX GYN  1983    lap btl    HX HEENT  1957    tonsils as child    HX HERNIA REPAIR  04/26/2012    HX LAP CHOLECYSTECTOMY  1995    HX ORTHOPAEDIC  02/20/2015    right shoulder manipulation    HX OTHER SURGICAL      left shoulder surgery for frozen surgery    HX OTHER SURGICAL  6/24/2015    mammogram     HX PELVIC LAPAROSCOPY  1989    HX VASCULAR ACCESS      port a cath and removal    NE EGD BALLOON DILATION ESOPHAGUS <30 MM DIAM  4/20/2010           Lab Results   Component Value Date/Time    WBC 6.9 03/23/2017 12:09 PM    HGB 12.0 03/23/2017 12:09 PM    Hemoglobin (POC) 11.9 01/28/2010 12:02 PM    HCT 36.9 03/23/2017 12:09 PM    Hematocrit (POC) 35 01/28/2010 12:02 PM    PLATELET 247 26/41/3866 12:09 PM    MCV 90 03/23/2017 12:09 PM     Lab Results   Component Value Date/Time    Hemoglobin A1c 6.4 12/18/2017 08:42 AM    Hemoglobin A1c 6.2 09/20/2017 08:41 AM    Hemoglobin A1c 6.8 06/28/2017 02:07 PM    Glucose 100 12/18/2017 08:42 AM    Glucose (POC) 89 03/28/2017 07:41 AM    Microalb/Creat ratio (ug/mg creat.) 4.9 06/28/2017 02:07 PM    LDL, calculated 71 09/20/2017 08:41 AM    Creatinine (POC) 0.9 01/06/2012 10:42 AM    Creatinine 0.80 12/18/2017 08:42 AM      Lab Results   Component Value Date/Time    Cholesterol, total 140 09/20/2017 08:41 AM    HDL Cholesterol 51 09/20/2017 08:41 AM    LDL, calculated 71 09/20/2017 08:41 AM    Triglyceride 91 09/20/2017 08:41 AM     Lab Results   Component Value Date/Time    GFR est non-AA 78 12/18/2017 08:42 AM    GFRNA, POC >60 01/06/2012 10:42 AM    GFR est AA 90 12/18/2017 08:42 AM    GFRAA, POC >60 01/06/2012 10:42 AM    Creatinine 0.80 12/18/2017 08:42 AM    Creatinine (POC) 0.9 01/06/2012 10:42 AM    BUN 13 12/18/2017 08:42 AM    BUN (POC) 14 01/28/2010 12:02 PM    Sodium 140 12/18/2017 08:42 AM    Sodium (POC) 141 01/28/2010 12:02 PM    Potassium 4.1 12/18/2017 08:42 AM    Potassium (POC) 4.0 01/28/2010 12:02 PM    Chloride 99 12/18/2017 08:42 AM    Chloride (POC) 103 01/28/2010 12:02 PM    CO2 26 12/18/2017 08:42 AM    Magnesium 1.9 02/08/2010 03:00 AM        Review of Systems   Respiratory: Negative for shortness of breath. Cardiovascular: Negative for chest pain. Physical Exam   Constitutional: She is oriented to person, place, and time. She appears well-developed and well-nourished. No distress. HENT:   Head: Normocephalic and atraumatic. Mouth/Throat: Oropharynx is clear and moist. No oropharyngeal exudate. Eyes: Conjunctivae and EOM are normal. Right eye exhibits no discharge. Left eye exhibits no discharge. Neck: Normal range of motion. Neck supple. Cardiovascular: Normal rate, regular rhythm, normal heart sounds and intact distal pulses. Exam reveals no gallop and no friction rub. No murmur heard. Pulmonary/Chest: Effort normal and breath sounds normal. No respiratory distress. She has no wheezes. She has no rales. She exhibits no tenderness. Musculoskeletal: Normal range of motion. She exhibits edema (trace pitting, bilaterally). She exhibits no tenderness or deformity. R leg slightly larger than left leg   Lymphadenopathy:     She has no cervical adenopathy. Neurological: She is alert and oriented to person, place, and time. Coordination normal.   Skin: Skin is warm and dry. She is not diaphoretic. No erythema. No pallor. echymosses on bilateral forearms   Psychiatric: She has a normal mood and affect. Her behavior is normal.       ASSESSMENT and PLAN    ICD-10-CM ICD-9-CM    1. Diabetes mellitus without complication (HCC) M46.1 720.52 METABOLIC PANEL, COMPREHENSIVE   Controlled, continue lantus 27 U qAM and humalog 4 U with dinner for BS >120, no change in dose   HEMOGLOBIN A1C WITH EAG      CBC W/O DIFF   2. ASHD (arteriosclerotic heart disease) B87.16 310.70 METABOLIC PANEL, COMPREHENSIVE   Continue f/u with Dr. Hernando Knight. Continue plavix and renexa. HEMOGLOBIN A1C WITH EAG      CBC W/O DIFF   3. Malignant neoplasm of esophagus, unspecified location (HCC) N12.0 756.8 METABOLIC PANEL, COMPREHENSIVE   F/u with Dr. Lesly Mcneill. Has been on dexilant and reglan since surgery. continue   HEMOGLOBIN A1C WITH EAG      CBC W/O DIFF   4. Hypercholesteremia T59.11 700.0 METABOLIC PANEL, COMPREHENSIVE   Controlled on lipitor in September. Continue   HEMOGLOBIN A1C WITH EAG      CBC W/O DIFF   5. Essential hypertension P77 413.7 METABOLIC PANEL, COMPREHENSIVE   Controlled, continue 160mg diovan, no change in dose   HEMOGLOBIN A1C WITH EAG      CBC W/O DIFF   6. Mild intermittent asthma without complication V46.51 218.22 METABOLIC PANEL, COMPREHENSIVE   Controlled, continue Albuterol refilled and continue symbicort   HEMOGLOBIN A1C WITH EAG      CBC W/O DIFF   7. Localized edema B89.6 851.3 METABOLIC PANEL, COMPREHENSIVE   Continue lasix   HEMOGLOBIN A1C WITH EAG      CBC W/O DIFF   8. Obesity (BMI 30.0-34. 9)    Weight stable, but above goal. Discussed weight watchers and other weight loss options E66.9 278.00      Written by Leonard Wilkes, as dictated by Bola Millan MD  Current diagnosis and concerns discussed with pt at length.  Understands risks and benefits or current treatment plan and medications and accepts the treatment and medication with any possible risks.   Pt asks appropriate questions which were answered.   Pt instructed to call with any concerns or problems.  This note will not be viewable in [employer].

## 2017-12-29 NOTE — PROGRESS NOTES
Chief Complaint   Patient presents with    Diabetes     3 months f/u     1. Have you been to the ER, urgent care clinic since your last visit? Hospitalized since your last visit? No    2. Have you seen or consulted any other health care providers outside of the [address] [place] [address] since your last visit? Include any pap smears or colon screening.  No

## 2018-01-10 ENCOUNTER — TELEPHONE (OUTPATIENT)
Dept: INTERNAL MEDICINE CLINIC | Age: 63
End: 2018-01-10

## 2018-01-10 NOTE — TELEPHONE ENCOUNTER
Pt is requesting a same day or next day appt, due to a cold that she has had for one week now and cant get rid of. Best contact number [phone].        Message received & copied from [facility]

## 2018-01-10 NOTE — TELEPHONE ENCOUNTER
Called, spoke to pt. Two pt identifiers confirmed. Pt informed that Dr. Jose Hughes does not currently have an opening on the schedule. Pt offered and accepted appt for 1/11/18 7935 w/ Dr. Arnaldo Waite. Pt verbalized understanding of information discussed w/ no further questions at this time.

## 2018-01-11 ENCOUNTER — OFFICE VISIT (OUTPATIENT)
Dept: INTERNAL MEDICINE CLINIC | Age: 63
End: 2018-01-11

## 2018-01-11 VITALS
BODY MASS INDEX: 31.47 KG/M2 | OXYGEN SATURATION: 99 % | HEIGHT: 63 IN | HEART RATE: 52 BPM | RESPIRATION RATE: 18 BRPM | TEMPERATURE: 97.8 F | WEIGHT: 177.6 LBS | DIASTOLIC BLOOD PRESSURE: 69 MMHG | SYSTOLIC BLOOD PRESSURE: 125 MMHG

## 2018-01-11 DIAGNOSIS — J40 CATARRHAL BRONCHITIS: Primary | ICD-10-CM

## 2018-01-11 DIAGNOSIS — J40 BRONCHITIS: Primary | ICD-10-CM

## 2018-01-11 RX ORDER — HYDROCODONE POLISTIREX AND CHLORPHENIRAMINE POLISTIREX 10; 8 MG/5ML; MG/5ML
1 SUSPENSION, EXTENDED RELEASE ORAL
Qty: 115 ML | Refills: 0 | Status: SHIPPED | OUTPATIENT
Start: 2018-01-11 | End: 2018-08-03

## 2018-01-11 RX ORDER — AZITHROMYCIN 250 MG/1
250 TABLET, FILM COATED ORAL SEE ADMIN INSTRUCTIONS
Qty: 6 TAB | Refills: 0 | Status: SHIPPED | OUTPATIENT
Start: 2018-01-11 | End: 2018-07-02

## 2018-01-11 NOTE — MR AVS SNAPSHOT
Visit Information Date & Time Provider Department Dept. Phone Encounter #  
 1/11/2018  8:15 AM Yancy Li, [address] [phone] [id number] Follow-up Instructions Return if symptoms worsen or fail to improve. Your Appointments 4/9/2018  1:15 PM  
ROUTINE CARE with Srini Valiente, [address] [facility]) Appt Note: 3 mon f/u  
 [place] [address] [id number]  
[address] [facility] [address] [facility] 5/17/2018  1:15 PM  
6 MONTH with Ruiz Busch MD  
[facility] Appt Note: Dr. Emi Edwards [facility]  
[phone] [address] [place] [address] Upcoming Health Maintenance Date Due Pneumococcal 19-64 Highest Risk (3 of 3 - PPSV23) 1/9/2017 COLONOSCOPY 5/13/2018 HEMOGLOBIN A1C Q6M 6/18/2018 FOOT EXAM Q1 6/28/2018 MICROALBUMIN Q1 6/28/2018 LIPID PANEL Q1 9/20/2018 EYE EXAM RETINAL OR DILATED Q1 11/10/2018 PAP AKA CERVICAL CYTOLOGY 12/4/2018 BREAST CANCER SCRN MAMMOGRAM 7/20/2019 DTaP/Tdap/Td series (2 - Td) 9/14/2026 Allergies as of 1/11/2018  Review Complete On: 1/11/2018 By: Emma Hendricks Severity Noted Reaction Type Reactions Adhesive Low 04/25/2012   Topical Other (comments)  
 redness Current Immunizations  Reviewed on 4/26/2012 Name Date Influenza Vaccine (Quad) PF 10/19/2017, 10/13/2016, 10/14/2015 Influenza Vaccine Split 10/26/2011 Pneumococcal Conjugate (PCV-13) 11/14/2016 Tdap 9/14/2016 ZZZ-RETIRED (DO NOT USE) Pneumococcal Vaccine (Unspecified Type) 4/26/2009 Zoster Vaccine, Live 11/18/2015 Not reviewed this visit You Were Diagnosed With   
  
 Codes Comments Bronchitis    -  Primary ICD-10-CM: E74 ICD-9-CM: 831 Vitals BP Pulse Temp Resp Height(growth percentile) Weight(growth percentile) 125/69 (BP 1 Location: Right arm, BP Patient Position: Sitting) (!) 52 97.8 °F (36.6 °C) (Oral) 18 5' 3\" (1.6 m) 177 lb 9.6 oz (80.6 kg) SpO2 BMI OB Status Smoking Status 99% 31.46 kg/m2 Postmenopausal Never Smoker BMI and BSA Data Body Mass Index Body Surface Area  
 31.46 kg/m 2 1.89 m 2 Preferred Pharmacy Pharmacy Name Phone [facility] [address] [place], [address] [phone] Your Updated Medication List  
  
   
This list is accurate as of: 1/11/18  8:49 AM.  Always use your most recent med list.  
  
  
  
  
 albuterol 90 mcg/actuation inhaler Commonly known as:  PROVENTIL HFA, VENTOLIN HFA, PROAIR HFA Take 2 Puffs by inhalation as needed. ALEVE 220 mg tablet Generic drug:  naproxen sodium Take 220 mg by mouth two (2) times daily (with meals). aspirin 81 mg tablet Take 81 mg by mouth. atorvastatin 40 mg tablet Commonly known as:  LIPITOR  
TAKE 1 TABLET NIGHTLY  
  
 azithromycin 250 mg tablet Commonly known as:  [place] Sly Take 1 Tab by mouth See Admin Instructions for 6 doses. Take 2 tabs on day one followed by 1 tab daily for a total of 5 days. chlorpheniramine-HYDROcodone 10-8 mg/5 mL suspension Commonly known as:  Starlet Lat Take 5 mL by mouth every twelve (12) hours as needed for Cough. Max Daily Amount: 10 mL. clopidogrel 75 mg Tab Commonly known as:  PLAVIX Take 1 Tab by mouth daily. Indications: Thrombosis Prevention after PCI DEXILANT 60 mg Cpdb Generic drug:  Dexlansoprazole Take 60 mg by mouth daily. furosemide 20 mg tablet Commonly known as:  LASIX TAKE 1 TABLET BY MOUTH EVERY DAY  
  
 hydrocortisone 25 mg Supp Commonly known as:  ANUSOL-HC  
  
 insulin lispro 100 unit/mL kwikpen Commonly known as:  HUMALOG  
4 units every evening with blood sugars over 120 Insulin Needles (Disposable) 31 gauge x 5/16\" Ndle Commonly known as:  CLICKFINE  
USE DAILY AS DIRECTED  
  
 LANTUS SOLOSTAR 100 unit/mL (3 mL) Inpn Generic drug:  insulin glargine INJECT 34 UNITS UNDER THE SKIN EVERY MORNING  
  
 lidocaine 2 % jelly Commonly known as:  XYLOCAINE  
  
 melatonin Tab tablet Take 5 mg by mouth daily. metoclopramide HCl 10 mg tablet Commonly known as:  REGLAN Take 10 mg by mouth daily. MIRALAX 17 gram packet Generic drug:  polyethylene glycol Take 17 g by mouth daily. montelukast 10 mg tablet Commonly known as:  SINGULAIR  
TAKE 1 TABLET DAILY  
  
 nystatin topical cream  
Commonly known as:  MYCOSTATIN  
  
 ONETOUCH ULTRA TEST strip Generic drug:  glucose blood VI test strips CHECK TWICE A DAY  
  
 RANEXA 500 mg SR tablet Generic drug:  ranolazine ER  
TAKE 1 TABLET EVERY 12 HOURS  
  
 SYMBICORT 160-4.5 mcg/actuation Hfaa Generic drug:  budesonide-formoterol Take 2 Puffs by inhalation two (2) times a day. valsartan 160 mg tablet Commonly known as:  DIOVAN  
TAKE 1 TABLET DAILY  
  
 VITAMIN B-6 100 mg tablet Generic drug:  pyridoxine (vitamin B6) Take 100 mg by mouth every morning. VITAMIN D2 PO Take 2,000 Units by mouth daily. ZyrTEC 10 mg tablet Generic drug:  cetirizine Take 10 mg by mouth daily. Prescriptions Printed Refills  
 chlorpheniramine-HYDROcodone (TUSSIONEX) 10-8 mg/5 mL suspension 0 Sig: Take 5 mL by mouth every twelve (12) hours as needed for Cough. Max Daily Amount: 10 mL. Class: Print Route: Oral  
  
Prescriptions Sent to Pharmacy Refills  
 azithromycin (ZITHROMAX) 250 mg tablet 0 Sig: Take 1 Tab by mouth See Admin Instructions for 6 doses. Take 2 tabs on day one followed by 1 tab daily for a total of 5 days. Class: Normal  
 Pharmacy: [address] [place] [address], [address] [place] [address] #: [phone]  Route: Oral  
 Follow-up Instructions Return if symptoms worsen or fail to improve. To-Do List   
 01/11/2018 Imaging:  XR CHEST PA LAT Patient Instructions Bronchitis: Care Instructions Your Care Instructions Bronchitis is inflammation of the bronchial tubes, which carry air to the lungs. The tubes swell and produce mucus, or phlegm. The mucus and inflamed bronchial tubes make you cough. You may have trouble breathing. Most cases of bronchitis are caused by viruses like those that cause colds. Antibiotics usually do not help and they may be harmful. Bronchitis usually develops rapidly and lasts about 2 to 3 weeks in otherwise healthy people. Follow-up care is a key part of your treatment and safety. Be sure to make and go to all appointments, and call your doctor if you are having problems. It's also a good idea to know your test results and keep a list of the medicines you take. How can you care for yourself at home? · Take all medicines exactly as prescribed. Call your doctor if you think you are having a problem with your medicine. · Get some extra rest. 
· Take an over-the-counter pain medicine, such as acetaminophen (Tylenol), ibuprofen (Advil, Motrin), or naproxen (Aleve) to reduce fever and relieve body aches. Read and follow all instructions on the label. · Do not take two or more pain medicines at the same time unless the doctor told you to. Many pain medicines have acetaminophen, which is Tylenol. Too much acetaminophen (Tylenol) can be harmful. · Take an over-the-counter cough medicine that contains dextromethorphan to help quiet a dry, hacking cough so that you can sleep. Avoid cough medicines that have more than one active ingredient. Read and follow all instructions on the label. · Breathe moist air from a humidifier, hot shower, or sink filled with hot water. The heat and moisture will thin mucus so you can cough it out. · Do not smoke. Smoking can make bronchitis worse. If you need help quitting, talk to your doctor about stop-smoking programs and medicines. These can increase your chances of quitting for good. When should you call for help? Call 911 anytime you think you may need emergency care. For example, call if: 
? · You have severe trouble breathing. ?Call your doctor now or seek immediate medical care if: 
? · You have new or worse trouble breathing. ? · You cough up dark brown or bloody mucus (sputum). ? · You have a new or higher fever. ? · You have a new rash. ? Watch closely for changes in your health, and be sure to contact your doctor if: 
? · You cough more deeply or more often, especially if you notice more mucus or a change in the color of your mucus. ? · You are not getting better as expected. Where can you learn more? Go to http://alfonzo-andi.info/. Enter H333 in the search box to learn more about \"Bronchitis: Care Instructions. \" Current as of: May 12, 2017 Content Version: 11.4 © 5320-7737 [employer]. Care instructions adapted under license by [employer] (which disclaims liability or warranty for this information). If you have questions about a medical condition or this instruction, always ask your healthcare professional. [address] any warranty or liability for your use of this information. Introducing [facility]! Dear Rhea Peter: Thank you for requesting a [employer] account. Our records indicate that you already have an active [employer] account. You can access your account anytime at [email or web address] Did you know that you can access your hospital and ER discharge instructions at any time in [employer]? You can also review all of your test results from your hospital stay or ER visit. Additional Information If you have questions, please visit the Frequently Asked Questions section of the [employer] website at [email or web address] Remember, [employer] is NOT to be used for urgent needs. For medical emergencies, dial 911. Now available from your iPhone and Android! Please provide this summary of care documentation to your next provider. Your primary care clinician is listed as Carmen Mcclellan. If you have any questions after today's visit, please call [phone].

## 2018-01-11 NOTE — PATIENT INSTRUCTIONS
Bronchitis: Care Instructions  Your Care Instructions    Bronchitis is inflammation of the bronchial tubes, which carry air to the lungs. The tubes swell and produce mucus, or phlegm. The mucus and inflamed bronchial tubes make you cough. You may have trouble breathing. Most cases of bronchitis are caused by viruses like those that cause colds. Antibiotics usually do not help and they may be harmful. Bronchitis usually develops rapidly and lasts about 2 to 3 weeks in otherwise healthy people. Follow-up care is a key part of your treatment and safety. Be sure to make and go to all appointments, and call your doctor if you are having problems. It's also a good idea to know your test results and keep a list of the medicines you take. How can you care for yourself at home? · Take all medicines exactly as prescribed. Call your doctor if you think you are having a problem with your medicine. · Get some extra rest.  · Take an over-the-counter pain medicine, such as acetaminophen (Tylenol), ibuprofen (Advil, Motrin), or naproxen (Aleve) to reduce fever and relieve body aches. Read and follow all instructions on the label. · Do not take two or more pain medicines at the same time unless the doctor told you to. Many pain medicines have acetaminophen, which is Tylenol. Too much acetaminophen (Tylenol) can be harmful. · Take an over-the-counter cough medicine that contains dextromethorphan to help quiet a dry, hacking cough so that you can sleep. Avoid cough medicines that have more than one active ingredient. Read and follow all instructions on the label. · Breathe moist air from a humidifier, hot shower, or sink filled with hot water. The heat and moisture will thin mucus so you can cough it out. · Do not smoke. Smoking can make bronchitis worse. If you need help quitting, talk to your doctor about stop-smoking programs and medicines. These can increase your chances of quitting for good.   When should you call for help? Call 911 anytime you think you may need emergency care. For example, call if:  ? · You have severe trouble breathing. ?Call your doctor now or seek immediate medical care if:  ? · You have new or worse trouble breathing. ? · You cough up dark brown or bloody mucus (sputum). ? · You have a new or higher fever. ? · You have a new rash. ? Watch closely for changes in your health, and be sure to contact your doctor if:  ? · You cough more deeply or more often, especially if you notice more mucus or a change in the color of your mucus. ? · You are not getting better as expected. Where can you learn more? Go to http://alfonzo-andi.info/. Enter H333 in the search box to learn more about \"Bronchitis: Care Instructions. \"  Current as of: May 12, 2017  Content Version: 11.4  © 5808-0712 [employer]. Care instructions adapted under license by [employer] (which disclaims liability or warranty for this information). If you have questions about a medical condition or this instruction, always ask your healthcare professional. [address] any warranty or liability for your use of this information.

## 2018-01-11 NOTE — PROGRESS NOTES
CC: URI (x 2 week)      HPI:    She is a 57 y.o. female with a hx of diabetes, COPD/ asthma who presents for evaluation of 2 weeks of respiratory symptoms. Symptoms started with cold symptoms, runny nose and cough and now cough has persisted. 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    Chest pain     Chest pain     Chronic pain     left shoulder    Congestion of throat     Cough     Depression     & anxiety    Diabetes (HCC)     IDDM    Dyspepsia and other specified disorders of function of stomach     Fatigue     GERD (gastroesophageal reflux disease)     Headache(784.0)     Hypercholesterolemia     Hypertension     Multinodular goiter     Nausea & vomiting     Stool color black     Stress     Weakness        Current Outpatient Prescriptions on File Prior to Visit   Medication Sig Dispense Refill    albuterol (PROVENTIL HFA, VENTOLIN HFA, PROAIR HFA) 90 mcg/actuation inhaler Take 2 Puffs by inhalation as needed.  1 Inhaler 3    valsartan (DIOVAN) 160 mg tablet TAKE 1 TABLET DAILY 90 Tab 1    RANEXA 500 mg SR tablet TAKE 1 TABLET EVERY 12 HOURS 180 Tab 1    montelukast (SINGULAIR) 10 mg tablet TAKE 1 TABLET DAILY 90 Tab 1    furosemide (LASIX) 20 mg tablet TAKE 1 TABLET BY MOUTH EVERY DAY 30 Tab 3    ONETOUCH ULTRA TEST strip CHECK TWICE A  Strip 12    LANTUS SOLOSTAR 100 unit/mL (3 mL) inpn INJECT 34 UNITS UNDER THE SKIN EVERY MORNING 45 mL 2    atorvastatin (LIPITOR) 40 mg tablet TAKE 1 TABLET NIGHTLY 90 Tab 2    insulin lispro (HUMALOG) 100 unit/mL kwikpen 4 units every evening with blood sugars over 120 1 Package 0    Insulin Needles, Disposable, (CLICKFINE) 31 gauge x 5/16\" ndle USE DAILY AS DIRECTED 100 Package 10    clopidogrel (PLAVIX) 75 mg tab Take 1 Tab by mouth daily. Indications: Thrombosis Prevention after PCI 90 Tab 3    SYMBICORT 160-4.5 mcg/actuation HFA inhaler Take 2 Puffs by inhalation two (2) times a day.  naproxen sodium (ALEVE) 220 mg tablet Take 220 mg by mouth two (2) times daily (with meals).  hydrocortisone (ANUSOL-HC) 25 mg supp   2    lidocaine (XYLOCAINE) 2 % jelly   2    metoclopramide HCl (REGLAN) 10 mg tablet Take 10 mg by mouth daily.  nystatin (MYCOSTATIN) topical cream   0    polyethylene glycol (MIRALAX) 17 gram packet Take 17 g by mouth daily.  cetirizine (ZYRTEC) 10 mg tablet Take 10 mg by mouth daily.  melatonin 5 mg Tab Take 5 mg by mouth daily.  Dexlansoprazole (DEXILANT) 60 mg CpDM Take 60 mg by mouth daily.  aspirin 81 mg tablet Take 81 mg by mouth.  pyridoxine (VITAMIN B-6) 100 mg tablet Take 100 mg by mouth every morning.  ERGOCALCIFEROL, VITAMIN D2, (VITAMIN D PO) Take 2,000 Units by mouth daily. No current facility-administered medications on file prior to visit.         Past Surgical History:   Procedure Laterality Date    EXTRACTION ERUPTED TOOTH/EXR      HX ENDOSCOPY  4/2016    HX GI  2010    gastroesophagectomy    HX GYN  1983    lap btl    HX HEENT  1957    tonsils as child    HX HERNIA REPAIR  04/26/2012    HX LAP CHOLECYSTECTOMY  1995    HX ORTHOPAEDIC  02/20/2015    right shoulder manipulation    HX OTHER SURGICAL      left shoulder surgery for frozen surgery    HX OTHER SURGICAL  6/24/2015    mammogram     HX PELVIC LAPAROSCOPY  1989    HX VASCULAR ACCESS      port a cath and removal    WA EGD BALLOON DILATION ESOPHAGUS <30 MM DIAM  4/20/2010            Family History   Problem Relation Age of Onset    Diabetes Father     Cancer Father      intestinal    Heart Disease Father     Hypertension Father     Heart Disease Brother     Diabetes Brother  Diabetes Mother     Lung Disease Mother     Heart Disease Mother     Hypertension Mother     Diabetes Maternal Grandmother     Diabetes Maternal Grandfather     Elevated Lipids Maternal Aunt     Thyroid Disease Neg Hx      Reviewed and no changes     Social History     Social History    Marital status:      Spouse name: N/A    Number of children: N/A    Years of education: N/A     Occupational History    Not on file. Social History Main Topics    Smoking status: Never Smoker    Smokeless tobacco: Never Used    Alcohol use 0.5 oz/week     1 Glasses of wine per week      Comment: rarely glass of wine    Drug use: No    Sexual activity: Not on file     Other Topics Concern    Not on file     Social History Narrative    Lives in [facility] with . Has a 43 yo daughter and an adopted 24 yo daughter. 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               Visit Vitals    /69 (BP 1 Location: Right arm, BP Patient Position: Sitting)    Pulse (!) 52    Temp 97.8 °F (36.6 °C) (Oral)    Resp 18    Ht 5' 3\" (1.6 m)    Wt 177 lb 9.6 oz (80.6 kg)    SpO2 99%    BMI 31.46 kg/m2       Physical Examination:   General - Well appearing female  HEENT - PERRL, TM no erythema/opacification, normal nasal turbinates, oropharynx no erythema or exudate, MMM  Neck - supple, no bruits, no TMG, no LAD  Pulm -not in respiratory distress, few expiratory wheezes no rales,  Cardio - RRR, normal S1 S2, no murmur gallops or rubs  Abd - soft, nontender, no masses, no HSM  Extrem - no edema, +2 distal pulses  Psych - normal affect, appropriate mood    Lab Results   Component Value Date/Time    WBC 6.9 03/23/2017 12:09 PM    Hemoglobin (POC) 11.9 01/28/2010 12:02 PM    HGB 12.0 03/23/2017 12:09 PM    Hematocrit (POC) 35 01/28/2010 12:02 PM    HCT 36.9 03/23/2017 12:09 PM    PLATELET 115 80/16/9634 12:09 PM    MCV 90 03/23/2017 12:09 PM     Lab Results   Component Value Date/Time Sodium 140 12/18/2017 08:42 AM    Potassium 4.1 12/18/2017 08:42 AM    Chloride 99 12/18/2017 08:42 AM    CO2 26 12/18/2017 08:42 AM    Anion gap 5 04/26/2012 10:00 AM    Glucose 100 12/18/2017 08:42 AM    BUN 13 12/18/2017 08:42 AM    Creatinine 0.80 12/18/2017 08:42 AM    BUN/Creatinine ratio 16 12/18/2017 08:42 AM    GFR est AA 90 12/18/2017 08:42 AM    GFR est non-AA 78 12/18/2017 08:42 AM    Calcium 8.7 12/18/2017 08:42 AM     Lab Results   Component Value Date/Time    Cholesterol, total 140 09/20/2017 08:41 AM    HDL Cholesterol 51 09/20/2017 08:41 AM    LDL, calculated 71 09/20/2017 08:41 AM    VLDL, calculated 18 09/20/2017 08:41 AM    Triglyceride 91 09/20/2017 08:41 AM     Lab Results   Component Value Date/Time    TSH 3.420 09/20/2017 08:41 AM     No results found for: PSA, [place] Ser, [id number], [id number], PSALT  Lab Results   Component Value Date/Time    Hemoglobin A1c 6.4 12/18/2017 08:42 AM    Hemoglobin A1c (POC) 6.8 03/24/2017 01:51 PM     No results found for: Boris Johnson VD3RIA    Lab Results   Component Value Date/Time    ALT (SGPT) 30 09/20/2017 08:41 AM    AST (SGOT) 24 09/20/2017 08:41 AM    Alk. phosphatase 51 09/20/2017 08:41 AM    Bilirubin, direct 0.12 09/11/2013 08:35 AM    Bilirubin, total 0.5 09/20/2017 08:41 AM           Assessment/Plan:    1. Bronchitis/symptoms for 2 weeks   No evidence of pneumonia- XR CHEST PA LAT  - azithromycin (ZITHROMAX) 250 mg tablet; Take 1 Tab by mouth See Admin Instructions for 6 doses. Take 2 tabs on day one followed by 1 tab daily for a total of 5 days. Dispense: 6 Tab; Refill: 0  - chlorpheniramine-HYDROcodone (TUSSIONEX) 10-8 mg/5 mL suspension; Take 5 mL by mouth every twelve (12) hours as needed for Cough. Max Daily Amount: 10 mL.   Dispense: 115 mL; Refill: 0  -patient has diabetes and would prefer to avoid steroids to avoid changes in insulin regimen - if no improvement on above therapy advised to call Follow-up Disposition:  Return if symptoms worsen or fail to improve.     Magda Ernst MD

## 2018-02-06 RX ORDER — FUROSEMIDE 20 MG/1
TABLET ORAL
Qty: 30 TAB | Refills: 3 | Status: SHIPPED | OUTPATIENT
Start: 2018-02-06 | End: 2018-05-04 | Stop reason: SDUPTHER

## 2018-03-30 ENCOUNTER — DOCUMENTATION ONLY (OUTPATIENT)
Dept: INTERNAL MEDICINE CLINIC | Age: 63
End: 2018-03-30

## 2018-03-30 NOTE — PROGRESS NOTES
Medicare Part B Preventive Services Guidelines/Limitations Date last completed and Frequency Due Date   Bone Mass Measurement  (age 72 & older, biennial) Requires diagnosis related to osteoporosis or estrogen deficiency. Biennial benefit unless patient has history of long-term glucocorticoid tx or baseline is needed because initial test was by other method Completed 7/2015    Recommended every 2 years Due now   Cardiovascular Screening Blood Tests (every 5 years)  Total cholesterol, HDL, Triglycerides Order as a panel if possible Completed 9/2017    Recommended annually Due 9/2018   Colorectal Cancer Screening  -Fecal occult blood test (annual)  -Flexible sigmoidoscopy (5y)  -Screening colonoscopy (10y)  -Barium Enema  Completed 5/2013 with Dr. Gilles Ling    Recommended every 5 years  Due 5/2018   Counseling to Prevent Tobacco Use (up to 8 sessions per year)  - Counseling greater than 3 and up to 10 minutes  - Counseling greater than 10 minutes Patients must be asymptomatic of tobacco-related conditions to receive as preventive service N/A N/A   Diabetes Screening Tests (at least every 3 years, Medicare covers annually or at 6-month intervals for prediabetic patients)    Fasting blood sugar (FBS) or glucose tolerance test (GTT) Patient must be diagnosed with one of the following:  -Hypertension, Dyslipidemia, obesity, previous impaired FBS or GTT  Or any two of the following: overweight, FH of diabetes, age ? 72, history of gestational diabetes, birth of baby weighing more than 9 pounds Completed 12/2017 with A1C 6.4    Recommended every 3-6 months for Pre-Diabetics and Diabetics Due 3/2018-6/2018   Diabetes Self-Management Training (DSMT) (no USPSTF recommendation) Requires referral by treating physician for patient with diabetes or renal disease. 10 hours of initial DSMT session of no less than 30 minutes each in a continuous 12-month period. 2 hours of follow-up DSMT in subsequent years.  N/A N/A   Glaucoma Screening (no USPSTF recommendation) Diabetes mellitus, family history, , age 48 or over,  American, age 72 or over Completed 11/2017 with Dr. Norma Key     Recommended annually Due 11/2018   Human Immunodeficiency Virus (HIV) Screening (annually for increased risk patients)  HIV-1 and HIV-2 by EIA, COY, rapid antibody test, or oral mucosa transudate Patient must be at increased risk for HIV infection per USPSTF guidelines or pregnant. Tests covered annually for patients at increased risk. Pregnant patients may receive up to 3 test during pregnancy. N/A N/A   Medical Nutrition Therapy (MNT) (for diabetes or renal disease not recommended schedule) Requires referral by treating physician for patient with diabetes or renal disease. Can be provided in same year as diabetes self-management training (DSMT), and CMS recommends medical nutrition therapy take place after DSMT. Up to 3 hours for initial year and 2 hours in subsequent years. N/A N/A   Prostate Cancer Screening (annually up to age 76)  - Digital rectal exam (HENRY)  - Prostate specific antigen (PSA) Annually (age 48 or over), HENRY not paid separately when covered E/M service is provided on same date N/A N/A   Seasonal Influenza Vaccination (annually)  Completed 10/2017    Recommended annually Due Fall 2018   Pneumococcal Vaccination (once after 72)  Pneumococcal 23 - 4/2009    Prevnar 13 - 11/2016    Both recommended once over the age of 72 Completed      Completed   Hepatitis B Vaccinations (if medium/high risk) Medium/high risk factors:  End-stage renal disease,  Hemophiliacs who received Factor VIII or IX concentrates, Clients of institutions for the mentally retarded, Persons who live in the same house as a HepB virus carrier, Homosexual men, Illicit injectable drug abusers. N/A N/A   Screening Mammography (biennial age 54-69)?  Annually (age 36 or over) Completed 7/2017    Recommended annually    Due 7/2018   Screening Pap Tests and Pelvic Examination (up to age 79 and after 79 if unknown history or abnormal study last 10 years) Every 24 months except high risk Completed 1/2017 with Dr. Morris Landau    Recommended every 2 years Due 1/2019   Ultrasound Screening for Abdominal Aortic Aneurysm (AAA) (once) Patient must be referred through [facility] and not have had a screening for abdominal aortic aneurysm before under Medicare.   Limited to patients who meet one of the following criteria:  - Men who are 73-68 years old and have smoked more than 100 cigarettes in their lifetime.  -Anyone with a FH of AAA  -Anyone recommended for screening by USPSTF Completed CT abd 10/2012 - negative Completed    Family Practice Management 2011

## 2018-04-02 ENCOUNTER — APPOINTMENT (OUTPATIENT)
Dept: INTERNAL MEDICINE CLINIC | Age: 63
End: 2018-04-02

## 2018-04-02 DIAGNOSIS — I10 ESSENTIAL HYPERTENSION: ICD-10-CM

## 2018-04-02 DIAGNOSIS — I25.10 ASHD (ARTERIOSCLEROTIC HEART DISEASE): ICD-10-CM

## 2018-04-02 DIAGNOSIS — R60.0 LOCALIZED EDEMA: ICD-10-CM

## 2018-04-02 DIAGNOSIS — J45.20 MILD INTERMITTENT ASTHMA WITHOUT COMPLICATION: ICD-10-CM

## 2018-04-02 DIAGNOSIS — C15.9 MALIGNANT NEOPLASM OF ESOPHAGUS, UNSPECIFIED LOCATION (HCC): Chronic | ICD-10-CM

## 2018-04-02 DIAGNOSIS — E11.9 DIABETES MELLITUS WITHOUT COMPLICATION (HCC): ICD-10-CM

## 2018-04-02 DIAGNOSIS — E78.00 HYPERCHOLESTEREMIA: ICD-10-CM

## 2018-04-03 LAB
ALBUMIN SERPL-MCNC: 3.9 G/DL (ref 3.6–4.8)
ALBUMIN/GLOB SERPL: 2 {RATIO} (ref 1.2–2.2)
ALP SERPL-CCNC: 62 IU/L (ref 39–117)
ALT SERPL-CCNC: 21 IU/L (ref 0–32)
AST SERPL-CCNC: 18 IU/L (ref 0–40)
BILIRUB SERPL-MCNC: 0.3 MG/DL (ref 0–1.2)
BUN SERPL-MCNC: 12 MG/DL (ref 8–27)
BUN/CREAT SERPL: 13 (ref 12–28)
CALCIUM SERPL-MCNC: 8.6 MG/DL (ref 8.7–10.3)
CHLORIDE SERPL-SCNC: 101 MMOL/L (ref 96–106)
CO2 SERPL-SCNC: 26 MMOL/L (ref 18–29)
CREAT SERPL-MCNC: 0.96 MG/DL (ref 0.57–1)
ERYTHROCYTE [DISTWIDTH] IN BLOOD BY AUTOMATED COUNT: 14.6 % (ref 12.3–15.4)
EST. AVERAGE GLUCOSE BLD GHB EST-MCNC: 163 MG/DL
GFR SERPLBLD CREATININE-BSD FMLA CKD-EPI: 62 ML/MIN/1.73
GFR SERPLBLD CREATININE-BSD FMLA CKD-EPI: 72 ML/MIN/1.73
GLOBULIN SER CALC-MCNC: 2 G/DL (ref 1.5–4.5)
GLUCOSE SERPL-MCNC: 119 MG/DL (ref 65–99)
HBA1C MFR BLD: 7.3 % (ref 4.8–5.6)
HCT VFR BLD AUTO: 37.9 % (ref 34–46.6)
HGB BLD-MCNC: 12.3 G/DL (ref 11.1–15.9)
MCH RBC QN AUTO: 30.6 PG (ref 26.6–33)
MCHC RBC AUTO-ENTMCNC: 32.5 G/DL (ref 31.5–35.7)
MCV RBC AUTO: 94 FL (ref 79–97)
PLATELET # BLD AUTO: 228 X10E3/UL (ref 150–379)
POTASSIUM SERPL-SCNC: 4.2 MMOL/L (ref 3.5–5.2)
PROT SERPL-MCNC: 5.9 G/DL (ref 6–8.5)
RBC # BLD AUTO: 4.02 X10E6/UL (ref 3.77–5.28)
SODIUM SERPL-SCNC: 141 MMOL/L (ref 134–144)
WBC # BLD AUTO: 5.3 X10E3/UL (ref 3.4–10.8)

## 2018-04-07 RX ORDER — ATORVASTATIN CALCIUM 40 MG/1
TABLET, FILM COATED ORAL
Qty: 90 TAB | Refills: 2 | Status: SHIPPED | OUTPATIENT
Start: 2018-04-07 | End: 2018-06-20 | Stop reason: SDUPTHER

## 2018-04-09 ENCOUNTER — OFFICE VISIT (OUTPATIENT)
Dept: INTERNAL MEDICINE CLINIC | Age: 63
End: 2018-04-09

## 2018-04-09 VITALS
TEMPERATURE: 97.8 F | DIASTOLIC BLOOD PRESSURE: 71 MMHG | BODY MASS INDEX: 32.25 KG/M2 | OXYGEN SATURATION: 99 % | HEIGHT: 63 IN | WEIGHT: 182 LBS | RESPIRATION RATE: 16 BRPM | HEART RATE: 51 BPM | SYSTOLIC BLOOD PRESSURE: 110 MMHG

## 2018-04-09 DIAGNOSIS — E66.9 OBESITY (BMI 30.0-34.9): ICD-10-CM

## 2018-04-09 DIAGNOSIS — L02.92 BOIL: ICD-10-CM

## 2018-04-09 DIAGNOSIS — J45.20 MILD INTERMITTENT ASTHMA WITHOUT COMPLICATION: ICD-10-CM

## 2018-04-09 DIAGNOSIS — I25.10 ASHD (ARTERIOSCLEROTIC HEART DISEASE): ICD-10-CM

## 2018-04-09 DIAGNOSIS — I10 ESSENTIAL HYPERTENSION: Primary | ICD-10-CM

## 2018-04-09 DIAGNOSIS — E78.00 HYPERCHOLESTEREMIA: ICD-10-CM

## 2018-04-09 DIAGNOSIS — E11.9 DIABETES MELLITUS WITHOUT COMPLICATION (HCC): ICD-10-CM

## 2018-04-09 DIAGNOSIS — Z12.11 COLON CANCER SCREENING: ICD-10-CM

## 2018-04-09 DIAGNOSIS — K21.9 GASTROESOPHAGEAL REFLUX DISEASE WITHOUT ESOPHAGITIS: ICD-10-CM

## 2018-04-09 DIAGNOSIS — Z00.00 WELCOME TO MEDICARE PREVENTIVE VISIT: ICD-10-CM

## 2018-04-09 DIAGNOSIS — B37.9 CANDIDA INFECTION: ICD-10-CM

## 2018-04-09 DIAGNOSIS — M89.9 BONE DISEASE: ICD-10-CM

## 2018-04-09 RX ORDER — RANITIDINE 150 MG/1
150 TABLET, FILM COATED ORAL AS NEEDED
COMMUNITY
End: 2020-02-06

## 2018-04-09 RX ORDER — NYSTATIN 100000 [USP'U]/G
POWDER TOPICAL 4 TIMES DAILY
Qty: 1 BOTTLE | Refills: 3 | Status: SHIPPED | OUTPATIENT
Start: 2018-04-09 | End: 2020-02-06

## 2018-04-09 RX ORDER — NYSTATIN 100000 U/G
CREAM TOPICAL 2 TIMES DAILY
Qty: 15 G | Refills: 1 | Status: SHIPPED | OUTPATIENT
Start: 2018-04-09 | End: 2020-02-06

## 2018-04-09 RX ORDER — SULFAMETHOXAZOLE AND TRIMETHOPRIM 800; 160 MG/1; MG/1
1 TABLET ORAL 2 TIMES DAILY
Qty: 14 TAB | Refills: 0 | Status: SHIPPED | OUTPATIENT
Start: 2018-04-09 | End: 2018-04-16

## 2018-04-09 RX ORDER — RANOLAZINE 500 MG/1
500 TABLET, EXTENDED RELEASE ORAL EVERY 12 HOURS
Qty: 180 TAB | Refills: 3 | Status: SHIPPED | OUTPATIENT
Start: 2018-04-09 | End: 2018-04-20 | Stop reason: CLARIF

## 2018-04-09 RX ORDER — PEN NEEDLE, DIABETIC 30 GX3/16"
NEEDLE, DISPOSABLE MISCELLANEOUS
Qty: 100 PACKAGE | Refills: 10 | Status: SHIPPED | OUTPATIENT
Start: 2018-04-09 | End: 2018-10-16

## 2018-04-09 NOTE — PATIENT INSTRUCTIONS
Medicare Part B Preventive Services Guidelines/Limitations Date last completed and Frequency Due Date   Bone Mass Measurement  (age 72 & older, biennial) Requires diagnosis related to osteoporosis or estrogen deficiency. Biennial benefit unless patient has history of long-term glucocorticoid tx or baseline is needed because initial test was by other method Completed 7/2015     Recommended every 2 years Due now   Cardiovascular Screening Blood Tests (every 5 years)  Total cholesterol, HDL, Triglycerides Order as a panel if possible Completed 9/2017     Recommended annually Due 9/2018   Colorectal Cancer Screening  -Fecal occult blood test (annual)  -Flexible sigmoidoscopy (5y)  -Screening colonoscopy (10y)  -Barium Enema   Completed 5/2013 with Dr. Arin Rush     Recommended every 5 years  Due 5/2018   Counseling to Prevent Tobacco Use (up to 8 sessions per year)  - Counseling greater than 3 and up to 10 minutes  - Counseling greater than 10 minutes Patients must be asymptomatic of tobacco-related conditions to receive as preventive service N/A N/A   Diabetes Screening Tests (at least every 3 years, Medicare covers annually or at 6-month intervals for prediabetic patients)     Fasting blood sugar (FBS) or glucose tolerance test (GTT) Patient must be diagnosed with one of the following:  -Hypertension, Dyslipidemia, obesity, previous impaired FBS or GTT  Or any two of the following: overweight, FH of diabetes, age ? 72, history of gestational diabetes, birth of baby weighing more than 9 pounds Completed 4/18 with A1C 7.3     Recommended every 3-6 months for Pre-Diabetics and Diabetics Due -6/2018   Diabetes Self-Management Training (DSMT) (no USPSTF recommendation) Requires referral by treating physician for patient with diabetes or renal disease. 10 hours of initial DSMT session of no less than 30 minutes each in a continuous 12-month period. 2 hours of follow-up DSMT in subsequent years.  N/A N/A   Glaucoma Screening (no USPSTF recommendation) Diabetes mellitus, family history, , age 48 or over,  American, age 72 or over Completed 11/2017 with Dr. Shellie Rivero      Recommended annually Due 11/2018   Human Immunodeficiency Virus (HIV) Screening (annually for increased risk patients)  HIV-1 and HIV-2 by EIA, COY, rapid antibody test, or oral mucosa transudate Patient must be at increased risk for HIV infection per USPSTF guidelines or pregnant. Tests covered annually for patients at increased risk. Pregnant patients may receive up to 3 test during pregnancy. N/A N/A   Medical Nutrition Therapy (MNT) (for diabetes or renal disease not recommended schedule) Requires referral by treating physician for patient with diabetes or renal disease. Can be provided in same year as diabetes self-management training (DSMT), and CMS recommends medical nutrition therapy take place after DSMT. Up to 3 hours for initial year and 2 hours in subsequent years. N/A N/A   Prostate Cancer Screening (annually up to age 76)  - Digital rectal exam (HENRY)  - Prostate specific antigen (PSA) Annually (age 48 or over), HENRY not paid separately when covered E/M service is provided on same date N/A N/A   Seasonal Influenza Vaccination (annually)   Completed 10/2017     Recommended annually Due Fall 2018   Pneumococcal Vaccination (once after 72)   Pneumococcal 23 - 4/2009     Prevnar 13 - 11/2016     Both recommended once over the age of 72 Completed        Completed   Hepatitis B Vaccinations (if medium/high risk) Medium/high risk factors:  End-stage renal disease,  Hemophiliacs who received Factor VIII or IX concentrates, Clients of institutions for the mentally retarded, Persons who live in the same house as a HepB virus carrier, Homosexual men, Illicit injectable drug abusers. N/A N/A   Screening Mammography (biennial age 54-69)?  Annually (age 36 or over) Completed 7/2017     Recommended annually  Due 7/2018   Screening Pap Tests and Pelvic Examination (up to age 79 and after 79 if unknown history or abnormal study last 10 years) Every 24 months except high risk Completed 1/2018 with Dr. Roxanna Joya     Recommended every 2 years Due 1/2021   Ultrasound Screening for Abdominal Aortic Aneurysm (AAA) (once) Patient must be referred through [facility] and not have had a screening for abdominal aortic aneurysm before under Medicare. Limited to patients who meet one of the following criteria:  - Men who are 73-68 years old and have smoked more than 100 cigarettes in their lifetime.  -Anyone with a FH of AAA  -Anyone recommended for screening by USPSTF Completed CT abd 10/2012 - negative Completed    Family Practice Management 2011     Please bring in a copy of your advanced directive to your next office visit so we can have a copy on file. Medicare Wellness Visit, Female    The best way to live healthy is to have a healthy lifestyle by eating a well-balanced diet, exercising regularly, limiting alcohol and stopping smoking. Regular physical exams and screening tests are another way to keep healthy. Preventive exams provided by your health care provider can find health problems before they become diseases or illnesses. Preventive services including immunizations, screening tests, monitoring and exams can help you take care of your own health. All people over age 72 should have a pneumovax  and and a prevnar shot to prevent pneumonia. These are once in a lifetime unless you and your provider decide differently. All people over 65 should have a yearly flu shot and a tetanus vaccine every 10 years. A bone mass density to screen for osteoporosis or thinning of the bones should be done every 2 years after 65. Screening for diabetes mellitus with a blood sugar test should be done every year.     Glaucoma is a disease of the eye due to increased ocular pressure that can lead to blindness and it should be done every year by an eye professional.    Cardiovascular screening tests that check for elevated lipids (fatty part of blood) which can lead to heart disease and strokes should be done every 5 years. Colorectal screening that evaluates for blood or polyps in your colon should be done yearly as a stool test or every five years as a flexible sigmoidoscope or every 10 years as a colonoscopy up to age 76. Breast cancer screening with a mammogram is recommended biennially  for women age 54-69. Screening for cervical cancer with a pap smear and pelvic exam is recommended for women after age 72 years every 2 years up to age 79 or when the provider and patient decide to stop. If there is a history of cervical abnormalities or other increased risk for cancer then the test is recommended yearly. Hepatitis C screening is also recommended for anyone born between [birth year] through [address]. A shingles vaccine is also recommended once in a lifetime after age 61. Your Medicare Wellness Exam is recommended annually.     Here is a list of your current Health Maintenance items with a due date:  Health Maintenance Due   Topic Date Due    Pneumococcal Vaccine (2 of 2 - PPSV23) 02/18/2018    Annual Well Visit  04/02/2018    Colonoscopy  05/13/2018     Increase lantus to 29 units    Take half dose diovan while on bactrim for 7 days

## 2018-04-09 NOTE — PROGRESS NOTES
HISTORY OF PRESENT ILLNESS  Luana Harmon is a 70 y.o. female. HPI   Last here 12/29/17.  Pt is here to f/u on chronic conditions      BP today is 110/71  Continues diovan 160mg daily   Home readings 120-130's     She has been taking lasix daily - R chronically larger than L, stable swelling actually improved overall   She states that she has improved ROM in her hands     BS at home running around 120s, 130s (occasinally) in the AM fasting  Pt denies having any low BS    Sugars have increased lately, poor diet playing a role  Continues lantus 27U qAM and humalog 4-8U with dinner for BS >120  She has been using her humalog more recently --mostly every day   She also takes ASA 81mg daily  Will increase lantus to 29U qAM  If her BS improve, will decrease lantus back down to 27U qAM    Reviewed last labs 4/18   Will get labs today     Wt is up 5 lbs since last visit   Pt reports dietary indiscretion   Pt drinks diet soda and eats junk food  Discussed diet and w/l     Pt c/o small and tender bump to abd area x enlarging over 1 week  Pt states that this bump erupts every so often  On exam, pt also had candida to her abd--redness and irritation   Also under breasts  Not tx this currently   Ordered nystatin powder and cream  Advised her to use desitin cream afterwards   Ordered bactrim   Will have her take diovan half tab while on bactrim    Pt had bronchitis in 1/18  Pt saw Dr. Fany Ivy (PCP) for her sx  Pt was provided with a zpak with improvement to sx  No steroids needed    Pt follows with Dr. Emerson Azevedo (cardio)  Last visit 11/2/17  Pt is no longer taking plavix as of 3/18  Continues ranexa daily to prevent CP  Next visit scheduled for 5/18    Pt follows annually with Dr. Shamar Fernandez (hemo/onc) for h/o adenoma of esophagus   Last visit was 6/28/17     Next visit scheduled for 7/18    Pt follows with Dr. Colton Garsia (pulm)   Last visit was 2/2018 - will get notes for review   Continues singulair daily, albuterol prn and symbicort BID for breathing/asthma per pulm, which help    Pt has not been evaluated for DANIELLE in the past  Recall she declines further evaluation for now       Continues lipitor 40mg daily for cholesterol      Continues dexilant and reglan daily for reflux, which works well  She also takes zantac qhs prn (not daily)   She avoids trigger foods and rarely has reflux/aspiration  Recall has h/o esophageal cancer    Advised her to use westcort OTC for dry skin on ears    Pt is independent (pt can drive/feed/bathe etc. herself)    Pt lives and gets along well with her      Pt had a zostavax vaccine in 2016  Discussed shingrix being a dead vaccine   Discussed it being more effective than zostavax (~90% effective)  Discussed it being a 2 part series  Ordered 04/09/18      Of note, her  has bladder cancer and she is caring for him at home. ACP not on file. SDM is her .   Provided information today.       PREVENTIVE:    Colonoscopy: 5/2013, Dr. Edward Aldridge, repeat 5 years, due 7/18 (per pt), referred   EGD: 4/16, Dr. Edward Aldridge, h/o esophageal cancer, polyp on recent EGD, biopsy nl  AAA: FMHx (grandfather)  Pap: Dr. Rafaela Morris, 2/18, has to repeat pap smear, will schedule   Mammogram: 7/20/17, negative  Dexa: 7/29/15, nl, due, ordered    Tdap: 9/14/2016  Pneumovax: 4/26/2009  Flrtyin72: 11/14/2016  Zostavax: 11/18/2016, reaction on R arm  Shingrix: ordered 04/09/18, will think about this vaccine  Flu shot: 10/19/17  Foot exam: 04/09/18   Microalbumin: 6/17, minimal  A1c:  4/15 7.8, 7/15 7.1, 10/15 7.3, 1/16 7.2, 5/16 7.0, 9/16 6.7, 12/16 6.2, 3/17 6.8, 6/17 8.8, 9/17 6.2, 12/17 6.4, 4/18 7.3  Eye exam: Dr. Cheryl Quach, 11/17, will get notes for review    Lipids: 9/17 LDL 71  Hep C screen: 11/15, negative  EKG: declines today, will get with cardio in 5/18    Patient Active Problem List    Diagnosis Date Noted    Recurrent depression ([facility] 12/29/2017    Diabetes mellitus without complication ([facility] 23/73/0990    [facility] (arteriosclerotic heart disease) 06/18/2014    Syncope 06/18/2014    Bradycardia 06/18/2014    Multinodular goiter     Angina, class II ([facility] 04/18/2013    Family history of coronary artery disease 04/18/2013    S/P cardiac cath 04/18/2013    Hypercholesteremia 02/18/2013    HTN (hypertension) 02/18/2013    Asthma 02/18/2013    Depression 02/18/2013    Thyroid nodule 02/18/2013    Shoulder capsulitis 02/18/2013    Incisional hernia 02/10/2011    Esophageal cancer (HCC) 02/10/2011     Current Outpatient Prescriptions   Medication Sig Dispense Refill    raNITIdine (ZANTAC) 150 mg tablet Take 150 mg by mouth as needed for Indigestion.  atorvastatin (LIPITOR) 40 mg tablet TAKE 1 TABLET NIGHTLY 90 Tab 2    furosemide (LASIX) 20 mg tablet TAKE 1 TABLET BY MOUTH EVERY DAY 30 Tab 3    albuterol (PROVENTIL HFA, VENTOLIN HFA, PROAIR HFA) 90 mcg/actuation inhaler Take 2 Puffs by inhalation as needed. 1 Inhaler 3    valsartan (DIOVAN) 160 mg tablet TAKE 1 TABLET DAILY 90 Tab 1    RANEXA 500 mg SR tablet TAKE 1 TABLET EVERY 12 HOURS 180 Tab 1    montelukast (SINGULAIR) 10 mg tablet TAKE 1 TABLET DAILY 90 Tab 1    ONETOUCH ULTRA TEST strip CHECK TWICE A  Strip 12    LANTUS SOLOSTAR 100 unit/mL (3 mL) inpn INJECT 34 UNITS UNDER THE SKIN EVERY MORNING 45 mL 2    insulin lispro (HUMALOG) 100 unit/mL kwikpen 4 units every evening with blood sugars over 120 1 Package 0    Insulin Needles, Disposable, (CLICKFINE) 31 gauge x 5/16\" ndle USE DAILY AS DIRECTED 100 Package 10    SYMBICORT 160-4.5 mcg/actuation HFA inhaler Take 2 Puffs by inhalation two (2) times a day.  metoclopramide HCl (REGLAN) 10 mg tablet Take 10 mg by mouth daily.  polyethylene glycol (MIRALAX) 17 gram packet Take 17 g by mouth daily.  cetirizine (ZYRTEC) 10 mg tablet Take 10 mg by mouth daily.  melatonin 5 mg Tab Take 5 mg by mouth daily.  Dexlansoprazole (DEXILANT) 60 mg CpDM Take 60 mg by mouth daily.  aspirin 81 mg tablet Take 81 mg by mouth.  pyridoxine (VITAMIN B-6) 100 mg tablet Take 100 mg by mouth every morning.  ERGOCALCIFEROL, VITAMIN D2, (VITAMIN D PO) Take 2,000 Units by mouth daily.  azithromycin (ZITHROMAX) 250 mg tablet Take 1 Tab by mouth See Admin Instructions for 6 doses. Take 2 tabs on day one followed by 1 tab daily for a total of 5 days. 6 Tab 0    chlorpheniramine-HYDROcodone (TUSSIONEX) 10-8 mg/5 mL suspension Take 5 mL by mouth every twelve (12) hours as needed for Cough. Max Daily Amount: 10 mL. 115 mL 0    clopidogrel (PLAVIX) 75 mg tab Take 1 Tab by mouth daily. Indications: Thrombosis Prevention after PCI 90 Tab 3    naproxen sodium (ALEVE) 220 mg tablet Take 220 mg by mouth two (2) times daily (with meals).       hydrocortisone (ANUSOL-HC) 25 mg supp   2    lidocaine (XYLOCAINE) 2 % jelly   2    nystatin (MYCOSTATIN) topical cream   0     Past Surgical History:   Procedure Laterality Date    EXTRACTION ERUPTED TOOTH/EXR      HX ENDOSCOPY  4/2016    HX GI  2010    gastroesophagectomy    HX GYN  1983    lap btl    HX HEENT  1957    tonsils as child    HX HERNIA REPAIR  04/26/2012    HX LAP CHOLECYSTECTOMY  1995    HX ORTHOPAEDIC  02/20/2015    right shoulder manipulation    HX OTHER SURGICAL      left shoulder surgery for frozen surgery    HX OTHER SURGICAL  6/24/2015    mammogram     HX PELVIC LAPAROSCOPY  1989    HX VASCULAR ACCESS      port a cath and removal    [place] EGD BALLOON DILATION ESOPHAGUS <30 MM DIAM  4/20/2010           Lab Results  Component Value Date/Time   WBC 5.3 04/02/2018 08:24 AM   HGB 12.3 04/02/2018 08:24 AM   Hemoglobin (POC) 11.9 01/28/2010 12:02 PM   HCT 37.9 04/02/2018 08:24 AM   Hematocrit (POC) 35 01/28/2010 12:02 PM   PLATELET 995 73/60/8295 08:24 AM   MCV 94 04/02/2018 08:24 AM     Lab Results  Component Value Date/Time   Cholesterol, total 140 09/20/2017 08:41 AM   HDL Cholesterol 51 09/20/2017 08:41 AM   LDL, calculated 71 09/20/2017 08:41 AM   Triglyceride 91 09/20/2017 08:41 AM     Lab Results  Component Value Date/Time   GFR est non-AA 62 04/02/2018 08:24 AM   GFRNA, POC >60 01/06/2012 10:42 AM   GFR est AA 72 04/02/2018 08:24 AM   GFRAA, POC >60 01/06/2012 10:42 AM   Creatinine 0.96 04/02/2018 08:24 AM   Creatinine (POC) 0.9 01/06/2012 10:42 AM   BUN 12 04/02/2018 08:24 AM   BUN (POC) 14 01/28/2010 12:02 PM   Sodium 141 04/02/2018 08:24 AM   Sodium (POC) 141 01/28/2010 12:02 PM   Potassium 4.2 04/02/2018 08:24 AM   Potassium (POC) 4.0 01/28/2010 12:02 PM   Chloride 101 04/02/2018 08:24 AM   Chloride (POC) 103 01/28/2010 12:02 PM   CO2 26 04/02/2018 08:24 AM   Magnesium 1.9 02/08/2010 03:00 AM        Review of Systems   Respiratory: Negative for shortness of breath. Cardiovascular: Negative for chest pain. Musculoskeletal: Negative for falls. Physical Exam   Constitutional: She is oriented to person, place, and time. She appears well-developed and well-nourished. No distress. HENT:   Head: Normocephalic and atraumatic. Right Ear: External ear normal.   Left Ear: External ear normal.   Mouth/Throat: Oropharynx is clear and moist. No oropharyngeal exudate. Mild cerumen to BL TM  Dry skin around L ear   Eyes: Conjunctivae and EOM are normal. Right eye exhibits no discharge. Left eye exhibits no discharge. No scleral icterus. Neck: Normal range of motion. Neck supple. No carotid bruits    Cardiovascular: Normal rate, regular rhythm, normal heart sounds and intact distal pulses. Exam reveals no gallop and no friction rub. No murmur heard. Pulmonary/Chest: Effort normal and breath sounds normal. No respiratory distress. She has no wheezes. She has no rales. She exhibits no tenderness. Abdominal: Soft. She exhibits no distension and no mass. There is no tenderness. There is no rebound and no guarding. Musculoskeletal: She exhibits edema (Trace BLE). She exhibits no tenderness or deformity.    Lymphadenopathy: She has no cervical adenopathy. Neurological: She is alert and oriented to person, place, and time. Coordination normal.   Monofilament nl BLE, good peripheral pulses, no ulcers    Skin: Skin is warm and dry. Rash noted. She is not diaphoretic. There is erythema (Abd, mild erythema, 6mm fluctulant area). No pallor. Psychiatric: She has a normal mood and affect. Her behavior is normal.       ASSESSMENT and PLAN high complex med decision making    ICD-10-CM ICD-9-CM    1. Essential hypertension    Controled on diovan, will have her take half dose of diovan while on bactrim for the next 7 days then resume nl dosing, BP well-controled    M11 889.3 METABOLIC PANEL, BASIC      HEMOGLOBIN A1C WITH EAG      MICROALBUMIN, UR, RAND W/ MICROALB/CREAT RATIO      LIPID PANEL   2. Welcome to Medicare preventive visit S94.01 H65.6 METABOLIC PANEL, BASIC      HEMOGLOBIN A1C WITH EAG      MICROALBUMIN, UR, RAND W/ MICROALB/CREAT RATIO      LIPID PANEL   3. Diabetes mellitus without complication (HCC)    Worsening control d/t dietary indiscretion, will increase lantus to 29U, continue humalog 4U with dinner   L77.0 528.90 METABOLIC PANEL, BASIC      HEMOGLOBIN A1C WITH EAG      MICROALBUMIN, UR, RAND W/ MICROALB/CREAT RATIO      LIPID PANEL       DIABETES FOOT EXAM   4. Hypercholesteremia    Controled on lipitor 40mg    C57.56 999.1 METABOLIC PANEL, BASIC      HEMOGLOBIN A1C WITH EAG      MICROALBUMIN, UR, RAND W/ MICROALB/CREAT RATIO      LIPID PANEL   5. Mild intermittent asthma without complication    Controled with symbicort, UTD with Dr. Emerson Moritz, uses albuterol prn   K88.45 301.87 METABOLIC PANEL, BASIC      HEMOGLOBIN A1C WITH EAG      MICROALBUMIN, UR, RAND W/ MICROALB/CREAT RATIO      LIPID PANEL   6. Boil    Will treat with bactrim BID for 7 days   U42.48 416.8 METABOLIC PANEL, BASIC      HEMOGLOBIN A1C WITH EAG      MICROALBUMIN, UR, RAND W/ MICROALB/CREAT RATIO      LIPID PANEL   7.  Bone disease    DEXA ordered M89.9 733.90 DEXA BONE DENSITY STUDY AXIAL      METABOLIC PANEL, BASIC      HEMOGLOBIN A1C WITH EAG      MICROALBUMIN, UR, RAND W/ MICROALB/CREAT RATIO      LIPID PANEL   8. Colon cancer screening    Screen    Z12.11 V76.51 REFERRAL TO GASTROENTEROLOGY      METABOLIC PANEL, BASIC      HEMOGLOBIN A1C WITH EAG      MICROALBUMIN, UR, RAND W/ MICROALB/CREAT RATIO      LIPID PANEL   9. ASHD (arteriosclerotic heart disease)    UTD with Dr. Omar Beck, plavix has been stopped x 3/18, she has f/u with his clinic next month   I77.35 006.88 METABOLIC PANEL, BASIC      HEMOGLOBIN A1C WITH EAG      MICROALBUMIN, UR, RAND W/ MICROALB/CREAT RATIO      LIPID PANEL   10. Gastroesophageal reflux disease without esophagitis    Controled on dexilant    P12.1 229.16 METABOLIC PANEL, BASIC      HEMOGLOBIN A1C WITH EAG      MICROALBUMIN, UR, RAND W/ MICROALB/CREAT RATIO      LIPID PANEL   11. Obesity (BMI 30.0-34. 9)    Discussed portion control, decreased carbs    V70.6 339.94 METABOLIC PANEL, BASIC      HEMOGLOBIN A1C WITH EAG      MICROALBUMIN, UR, RAND W/ MICROALB/CREAT RATIO      LIPID PANEL   12. Candida infection    Will treat with nystatin cream and then will use either nystatin powder or desitin powder as a barrier to help with reoccurrence    B37.9 112.9         Scribed by Malvin García of [address] [place] [address] [address], as dictated by Dr. Willie Alexander. Current diagnosis and concerns discussed with pt at length. Pt understands risks and benefits or current treatment plan and medications, and accepts the treatment and medication with any possible risks. Pt asks appropriate questions, which were answered. Pt was instructed to call with any concerns or problems. This note will not be viewable in [address]

## 2018-04-09 NOTE — MR AVS SNAPSHOT
[address] [facility] 
[phone] Patient: Ese Quiñones MRN: [id number] Visit Information Date & Time Provider Department Dept. Phone Encounter #  
 4/9/2018  1:15 PM Juan C Rubi, [address] [phone] [id number] Follow-up Instructions Return in about 3 months (around 7/9/2018). Your Appointments 5/17/2018  1:15 PM  
6 MONTH with Bess To MD  
[facility] Appt Note: Dr. Ros Linares [facility]  
[phone] [address] [place] [address]  
  
    
 7/9/2018  1:15 PM  
ROUTINE CARE with Juan C Rubi, [address] [facility] Appt Note: 3 month follow up  
 [facility] [address] [address]  
[address] Upcoming Health Maintenance Date Due Pneumococcal 65+ High/Highest Risk (2 of 2 - PPSV23) 2/18/2018 COLONOSCOPY 5/13/2018 FOOT EXAM Q1 6/28/2018 MICROALBUMIN Q1 6/28/2018 LIPID PANEL Q1 9/20/2018 HEMOGLOBIN A1C Q6M 10/2/2018 EYE EXAM RETINAL OR DILATED Q1 11/10/2018 MEDICARE YEARLY EXAM 4/10/2019 BREAST CANCER SCRN MAMMOGRAM 7/20/2019 GLAUCOMA SCREENING Q2Y 11/10/2019 DTaP/Tdap/Td series (2 - Td) 9/14/2026 Allergies as of 4/9/2018  Review Complete On: 4/9/2018 By: Juan C Rubi MD  
  
 Severity Noted Reaction Type Reactions Adhesive Low 04/25/2012   Topical Other (comments)  
 redness Current Immunizations  Reviewed on 4/26/2012 Name Date Influenza Vaccine (Quad) PF 10/19/2017, 10/13/2016, 10/14/2015 Influenza Vaccine Split 10/26/2011 Pneumococcal Conjugate (PCV-13) 11/14/2016 Tdap 9/14/2016 ZZZ-RETIRED (DO NOT USE) Pneumococcal Vaccine (Unspecified Type) 4/26/2009 Zoster Vaccine, Live 11/18/2015 Not reviewed this visit You Were Diagnosed With   
  
 Codes Comments Essential hypertension    -  Primary ICD-10-CM: I10 
ICD-9-CM: 401.9 Welcome to Medicare preventive visit     ICD-10-CM: Z00.00 ICD-9-CM: V70.0 Diabetes mellitus without complication ([facility]     DBO-03-NF: E11.9 ICD-9-CM: 250.00 Hypercholesteremia     ICD-10-CM: E78.00 ICD-9-CM: 272.0 Mild intermittent asthma without complication     VJT-11-YA: J45.20 ICD-9-CM: 493.90 Boil     ICD-10-CM: L02.92 
ICD-9-CM: 680.9 Bone disease     ICD-10-CM: M89.9 ICD-9-CM: 733.90 Colon cancer screening     ICD-10-CM: Z12.11 ICD-9-CM: V76.51   
 ASHD (arteriosclerotic heart disease)     ICD-10-CM: I25.10 ICD-9-CM: 414.00 Gastroesophageal reflux disease without esophagitis     ICD-10-CM: K21.9 ICD-9-CM: 530.81 Obesity (BMI 30.0-34.9)     ICD-10-CM: L90.5 ICD-9-CM: 278.00 Candida infection     ICD-10-CM: B37.9 ICD-9-CM: 112.9 Vitals BP Pulse Temp Resp Height(growth percentile) Weight(growth percentile) 110/71 (BP 1 Location: Left arm, BP Patient Position: Sitting) (!) 51 97.8 °F (36.6 °C) (Oral) 16 5' 3\" (1.6 m) 182 lb (82.6 kg) SpO2 BMI OB Status Smoking Status 99% 32.24 kg/m2 Postmenopausal Never Smoker Vitals History BMI and BSA Data Body Mass Index Body Surface Area  
 32.24 kg/m 2 1.92 m 2 Preferred Pharmacy Pharmacy Name Phone [facility] [address] [place] [address] [phone] Your Updated Medication List  
  
   
This list is accurate as of 4/9/18  3:42 PM.  Always use your most recent med list.  
  
  
  
  
 albuterol 90 mcg/actuation inhaler Commonly known as:  PROVENTIL HFA, VENTOLIN HFA, PROAIR HFA Take 2 Puffs by inhalation as needed. ALEVE 220 mg tablet Generic drug:  naproxen sodium Take 220 mg by mouth two (2) times daily (with meals). aspirin 81 mg tablet Take 81 mg by mouth. atorvastatin 40 mg tablet Commonly known as:  LIPITOR  
TAKE 1 TABLET NIGHTLY  
  
 azithromycin 250 mg tablet Commonly known as:  Elham Flatness Take 1 Tab by mouth See Admin Instructions for 6 doses. Take 2 tabs on day one followed by 1 tab daily for a total of 5 days. chlorpheniramine-HYDROcodone 10-8 mg/5 mL suspension Commonly known as:  Franceen Lan Take 5 mL by mouth every twelve (12) hours as needed for Cough. Max Daily Amount: 10 mL. clopidogrel 75 mg Tab Commonly known as:  PLAVIX Take 1 Tab by mouth daily. Indications: Thrombosis Prevention after PCI DEXILANT 60 mg Cpdb Generic drug:  Dexlansoprazole Take 60 mg by mouth daily. furosemide 20 mg tablet Commonly known as:  LASIX TAKE 1 TABLET BY MOUTH EVERY DAY  
  
 hydrocortisone 25 mg Supp Commonly known as:  ANUSOL-HC  
  
 insulin lispro 100 unit/mL kwikpen Commonly known as:  HUMALOG  
4 units every evening with blood sugars over 120 Insulin Needles (Disposable) 31 gauge x 5/16\" Ndle Commonly known as:  CLICKFINE  
USE TWICE DAILY AS DIRECTED  
  
 LANTUS SOLOSTAR U-100 INSULIN 100 unit/mL (3 mL) Inpn Generic drug:  insulin glargine INJECT 34 UNITS UNDER THE SKIN EVERY MORNING  
  
 lidocaine 2 % jelly Commonly known as:  XYLOCAINE  
  
 melatonin Tab tablet Take 5 mg by mouth daily. metoclopramide HCl 10 mg tablet Commonly known as:  REGLAN Take 10 mg by mouth daily. MIRALAX 17 gram packet Generic drug:  polyethylene glycol Take 17 g by mouth daily. montelukast 10 mg tablet Commonly known as:  SINGULAIR  
TAKE 1 TABLET DAILY  
  
 * nystatin topical cream  
Commonly known as:  MYCOSTATIN  
  
 * nystatin powder Commonly known as:  MYCOSTATIN Apply  to affected area four (4) times daily.   
  
 * nystatin topical cream  
 Commonly known as:  MYCOSTATIN Apply  to affected area two (2) times a day. ONETOUCH ULTRA TEST strip Generic drug:  glucose blood VI test strips CHECK TWICE A DAY  
  
 RANEXA 500 mg SR tablet Generic drug:  ranolazine ER  
TAKE 1 TABLET EVERY 12 HOURS  
  
 SYMBICORT 160-4.5 mcg/actuation Hfaa Generic drug:  budesonide-formoterol Take 2 Puffs by inhalation two (2) times a day. trimethoprim-sulfamethoxazole 160-800 mg per tablet Commonly known as:  BACTRIM DS, SEPTRA DS Take 1 Tab by mouth two (2) times a day for 7 days. valsartan 160 mg tablet Commonly known as:  DIOVAN  
TAKE 1 TABLET DAILY  
  
 varicella-zoster recombinant (PF) 50 mcg/0.5 mL Susr injection Commonly known as:  SHINGRIX (PF)  
0.5 mL by IntraMUSCular route once for 1 dose. VITAMIN B-6 100 mg tablet Generic drug:  pyridoxine (vitamin B6) Take 100 mg by mouth every morning. VITAMIN D2 PO Take 2,000 Units by mouth daily. ZANTAC 150 mg tablet Generic drug:  raNITIdine Take 150 mg by mouth as needed for Indigestion. ZyrTEC 10 mg tablet Generic drug:  cetirizine Take 10 mg by mouth daily. * Notice: This list has 3 medication(s) that are the same as other medications prescribed for you. Read the directions carefully, and ask your doctor or other care provider to review them with you. Prescriptions Printed Refills Insulin Needles, Disposable, (CLICKFINE) 31 gauge x 5/16\" ndle 10 Sig: USE TWICE DAILY AS DIRECTED Class: Print  
 varicella-zoster recombinant, PF, (SHINGRIX, PF,) 50 mcg/0.5 mL susr injection 1 Si.5 mL by IntraMUSCular route once for 1 dose. Class: Print Route: IntraMUSCular Prescriptions Sent to Pharmacy Refills  
 nystatin (MYCOSTATIN) powder 3 Sig: Apply  to affected area four (4) times daily.   
 Class: Normal  
 Pharmacy: [address] [place] [address], [address] [place] [address] ROAD Ph #: [phone] Route: Topical  
 nystatin (MYCOSTATIN) topical cream 1 Sig: Apply  to affected area two (2) times a day. Class: Normal  
 Pharmacy: [address] [place] [address] Ph #: [phone] Route: Topical  
 trimethoprim-sulfamethoxazole (BACTRIM DS, SEPTRA DS) 160-800 mg per tablet 0 Sig: Take 1 Tab by mouth two (2) times a day for 7 days. Class: Normal  
 Pharmacy: [address] [place] [address] Ph #: [phone] Route: Oral  
  
We Performed the Following  DIABETES FOOT EXAM [HM7 Custom] REFERRAL TO GASTROENTEROLOGY [U55 Custom] Follow-up Instructions Return in about 3 months (around 7/9/2018). To-Do List   
 04/09/2018 Imaging:  DEXA BONE DENSITY STUDY AXIAL   
  
 04/10/2018 Lab:  HEMOGLOBIN A1C WITH EAG   
  
 04/10/2018 Lab:  LIPID PANEL   
  
 04/10/2018 Lab:  METABOLIC PANEL, BASIC   
  
 04/10/2018 Lab:  MICROALBUMIN, UR, RAND W/ MICROALB/CREAT RATIO Referral Information Referral ID Referred By Referred To  
  
 [id number] Deni Alegria Not Available Visits Status Start Date End Date 1 New Request 4/9/18 4/9/19 If your referral has a status of pending review or denied, additional information will be sent to support the outcome of this decision. Referral ID Referred By Referred To  
 [id number] [address]  
   [address], [address] [facility] Visits Status Start Date End Date 1 New Request 4/9/18 4/9/19 If your referral has a status of pending review or denied, additional information will be sent to support the outcome of this decision. Patient Instructions Medicare Part B Preventive Services Guidelines/Limitations Date last completed and Frequency Due Date Bone Mass Measurement (age 72 & older, biennial) Requires diagnosis related to osteoporosis or estrogen deficiency. Biennial benefit unless patient has history of long-term glucocorticoid tx or baseline is needed because initial test was by other method Completed 7/2015 
  
Recommended every 2 years Due now Cardiovascular Screening Blood Tests (every 5 years) Total cholesterol, HDL, Triglycerides Order as a panel if possible Completed 9/2017 
  
Recommended annually Due 9/2018 Colorectal Cancer Screening 
-Fecal occult blood test (annual) -Flexible sigmoidoscopy (5y) 
-Screening colonoscopy (10y) -Barium Enema   Completed 5/2013 with Dr. Brandy Anne 
  
Recommended every 5 years  Due 5/2018 Counseling to Prevent Tobacco Use (up to 8 sessions per year) - Counseling greater than 3 and up to 10 minutes - Counseling greater than 10 minutes Patients must be asymptomatic of tobacco-related conditions to receive as preventive service N/A N/A Diabetes Screening Tests (at least every 3 years, Medicare covers annually or at 6-month intervals for prediabetic patients) 
  Fasting blood sugar (FBS) or glucose tolerance test (GTT) Patient must be diagnosed with one of the following: 
-Hypertension, Dyslipidemia, obesity, previous impaired FBS or GTT 
Or any two of the following: overweight, FH of diabetes, age ? 72, history of gestational diabetes, birth of baby weighing more than 9 pounds Completed 4/18 with A1C 7.3 
  
Recommended every 3-6 months for Pre-Diabetics and Diabetics Due -6/2018 Diabetes Self-Management Training (DSMT) (no USPSTF recommendation) Requires referral by treating physician for patient with diabetes or renal disease. 10 hours of initial DSMT session of no less than 30 minutes each in a continuous 12-month period. 2 hours of follow-up DSMT in subsequent years. N/A N/A Glaucoma Screening (no USPSTF recommendation) Diabetes mellitus, family history, , age 48 or over,  American, age 72 or over Completed 11/2017 with Dr. Colette Vivas  
  
Recommended annually Due 11/2018 Human Immunodeficiency Virus (HIV) Screening (annually for increased risk patients) HIV-1 and HIV-2 by EIA, COY, rapid antibody test, or oral mucosa transudate Patient must be at increased risk for HIV infection per USPSTF guidelines or pregnant. Tests covered annually for patients at increased risk. Pregnant patients may receive up to 3 test during pregnancy. N/A N/A Medical Nutrition Therapy (MNT) (for diabetes or renal disease not recommended schedule) Requires referral by treating physician for patient with diabetes or renal disease. Can be provided in same year as diabetes self-management training (DSMT), and CMS recommends medical nutrition therapy take place after DSMT. Up to 3 hours for initial year and 2 hours in subsequent years. N/A N/A Prostate Cancer Screening (annually up to age 76) - Digital rectal exam (HENRY) - Prostate specific antigen (PSA) Annually (age 48 or over), HENRY not paid separately when covered E/M service is provided on same date N/A N/A Seasonal Influenza Vaccination (annually)   Completed 10/2017 
  
Recommended annually Due Fall 2018 Pneumococcal Vaccination (once after 65)   Pneumococcal 23 - 4/2009 
  
Prevnar 13 - 11/2016 
  
Both recommended once over the age of 72 Completed 
  
  
Completed Hepatitis B Vaccinations (if medium/high risk) Medium/high risk factors:  End-stage renal disease, Hemophiliacs who received Factor VIII or IX concentrates, Clients of institutions for the mentally retarded, Persons who live in the same house as a HepB virus carrier, Homosexual men, Illicit injectable drug abusers. N/A N/A Screening Mammography (biennial age 54-69)? Annually (age 36 or over) Completed 7/2017 
  
Recommended annually  Due 7/2018 Screening Pap Tests and Pelvic Examination (up to age 79 and after 79 if unknown history or abnormal study last 10 years) Every 24 months except high risk Completed 1/2018 with Dr. Loyd Rater 
  
Recommended every 2 years Due 1/2021 Ultrasound Screening for Abdominal Aortic Aneurysm (AAA) (once) Patient must be referred through IPPE and not have had a screening for abdominal aortic aneurysm before under Medicare. Limited to patients who meet one of the following criteria: 
- Men who are 73-68 years old and have smoked more than 100 cigarettes in their lifetime. 
-Anyone with a FH of AAA 
-Anyone recommended for screening by USPSTF Completed CT abd 10/2012 - negative Completed Family Practice Management 2011 Please bring in a copy of your advanced directive to your next office visit so we can have a copy on file. Medicare Wellness Visit, Female The best way to live healthy is to have a healthy lifestyle by eating a well-balanced diet, exercising regularly, limiting alcohol and stopping smoking. Regular physical exams and screening tests are another way to keep healthy. Preventive exams provided by your health care provider can find health problems before they become diseases or illnesses. Preventive services including immunizations, screening tests, monitoring and exams can help you take care of your own health. All people over age 72 should have a pneumovax  and and a prevnar shot to prevent pneumonia. These are once in a lifetime unless you and your provider decide differently. All people over 65 should have a yearly flu shot and a tetanus vaccine every 10 years. A bone mass density to screen for osteoporosis or thinning of the bones should be done every 2 years after 65. Screening for diabetes mellitus with a blood sugar test should be done every year.  
 
Glaucoma is a disease of the eye due to increased ocular pressure that can lead to blindness and it should be done every year by an eye professional. 
 
Cardiovascular screening tests that check for elevated lipids (fatty part of blood) which can lead to heart disease and strokes should be done every 5 years. Colorectal screening that evaluates for blood or polyps in your colon should be done yearly as a stool test or every five years as a flexible sigmoidoscope or every 10 years as a colonoscopy up to age 76. Breast cancer screening with a mammogram is recommended biennially  for women age 54-69. Screening for cervical cancer with a pap smear and pelvic exam is recommended for women after age 72 years every 2 years up to age 79 or when the provider and patient decide to stop. If there is a history of cervical abnormalities or other increased risk for cancer then the test is recommended yearly. Hepatitis C screening is also recommended for anyone born between 80 through [address]. A shingles vaccine is also recommended once in a lifetime after age 61. Your Medicare Wellness Exam is recommended annually. Here is a list of your current Health Maintenance items with a due date: 
Health Maintenance Due Topic Date Due  Pneumococcal Vaccine (2 of 2 - PPSV23) 02/18/2018 [facility] Annual Well Visit  04/02/2018  Colonoscopy  05/13/2018 Increase lantus to 29 units Take half dose diovan while on bactrim for 7 days Introducing \[facility]\"" & HEALTH SERVICES! Dear Jean Carlos Zhang: Thank you for requesting a [facility] account. Our records indicate that you already have an active [facility] account. You can access your account anytime at [email or web address] Did you know that you can access your hospital and ER discharge instructions at any time in [facility]? You can also review all of your test results from your hospital stay or ER visit. Additional Information If you have questions, please visit the Frequently Asked Questions section of the [facility] website at [email or web address] Remember, [facility] is NOT to be used for urgent needs. For medical emergencies, dial 911. Now available from your iPhone and Android! Please provide this summary of care documentation to your next provider. Your primary care clinician is listed as Lamrosy Dash. If you have any questions after today's visit, please call [phone].

## 2018-04-09 NOTE — PROGRESS NOTES
This is a \"Welcome to [employer]"  Initial Preventive Physical Examination (IPPE) providing Personalized Prevention Plan Services (Performed in the first 12 months of enrollment)    I have reviewed the patient's medical history in detail and updated the computerized patient record. History     Past Medical History:   Diagnosis Date    Asthma     coughing couple weeks ago    Bloating     CAD (coronary artery disease)     Cancer ([facility] [address] 2010    esophageal/GE junction    Chest pain     Chest pain     Chronic pain     left shoulder    Congestion of throat     Cough     Depression     & anxiety    Diabetes ([facility] [address]     IDDM    Dyspepsia and other specified disorders of function of stomach     Fatigue     GERD (gastroesophageal reflux disease)     Headache(784.0)     Hypercholesterolemia     Hypertension     Multinodular goiter     Nausea & vomiting     Stool color black     Stress     Weakness       Past Surgical History:   Procedure Laterality Date    EXTRACTION ERUPTED TOOTH/EXR      HX ENDOSCOPY  4/2016    HX GI  2010    gastroesophagectomy    HX GYN  1983    lap btl    HX HEENT  1957    tonsils as child    HX HERNIA REPAIR  04/26/2012    HX LAP CHOLECYSTECTOMY  1995    HX ORTHOPAEDIC  02/20/2015    right shoulder manipulation    HX OTHER SURGICAL      left shoulder surgery for frozen surgery    HX OTHER SURGICAL  6/24/2015    mammogram     HX PELVIC LAPAROSCOPY  1989    HX VASCULAR ACCESS      port a cath and removal    MT EGD BALLOON DILATION ESOPHAGUS <30 MM DIAM  4/20/2010          Current Outpatient Prescriptions   Medication Sig Dispense Refill    raNITIdine (ZANTAC) 150 mg tablet Take 150 mg by mouth as needed for Indigestion.       atorvastatin (LIPITOR) 40 mg tablet TAKE 1 TABLET NIGHTLY 90 Tab 2    furosemide (LASIX) 20 mg tablet TAKE 1 TABLET BY MOUTH EVERY DAY 30 Tab 3    albuterol (PROVENTIL HFA, VENTOLIN HFA, PROAIR HFA) 90 mcg/actuation inhaler Take 2 Puffs by inhalation as needed. 1 Inhaler 3    valsartan (DIOVAN) 160 mg tablet TAKE 1 TABLET DAILY 90 Tab 1    RANEXA 500 mg SR tablet TAKE 1 TABLET EVERY 12 HOURS 180 Tab 1    montelukast (SINGULAIR) 10 mg tablet TAKE 1 TABLET DAILY 90 Tab 1    ONETOUCH ULTRA TEST strip CHECK TWICE A  Strip 12    LANTUS SOLOSTAR 100 unit/mL (3 mL) inpn INJECT 34 UNITS UNDER THE SKIN EVERY MORNING 45 mL 2    insulin lispro (HUMALOG) 100 unit/mL kwikpen 4 units every evening with blood sugars over 120 1 Package 0    Insulin Needles, Disposable, (CLICKFINE) 31 gauge x 5/16\" ndle USE DAILY AS DIRECTED 100 Package 10    SYMBICORT 160-4.5 mcg/actuation HFA inhaler Take 2 Puffs by inhalation two (2) times a day.  metoclopramide HCl (REGLAN) 10 mg tablet Take 10 mg by mouth daily.  polyethylene glycol (MIRALAX) 17 gram packet Take 17 g by mouth daily.  cetirizine (ZYRTEC) 10 mg tablet Take 10 mg by mouth daily.  melatonin 5 mg Tab Take 5 mg by mouth daily.  Dexlansoprazole (DEXILANT) 60 mg CpDM Take 60 mg by mouth daily.  aspirin 81 mg tablet Take 81 mg by mouth.  pyridoxine (VITAMIN B-6) 100 mg tablet Take 100 mg by mouth every morning.  ERGOCALCIFEROL, VITAMIN D2, (VITAMIN D PO) Take 2,000 Units by mouth daily.  azithromycin (ZITHROMAX) 250 mg tablet Take 1 Tab by mouth See Admin Instructions for 6 doses. Take 2 tabs on day one followed by 1 tab daily for a total of 5 days. 6 Tab 0    chlorpheniramine-HYDROcodone (TUSSIONEX) 10-8 mg/5 mL suspension Take 5 mL by mouth every twelve (12) hours as needed for Cough. Max Daily Amount: 10 mL. 115 mL 0    clopidogrel (PLAVIX) 75 mg tab Take 1 Tab by mouth daily. Indications: Thrombosis Prevention after PCI 90 Tab 3    naproxen sodium (ALEVE) 220 mg tablet Take 220 mg by mouth two (2) times daily (with meals).       hydrocortisone (ANUSOL-HC) 25 mg supp   2    lidocaine (XYLOCAINE) 2 % jelly   2    nystatin (MYCOSTATIN) topical cream   0 Allergies   Allergen Reactions    Adhesive Other (comments)     redness     Family History   Problem Relation Age of Onset    Diabetes Father     Cancer Father      intestinal    Heart Disease Father     Hypertension Father     Heart Disease Brother     Diabetes Brother     Diabetes Mother     Lung Disease Mother     Heart Disease Mother     Hypertension Mother     Diabetes Maternal Grandmother     Diabetes Maternal Grandfather     Elevated Lipids Maternal Aunt     Thyroid Disease Neg Hx      Social History   Substance Use Topics    Smoking status: Never Smoker    Smokeless tobacco: Never Used    Alcohol use 0.5 oz/week     1 Glasses of wine per week      Comment: rarely glass of wine     Diet, Lifestyle: The patient is prescribed and follows a special diet. Diabetic low carb diet  Exercise level: sedentary    Depression Risk Screen     PHQ over the last two weeks 4/9/2018   Little interest or pleasure in doing things Not at all   Feeling down, depressed or hopeless Not at all   Total Score PHQ 2 0     Alcohol Risk Screen   You do not drink alcohol or very rarely. Functional Ability and Level of Safety   Hearing Loss  Hearing is good. Vision Screening  Vision is good. Visual Acuity Screening    Right eye Left eye Both eyes   Without correction: 20/50 20/30 20/30   With correction:            Activities of Daily Living  The home contains: no safety equipment. Patient does total self care    Fall Risk Screen  Fall Risk Assessment, last 12 mths 4/9/2018   Able to walk? Yes   Fall in past 12 months?  No       Abuse Screen  Patient is not abused  Lives with    Screening EKG   EKG order placed: No--ekg just done 11/17 and will be repeated 5/18 with cardiology     Patient Care Team   Patient Care Team:  Eros Adamson MD as PCP - General (Internal Medicine)  Damon Kwok MD (Cardiology)  Fernando Richardson MD as Consulting Provider (Endocrinology)  Deborah Roman MD ELIANA as Physician (Cardiology)  Cullen Cruz MD (Gastroenterology)  Genoveva Blevins MD (Internal Medicine)  Myron Alvarado OD (Optometry)  Johny Litten, RN as Ambulatory Care Navigator  Pooja Weeks MD (Hematology and Oncology)  Genoveva Blevins MD (Internal Medicine)  Price Nicholas MD (Obstetrics & Gynecology)  Rachel Hadley MD (Obstetrics & Gynecology)     Updated      End of Life Planning   Advanced care planning directives were discussed with the patient and /or family/caregiver. Assessment/Plan   Education and counseling provided:  Are appropriate based on today's review and evaluation  End-of-Life planning (with patient's consent)  Screening Mammography  Screening Pap and pelvic (covered once every 2 years)  Colorectal cancer screening tests  Bone mass measurement (DEXA)    Diagnoses and all orders for this visit:    1. Welcome to Medicare preventive visit    Other orders  -     Insulin Needles, Disposable, (CLICKFINE) 31 gauge x 5/16\" ndle; USE TWICE DAILY AS DIRECTED      Health Maintenance Due   Topic Date Due    Pneumococcal 65+ High/Highest Risk (2 of 2 - PPSV23) 02/18/2018    MEDICARE YEARLY EXAM  04/02/2018    COLONOSCOPY  05/13/2018     Discussed with patient about advance medical directive. Provided patient blank AMD and Your Right to Decide Booklet. Requested that if completed to provide a copy of AMD to office. ACP not on file. SDM is her .   Provided information today.         Colonoscopy: 5/2013, Dr. Akhil Rubio, repeat 5 years, due 7/18 (per pt), referred   AAA: FMHx (grandfather) no first degree relatives, no screen needed  Pap: Dr. Shannan Reaves, 2/18, has to repeat pap smear, will schedule   Mammogram: 7/20/17, negative annually   Dexa: 7/29/15, nl, due, ordered      Tdap: 9/14/2016  Pneumovax: 4/26/2009  [id number]: 11/14/2016  Zostavax: 11/18/2016, reaction on R arm  Shingrix: ordered 04/09/18, will think about this vaccine  Flu shot: 10/19/17      Eye exam: Dr. Efren Cole, 11/17, will get notes for review      A1c:   4/18 7.3 due 3 months  Lipids: 9/17 LDL 71 annual   Hep C screen: 11/15, negative    EKG: declines today, last completed 11/17 will get with cardio in 5/18    Medication reconciliation completed by MA and reviewed by me. Medical/surgical/social/family history reviewed and updated by me. Patient provided AVS and preventative screening table. Patient verbalized understanding of all information discussed.

## 2018-04-12 ENCOUNTER — DOCUMENTATION ONLY (OUTPATIENT)
Dept: CARDIOLOGY CLINIC | Age: 63
End: 2018-04-12

## 2018-04-20 ENCOUNTER — HOSPITAL ENCOUNTER (OUTPATIENT)
Dept: BONE DENSITY | Age: 63
Discharge: HOME | End: 2018-04-20
Attending: INTERNAL MEDICINE
Payer: MEDICARE

## 2018-04-20 DIAGNOSIS — I25.10 ASHD (ARTERIOSCLEROTIC HEART DISEASE): Primary | ICD-10-CM

## 2018-04-20 DIAGNOSIS — M89.9 BONE DISEASE: ICD-10-CM

## 2018-04-20 PROCEDURE — 77080 DXA BONE DENSITY AXIAL: CPT

## 2018-04-20 RX ORDER — AMLODIPINE BESYLATE 2.5 MG/1
2.5 TABLET ORAL DAILY
Qty: 90 TAB | Refills: 3 | Status: SHIPPED | OUTPATIENT
Start: 2018-04-20 | End: 2019-04-06 | Stop reason: SDUPTHER

## 2018-04-23 ENCOUNTER — TELEPHONE (OUTPATIENT)
Dept: CARDIOLOGY CLINIC | Age: 63
End: 2018-04-23

## 2018-04-23 NOTE — TELEPHONE ENCOUNTER
Verified patient with two identifiers. Spoke with pt, advising her Ranexa was denied by insurance d/t not trying other meds first.  Pt should start Amlodipine 2/5 mg daily, patient stated she picked up at pharmacy. Advised her to finish her supply of Ranexa then start Amlodipine, not to take both at same time. Pt verbalized understanding.

## 2018-04-25 RX ORDER — MONTELUKAST SODIUM 10 MG/1
TABLET ORAL
Qty: 90 TAB | Refills: 1 | Status: SHIPPED | OUTPATIENT
Start: 2018-04-25 | End: 2018-06-20 | Stop reason: SDUPTHER

## 2018-05-04 RX ORDER — FUROSEMIDE 20 MG/1
20 TABLET ORAL DAILY
Qty: 90 TAB | Refills: 1 | Status: SHIPPED | OUTPATIENT
Start: 2018-05-04 | End: 2018-07-09 | Stop reason: SDUPTHER

## 2018-05-04 NOTE — TELEPHONE ENCOUNTER
PCP: James Lynn MD    Last appt: 4/9/2018  Future Appointments  Date Time Provider Dc Adler   5/17/2018 1:15 PM Renata Ko MD [facility]   7/9/2018 1:15 PM James Lynn MD [facility]       Requested Prescriptions     Pending Prescriptions Disp Refills    furosemide (LASIX) 20 mg tablet 90 Tab 1     Sig: Take 1 Tab by mouth daily.

## 2018-06-20 RX ORDER — MONTELUKAST SODIUM 10 MG/1
10 TABLET ORAL DAILY
Qty: 90 TAB | Refills: 1 | Status: SHIPPED | OUTPATIENT
Start: 2018-06-20 | End: 2018-11-12 | Stop reason: SDUPTHER

## 2018-06-20 RX ORDER — ATORVASTATIN CALCIUM 40 MG/1
40 TABLET, FILM COATED ORAL DAILY
Qty: 90 TAB | Refills: 2 | Status: SHIPPED | OUTPATIENT
Start: 2018-06-20 | End: 2019-01-15 | Stop reason: SDUPTHER

## 2018-06-20 NOTE — TELEPHONE ENCOUNTER
PCP: Verna Garcia MD    Last appt: 4/9/2018  Future Appointments  Date Time Provider Dc Adler   7/2/2018 2:45 PM Renata Ortega MD [address] [facility]   7/9/2018 1:15 PM Verna Garcia MD [facility]       Requested Prescriptions     Pending Prescriptions Disp Refills    atorvastatin (LIPITOR) 40 mg tablet 90 Tab 2    montelukast (SINGULAIR) 10 mg tablet 90 Tab 1

## 2018-06-20 NOTE — TELEPHONE ENCOUNTER
From: Luana Harmon  To: Cee Chawla MD  Sent: 6/20/2018 9:14 AM EDT  Subject: Medication Renewal Request    Original authorizing provider: MD Luana Cook would like a refill of the following medications:  atorvastatin (LIPITOR) 40 mg tablet Cee Chawla MD]  montelukast (SINGULAIR) 10 mg tablet Cee Chawla MD]    Preferred pharmacy: [id number], [address]    Comment:

## 2018-07-02 ENCOUNTER — OFFICE VISIT (OUTPATIENT)
Dept: CARDIOLOGY CLINIC | Age: 63
End: 2018-07-02

## 2018-07-02 VITALS
SYSTOLIC BLOOD PRESSURE: 118 MMHG | RESPIRATION RATE: 16 BRPM | OXYGEN SATURATION: 96 % | HEIGHT: 63 IN | BODY MASS INDEX: 32.83 KG/M2 | WEIGHT: 185.3 LBS | HEART RATE: 63 BPM | DIASTOLIC BLOOD PRESSURE: 78 MMHG

## 2018-07-02 DIAGNOSIS — E78.2 MIXED HYPERLIPIDEMIA: ICD-10-CM

## 2018-07-02 DIAGNOSIS — I10 HYPERTENSION, UNSPECIFIED TYPE: ICD-10-CM

## 2018-07-02 DIAGNOSIS — I25.10 ASHD (ARTERIOSCLEROTIC HEART DISEASE): Primary | ICD-10-CM

## 2018-07-02 DIAGNOSIS — J44.9 CHRONIC OBSTRUCTIVE PULMONARY DISEASE, UNSPECIFIED COPD TYPE (HCC): ICD-10-CM

## 2018-07-02 DIAGNOSIS — E11.9 DIABETES MELLITUS WITHOUT COMPLICATION (HCC): ICD-10-CM

## 2018-07-02 NOTE — MR AVS SNAPSHOT
[address] [facility] 
[phone] Patient: Nicholas Shirley MRN: [id number] Visit Information Date & Time Provider Department Dept. Phone Encounter #  
 7/2/2018  2:45 PM [address], [address] [facility] [phone] [id number] Your Appointments 7/9/2018  1:15 PM  
ROUTINE CARE with Harshad Blanton, [address] [place] [address],[address]) Appt Note: 3 month follow up  
 [facility] [address] [facility]  
[phone]  
  
   
 [facility] [address] [facility] [address] [address]  
  
    
 2/5/2019  9:45 AM  
ESTABLISHED PATIENT with MD [facility] [address]) Appt Note: Dr Gege Russell  
 [address] [facility]  
[phone] [address] [facility] Upcoming Health Maintenance Date Due Pneumococcal 65+ High/Highest Risk (2 of 2 - PPSV23) 2/18/2018 COLONOSCOPY 5/13/2018 MICROALBUMIN Q1 6/28/2018 Influenza Age 5 to Adult 8/1/2018 LIPID PANEL Q1 9/20/2018 HEMOGLOBIN A1C Q6M 10/2/2018 EYE EXAM RETINAL OR DILATED Q1 11/10/2018 FOOT EXAM Q1 4/9/2019 MEDICARE YEARLY EXAM 4/10/2019 BREAST CANCER SCRN MAMMOGRAM 7/20/2019 GLAUCOMA SCREENING Q2Y 11/10/2019 DTaP/Tdap/Td series (2 - Td) 9/14/2026 Allergies as of 7/2/2018  Review Complete On: 7/2/2018 By: Adelfo Zheng Severity Noted Reaction Type Reactions Adhesive Low 04/25/2012   Topical Other (comments)  
 redness Current Immunizations  Reviewed on 4/26/2012 Name Date Influenza Vaccine (Quad) PF 10/19/2017, 10/13/2016, 10/14/2015 Influenza Vaccine Split 10/26/2011 Pneumococcal Conjugate (PCV-13) 11/14/2016 Tdap 9/14/2016 ZZZ-RETIRED (DO NOT USE) Pneumococcal Vaccine (Unspecified Type) 4/26/2009 Zoster Vaccine, Live 11/18/2015 Not reviewed this visit You Were Diagnosed With   
  
 Codes Comments ASHD (arteriosclerotic heart disease)    -  Primary ICD-10-CM: I25.10 ICD-9-CM: 414.00 Hypertension, unspecified type     ICD-10-CM: I10 
ICD-9-CM: 401.9 Mixed hyperlipidemia     ICD-10-CM: E78.2 ICD-9-CM: 272.2 Diabetes mellitus without complication ([facility]     HVP-67-UI: E11.9 ICD-9-CM: 250.00 Chronic obstructive pulmonary disease, unspecified COPD type ([facility]     ICD-10-CM: J44.9 ICD-9-CM: 825 Vitals BP Pulse Resp Height(growth percentile) Weight(growth percentile) SpO2  
 118/78 (BP 1 Location: Right arm, BP Patient Position: Sitting) 63 16 5' 3\" (1.6 m) 185 lb 4.8 oz (84.1 kg) 96% BMI OB Status Smoking Status 32.82 kg/m2 Postmenopausal Never Smoker Vitals History BMI and BSA Data Body Mass Index Body Surface Area  
 32.82 kg/m 2 1.93 m 2 Preferred Pharmacy Pharmacy Name Phone Amado Fox, [facility] [phone] Your Updated Medication List  
  
   
This list is accurate as of 7/2/18  3:41 PM.  Always use your most recent med list.  
  
  
  
  
 albuterol 90 mcg/actuation inhaler Commonly known as:  PROVENTIL HFA, VENTOLIN HFA, PROAIR HFA Take 2 Puffs by inhalation as needed. ALEVE 220 mg tablet Generic drug:  naproxen sodium Take 220 mg by mouth two (2) times daily (with meals). amLODIPine 2.5 mg tablet Commonly known as:  Rondall Farhad Take 1 Tab by mouth daily. In place of Ranexa as not covered  
  
 aspirin 81 mg tablet Take 81 mg by mouth. atorvastatin 40 mg tablet Commonly known as:  LIPITOR Take 1 Tab by mouth daily. chlorpheniramine-HYDROcodone 10-8 mg/5 mL suspension Commonly known as:  Magdaline Hock Take 5 mL by mouth every twelve (12) hours as needed for Cough. Max Daily Amount: 10 mL. DEXILANT 60 mg Cpdb Generic drug:  Dexlansoprazole Take 60 mg by mouth daily. furosemide 20 mg tablet Commonly known as:  LASIX Take 1 Tab by mouth daily. hydrocortisone 25 mg Supp Commonly known as:  ANUSOL-HC  
  
 insulin lispro 100 unit/mL kwikpen Commonly known as:  HUMALOG  
4 units every evening with blood sugars over 120 Insulin Needles (Disposable) 31 gauge x 5/16\" Ndle Commonly known as:  CLICKFINE  
USE TWICE DAILY AS DIRECTED  
  
 LANTUS SOLOSTAR U-100 INSULIN 100 unit/mL (3 mL) Inpn Generic drug:  insulin glargine INJECT 34 UNITS UNDER THE SKIN EVERY MORNING  
  
 lidocaine 2 % jelly Commonly known as:  XYLOCAINE  
  
 melatonin Tab tablet Take 5 mg by mouth daily. metoclopramide HCl 10 mg tablet Commonly known as:  REGLAN Take 10 mg by mouth daily. MIRALAX 17 gram packet Generic drug:  polyethylene glycol Take 17 g by mouth daily. montelukast 10 mg tablet Commonly known as:  SINGULAIR Take 1 Tab by mouth daily. * nystatin topical cream  
Commonly known as:  MYCOSTATIN  
  
 * nystatin powder Commonly known as:  MYCOSTATIN Apply  to affected area four (4) times daily. * nystatin topical cream  
Commonly known as:  MYCOSTATIN Apply  to affected area two (2) times a day. ONETOUCH ULTRA TEST strip Generic drug:  glucose blood VI test strips CHECK TWICE A DAY  
  
 SYMBICORT 160-4.5 mcg/actuation Hfaa Generic drug:  budesonide-formoterol Take 2 Puffs by inhalation two (2) times a day. valsartan 160 mg tablet Commonly known as:  DIOVAN  
TAKE 1 TABLET DAILY  
  
 VITAMIN B-6 100 mg tablet Generic drug:  pyridoxine (vitamin B6) Take 100 mg by mouth every morning. VITAMIN D2 PO Take 2,000 Units by mouth daily. ZANTAC 150 mg tablet Generic drug:  raNITIdine Take 150 mg by mouth as needed for Indigestion. ZyrTEC 10 mg tablet Generic drug:  cetirizine Take 10 mg by mouth daily. * Notice: This list has 3 medication(s) that are the same as other medications prescribed for you. Read the directions carefully, and ask your doctor or other care provider to review them with you. We Performed the Following AMB POC EKG ROUTINE W/ 12 LEADS, INTER & REP [07531 CPT(R)] To-Do List   
 07/23/2018 9:20 AM  
  Appointment with [facility] 3 at [address] [place] [address] ([phone]) Shower or bathe using soap and water. Do not use deodorant, powder, perfumes, or lotion the day of your exam.  If your prior mammograms were not performed at [facility] please bring films with you or forward prior images 2 days before your procedure. Check in at registration 15min before your appointment time unless you were instructed to do otherwise. A script is not necessary, but if you have one, please bring it on the day of the mammogram or have it faxed to the department. You are responsible for finding a method of transportation to your appointment. If you don't have transportation, please reschedule your appointment at least 24 hours in advance. [facility] [phone] [facility]  [phone] [address]  [phone] [facility] [phone] [address] [phone] \[facility]\"" & HEALTH SERVICES! Dear Faheem Marsh: Thank you for requesting a [employer] account. Our records indicate that you already have an active [employer] account. You can access your account anytime at [email or web address] Did you know that you can access your hospital and ER discharge instructions at any time in [employer]? You can also review all of your test results from your hospital stay or ER visit. Additional Information If you have questions, please visit the Frequently Asked Questions section of the [employer] website at [email or web address] Remember, [employer] is NOT to be used for urgent needs. For medical emergencies, dial 911. Now available from your iPhone and Android! Please provide this summary of care documentation to your next provider. Your primary care clinician is listed as Adamaris Olivares. If you have any questions after today's visit, please call [phone].

## 2018-07-02 NOTE — PROGRESS NOTES
Chief Complaint   Patient presents with    Hypertension     6 mo f/u      1. Have you been to the ER, urgent care clinic since your last visit? Hospitalized since your last visit? No    2. Have you seen or consulted any other health care providers outside of the [address] [place] [address] since your last visit? Include any pap smears or colon screening.  Pulmonary, Dr Blue Ruth, and OBGYN

## 2018-07-02 NOTE — PROGRESS NOTES
[id number] [facility] [place], [address] [place] [address] Ne, [address] [facility]  [phone]     Subjective:      Debora Resendiz is a 70 y.o. female is here for routine f/u. The patient denies chest pain/ shortness of breath, orthopnea, PND, LE edema, palpitations, syncope, or presyncope. 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 Doing well.     Patient Active Problem List    Diagnosis Date Noted    Recurrent depression ([address] 12/29/2017    Diabetes mellitus without complication ([address] 54/80/6293    ASHD (arteriosclerotic heart disease) 06/18/2014    Syncope 06/18/2014    Bradycardia 06/18/2014    Multinodular goiter     Angina, class II ([address] 04/18/2013    Family history of coronary artery disease 04/18/2013    S/P cardiac cath 04/18/2013    Hypercholesteremia 02/18/2013    HTN (hypertension) 02/18/2013    Asthma 02/18/2013    Depression 02/18/2013    Thyroid nodule 02/18/2013    Shoulder capsulitis 02/18/2013    Incisional hernia 02/10/2011    Esophageal cancer ([address] 02/10/2011      Keyur Patton MD  Past Medical History:   Diagnosis Date    Asthma     coughing couple weeks ago    Bloating     CAD (coronary artery disease)     Cancer ([address] 2010    esophageal/GE junction    Chest pain     Chest pain     Chronic pain     left shoulder    Congestion of throat     Cough     Depression     & anxiety    Diabetes (HCC)     IDDM    Dyspepsia and other specified disorders of function of stomach     Fatigue     GERD (gastroesophageal reflux disease)     Headache(784.0)     Hypercholesterolemia     Hypertension     Multinodular goiter     Nausea & vomiting     Stool color black     Stress     Weakness       Past Surgical History:   Procedure Laterality Date    HX ENDOSCOPY  4/2016    HX GI  2010    gastroesophagectomy    HX GYN  1983    lap btl    HX HEENT  1957    tonsils as child    HX HERNIA REPAIR  04/26/2012    HX LAP CHOLECYSTECTOMY  1995    HX ORTHOPAEDIC  02/20/2015    right shoulder manipulation    HX OTHER SURGICAL      left shoulder surgery for frozen surgery    HX OTHER SURGICAL  6/24/2015    mammogram     HX PELVIC LAPAROSCOPY  1989    HX VASCULAR ACCESS      port a cath and removal    NM EGD BALLOON DILATION ESOPHAGUS <30 MM DIAM  4/20/2010         NM EXTRAC ERUPTED TOOTH/EXPOSED ROOT       Allergies   Allergen Reactions    Adhesive Other (comments)     redness      Family History   Problem Relation Age of Onset    Diabetes Father     Cancer Father      intestinal    Heart Disease Father     Hypertension Father     Heart Disease Brother     Diabetes Brother     Diabetes Mother     Lung Disease Mother     Heart Disease Mother     Hypertension Mother     Diabetes Maternal Grandmother     Diabetes Maternal Grandfather     Elevated Lipids Maternal Aunt     Thyroid Disease Neg Hx       Social History     Social History    Marital status:      Spouse name: N/A    Number of children: N/A    Years of education: N/A     Occupational History    Not on file. Social History Main Topics    Smoking status: Never Smoker    Smokeless tobacco: Never Used    Alcohol use 0.5 oz/week     1 Glasses of wine per week      Comment: rarely glass of wine    Drug use: No    Sexual activity: Not on file     Other Topics Concern    Not on file     Social History Narrative    Lives in [facility] with . Has a 45 yo daughter and an adopted 24 yo daughter. 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 Current Outpatient Prescriptions   Medication Sig    atorvastatin (LIPITOR) 40 mg tablet Take 1 Tab by mouth daily.  montelukast (SINGULAIR) 10 mg tablet Take 1 Tab by mouth daily.  furosemide (LASIX) 20 mg tablet Take 1 Tab by mouth daily.  amLODIPine (NORVASC) 2.5 mg tablet Take 1 Tab by mouth daily. In place of Ranexa as not covered    raNITIdine (ZANTAC) 150 mg tablet Take 150 mg by mouth as needed for Indigestion.     Insulin Needles, Disposable, (CLICKFINE) 31 gauge x 5/16\" ndle USE TWICE DAILY AS DIRECTED    nystatin (MYCOSTATIN) powder Apply  to affected area four (4) times daily.  nystatin (MYCOSTATIN) topical cream Apply  to affected area two (2) times a day.  chlorpheniramine-HYDROcodone (TUSSIONEX) 10-8 mg/5 mL suspension Take 5 mL by mouth every twelve (12) hours as needed for Cough. Max Daily Amount: 10 mL.  albuterol (PROVENTIL HFA, VENTOLIN HFA, PROAIR HFA) 90 mcg/actuation inhaler Take 2 Puffs by inhalation as needed.  valsartan (DIOVAN) 160 mg tablet TAKE 1 TABLET DAILY    ONETOUCH ULTRA TEST strip CHECK TWICE A DAY    LANTUS SOLOSTAR 100 unit/mL (3 mL) inpn INJECT 34 UNITS UNDER THE SKIN EVERY MORNING    insulin lispro (HUMALOG) 100 unit/mL kwikpen 4 units every evening with blood sugars over 120    SYMBICORT 160-4.5 mcg/actuation HFA inhaler Take 2 Puffs by inhalation two (2) times a day.  naproxen sodium (ALEVE) 220 mg tablet Take 220 mg by mouth two (2) times daily (with meals).  metoclopramide HCl (REGLAN) 10 mg tablet Take 10 mg by mouth daily.  nystatin (MYCOSTATIN) topical cream     polyethylene glycol (MIRALAX) 17 gram packet Take 17 g by mouth daily.  cetirizine (ZYRTEC) 10 mg tablet Take 10 mg by mouth daily.  melatonin 5 mg Tab Take 5 mg by mouth daily.  Dexlansoprazole (DEXILANT) 60 mg CpDM Take 60 mg by mouth daily.  aspirin 81 mg tablet Take 81 mg by mouth.  pyridoxine (VITAMIN B-6) 100 mg tablet Take 100 mg by mouth every morning.  ERGOCALCIFEROL, VITAMIN D2, (VITAMIN D PO) Take 2,000 Units by mouth daily.  azithromycin (ZITHROMAX) 250 mg tablet Take 1 Tab by mouth See Admin Instructions for 6 doses. Take 2 tabs on day one followed by 1 tab daily for a total of 5 days.  clopidogrel (PLAVIX) 75 mg tab Take 1 Tab by mouth daily. Indications:  Thrombosis Prevention after PCI    hydrocortisone (ANUSOL-HC) 25 mg supp     lidocaine (XYLOCAINE) 2 % jelly      No current facility-administered medications for this visit. Review of Symptoms:  11 systems reviewed, negative other than as stated in the HPI    Physical ExamPhysical Exam:    Vitals:    07/02/18 1450 07/02/18 1503   BP: 124/72 118/78   Pulse: 63    Resp: 16    SpO2: 96%    Weight: 185 lb 4.8 oz (84.1 kg)    Height: 5' 3\" (1.6 m)      Body mass index is 32.82 kg/(m^2). General PE   Gen:  NAD  Mental Status - Alert. General Appearance - Not in acute distress. Chest and Lung Exam   Inspection: Accessory muscles - No use of accessory muscles in breathing. Auscultation:   Breath sounds: - Normal.   Cardiovascular   Inspection: Jugular vein - Bilateral - Inspection Normal.   Palpation/Percussion:   Apical Impulse: - Normal.   Auscultation: Rhythm - Regular. Heart Sounds - S1 WNL and S2 WNL. No S3 or S4. Murmurs & Other Heart Sounds: Auscultation of the heart reveals - No Murmurs. Peripheral Vascular   Upper Extremity: Inspection - Bilateral - No Cyanotic nailbeds or Digital clubbing. Lower Extremity:   Palpation: Edema - Bilateral - No edema. Abdomen:   Soft, non-tender, bowel sounds are active.   Neuro: A&O times 3, CN and motor grossly WNL    Labs:   Lab Results   Component Value Date/Time    Cholesterol, total 140 09/20/2017 08:41 AM    Cholesterol, total 147 09/14/2016 01:49 PM    Cholesterol, total 139 10/07/2015 08:44 AM    Cholesterol, total 136 04/07/2015 08:46 AM    Cholesterol, total 144 06/09/2014 07:55 AM    HDL Cholesterol 51 09/20/2017 08:41 AM    HDL Cholesterol 55 09/14/2016 01:49 PM    HDL Cholesterol 47 10/07/2015 08:44 AM    HDL Cholesterol 49 04/07/2015 08:46 AM    HDL Cholesterol 56 06/09/2014 07:55 AM    LDL, calculated 71 09/20/2017 08:41 AM    LDL, calculated 73 09/14/2016 01:49 PM    LDL, calculated 66 10/07/2015 08:44 AM    LDL, calculated 71 04/07/2015 08:46 AM    LDL, calculated 75 06/09/2014 07:55 AM    Triglyceride 91 09/20/2017 08:41 AM    Triglyceride 95 09/14/2016 01:49 PM Triglyceride 128 10/07/2015 08:44 AM    Triglyceride 82 04/07/2015 08:46 AM    Triglyceride 63 06/09/2014 07:55 AM     No results found for: CPK, CPKX, CPX  Lab Results   Component Value Date/Time    Sodium 141 04/02/2018 08:24 AM    Potassium 4.2 04/02/2018 08:24 AM    Chloride 101 04/02/2018 08:24 AM    CO2 26 04/02/2018 08:24 AM    Anion gap 5 04/26/2012 10:00 AM    Glucose 119 (H) 04/02/2018 08:24 AM    BUN 12 04/02/2018 08:24 AM    Creatinine 0.96 04/02/2018 08:24 AM    BUN/Creatinine ratio 13 04/02/2018 08:24 AM    GFR est AA 72 04/02/2018 08:24 AM    GFR est non-AA 62 04/02/2018 08:24 AM    Calcium 8.6 (L) 04/02/2018 08:24 AM    Bilirubin, total 0.3 04/02/2018 08:24 AM    AST (SGOT) 18 04/02/2018 08:24 AM    Alk. phosphatase 62 04/02/2018 08:24 AM    Protein, total 5.9 (L) 04/02/2018 08:24 AM    Albumin 3.9 04/02/2018 08:24 AM    Globulin 3.4 02/01/2010 04:40 AM    A-G Ratio 2.0 04/02/2018 08:24 AM    ALT (SGPT) 21 04/02/2018 08:24 AM       EKG:  SB with RBBB     Assessment:     Assessment:      1. ASHD (arteriosclerotic heart disease)    2. Hypertension, unspecified type    3. Mixed hyperlipidemia    4. Diabetes mellitus without complication ([facility]        Orders Placed This Encounter    AMB POC EKG ROUTINE W/ 12 LEADS, INTER & REP     Order Specific Question:   Reason for Exam:     Answer:   routine        Plan:     Patient presents doing well and is stable from cardiac stand point. 1. CAD, KATHERINE to PDA 3/17: Clinically stable. Continue ASA, CCB, statin  2. HLD: 9/17  LDL 71. On statin. Lipids and labs followed by PCP  3. HTN: Controlled with current therapy  4. DM: On Insulin therapy  5. COPD: On inhalers. Followed by Dr Brad Blackwood current care and f/u in 6 months. Goran Burgess NP       [facility]    7/2/2018         Patient seen, examined by me personally. Plan discussed as detailed. Agree with note as outlined by  NP.  I confirm findings in history and physical exam. No additional findings noted. Agree with plan as outlined above.      Antelmo Bernard MD

## 2018-07-05 ENCOUNTER — APPOINTMENT (OUTPATIENT)
Dept: INTERNAL MEDICINE CLINIC | Age: 63
End: 2018-07-05

## 2018-07-05 DIAGNOSIS — E66.9 OBESITY (BMI 30.0-34.9): ICD-10-CM

## 2018-07-05 DIAGNOSIS — I25.10 ASHD (ARTERIOSCLEROTIC HEART DISEASE): ICD-10-CM

## 2018-07-05 DIAGNOSIS — Z00.00 WELCOME TO MEDICARE PREVENTIVE VISIT: ICD-10-CM

## 2018-07-05 DIAGNOSIS — E78.00 HYPERCHOLESTEREMIA: ICD-10-CM

## 2018-07-05 DIAGNOSIS — M89.9 BONE DISEASE: ICD-10-CM

## 2018-07-05 DIAGNOSIS — K21.9 GASTROESOPHAGEAL REFLUX DISEASE WITHOUT ESOPHAGITIS: ICD-10-CM

## 2018-07-05 DIAGNOSIS — E11.9 DIABETES MELLITUS WITHOUT COMPLICATION (HCC): ICD-10-CM

## 2018-07-05 DIAGNOSIS — Z12.11 COLON CANCER SCREENING: ICD-10-CM

## 2018-07-05 DIAGNOSIS — I10 ESSENTIAL HYPERTENSION: ICD-10-CM

## 2018-07-05 DIAGNOSIS — J45.20 MILD INTERMITTENT ASTHMA WITHOUT COMPLICATION: ICD-10-CM

## 2018-07-05 DIAGNOSIS — L02.92 BOIL: ICD-10-CM

## 2018-07-06 LAB
ALBUMIN/CREAT UR: 4.3 MG/G CREAT (ref 0–30)
BUN SERPL-MCNC: 13 MG/DL (ref 8–27)
BUN/CREAT SERPL: 14 (ref 12–28)
CALCIUM SERPL-MCNC: 8.6 MG/DL (ref 8.7–10.3)
CHLORIDE SERPL-SCNC: 99 MMOL/L (ref 96–106)
CHOLEST SERPL-MCNC: 126 MG/DL (ref 100–199)
CO2 SERPL-SCNC: 23 MMOL/L (ref 20–29)
CREAT SERPL-MCNC: 0.91 MG/DL (ref 0.57–1)
CREAT UR-MCNC: 238 MG/DL
EST. AVERAGE GLUCOSE BLD GHB EST-MCNC: 166 MG/DL
GLUCOSE SERPL-MCNC: 136 MG/DL (ref 65–99)
HBA1C MFR BLD: 7.4 % (ref 4.8–5.6)
HDLC SERPL-MCNC: 39 MG/DL
LDLC SERPL CALC-MCNC: 66 MG/DL (ref 0–99)
MICROALBUMIN UR-MCNC: 10.3 UG/ML
POTASSIUM SERPL-SCNC: 3.8 MMOL/L (ref 3.5–5.2)
SODIUM SERPL-SCNC: 138 MMOL/L (ref 134–144)
TRIGL SERPL-MCNC: 104 MG/DL (ref 0–149)
VLDLC SERPL CALC-MCNC: 21 MG/DL (ref 5–40)

## 2018-07-09 ENCOUNTER — OFFICE VISIT (OUTPATIENT)
Dept: INTERNAL MEDICINE CLINIC | Age: 63
End: 2018-07-09

## 2018-07-09 VITALS
DIASTOLIC BLOOD PRESSURE: 68 MMHG | RESPIRATION RATE: 16 BRPM | WEIGHT: 185 LBS | OXYGEN SATURATION: 98 % | BODY MASS INDEX: 32.78 KG/M2 | HEIGHT: 63 IN | HEART RATE: 65 BPM | TEMPERATURE: 98.3 F | SYSTOLIC BLOOD PRESSURE: 113 MMHG

## 2018-07-09 DIAGNOSIS — J45.20 MILD INTERMITTENT ASTHMA WITHOUT COMPLICATION: ICD-10-CM

## 2018-07-09 DIAGNOSIS — I10 HYPERTENSION, UNSPECIFIED TYPE: ICD-10-CM

## 2018-07-09 DIAGNOSIS — E11.9 DIABETES MELLITUS WITHOUT COMPLICATION (HCC): Primary | ICD-10-CM

## 2018-07-09 DIAGNOSIS — I25.10 ASHD (ARTERIOSCLEROTIC HEART DISEASE): ICD-10-CM

## 2018-07-09 DIAGNOSIS — K21.9 GASTROESOPHAGEAL REFLUX DISEASE WITHOUT ESOPHAGITIS: ICD-10-CM

## 2018-07-09 DIAGNOSIS — E66.9 OBESITY (BMI 30.0-34.9): ICD-10-CM

## 2018-07-09 DIAGNOSIS — E78.00 HYPERCHOLESTEREMIA: ICD-10-CM

## 2018-07-09 DIAGNOSIS — C15.9 MALIGNANT NEOPLASM OF ESOPHAGUS, UNSPECIFIED LOCATION (HCC): Chronic | ICD-10-CM

## 2018-07-09 RX ORDER — FUROSEMIDE 20 MG/1
20 TABLET ORAL DAILY
Qty: 90 TAB | Refills: 1 | Status: SHIPPED | OUTPATIENT
Start: 2018-07-09 | End: 2019-01-09 | Stop reason: SDUPTHER

## 2018-07-09 NOTE — PATIENT INSTRUCTIONS
INCREASE LANTUS TO 32 UNITS (GOAL FASTING GLUCOSE )    HUMALOG SLIDING SCALE WITH DINNER.  CAN INCREASE SLIDING SCALE BY 2 UNITS

## 2018-07-09 NOTE — PROGRESS NOTES
HISTORY OF PRESENT ILLNESS  Brittanie Gunn is a 70 y.o. female. HPI  Last here 4/9/18. Pt is here to f/u on chronic conditions      BP today is 113/68  Continues diovan 160mg daily   Home readings 120s/60's  Is now taking norvasc 2.5mg to prevent CP controls bp as well discussed with her     She has been taking lasix daily - R chronically larger than L, minimal  swelling --improved overall     BS at home running around 130, 120 in the AM fasting  BS 200s in the evening ~2 hours post-prandial  Pt had a dinner of salmon and spinach and had a BS of 67 in the AM this was one time months ago  Discussed goal being BS of  in the AM  Continues humalog mostly 4U (occasionally higher) with dinner - uses sliding scale  Will increase humalog sliding scale by 2U  She has been using her humalog more recently --mostly every day   Lov, increased lantus to 29U qAM (from 27U) - reports compliance  Will increase lantus to 32U  She also takes ASA 81mg daily     Reviewed last labs 7/18      Wt is up 3 lbs since last visit  Pt says she has been unable to exercise d/t the heat  Discussed WW  Discussed a nutritionist, pt is not interested at this time  Discussed diet and w/l      Pt follows with Dr. Lucila Molina (cardio)  Reviewed notes 7/2/18: Patient presents doing well and is stable from cardiac stand point. 1. CAD, KATHERINE to PDA 3/17: Clinically stable. Continue ASA, CCB, statin  2. HLD: 9/17  LDL 71. On statin. Lipids and labs followed by PCP  3. HTN: Controlled with current therapy  4. DM: On Insulin therapy  5. COPD: On inhalers. Followed by Dr Juanita Olivarez current care and f/u in 6 months.       Pt is no longer taking plavix as of 3/18  Pt is no longer on ranexa dt/ cost  Pt is now taking norvasc to prevent CP  Next visit scheduled for 5/18     Pt follows annually with Dr. Drea Wilson (hemo/onc) for h/o adenoma of esophagus   Last visit was 7/18  Per pt, she was dismissed unless sx arise     Pt follows with Dr. Juanita Mobley (pulm)   Last visit was 2/18  Continues singulair daily, albuterol prn and symbicort BID for breathing/asthma per pulm, which help  No flares recently      Pt has not been evaluated for DANIELLE in the past  Recall she declines further evaluation for now       Continues lipitor 40mg daily for cholesterol      Continues dexilant and reglan daily for reflux, which works well  She also takes zantac qhs prn (not daily)   She avoids trigger foods and rarely has reflux/aspiration  Pt states she had dry heaving, then throwing up bile in 5/18, had some reflux following this  Recall has h/o esophageal cancer     Of note, her  has bladder cancer and she is caring for him at home.     ACP not on file. SDM is her .   Provided information today.       PREVENTIVE:    Colonoscopy: 5/2013, Dr. Trisha Wills, repeat 5 years, scheduled 8/7/18   EGD: 4/16, Dr. Trisha Wills, h/o esophageal cancer, polyp on recent EGD, biopsy nl  AAA: FMHx (grandfather)  Pap: Dr. Dewayne Moss, 5/15/18  Mammogram: 7/20/17, negative, scheduled 7/18  Dexa: 4/18, mild osteopenia  Tdap: 9/14/2016  Pneumovax: 4/26/2009  Astxmea17: 11/14/2016  Zostavax: 11/18/2016, reaction on R arm  Shingrix: ordered 04/09/18, will think about this vaccine  Flu shot: 10/19/17  Foot exam: 04/09/18   Microalbumin: 7/18, minimal  A1c:, 10/15 7.3, 1/16 7.2, 5/16 7.0, 9/16 6.7, 12/16 6.2, 3/17 6.8, 6/17 8.8, 9/17 6.2, 12/17 6.4, 4/18 7.3, 7/18 7.4  Eye exam: Dr. Naresh Dorantes, 11/17, will get notes for review    Lipids: 7/18 LDL 66  Hep C screen: 11/15, negative  EKG: declines today, will get with cardio in 5/18    Patient Active Problem List    Diagnosis Date Noted    Recurrent depression ([address] 12/29/2017    Diabetes mellitus without complication ([address] 20/74/1340    ASHD (arteriosclerotic heart disease) 06/18/2014    Syncope 06/18/2014    Bradycardia 06/18/2014    Multinodular goiter     Angina, class II ([facility] 04/18/2013    Family history of coronary artery disease 04/18/2013    S/P cardiac cath 04/18/2013    Hypercholesteremia 02/18/2013    HTN (hypertension) 02/18/2013    Asthma 02/18/2013    Depression 02/18/2013    Thyroid nodule 02/18/2013    Shoulder capsulitis 02/18/2013    Incisional hernia 02/10/2011    Esophageal cancer (HCC) 02/10/2011     Current Outpatient Prescriptions   Medication Sig Dispense Refill    atorvastatin (LIPITOR) 40 mg tablet Take 1 Tab by mouth daily. 90 Tab 2    montelukast (SINGULAIR) 10 mg tablet Take 1 Tab by mouth daily. 90 Tab 1    furosemide (LASIX) 20 mg tablet Take 1 Tab by mouth daily. 90 Tab 1    amLODIPine (NORVASC) 2.5 mg tablet Take 1 Tab by mouth daily. In place of Ranexa as not covered 90 Tab 3    raNITIdine (ZANTAC) 150 mg tablet Take 150 mg by mouth as needed for Indigestion.  Insulin Needles, Disposable, (CLICKFINE) 31 gauge x 5/16\" ndle USE TWICE DAILY AS DIRECTED 100 Package 10    nystatin (MYCOSTATIN) powder Apply  to affected area four (4) times daily. 1 Bottle 3    nystatin (MYCOSTATIN) topical cream Apply  to affected area two (2) times a day. 15 g 1    ONETOUCH ULTRA TEST strip CHECK TWICE A  Strip 12    LANTUS SOLOSTAR 100 unit/mL (3 mL) inpn INJECT 34 UNITS UNDER THE SKIN EVERY MORNING 45 mL 2    insulin lispro (HUMALOG) 100 unit/mL kwikpen 4 units every evening with blood sugars over 120 1 Package 0    SYMBICORT 160-4.5 mcg/actuation HFA inhaler Take 2 Puffs by inhalation two (2) times a day.  naproxen sodium (ALEVE) 220 mg tablet Take 220 mg by mouth two (2) times daily (with meals).  metoclopramide HCl (REGLAN) 10 mg tablet Take 10 mg by mouth daily.  nystatin (MYCOSTATIN) topical cream   0    polyethylene glycol (MIRALAX) 17 gram packet Take 17 g by mouth daily.  cetirizine (ZYRTEC) 10 mg tablet Take 10 mg by mouth daily.  melatonin 5 mg Tab Take 5 mg by mouth daily.  Dexlansoprazole (DEXILANT) 60 mg CpDM Take 60 mg by mouth daily.  aspirin 81 mg tablet Take 81 mg by mouth.       pyridoxine (VITAMIN B-6) 100 mg tablet Take 100 mg by mouth every morning.  ERGOCALCIFEROL, VITAMIN D2, (VITAMIN D PO) Take 2,000 Units by mouth daily.  chlorpheniramine-HYDROcodone (TUSSIONEX) 10-8 mg/5 mL suspension Take 5 mL by mouth every twelve (12) hours as needed for Cough. Max Daily Amount: 10 mL. 115 mL 0    albuterol (PROVENTIL HFA, VENTOLIN HFA, PROAIR HFA) 90 mcg/actuation inhaler Take 2 Puffs by inhalation as needed.  1 Inhaler 3    valsartan (DIOVAN) 160 mg tablet TAKE 1 TABLET DAILY 90 Tab 1    hydrocortisone (ANUSOL-HC) 25 mg supp   2    lidocaine (XYLOCAINE) 2 % jelly   2     Past Surgical History:   Procedure Laterality Date    HX ENDOSCOPY  4/2016    HX GI  2010    gastroesophagectomy    HX GYN  1983    lap btl    HX HEENT  1957    tonsils as child    HX HERNIA REPAIR  04/26/2012    HX LAP CHOLECYSTECTOMY  1995    HX ORTHOPAEDIC  02/20/2015    right shoulder manipulation    HX OTHER SURGICAL      left shoulder surgery for frozen surgery    HX OTHER SURGICAL  6/24/2015    mammogram     HX PELVIC LAPAROSCOPY  1989    HX VASCULAR ACCESS      port a cath and removal    MT EGD BALLOON DILATION ESOPHAGUS <30 MM DIAM  4/20/2010         MT EXTRAC ERUPTED TOOTH/EXPOSED ROOT        Lab Results  Component Value Date/Time   WBC 5.3 04/02/2018 08:24 AM   HGB 12.3 04/02/2018 08:24 AM   Hemoglobin (POC) 11.9 01/28/2010 12:02 PM   HCT 37.9 04/02/2018 08:24 AM   Hematocrit (POC) 35 01/28/2010 12:02 PM   PLATELET 184 42/20/5655 08:24 AM   MCV 94 04/02/2018 08:24 AM     Lab Results  Component Value Date/Time   Cholesterol, total 126 07/05/2018 08:34 AM   HDL Cholesterol 39 (L) 07/05/2018 08:34 AM   LDL, calculated 66 07/05/2018 08:34 AM   Triglyceride 104 07/05/2018 08:34 AM     Lab Results  Component Value Date/Time   GFR est non-AA 66 07/05/2018 08:34 AM   GFRNA, POC >60 01/06/2012 10:42 AM   GFR est AA 77 07/05/2018 08:34 AM   GFRAA, POC >60 01/06/2012 10:42 AM   Creatinine 0.91 07/05/2018 08:34 AM   Creatinine (POC) 0.9 01/06/2012 10:42 AM   BUN 13 07/05/2018 08:34 AM   BUN (POC) 14 01/28/2010 12:02 PM   Sodium 138 07/05/2018 08:34 AM   Sodium (POC) 141 01/28/2010 12:02 PM   Potassium 3.8 07/05/2018 08:34 AM   Potassium (POC) 4.0 01/28/2010 12:02 PM   Chloride 99 07/05/2018 08:34 AM   Chloride (POC) 103 01/28/2010 12:02 PM   CO2 23 07/05/2018 08:34 AM   Magnesium 1.9 02/08/2010 03:00 AM        Review of Systems   Respiratory: Negative for shortness of breath. Cardiovascular: Negative for chest pain. Physical Exam   Constitutional: She is oriented to person, place, and time. She appears well-developed and well-nourished. No distress. HENT:   Head: Normocephalic and atraumatic. Mouth/Throat: Oropharynx is clear and moist. No oropharyngeal exudate. Eyes: Conjunctivae and EOM are normal. Right eye exhibits no discharge. Left eye exhibits no discharge. Neck: Normal range of motion. Neck supple. Cardiovascular: Normal rate, regular rhythm, normal heart sounds and intact distal pulses. Exam reveals no gallop and no friction rub. No murmur heard. Pulmonary/Chest: Effort normal and breath sounds normal. No respiratory distress. She has no wheezes. She has no rales. She exhibits no tenderness. Abdominal: Soft. She exhibits no distension. There is no tenderness. Musculoskeletal: She exhibits edema (trace). She exhibits no tenderness or deformity. Lymphadenopathy:     She has no cervical adenopathy. Neurological: She is alert and oriented to person, place, and time. Coordination abnormal.   Skin: Skin is warm and dry. No rash noted. She is not diaphoretic. No erythema. No pallor. Psychiatric: She has a normal mood and affect. Her behavior is normal.       ASSESSMENT and PLAN    ICD-10-CM ICD-9-CM    1.  Diabetes mellitus without complication ([facility]    Will increase lantus to 32U to get fasting glucose to goal, continue humalo sliding scale with dinner, discussed increasing sliding scale by 2U, a1c was slightly worse on recent labs   X42.2 588.54 METABOLIC PANEL, BASIC      HEMOGLOBIN A1C WITH EAG   2. Hypertension, unspecified type    Controlled on diovan and norvasc, of note norvasc is to prevent CP as well   G87 876.5 METABOLIC PANEL, BASIC      HEMOGLOBIN A1C WITH EAG   3. Malignant neoplasm of esophagus, unspecified location [facility])    Was recently eval by Dr Teodoro Almanzar and discharged from his care, continues to follow with Dr Humera Chen prn for EGDs, has COLO pending, has dexilant to control her GERD sx   I04.6 612.4 METABOLIC PANEL, BASIC      HEMOGLOBIN A1C WITH EAG   4. Hypercholesteremia    Controlled on lipitor, continue no change to dose   R50.55 229.2 METABOLIC PANEL, BASIC      HEMOGLOBIN A1C WITH EAG   5. ASHD (arteriosclerotic heart disease)    Follows with cardio Dr Stephanie Breen UTGILBERT, no longer on pravix, no longer on ranexa d/t cost, now on norvasc to prevent CP   M83.63 658.12 METABOLIC PANEL, BASIC      HEMOGLOBIN A1C WITH EAG   6. Mild intermittent asthma without complication    Stable, continues on symbicort BID and singulair, no recent flares   K49.83 031.16 METABOLIC PANEL, BASIC      HEMOGLOBIN A1C WITH EAG   7. Gastroesophageal reflux disease without esophagitis    Controlled on dexilant, takes 1 reglan per day as well per Dr Humera Chen   O36.8 127.00 METABOLIC PANEL, BASIC      HEMOGLOBIN A1C WITH EAG   8. Obesity (BMI 30.0-34. 9)    Discussed diet and w/l, discussed [employer]   X54.7 028.43 METABOLIC PANEL, BASIC      HEMOGLOBIN A1C WITH EAG        Scribed by Myrlene Riedel of [address] [place] [address] [address], as dictated by Dr. Juan Briggs. Current diagnosis and concerns discussed with pt at length. Pt understands risks and benefits or current treatment plan and medications, and accepts the treatment and medication with any possible risks. Pt asks appropriate questions, which were answered. Pt was instructed to call with any concerns or problems.   This note will not be viewable in MyChart.

## 2018-07-09 NOTE — MR AVS SNAPSHOT
[address] [facility] 
[phone] Patient: Peyton Herron MRN: [id number] Visit Information Date & Time Provider Department Dept. Phone Encounter #  
 7/9/2018  1:15 PM Krishna Hernandez, [address] [id number] Follow-up Instructions Return in about 3 months (around 10/9/2018). Your Appointments 2/5/2019  9:45 AM  
ESTABLISHED PATIENT with Saniya Varela MD  
[facility]: Dr Georges Patel  
 [address]  
[phone] [address] Upcoming Health Maintenance Date Due Pneumococcal 65+ High/Highest Risk (2 of 2 - PPSV23) 2/18/2018 COLONOSCOPY 5/13/2018 Influenza Age 5 to Adult 8/1/2018 EYE EXAM RETINAL OR DILATED Q1 11/10/2018 HEMOGLOBIN A1C Q6M 1/5/2019 FOOT EXAM Q1 4/9/2019 MEDICARE YEARLY EXAM 4/10/2019 MICROALBUMIN Q1 7/5/2019 LIPID PANEL Q1 7/5/2019 BREAST CANCER SCRN MAMMOGRAM 7/20/2019 GLAUCOMA SCREENING Q2Y 11/10/2019 DTaP/Tdap/Td series (2 - Td) 9/14/2026 Allergies as of 7/9/2018  Review Complete On: 7/9/2018 By: Krishna Hernandez MD  
  
 Severity Noted Reaction Type Reactions Adhesive Low 04/25/2012   Topical Other (comments)  
 redness Current Immunizations  Reviewed on 4/26/2012 Name Date Influenza Vaccine (Quad) PF 10/19/2017, 10/13/2016, 10/14/2015 Influenza Vaccine Split 10/26/2011 Pneumococcal Conjugate (PCV-13) 11/14/2016 Tdap 9/14/2016 ZZZ-RETIRED (DO NOT USE) Pneumococcal Vaccine (Unspecified Type) 4/26/2009 Zoster Vaccine, Live 11/18/2015 Not reviewed this visit You Were Diagnosed With   
  
 Codes Comments Diabetes mellitus without complication ([facility] 75.)    -  Primary ICD-10-CM: E11.9 ICD-9-CM: 250.00 Hypertension, unspecified type     ICD-10-CM: I10 
ICD-9-CM: 401.9 Malignant neoplasm of esophagus, unspecified location [facility])     ICD-10-CM: C15.9 ICD-9-CM: 150.9 Hypercholesteremia     ICD-10-CM: E78.00 ICD-9-CM: 272.0 ASHD (arteriosclerotic heart disease)     ICD-10-CM: I25.10 ICD-9-CM: 414.00 Mild intermittent asthma without complication     [facility]-08-VQ: J45.20 ICD-9-CM: 493.90 Gastroesophageal reflux disease without esophagitis     ICD-10-CM: K21.9 ICD-9-CM: 530.81 Obesity (BMI 30.0-34.9)     ICD-10-CM: Q87.1 ICD-9-CM: 278.00 Vitals BP Pulse Temp Resp Height(growth percentile) Weight(growth percentile) 113/68 (BP 1 Location: Left arm, BP Patient Position: Sitting) 65 98.3 °F (36.8 °C) (Oral) 16 5' 3\" (1.6 m) 185 lb (83.9 kg) SpO2 BMI OB Status Smoking Status 98% 32.77 kg/m2 Postmenopausal Never Smoker Vitals History BMI and BSA Data Body Mass Index Body Surface Area 32.77 kg/m 2 1.93 m 2 Preferred Pharmacy Pharmacy Name Phone [address] 5350 [facility] 66 [address] [phone] Your Updated Medication List  
  
   
This list is accurate as of 7/9/18  1:56 PM.  Always use your most recent med list.  
  
  
  
  
 albuterol 90 mcg/actuation inhaler Commonly known as:  PROVENTIL HFA, VENTOLIN HFA, PROAIR HFA Take 2 Puffs by inhalation as needed. ALEVE 220 mg tablet Generic drug:  naproxen sodium Take 220 mg by mouth two (2) times daily (with meals). amLODIPine 2.5 mg tablet Commonly known as:  Horris Bills Take 1 Tab by mouth daily. In place of Ranexa as not covered  
  
 aspirin 81 mg tablet Take 81 mg by mouth. atorvastatin 40 mg tablet Commonly known as:  LIPITOR Take 1 Tab by mouth daily. chlorpheniramine-HYDROcodone 10-8 mg/5 mL suspension Commonly known as:  Daylin Haus Take 5 mL by mouth every twelve (12) hours as needed for Cough.  Max Daily Amount: 10 mL. DEXILANT 60 mg Cpdb Generic drug:  Dexlansoprazole Take 60 mg by mouth daily. furosemide 20 mg tablet Commonly known as:  LASIX Take 1 Tab by mouth daily. hydrocortisone 25 mg Supp Commonly known as:  ANUSOL-HC  
  
 insulin lispro 100 unit/mL kwikpen Commonly known as:  HUMALOG  
4 units every evening with blood sugars over 120 Insulin Needles (Disposable) 31 gauge x 5/16\" Ndle Commonly known as:  CLICKFINE  
USE TWICE DAILY AS DIRECTED  
  
 LANTUS SOLOSTAR U-100 INSULIN 100 unit/mL (3 mL) Inpn Generic drug:  insulin glargine INJECT 34 UNITS UNDER THE SKIN EVERY MORNING  
  
 lidocaine 2 % jelly Commonly known as:  XYLOCAINE  
  
 melatonin Tab tablet Take 5 mg by mouth daily. metoclopramide HCl 10 mg tablet Commonly known as:  REGLAN Take 10 mg by mouth daily. MIRALAX 17 gram packet Generic drug:  polyethylene glycol Take 17 g by mouth daily. montelukast 10 mg tablet Commonly known as:  SINGULAIR Take 1 Tab by mouth daily. * nystatin topical cream  
Commonly known as:  MYCOSTATIN  
  
 * nystatin powder Commonly known as:  MYCOSTATIN Apply  to affected area four (4) times daily. * nystatin topical cream  
Commonly known as:  MYCOSTATIN Apply  to affected area two (2) times a day. ONETOUCH ULTRA TEST strip Generic drug:  glucose blood VI test strips CHECK TWICE A DAY  
  
 SYMBICORT 160-4.5 mcg/actuation Hfaa Generic drug:  budesonide-formoterol Take 2 Puffs by inhalation two (2) times a day. valsartan 160 mg tablet Commonly known as:  DIOVAN  
TAKE 1 TABLET DAILY  
  
 VITAMIN B-6 100 mg tablet Generic drug:  pyridoxine (vitamin B6) Take 100 mg by mouth every morning. VITAMIN D2 PO Take 2,000 Units by mouth daily. ZANTAC 150 mg tablet Generic drug:  raNITIdine Take 150 mg by mouth as needed for Indigestion. ZyrTEC 10 mg tablet Generic drug:  cetirizine Take 10 mg by mouth daily. * Notice: This list has 3 medication(s) that are the same as other medications prescribed for you. Read the directions carefully, and ask your doctor or other care provider to review them with you. Follow-up Instructions Return in about 3 months (around 10/9/2018). To-Do List   
 07/10/2018 Lab:  HEMOGLOBIN A1C WITH EAG   
  
 07/10/2018 Lab:  METABOLIC PANEL, BASIC   
  
 07/23/2018 9:20 AM  
  Appointment with [address] at [address] [place] [address] ([phone]) Shower or bathe using soap and water. Do not use deodorant, powder, perfumes, or lotion the day of your exam.  If your prior mammograms were not performed at [place] [address] please bring films with you or forward prior images 2 days before your procedure. Check in at registration 15min before your appointment time unless you were instructed to do otherwise. A script is not necessary, but if you have one, please bring it on the day of the mammogram or have it faxed to the department. You are responsible for finding a method of transportation to your appointment. If you don't have transportation, please reschedule your appointment at least 24 hours in advance. [facility] [phone] [facility] CENTER  [phone] [facility] KAMARI  [phone] [facility] [phone] [address] [phone] Patient Instructions INCREASE LANTUS TO 32 UNITS (GOAL FASTING GLUCOSE ) HUMALOG SLIDING SCALE WITH DINNER. CAN INCREASE SLIDING SCALE BY 2 UNITS Introducing [facility] SERVICES! Dear Kezia Govea: Thank you for requesting a NotesFirst account. Our records indicate that you already have an active NotesFirst account. You can access your account anytime at [email or web address] Board [employer] Addiction Medicine ([email or web address] Did you know that you can access your hospital and ER discharge instructions at any time in NotesFirst? You can also review all of your test results from your hospital stay or ER visit. Additional Information If you have questions, please visit the Frequently Asked Questions section of the [employer] website at [email or web address] Remember, [employer] is NOT to be used for urgent needs. For medical emergencies, dial 911. Now available from your iPhone and Android! Please provide this summary of care documentation to your next provider. Your primary care clinician is listed as Shane Tee. If you have any questions after today's visit, please call [phone].

## 2018-07-09 NOTE — TELEPHONE ENCOUNTER
PCP: Rhoderick Romberg, MD    Last appt: 7/9/2018  Future Appointments  Date Time Provider Dc Adler   7/23/2018 9:20 AM [facility] 3 [facility]   10/16/2018 1:00 PM Rhoderick Romberg, MD [address]   2/5/2019 9:45 AM Renata Masters MD [address] [facility] [place]       Requested Prescriptions     Pending Prescriptions Disp Refills    furosemide (LASIX) 20 mg tablet 90 Tab 1     Sig: Take 1 Tab by mouth daily.

## 2018-07-19 RX ORDER — INSULIN LISPRO 100 [IU]/ML
INJECTION, SOLUTION INTRAVENOUS; SUBCUTANEOUS
Qty: 4 ADJUSTABLE DOSE PRE-FILLED PEN SYRINGE | Refills: 1 | Status: SHIPPED | OUTPATIENT
Start: 2018-07-19 | End: 2018-08-03

## 2018-07-19 NOTE — TELEPHONE ENCOUNTER
PCP: Vanessa Blackburn MD    Last appt: 2018  Future Appointments  Date Time Provider Dc Adler   2018 9:20 AM [facility] 3 [facility]   10/16/2018 1:00 PM Vanessa Blackburn MD [address]   2019 9:45 AM Renata Mullen MD [address] [facility]       Requested Prescriptions     Pending Prescriptions Disp Refills    insulin lispro (HUMALOG) 100 unit/mL kwikpen       Si units every evening with blood sugars over 120

## 2018-07-19 NOTE — TELEPHONE ENCOUNTER
From: Heather Springer  To: Estelle Varela MD  Sent: 7/19/2018 10:37 AM EDT  Subject: Medication Renewal Request    Original authorizing provider: MD Heather Mcgarry would like a refill of the following medications:  insulin lispro (HUMALOG) 100 unit/mL francis Varela MD]    Preferred pharmacy: [facility]/[place] [address]    Comment:

## 2018-07-20 DIAGNOSIS — E11.9 DIABETES MELLITUS WITHOUT COMPLICATION (HCC): Primary | ICD-10-CM

## 2018-07-20 RX ORDER — INSULIN ASPART 100 [IU]/ML
INJECTION, SOLUTION INTRAVENOUS; SUBCUTANEOUS
Qty: 4 ADJUSTABLE DOSE PRE-FILLED PEN SYRINGE | Refills: 1 | Status: SHIPPED | OUTPATIENT
Start: 2018-07-20 | End: 2019-03-04 | Stop reason: SDUPTHER

## 2018-07-23 ENCOUNTER — HOSPITAL ENCOUNTER (OUTPATIENT)
Dept: MAMMOGRAPHY | Age: 63
Discharge: HOME | End: 2018-07-23
Attending: INTERNAL MEDICINE
Payer: MEDICARE

## 2018-07-23 DIAGNOSIS — Z12.39 SCREENING BREAST EXAMINATION: ICD-10-CM

## 2018-07-23 PROCEDURE — 77067 SCR MAMMO BI INCL CAD: CPT

## 2018-07-30 RX ORDER — LOSARTAN POTASSIUM 50 MG/1
50 TABLET ORAL DAILY
Qty: 90 TAB | Refills: 1 | Status: SHIPPED | OUTPATIENT
Start: 2018-07-30 | End: 2018-11-12 | Stop reason: SDUPTHER

## 2018-08-03 RX ORDER — CHOLECALCIFEROL (VITAMIN D3) 125 MCG
CAPSULE ORAL DAILY
COMMUNITY

## 2018-08-06 ENCOUNTER — ANESTHESIA EVENT (OUTPATIENT)
Dept: ENDOSCOPY | Age: 63
End: 2018-08-06
Payer: MEDICARE

## 2018-08-06 NOTE — ANESTHESIA PREPROCEDURE EVALUATION
Anesthetic History     PONV          Review of Systems / Medical History  Patient summary reviewed, nursing notes reviewed and pertinent labs reviewed    Pulmonary    COPD        Asthma        Neuro/Psych         Headaches and psychiatric history (depression/anxiety)     Cardiovascular    Hypertension        Dysrhythmias (Bradycardia)   CAD, cardiac stents and hyperlipidemia    Exercise tolerance: >4 METS  Comments: 7-2-18 EKG: SB, RBBB, LVH    2-10-16 ECHO: 55-60% EF   GI/Hepatic/Renal     GERD          Comments: Hx of colon polyp      Hx of GE junction cancer with gastroesophagectomy 2010 Endo/Other    Diabetes: using insulin  Hypothyroidism  Obesity, arthritis and cancer (GE junction)    Comments: Multinodular goiter Other Findings   Comments: Goiter         Physical Exam    Airway  Mallampati: III  TM Distance: 4 - 6 cm  Neck ROM: normal range of motion, short neck   Mouth opening: Normal     Cardiovascular  Regular rate and rhythm,  S1 and S2 normal,  no murmur, click, rub, or gallop             Dental  No notable dental hx       Pulmonary  Breath sounds clear to auscultation               Abdominal  GI exam deferred       Other Findings            Anesthetic Plan    ASA: 3  Anesthesia type: total IV anesthesia          Induction: Intravenous  Anesthetic plan and risks discussed with: Patient

## 2018-08-07 ENCOUNTER — ANESTHESIA (OUTPATIENT)
Dept: ENDOSCOPY | Age: 63
End: 2018-08-07
Payer: MEDICARE

## 2018-08-07 ENCOUNTER — HOSPITAL ENCOUNTER (OUTPATIENT)
Age: 63
Setting detail: OUTPATIENT SURGERY
Discharge: HOME | End: 2018-08-07
Attending: INTERNAL MEDICINE | Admitting: INTERNAL MEDICINE
Payer: MEDICARE

## 2018-08-07 VITALS
HEIGHT: 63 IN | DIASTOLIC BLOOD PRESSURE: 60 MMHG | SYSTOLIC BLOOD PRESSURE: 126 MMHG | RESPIRATION RATE: 20 BRPM | BODY MASS INDEX: 33.17 KG/M2 | HEART RATE: 50 BPM | TEMPERATURE: 97.6 F | WEIGHT: 187.19 LBS | OXYGEN SATURATION: 100 %

## 2018-08-07 LAB
GLUCOSE BLD STRIP.AUTO-MCNC: 89 MG/DL (ref 65–100)
GLUCOSE BLD STRIP.AUTO-MCNC: 90 MG/DL (ref 65–100)
SERVICE CMNT-IMP: NORMAL
SERVICE CMNT-IMP: NORMAL

## 2018-08-07 PROCEDURE — 74011250636 HC RX REV CODE- 250/636: Performed by: INTERNAL MEDICINE

## 2018-08-07 PROCEDURE — 77030013992 HC SNR POLYP ENDOSC BSC -B: Performed by: INTERNAL MEDICINE

## 2018-08-07 PROCEDURE — 77030009426 HC FCPS BIOP ENDOSC BSC -B: Performed by: INTERNAL MEDICINE

## 2018-08-07 PROCEDURE — 74011250636 HC RX REV CODE- 250/636

## 2018-08-07 PROCEDURE — 76060000031 HC ANESTHESIA FIRST 0.5 HR: Performed by: INTERNAL MEDICINE

## 2018-08-07 PROCEDURE — 88305 TISSUE EXAM BY PATHOLOGIST: CPT | Performed by: INTERNAL MEDICINE

## 2018-08-07 PROCEDURE — 76040000019: Performed by: INTERNAL MEDICINE

## 2018-08-07 PROCEDURE — 82962 GLUCOSE BLOOD TEST: CPT

## 2018-08-07 RX ORDER — DEXTROMETHORPHAN/PSEUDOEPHED 2.5-7.5/.8
1.2 DROPS ORAL
Status: DISCONTINUED | OUTPATIENT
Start: 2018-08-07 | End: 2018-08-07 | Stop reason: HOSPADM

## 2018-08-07 RX ORDER — ATROPINE SULFATE 0.1 MG/ML
0.5 INJECTION INTRAVENOUS
Status: DISCONTINUED | OUTPATIENT
Start: 2018-08-07 | End: 2018-08-07 | Stop reason: HOSPADM

## 2018-08-07 RX ORDER — PROPOFOL 10 MG/ML
INJECTION, EMULSION INTRAVENOUS AS NEEDED
Status: DISCONTINUED | OUTPATIENT
Start: 2018-08-07 | End: 2018-08-07 | Stop reason: HOSPADM

## 2018-08-07 RX ORDER — SODIUM CHLORIDE 0.9 % (FLUSH) 0.9 %
5-10 SYRINGE (ML) INJECTION AS NEEDED
Status: DISCONTINUED | OUTPATIENT
Start: 2018-08-07 | End: 2018-08-07 | Stop reason: HOSPADM

## 2018-08-07 RX ORDER — EPINEPHRINE 0.1 MG/ML
1 INJECTION INTRACARDIAC; INTRAVENOUS
Status: DISCONTINUED | OUTPATIENT
Start: 2018-08-07 | End: 2018-08-07 | Stop reason: HOSPADM

## 2018-08-07 RX ORDER — SODIUM CHLORIDE 0.9 % (FLUSH) 0.9 %
5-10 SYRINGE (ML) INJECTION EVERY 8 HOURS
Status: DISCONTINUED | OUTPATIENT
Start: 2018-08-07 | End: 2018-08-07 | Stop reason: HOSPADM

## 2018-08-07 RX ORDER — MIDAZOLAM HYDROCHLORIDE 1 MG/ML
1-2 INJECTION, SOLUTION INTRAMUSCULAR; INTRAVENOUS
Status: DISCONTINUED | OUTPATIENT
Start: 2018-08-07 | End: 2018-08-07 | Stop reason: HOSPADM

## 2018-08-07 RX ORDER — LIDOCAINE HYDROCHLORIDE 20 MG/ML
INJECTION, SOLUTION EPIDURAL; INFILTRATION; INTRACAUDAL; PERINEURAL AS NEEDED
Status: DISCONTINUED | OUTPATIENT
Start: 2018-08-07 | End: 2018-08-07 | Stop reason: HOSPADM

## 2018-08-07 RX ORDER — FLUMAZENIL 0.1 MG/ML
0.2 INJECTION INTRAVENOUS
Status: DISCONTINUED | OUTPATIENT
Start: 2018-08-07 | End: 2018-08-07 | Stop reason: HOSPADM

## 2018-08-07 RX ORDER — SODIUM CHLORIDE 9 MG/ML
100 INJECTION, SOLUTION INTRAVENOUS CONTINUOUS
Status: DISCONTINUED | OUTPATIENT
Start: 2018-08-07 | End: 2018-08-07 | Stop reason: HOSPADM

## 2018-08-07 RX ADMIN — LIDOCAINE HYDROCHLORIDE 20 MG: 20 INJECTION, SOLUTION EPIDURAL; INFILTRATION; INTRACAUDAL; PERINEURAL at 07:42

## 2018-08-07 RX ADMIN — PROPOFOL 50 MG: 10 INJECTION, EMULSION INTRAVENOUS at 07:42

## 2018-08-07 RX ADMIN — SODIUM CHLORIDE 100 ML/HR: 900 INJECTION, SOLUTION INTRAVENOUS at 07:39

## 2018-08-07 RX ADMIN — PROPOFOL 40 MG: 10 INJECTION, EMULSION INTRAVENOUS at 07:44

## 2018-08-07 RX ADMIN — PROPOFOL 30 MG: 10 INJECTION, EMULSION INTRAVENOUS at 07:56

## 2018-08-07 RX ADMIN — PROPOFOL 30 MG: 10 INJECTION, EMULSION INTRAVENOUS at 07:50

## 2018-08-07 RX ADMIN — PROPOFOL 40 MG: 10 INJECTION, EMULSION INTRAVENOUS at 07:46

## 2018-08-07 RX ADMIN — PROPOFOL 30 MG: 10 INJECTION, EMULSION INTRAVENOUS at 07:54

## 2018-08-07 RX ADMIN — PROPOFOL 30 MG: 10 INJECTION, EMULSION INTRAVENOUS at 07:52

## 2018-08-07 RX ADMIN — PROPOFOL 30 MG: 10 INJECTION, EMULSION INTRAVENOUS at 07:48

## 2018-08-07 NOTE — ANESTHESIA POSTPROCEDURE EVALUATION
Post-Anesthesia Evaluation and Assessment    Patient: Diane David MRN: [id number]  SSN: [id number]    YOB: 1955  Age: 70 y.o. Sex: female       Cardiovascular Function/Vital Signs  Visit Vitals    /60    Pulse (!) 50    Temp 36.4 °C (97.6 °F)    Resp 20    Ht 5' 3\" (1.6 m)    Wt 84.9 kg (187 lb 3 oz)    SpO2 100%    BMI 33.16 kg/m2       Patient is status post total IV anesthesia, general anesthesia for Procedure(s):  COLONOSCOPY  COLON BIOPSY  ENDOSCOPIC POLYPECTOMY. Nausea/Vomiting: None    Postoperative hydration reviewed and adequate. Pain:  Pain Scale 1: Numeric (0 - 10) (08/07/18 0829)  Pain Intensity 1: 0 (08/07/18 0829)   Managed    Neurological Status: At baseline    Mental Status and Level of Consciousness: Arousable    Pulmonary Status:   O2 Device: Room air (08/07/18 0829)   Adequate oxygenation and airway patent    Complications related to anesthesia: None    Post-anesthesia assessment completed.  No concerns    Signed By: Rima Rico MD     August 7, 2018

## 2018-08-07 NOTE — IP AVS SNAPSHOT
[address] [facility] 
[phone] Patient: Remedios Reyes MRN: [id number] KXE:1/89/3480 About your hospitalization You were admitted on:  August 7, 2018 You last received care in the:  [facility] You were discharged on:  August 7, 2018 Why you were hospitalized Your primary diagnosis was:  Not on File Follow-up Information Follow up With Details Comments Contact Info Jose David Davidson MD   [address] [facility] 
[phone] Discharge Orders None A check jose indicates which time of day the medication should be taken. My Medications CHANGE how you take these medications Instructions Each Dose to Equal  
 Morning Noon Evening Bedtime LANTUS SOLOSTAR U-100 INSULIN 100 unit/mL (3 mL) Inpn Generic drug:  insulin glargine What changed:  See the new instructions. Your last dose was: Your next dose is: INJECT 34 UNITS UNDER THE SKIN EVERY MORNING  
     
   
   
   
  
  
CONTINUE taking these medications Instructions Each Dose to Equal  
 Morning Noon Evening Bedtime  
 albuterol 90 mcg/actuation inhaler Commonly known as:  PROVENTIL HFA, VENTOLIN HFA, PROAIR HFA Your last dose was: Your next dose is: Take 2 Puffs by inhalation as needed. 2 Puff ALEVE 220 mg tablet Generic drug:  naproxen sodium Your last dose was: Your next dose is: Take 220 mg by mouth two (2) times daily (with meals). 220 mg  
    
   
   
   
  
 amLODIPine 2.5 mg tablet Commonly known as:  Cara Pace Your last dose was: Your next dose is: Take 1 Tab by mouth daily. In place of Ranexa as not covered 2.5 mg  
    
   
   
   
  
 aspirin 81 mg tablet Your last dose was: Your next dose is: Take 81 mg by mouth. 81 mg  
    
   
   
   
  
 atorvastatin 40 mg tablet Commonly known as:  LIPITOR Your last dose was: Your next dose is: Take 1 Tab by mouth daily. 40 mg DEXILANT 60 mg Cpdb Generic drug:  Dexlansoprazole Your last dose was: Your next dose is: Take 60 mg by mouth daily. 60 mg  
    
   
   
   
  
 furosemide 20 mg tablet Commonly known as:  LASIX Your last dose was: Your next dose is: Take 1 Tab by mouth daily. 20 mg  
    
   
   
   
  
 insulin aspart U-100 100 unit/mL Inpn Commonly known as:  Amberly Bennett Your last dose was: Your next dose is:    
   
   
 Inject 4 units every evening when blood sugar over 120 Insulin Needles (Disposable) 31 gauge x 5/16\" Ndle Commonly known as:  CLICKFINE Your last dose was: Your next dose is:    
   
   
 USE TWICE DAILY AS DIRECTED  
     
   
   
   
  
 losartan 50 mg tablet Commonly known as:  COZAAR Your last dose was: Your next dose is: Take 1 Tab by mouth daily. 50 mg  
    
   
   
   
  
 melatonin Tab tablet Your last dose was: Your next dose is: Take 5 mg by mouth nightly. 5 mg  
    
   
   
   
  
 metoclopramide HCl 10 mg tablet Commonly known as:  REGLAN Your last dose was: Your next dose is: Take 10 mg by mouth daily. 10 mg MIRALAX 17 gram packet Generic drug:  polyethylene glycol Your last dose was: Your next dose is: Take 17 g by mouth daily. 17 g  
    
   
   
   
  
 montelukast 10 mg tablet Commonly known as:  SINGULAIR Your last dose was: Your next dose is: Take 1 Tab by mouth daily. 10 mg  
    
   
   
   
  
 * nystatin powder Commonly known as:  MYCOSTATIN  
   
 Your last dose was: Your next dose is:    
   
   
 Apply  to affected area four (4) times daily. * nystatin topical cream  
Commonly known as:  MYCOSTATIN Your last dose was: Your next dose is:    
   
   
 Apply  to affected area two (2) times a day. ONETOUCH ULTRA TEST strip Generic drug:  glucose blood VI test strips Your last dose was: Your next dose is: CHECK TWICE A DAY  
     
   
   
   
  
 SYMBICORT 160-4.5 mcg/actuation Hfaa Generic drug:  budesonide-formoterol Your last dose was: Your next dose is: Take 2 Puffs by inhalation two (2) times a day. 2 Puff VITAMIN B-6 100 mg tablet Generic drug:  pyridoxine (vitamin B6) Your last dose was: Your next dose is: Take 100 mg by mouth every morning. 100 mg  
    
   
   
   
  
 VITAMIN D3 2,000 unit Tab Generic drug:  cholecalciferol (vitamin D3) Your last dose was: Your next dose is: Take  by mouth daily. ZANTAC 150 mg tablet Generic drug:  raNITIdine Your last dose was: Your next dose is: Take 150 mg by mouth as needed for Indigestion. 150 mg  
    
   
   
   
  
 ZyrTEC 10 mg tablet Generic drug:  cetirizine Your last dose was: Your next dose is: Take 10 mg by mouth daily. 10 mg  
    
   
   
   
  
 * Notice: This list has 2 medication(s) that are the same as other medications prescribed for you. Read the directions carefully, and ask your doctor or other care provider to review them with you. Discharge Instructions Nataliia Jeter MD 
[facility], [address] [place] [address] 
[phone] 
[email or web address] Tay Fell [id number] 
1955 COLON DISCHARGE INSTRUCTIONS Discomfort: 
Redness at IV site- apply warm compress to area; if redness or soreness persist- contact your physician There may be a slight amount of blood passed from the rectum Gaseous discomfort- walking, belching will help relieve any discomfort You may not operate a vehicle for 12 hours You may not engage in an occupation involving machinery or appliances for rest of today You may not drink alcoholic beverages for at least 12 hours Avoid making any critical decisions for at least 24 hour DIET: 
 Regular diet.  however -  remember your colon is empty and a heavy meal will produce gas. Avoid these foods:  vegetables, fried / greasy foods, carbonated drinks for today ACTIVITY: 
You may resume your normal daily activities it is recommended that you spend the remainder of the day resting -  avoid any strenuous activity. CALL M.D. ANY SIGN OF: Increasing pain, nausea, vomiting Abdominal distension (swelling) New increased bleeding (oral or rectal) Fever (chills) Pain in chest area Bloody discharge from nose or mouth Shortness of breath COLONOSCOPY FINDINGS: 
Your colonoscopy showed: two small polyps which were removed; diverticulosis and hemorrhoids. Follow-up Instructions: 
 Call Dr. Rhea Odell if any questions or problems. Telephone # [phone] Dr. Janna Cameron office will notify you of the biopsy results within 7 to 10 days. Should have a repeat colonoscopy in 5 years. [employer] Activation Thank you for requesting access to [employer]. Please follow the instructions below to securely access and download your online medical record. [employer] allows you to send messages to your doctor, view your test results, renew your prescriptions, schedule appointments, and more. How Do I Sign Up? 1. In your internet browser, go to [email or web address] 
2. Click on the First Time User? Click Here link in the Sign In box.  You will be redirect to the New Member Sign Up page. 3. Enter your [employer] Access Code exactly as it appears below. You will not need to use this code after youve completed the sign-up process. If you do not sign up before the expiration date, you must request a new code. MyChart Access Code: Activation code not generated Current [employer] Status: Active (This is the date your Distrat access code will ) 4. Enter the last four digits of your Social Security Number (xxxx) and Date of Birth (mm/dd/yyyy) as indicated and click Submit. You will be taken to the next sign-up page. 5. Create a [employer] Logic ID. This will be your [employer] login ID and cannot be changed, so think of one that is secure and easy to remember. 6. Create a [employer] password. You can change your password at any time. 7. Enter your Password Reset Question and Answer. This can be used at a later time if you forget your password. 8. Enter your e-mail address. You will receive e-mail notification when new information is available in [address] 9. Click Sign Up. You can now view and download portions of your medical record. 10. Click the Download Summary menu link to download a portable copy of your medical information. Additional Information If you have questions, please visit the Frequently Asked Questions section of the [employer] Logic website at [email or web address] Remember, [employer] Logic is NOT to be used for urgent needs. For medical emergencies, dial 911. Introducing [facility]! Dear Jon Hardy: Thank you for requesting a [employer] account. Our records indicate that you already have an active [employer] account. You can access your account anytime at [email or web address] Did you know that you can access your hospital and ER discharge instructions at any time in RCT Logic? You can also review all of your test results from your hospital stay or ER visit. Additional Information If you have questions, please visit the Frequently Asked Questions section of the MyChart website at [email or web address] Remember, [employer] Foundation Technologies[employer] is NOT to be used for urgent needs. For medical emergencies, dial 911. Now available from your iPhone and Android! Introducing Adam Dailey As a [employer] patient, I wanted to make you aware of our electronic visit tool called Adam Dailey. [employer] 24/7 allows you to connect within minutes with a medical provider 24 hours a day, seven days a week via a mobile device or tablet or logging into a secure website from your computer. You can access Adam Dailey from anywhere in the [place]. A virtual visit might be right for you when you have a simple condition and feel like you just dont want to get out of bed, or cant get away from work for an appointment, when your regular [employer] provider is not available (evenings, weekends or holidays), or when youre out of town and need minor care. Electronic visits cost only $49 and if the [employer] 24/7 provider determines a prescription is needed to treat your condition, one can be electronically transmitted to a nearby pharmacy*. Please take a moment to enroll today if you have not already done so. The enrollment process is free and takes just a few minutes. To enroll, please download the [employer] 24/7 pankaj to your tablet or phone, or visit [email or web address] to enroll on your computer. And, as an [address] [place] [address] patient with a [employer] account, the results of your visits will be scanned into your electronic medical record and your primary care provider will be able to view the scanned results. We urge you to continue to see your regular [employer] provider for your ongoing medical care.   And while your primary care provider may not be the one available when you seek a Adam Dailey virtual visit, the peace of mind you get from getting a real diagnosis real time can be priceless. For more information on Adam Dailey, view our Frequently Asked Questions (FAQs) at [email or web address]. Sincerely, 
 
Michael George MD 
Chief Medical Officer [employer] *:  certain medications cannot be prescribed via Adam Dailey Providers Seen During Your Hospitalization Provider Specialty Primary office phone Shree Bingham MD Gastroenterology [phone] Your Primary Care Physician (PCP) Primary Care Physician Office Phone Office Fax Prosper Ramirez [phone] You are allergic to the following Allergen Reactions Adhesive Other (comments)  
 redness Recent Documentation Height Weight BMI OB Status Smoking Status 1.6 m 84.9 kg 33.16 kg/m2 Postmenopausal Never Smoker Emergency Contacts Name Discharge Info Relation Home Work Mobile AnuragBrad DISCHARGE CAREGIVER [3] Spouse [3] [phone]  [phone] Patient Belongings The following personal items are in your possession at time of discharge: 
  Dental Appliances: None  Visual Aid: Glasses Please provide this summary of care documentation to your next provider. Signatures-by signing, you are acknowledging that this After Visit Summary has been reviewed with you and you have received a copy. Patient Signature:  ____________________________________________________________ Date:  ____________________________________________________________  
  
Obed Esposito Provider Signature:  ____________________________________________________________ Date:  ____________________________________________________________

## 2018-08-07 NOTE — DISCHARGE INSTRUCTIONS
Jorge Hernández MD  [facility], [address]  [address]  [phone]  [email or web address]    Haresh Arias  [id number]  1955    COLON DISCHARGE INSTRUCTIONS  Discomfort:  Redness at IV site- apply warm compress to area; if redness or soreness persist- contact your physician  There may be a slight amount of blood passed from the rectum  Gaseous discomfort- walking, belching will help relieve any discomfort  You may not operate a vehicle for 12 hours  You may not engage in an occupation involving machinery or appliances for rest of today  You may not drink alcoholic beverages for at least 12 hours  Avoid making any critical decisions for at least 24 hour  DIET:   Regular diet. - however -  remember your colon is empty and a heavy meal will produce gas. Avoid these foods:  vegetables, fried / greasy foods, carbonated drinks for today      ACTIVITY:  You may resume your normal daily activities it is recommended that you spend the remainder of the day resting -  avoid any strenuous activity. CALL M.D. ANY SIGN OF:   Increasing pain, nausea, vomiting  Abdominal distension (swelling)  New increased bleeding (oral or rectal)  Fever (chills)  Pain in chest area  Bloody discharge from nose or mouth  Shortness of breath     COLONOSCOPY FINDINGS:  Your colonoscopy showed: two small polyps which were removed; diverticulosis and hemorrhoids. Follow-up Instructions:   Call Dr. Jorge Hernández if any questions or problems. Telephone # [phone]  Dr. Liban Greene office will notify you of the biopsy results within 7 to 10 days. Should have a repeat colonoscopy in 5 years. [employer] Activation    Thank you for requesting access to [employer]. Please follow the instructions below to securely access and download your online medical record.  [employer] allows you to send messages to your doctor, view your test results, renew your prescriptions, schedule appointments, and more. How Do I Sign Up? 1. In your internet browser, go to [email or web address]  2. Click on the First Time User? Click Here link in the Sign In box. You will be redirect to the New Member Sign Up page. 3. Enter your [facility] Access Code exactly as it appears below. You will not need to use this code after youve completed the sign-up process. If you do not sign up before the expiration date, you must request a new code. MyChart Access Code: Activation code not generated  Current [facility] Status: Active (This is the date your Flashpointt access code will )    4. Enter the last four digits of your Social Security Number (xxxx) and Date of Birth (mm/dd/yyyy) as indicated and click Submit. You will be taken to the next sign-up page. 5. Create a [facility] ID. This will be your [facility] login ID and cannot be changed, so think of one that is secure and easy to remember. 6. Create a [facility] password. You can change your password at any time. 7. Enter your Password Reset Question and Answer. This can be used at a later time if you forget your password. 8. Enter your e-mail address. You will receive e-mail notification when new information is available in [address] 9. Click Sign Up. You can now view and download portions of your medical record. 10. Click the Download Summary menu link to download a portable copy of your medical information. Additional Information    If you have questions, please visit the Frequently Asked Questions section of the [facility] website at [email or web address] Remember, [facility] is NOT to be used for urgent needs. For medical emergencies, dial 911.

## 2018-08-07 NOTE — H&P
Mita Hamlin MD  [facility], [address] [place] [address]  [phone]  [email or web address]    Gastroenterology Outpatient History and Physical    Patient: Samantha Forbes    Physician: Shree Bingham MD    Vital Signs: Blood pressure 152/69, pulse 71, temperature 98.1 °F (36.7 °C), resp. rate 19, height 5' 3\" (1.6 m), weight 84.9 kg (187 lb 3 oz), SpO2 97 %. Allergies: Allergies   Allergen Reactions    Adhesive Other (comments)     redness       Chief Complaint: Hx of polyps    History of Present Illness: Personal history of colonic polyps. Last colonoscopy was 2011. Currently has no GI symptoms. No FH of colon cancer or polyps.       History:  Past Medical History:   Diagnosis Date    Arrhythmia     bradycardia    Bloating     CAD (coronary artery disease)     Cancer ([facility] 2010    esophageal/GE junction    Chest pain     Chest pain     Chronic obstructive pulmonary disease (HCC)     Chronic pain     left shoulder    Congestion of throat     Cough     Depression     & anxiety    Diabetes (HCC)     IDDM    Dyspepsia and other specified disorders of function of stomach     Fatigue     GERD (gastroesophageal reflux disease)     Headache(784.0)     Hypercholesterolemia     Hypertension     Multinodular goiter     Nausea & vomiting     Stool color black     Stress     Weakness       Past Surgical History:   Procedure Laterality Date    HX ENDOSCOPY  4/2016    HX GI  2010    gastroesophagectomy    HX HEART CATHETERIZATION  03/2017    stent x 1    HX HEENT  1957    tonsils as child    HX HERNIA REPAIR  04/26/2012    abdominal and umbilical    HX LAP CHOLECYSTECTOMY  1995    HX ORTHOPAEDIC  02/20/2015    right shoulder manipulation    HX OTHER SURGICAL Bilateral      shoulder surgery for frozen surgery    HX OTHER SURGICAL  6/24/2015    mammogram     HX PELVIC LAPAROSCOPY  1989    HX TUBAL LIGATION  1983    lap btl    HX VASCULAR ACCESS      port a cath and removal    MN EGD BALLOON DILATION ESOPHAGUS <30 MM DIAM  4/20/2010         MN EXTRAC ERUPTED TOOTH/EXPOSED ROOT        Social History     Social History    Marital status:      Spouse name: N/A    Number of children: N/A    Years of education: N/A     Social History Main Topics    Smoking status: Never Smoker    Smokeless tobacco: Never Used    Alcohol use 0.5 oz/week     1 Glasses of wine per week      Comment: rarely glass of wine    Drug use: No    Sexual activity: Not Asked     Other Topics Concern    None     Social History Narrative    Lives in [facility] with . Has a 45 yo daughter and an adopted 26 yo daughter. 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 Family History   Problem Relation Age of Onset    Diabetes Father     Cancer Father      intestinal    Heart Disease Father     Hypertension Father     Heart Disease Brother     Diabetes Brother     Diabetes Mother     Lung Disease Mother     Heart Disease Mother     Hypertension Mother     Diabetes Maternal Grandmother     Diabetes Maternal Grandfather     Elevated Lipids Maternal Aunt     Thyroid Disease Neg Hx       Patient Active Problem List   Diagnosis Code    Incisional hernia K43.2    Esophageal cancer ([address] C15.9    Hypercholesteremia E78.00    HTN (hypertension) I10    Asthma J45.909    Depression F32.9    Thyroid nodule E04.1    Shoulder capsulitis M75.80    Angina, class II ([address] I20.9    Family history of coronary artery disease Z80.55    S/P cardiac cath Z98.890    Multinodular goiter E04.2    ASHD (arteriosclerotic heart disease) I25.10    Syncope R55    Bradycardia R00.1    Diabetes mellitus without complication ([address] M09.2    Recurrent depression ([address] F33.9       Medications:   Prior to Admission medications    Medication Sig Start Date End Date Taking?  Authorizing Provider   cholecalciferol, vitamin D3, (VITAMIN D3) 2,000 unit tab Take  by mouth daily. Yes Historical Provider   insulin aspart U-100 (NOVOLOG) 100 unit/mL inpn Inject 4 units every evening when blood sugar over 120 7/20/18  Yes Roberto Wells MD   furosemide (LASIX) 20 mg tablet Take 1 Tab by mouth daily. 7/9/18  Yes Roberto Wells MD   atorvastatin (LIPITOR) 40 mg tablet Take 1 Tab by mouth daily. 6/20/18  Yes Roberto Wells MD   montelukast (SINGULAIR) 10 mg tablet Take 1 Tab by mouth daily. 6/20/18  Yes Roberto Wells MD   amLODIPine (NORVASC) 2.5 mg tablet Take 1 Tab by mouth daily. In place of Ranexa as not covered 4/20/18  Yes Kaleb Ram, JOS   raNITIdine (ZANTAC) 150 mg tablet Take 150 mg by mouth as needed for Indigestion. Yes Historical Provider   Insulin Needles, Disposable, (CLICKFINE) 31 gauge x 5/16\" ndle USE TWICE DAILY AS DIRECTED 4/9/18  Yes Roberto Wells MD   [facility] ULTRA TEST strip CHECK TWICE A DAY 10/12/17  Yes Roberto Wells MD   LANTUS SOLOSTAR 100 unit/mL (3 mL) inpn INJECT 34 UNITS UNDER THE SKIN EVERY MORNING  Patient taking differently: INJECT 34 UNITS UNDER THE SKIN EVERY MORNING; 10/12/17  Yes Roberto Wells MD   SYMBICORT 160-4.5 mcg/actuation HFA inhaler Take 2 Puffs by inhalation two (2) times a day. 2/21/17  Yes Historical Provider   naproxen sodium (ALEVE) 220 mg tablet Take 220 mg by mouth two (2) times daily (with meals). Yes Historical Provider   metoclopramide HCl (REGLAN) 10 mg tablet Take 10 mg by mouth daily. 10/11/15  Yes Historical Provider   polyethylene glycol (MIRALAX) 17 gram packet Take 17 g by mouth daily. Yes Historical Provider   cetirizine (ZYRTEC) 10 mg tablet Take 10 mg by mouth daily. Yes Historical Provider   melatonin 5 mg Tab Take 5 mg by mouth nightly. Yes Historical Provider   Dexlansoprazole (DEXILANT) 60 mg CpDM Take 60 mg by mouth daily. Yes Historical Provider   aspirin 81 mg tablet Take 81 mg by mouth.    Yes Historical Provider pyridoxine (VITAMIN B-6) 100 mg tablet Take 100 mg by mouth every morning. 2/4/11  Yes Historical Provider   losartan (COZAAR) 50 mg tablet Take 1 Tab by mouth daily. 7/30/18   Jolena Ganser, MD   nystatin (MYCOSTATIN) powder Apply  to affected area four (4) times daily. 4/9/18   Jolena Ganser, MD   nystatin (MYCOSTATIN) topical cream Apply  to affected area two (2) times a day. 4/9/18   Jolena Ganser, MD   albuterol (PROVENTIL HFA, VENTOLIN HFA, PROAIR HFA) 90 mcg/actuation inhaler Take 2 Puffs by inhalation as needed.  12/29/17   Jolena Ganser, MD       Physical Exam:     General: well developed, well nourished   HEENT: unremarkable   Heart: regular rhythm no mumur    Lungs: clear   Abdominal:  benign   Neurological: unremarkable   Extremities: no edema     Findings/Diagnosis: Personal history of colonic polyps  Plan of Care/Planned Procedure: Colonoscopy with monitored anesthesia care sedation    Signed:  Deepak Zapata MD 8/7/2018

## 2018-08-07 NOTE — ROUTINE PROCESS
Soham Jono  1955  [id number]    Situation:  Verbal report received from: Risa Lugo RN  Procedure: Procedure(s):  COLONOSCOPY  COLON BIOPSY  ENDOSCOPIC POLYPECTOMY    Background:    Preoperative diagnosis: HISTORY OF COLON POLYP   Postoperative diagnosis:   diverticulosis, colon polyps, hemorrhoids    :  Dr. Anne Ledesma  Assistant(s): Endoscopy RN-1: Isha Current    Specimens:   ID Type Source Tests Collected by Time Destination   1 : bx Preservative   Jerman Mcmahan MD 8/7/2018 6703 Pathology   2 : polyp Preservative Colon, Transverse  Jerman Mcmahan MD 8/7/2018 1456 Pathology     H. Pylori  no    Assessment:  Intra-procedure medications     Anesthesia gave intra-procedure sedation and medications, see anesthesia flow sheet     Intravenous fluids: NS@ KVO     Vital signs stable     Abdominal assessment: round and soft     Recommendation:  Discharge patient per MD order  Family or Friend   Permission to share finding with family or friend yes

## 2018-08-07 NOTE — PERIOP NOTES
Anesthesia reports 280 mg Propofol, 20 mg Lidocaine and 30 mL NS given during procedure. Received report from anesthesia staff on vital signs and status of patient.

## 2018-08-07 NOTE — PROCEDURES
[facility]                  Colonoscopy Operative Report    8/7/2018      Joyce Edwards  [id number]  1955    Procedure Type:   Colonoscopy --screening     Indications:    Personal history of colon polyps (screening only)     Pre-operative Diagnosis: see indication above    Post-operative Diagnosis:  See findings below    :  Eugene Torres MD    Referring Provider: Jessie La MD      Sedation:  MAC anesthesia Propofol    Pre-Procedural Exam:      Airway: clear,  No airway problems anticipated  Heart: RRR, without gallops or rubs  Lungs: clear bilaterally without wheezes, crackles, or rhonchi  Abdomen: soft, nontender, nondistended, bowel sounds present  Mental Status: awake, alert and oriented to person, place and time     Procedure Details:  After informed consent was obtained with all risks and benefits of procedure explained and preoperative exam completed, the patient was taken to the endoscopy suite and placed in the left lateral decubitus position. Upon sequential sedation as per above, a digital rectal exam was performed . The Olympus videocolonoscope  was inserted in the rectum and carefully advanced to the cecum, which was identified by the ileocecal valve and appendiceal orifice. The cecum was identified by the ileocecal valve and appendiceal orifice. The quality of preparation was adequate. The colonoscope was slowly withdrawn with careful evaluation between folds. Retroflexion in the rectum was completed demonstrating internal hemorrhoids. Findings:   Rectum: Grade 1 internal hemorrhoid(s); Sigmoid:     - Diverticulosis  Descending Colon:     - Diverticulosis  Transverse Colon: 6 mm polyp, cold snared  4 mm polyp at hepatic flexure, cold biopsied off  Ascending Colon: normal  Cecum: normal  Terminal Ileum: not intubated      Specimen Removed  1. hepatic flexure polyp  2. transverse colon polyp    Complications: None.      EBL: None.    Impression:    Two small polyps removed, left sided diverticulosis and hemorrhoids    Recommendations: --Await pathology. , -Repeat colonoscopy in 5 years. High fiber diet. Resume normal medication(s). Discharge Disposition:  Home in the company of a  when able to ambulate. Luis Childers MD    8/7/2018     HEMAL Vasquez MD  [facility], [address] [place] [address]  [phone]  [email or web address]

## 2018-10-10 ENCOUNTER — APPOINTMENT (OUTPATIENT)
Dept: INTERNAL MEDICINE CLINIC | Age: 63
End: 2018-10-10

## 2018-10-10 DIAGNOSIS — I10 HYPERTENSION, UNSPECIFIED TYPE: ICD-10-CM

## 2018-10-10 DIAGNOSIS — K21.9 GASTROESOPHAGEAL REFLUX DISEASE WITHOUT ESOPHAGITIS: ICD-10-CM

## 2018-10-10 DIAGNOSIS — C15.9 MALIGNANT NEOPLASM OF ESOPHAGUS, UNSPECIFIED LOCATION (HCC): Chronic | ICD-10-CM

## 2018-10-10 DIAGNOSIS — E11.9 DIABETES MELLITUS WITHOUT COMPLICATION (HCC): ICD-10-CM

## 2018-10-10 DIAGNOSIS — E78.00 HYPERCHOLESTEREMIA: ICD-10-CM

## 2018-10-10 DIAGNOSIS — E66.9 OBESITY (BMI 30.0-34.9): ICD-10-CM

## 2018-10-10 DIAGNOSIS — I25.10 ASHD (ARTERIOSCLEROTIC HEART DISEASE): ICD-10-CM

## 2018-10-10 DIAGNOSIS — J45.20 MILD INTERMITTENT ASTHMA WITHOUT COMPLICATION: ICD-10-CM

## 2018-10-11 LAB
BUN SERPL-MCNC: 10 MG/DL (ref 8–27)
BUN/CREAT SERPL: 12 (ref 12–28)
CALCIUM SERPL-MCNC: 8.5 MG/DL (ref 8.7–10.3)
CHLORIDE SERPL-SCNC: 103 MMOL/L (ref 96–106)
CO2 SERPL-SCNC: 22 MMOL/L (ref 20–29)
CREAT SERPL-MCNC: 0.81 MG/DL (ref 0.57–1)
EST. AVERAGE GLUCOSE BLD GHB EST-MCNC: 169 MG/DL
GLUCOSE SERPL-MCNC: 160 MG/DL (ref 65–99)
HBA1C MFR BLD: 7.5 % (ref 4.8–5.6)
POTASSIUM SERPL-SCNC: 4.4 MMOL/L (ref 3.5–5.2)
SODIUM SERPL-SCNC: 139 MMOL/L (ref 134–144)

## 2018-10-16 ENCOUNTER — OFFICE VISIT (OUTPATIENT)
Dept: INTERNAL MEDICINE CLINIC | Age: 63
End: 2018-10-16

## 2018-10-16 VITALS
OXYGEN SATURATION: 99 % | RESPIRATION RATE: 16 BRPM | TEMPERATURE: 98 F | BODY MASS INDEX: 32.78 KG/M2 | HEIGHT: 63 IN | DIASTOLIC BLOOD PRESSURE: 67 MMHG | HEART RATE: 50 BPM | SYSTOLIC BLOOD PRESSURE: 130 MMHG | WEIGHT: 185 LBS

## 2018-10-16 DIAGNOSIS — Z23 ENCOUNTER FOR IMMUNIZATION: ICD-10-CM

## 2018-10-16 DIAGNOSIS — R60.0 LOCALIZED EDEMA: ICD-10-CM

## 2018-10-16 DIAGNOSIS — E66.9 OBESITY (BMI 30.0-34.9): ICD-10-CM

## 2018-10-16 DIAGNOSIS — I20.9 ANGINA, CLASS II (HCC): ICD-10-CM

## 2018-10-16 DIAGNOSIS — J45.20 MILD INTERMITTENT ASTHMA WITHOUT COMPLICATION: ICD-10-CM

## 2018-10-16 DIAGNOSIS — I25.10 ASHD (ARTERIOSCLEROTIC HEART DISEASE): ICD-10-CM

## 2018-10-16 DIAGNOSIS — C15.9 MALIGNANT NEOPLASM OF ESOPHAGUS, UNSPECIFIED LOCATION (HCC): ICD-10-CM

## 2018-10-16 DIAGNOSIS — F32.9 REACTIVE DEPRESSION: ICD-10-CM

## 2018-10-16 DIAGNOSIS — E11.9 DIABETES MELLITUS WITHOUT COMPLICATION (HCC): ICD-10-CM

## 2018-10-16 DIAGNOSIS — E78.00 HYPERCHOLESTEREMIA: ICD-10-CM

## 2018-10-16 DIAGNOSIS — E83.51 HYPOCALCEMIA: ICD-10-CM

## 2018-10-16 DIAGNOSIS — I10 HYPERTENSION, UNSPECIFIED TYPE: Primary | ICD-10-CM

## 2018-10-16 NOTE — PATIENT INSTRUCTIONS
GIVE NOVOLOG 6 UNITS WITH DINNER EVERY NIGHT 
 
IF PREDINNER SUGAR IS GREATER THAN 150 GIVE 8UNITS 
 
IF > 200 GIVE 10 UNITS 
IF > 250 GIVE 12UNITS 
 
IF  GREATER THAN 300 GIVE 14UNITS IF LOW SUGARS DEVELOP CONTACT THE OFFICE

## 2018-10-16 NOTE — PROGRESS NOTES
HISTORY OF PRESENT ILLNESS Julián Alvarado is a 70 y.o. female. HPI Last here 7/9/18. Pt is here to f/u on chronic conditions 
   
BP today is 130/67 States her BP was OK the couple of times she took it Diovan 160mg was changed to losartan 50mg daily d/t recall Is now taking norvasc 2.5mg to prevent CP and control BP   
 
She has been taking lasix a few tmes per week- R chronically larger than L This is stable, improved from that past, lasix helps 
  
BS at home running around 110-120s, 112, 95, 110, 93, 115 (more recently)--prior in the 150-160 fasting range in July BS was 140s, 160s in July in the AM, and 200s in the PM 
No lows Lov, increased humalog sliding scale by 2U --humalog was changed to novolog x lov Continues novolog 4U with dinner +sliding scale - usually takes 6U, occasionally more Will increase novolog to 6U with dinner - 8U if >150, 10U >200, 12U if >250, 14U if >300 Lov, increased lantus to 32U (from 29U qAM) - reports compliance She also takes ASA 81mg daily 
   
Reviewed labs 10/18   
[facility] is down 2 lbs since last visit Pt states she has cut out a lot of junk food, and that she does not eat large portions Discussed monitoring calories using her phone and decreasing daily total 
Discussed Chuck Kay she is sceptical of this Discussed nutritionist she is not interested Discussed diet and w/l  
   
Pt follows with Dr. Koby Leigh (cardio) Last visit was 7/2/18 Pt is no longer taking plavix as of 3/18 Pt is no longer on ranexa dt/ cost 
Pt is now taking norvasc to prevent CP Next visit scheduled for 2/5/18 
   
Pt follows annually with Dr. Anna Prajapati (hemo/onc) for h/o adenoma of esophagus Reviewed notes 7/3/18: h/o adenocarcinoma at [employer] [facility], finished chemo 6/2010, no recent sing of disease, she is 8 years out Per pt, she was dismissed unless sx arise 
   
Pt follows with Dr. Luis Manuel Henderson (pulm) Last visit was 2/18 Continues singulair daily, albuterol prn and symbicort BID for breathing/asthma per pulm, which help She had a cold earlier this month and used her albuterol 1-2x a day for about a month 
   
Pt has not been evaluated for DANIELLE in the past 
Recall she declines further evaluation for now  
   
Continues lipitor 40mg daily for cholesterol 
   
Continues dexilant and reglan daily for reflux, which works well Helps empty her stomach cant take more reglan b/c gets jittery She also takes zantac qhs prn (not daily) She avoids trigger foods and rarely has reflux/aspiration Recall has h/o esophageal cancer Pt c/o congestion and coughing up mucous She takes zyrtec Advised her to use flonase as well Reviewed mammo 7/18: nl 
   
Of note, her  has bladder cancer and she is caring for him at home. 
   
ACP not on file. SDM is her . Provided information in the past.  
   
PREVENTIVE:   
Colonoscopy: 8/7/18, Dr. Dyana La, repeat 5 years EGD: 4/16, Dr. Dyana La, h/o esophageal cancer, polyp on recent EGD, biopsy nl 
AAA: FMHx (grandfather) Pap: Dr. Segura Or, 5/15/18 Mammogram: 7/23/18, negative Dexa: 4/18, mild osteopenia Tdap: 9/14/2016 Pneumovax: 4/26/2009 Lzbomjk84: 11/14/2016 Zostavax: 11/18/2016, reaction on R arm Shingrix: ordered 04/09/18, discussed this should be fine despite reaction to zostavax Flu shot: 10/16/18 Foot exam: 04/09/18  
Microalbumin: 7/18, minimal 
A1c:,9/16 6.7, 12/16 6.2, 3/17 6.8, 6/17 8.8, 9/17 6.2, 12/17 6.4, 4/18 7.3, 7/18 7.4, 10/18 7.5 Eye exam: Dr. Vesta Delgado, 11/17,  Due next month   
Lipids: 7/18 LDL 66 Hep C screen: 11/15, negative EKG: declines today, will get with cardio in 5/18 Patient Active Problem List  
 Diagnosis Date Noted  Recurrent depression ([facility] 12/29/2017  Diabetes mellitus without complication ([facility] 25/23/0579  ASHD (arteriosclerotic heart disease) 06/18/2014  Syncope 06/18/2014  Bradycardia 06/18/2014  Multinodular goiter  Angina, class II ([facility] 04/18/2013  Family history of coronary artery disease 04/18/2013  S/P cardiac cath 04/18/2013  Hypercholesteremia 02/18/2013  
 HTN (hypertension) 02/18/2013  Asthma 02/18/2013  Depression 02/18/2013  Thyroid nodule 02/18/2013  Shoulder capsulitis 02/18/2013  Incisional hernia 02/10/2011  Esophageal cancer ([address] 02/10/2011 Current Outpatient Prescriptions Medication Sig Dispense Refill  insulin glargine (LANTUS SOLOSTAR U-100 INSULIN) 100 unit/mL (3 mL) inpn 32 -34units daily 45 mL 2  cholecalciferol, vitamin D3, (VITAMIN D3) 2,000 unit tab Take  by mouth daily.  losartan (COZAAR) 50 mg tablet Take 1 Tab by mouth daily. 90 Tab 1  
 insulin aspart U-100 (NOVOLOG) 100 unit/mL inpn Inject 4 units every evening when blood sugar over 120 4 Adjustable Dose Pre-filled Pen Syringe 1  
 furosemide (LASIX) 20 mg tablet Take 1 Tab by mouth daily. 90 Tab 1  
 atorvastatin (LIPITOR) 40 mg tablet Take 1 Tab by mouth daily. 90 Tab 2  
 montelukast (SINGULAIR) 10 mg tablet Take 1 Tab by mouth daily. 90 Tab 1  
 amLODIPine (NORVASC) 2.5 mg tablet Take 1 Tab by mouth daily. In place of Ranexa as not covered 90 Tab 3  
 raNITIdine (ZANTAC) 150 mg tablet Take 150 mg by mouth as needed for Indigestion.  Insulin Needles, Disposable, (CLICKFINE) 31 gauge x 5/16\" ndle USE TWICE DAILY AS DIRECTED 100 Package 10  
 nystatin (MYCOSTATIN) powder Apply  to affected area four (4) times daily. 1 Bottle 3  
 nystatin (MYCOSTATIN) topical cream Apply  to affected area two (2) times a day. 15 g 1  
 albuterol (PROVENTIL HFA, VENTOLIN HFA, PROAIR HFA) 90 mcg/actuation inhaler Take 2 Puffs by inhalation as needed. 1 Inhaler 3  
 ONETOUCH ULTRA TEST strip CHECK TWICE A  Strip 12  
 SYMBICORT 160-4.5 mcg/actuation HFA inhaler Take 2 Puffs by inhalation two (2) times a day.  naproxen sodium (ALEVE) 220 mg tablet Take 220 mg by mouth two (2) times daily (with meals).  metoclopramide HCl (REGLAN) 10 mg tablet Take 10 mg by mouth daily.  polyethylene glycol (MIRALAX) 17 gram packet Take 17 g by mouth daily.  cetirizine (ZYRTEC) 10 mg tablet Take 10 mg by mouth daily.  melatonin 5 mg Tab Take 5 mg by mouth nightly.  Dexlansoprazole (DEXILANT) 60 mg CpDM Take 60 mg by mouth daily.  aspirin 81 mg tablet Take 81 mg by mouth.  pyridoxine (VITAMIN B-6) 100 mg tablet Take 100 mg by mouth every morning. Past Surgical History:  
Procedure Laterality Date  COLONOSCOPY N/A 8/7/2018 COLONOSCOPY performed by Alexey Og MD at [facility] ENDOSCOPY  COLORECTAL SCRN; HI RISK IND  8/7/2018  HX ENDOSCOPY  4/2016  HX GI  2010  
 gastroesophagectomy  HX HEART CATHETERIZATION  03/2017  
 stent x 1  
 HX HEENT  1957  
 tonsils as child  HX HERNIA REPAIR  04/26/2012  
 abdominal and umbilical  
 HX LAP CHOLECYSTECTOMY  1995  HX ORTHOPAEDIC  02/20/2015  
 right shoulder manipulation  HX OTHER SURGICAL Bilateral   
  shoulder surgery for frozen surgery  HX OTHER SURGICAL  6/24/2015  
 mammogram   
[address]  HX TUBAL LIGATION  1983  
 lap btl  HX VASCULAR ACCESS    
 port a cath and removal  
 NH EGD BALLOON DILATION ESOPHAGUS <30 MM DIAM  4/20/2010  NH EXTRAC ERUPTED TOOTH/EXPOSED ROOT Lab Results Component Value Date/Time WBC 5.3 04/02/2018 08:24 AM  
HGB 12.3 04/02/2018 08:24 AM  
Hemoglobin (POC) 11.9 01/28/2010 12:02 PM  
HCT 37.9 04/02/2018 08:24 AM  
Hematocrit (POC) 35 01/28/2010 12:02 PM  
PLATELET 658 13/73/6792 08:24 AM  
MCV 94 04/02/2018 08:24 AM  
 
Lab Results Component Value Date/Time Cholesterol, total 126 07/05/2018 08:34 AM  
HDL Cholesterol 39 (L) 07/05/2018 08:34 AM  
LDL, calculated 66 07/05/2018 08:34 AM  
Triglyceride 104 07/05/2018 08:34 AM  
 
Lab Results Component Value Date/Time GFR est non-AA 76 10/10/2018 08:46 AM  
 GFRNA, POC >60 01/06/2012 10:42 AM  
GFR est AA 88 10/10/2018 08:46 AM  
GFRAA, POC >60 01/06/2012 10:42 AM  
Creatinine 0.81 10/10/2018 08:46 AM  
Creatinine (POC) 0.9 01/06/2012 10:42 AM  
BUN 10 10/10/2018 08:46 AM  
BUN (POC) 14 01/28/2010 12:02 PM  
Sodium 139 10/10/2018 08:46 AM  
Sodium (POC) 141 01/28/2010 12:02 PM  
Potassium 4.4 10/10/2018 08:46 AM  
Potassium (POC) 4.0 01/28/2010 12:02 PM  
Chloride 103 10/10/2018 08:46 AM  
Chloride (POC) 103 01/28/2010 12:02 PM  
CO2 22 10/10/2018 08:46 AM  
Magnesium 1.9 02/08/2010 03:00 AM  
  
Review of Systems Respiratory: Negative for shortness of breath. Cardiovascular: Negative for chest pain. Physical Exam  
Constitutional: She is oriented to person, place, and time. She appears well-developed and well-nourished. No distress. HENT:  
Head: Normocephalic and atraumatic. Right Ear: External ear normal.  
Left Ear: External ear normal.  
Mouth/Throat: Oropharynx is clear and moist. No oropharyngeal exudate. Eyes: Conjunctivae and EOM are normal. Right eye exhibits no discharge. Left eye exhibits no discharge. Neck: Normal range of motion. Neck supple. Cardiovascular: Normal rate, regular rhythm, normal heart sounds and intact distal pulses. Exam reveals no gallop and no friction rub. No murmur heard. Pulmonary/Chest: Effort normal and breath sounds normal. No respiratory distress. She has no wheezes. She has no rales. She exhibits no tenderness. Musculoskeletal: Normal range of motion. She exhibits no edema, tenderness or deformity. Lymphadenopathy:  
  She has no cervical adenopathy. Neurological: She is alert and oriented to person, place, and time. Coordination normal.  
Skin: Skin is warm and dry. No rash noted. She is not diaphoretic. No erythema. No pallor. Psychiatric: She has a normal mood and affect. Her behavior is normal.  
 
 
ASSESSMENT and PLAN 
  ICD-10-CM ICD-9-CM 1. Hypertension, unspecified type Well controlled on diovan 160 and norvasc 2.5mg daily I10 401.9 2. Diabetes mellitus without complication ([facility] a1c slightly above goal, progressively climbing x last check, currently on lantus 32U qhs and now novolog 4U with dinner + sliding scale, continue lantus, increase novolog to 6U with dinner every night and adjusted sliding scale E11.9 250.00   
      
4. Hypercholesteremia Controlled on lipitor in July E78.00 272.0   
5. Angina, class II ([facility] Has CAD, follows with Dr Oralia Lewis, UTD from 7/18, no longer on plavix or ranexa, continues on daily ASA, medically managed with Ca channel and statin as well 
 I20.9 413.9 6. ASHD (arteriosclerotic heart disease) See above 
 I25.10 414.00   
7. Malignant neoplasm of esophagus, unspecified location ([facility] Saw Dr Oscar Ambrose in 7/18, remains in remission, follows with Dr Ale Quevedo as well, has been on reglan x her surg for this years ago, EGD per Dr Ale Quevedo C15.9 150.9 8. Obesity (BMI 30.0-34. 9) Discussed diet, w/l, nutritionist, and WW 
 E66.9 278.00   
9. Hypocalcemia Start daily Ca supplementation E83.51 275.41   
10. Localized edema Stable on lasix daily, R leg chronically larger than L 
 R60.0 782. 3 Scribed by Erendira Reza of [address], as dictated by Dr. Iram Cruz. Current diagnosis and concerns discussed with pt at length. Pt understands risks and benefits or current treatment plan and medications, and accepts the treatment and medication with any possible risks. Pt asks appropriate questions, which were answered. Pt was instructed to call with any concerns or problems. This note will not be viewable in [address]

## 2018-10-16 NOTE — PROGRESS NOTES
After obtaining consent, and per verbal order from Dr. Latonia Fierro, patient received influenza vaccine given by Keegan Honeycutt LPN in L Deltoid. Influenza Vaccine 0.5 mL IM now. Patient was observed for 10 minutes post injection. Patient tolerated injection well. VIS given.

## 2018-10-16 NOTE — MR AVS SNAPSHOT
[facility] [address] [facility] 
[phone] Patient: Jamison Chan MRN: [id number] WSB:[id number] Visit Information Date & Time Provider Department Dept. Phone Encounter #  
 10/16/2018  1:00 PM Rajan Mares, [address] [place] [phone] [id number] Follow-up Instructions Return in about 3 months (around 1/16/2019). Your Appointments 2/5/2019  9:45 AM  
ESTABLISHED PATIENT with Gabbi Quintanilla MD  
[facility] [address]) Appt Note: Dr Robins Early  
 [id number] [facility]  
[phone] [address] [facility] Upcoming Health Maintenance Date Due Shingrix Vaccine Age 50> (1 of 2) 2/18/2003 Pneumococcal 65+ High/Highest Risk (2 of 2 - PPSV23) 2/18/2018 Influenza Age 5 to Adult 8/1/2018 EYE EXAM RETINAL OR DILATED Q1 11/10/2018 FOOT EXAM Q1 4/9/2019 MEDICARE YEARLY EXAM 4/10/2019 HEMOGLOBIN A1C Q6M 4/10/2019 MICROALBUMIN Q1 7/5/2019 LIPID PANEL Q1 7/5/2019 GLAUCOMA SCREENING Q2Y 11/10/2019 BREAST CANCER SCRN MAMMOGRAM 7/23/2020 COLONOSCOPY 8/7/2023 DTaP/Tdap/Td series (2 - Td) 9/14/2026 Allergies as of 10/16/2018  Review Complete On: 10/16/2018 By: Rajan Mares MD  
  
 Severity Noted Reaction Type Reactions Adhesive Low 04/25/2012   Topical Other (comments)  
 redness Current Immunizations  Reviewed on 4/26/2012 Name Date Influenza Vaccine (Quad) PF 10/19/2017, 10/13/2016, 10/14/2015 Influenza Vaccine Split 10/26/2011 Pneumococcal Conjugate (PCV-13) 11/14/2016 Tdap 9/14/2016 ZZZ-RETIRED (DO NOT USE) Pneumococcal Vaccine (Unspecified Type) 4/26/2009 Zoster Vaccine, Live 11/18/2015 Not reviewed this visit You Were Diagnosed With   
  
 Codes Comments Hypertension, unspecified type    -  Primary ICD-10-CM: I10 
ICD-9-CM: 401.9 Diabetes mellitus without complication ([facility]     TKG-02-SS: E11.9 ICD-9-CM: 250.00 Reactive depression     ICD-10-CM: F32.9 ICD-9-CM: 300.4 Hypercholesteremia     ICD-10-CM: E78.00 ICD-9-CM: 272.0 Angina, class II ([facility]     ICD-10-CM: I20.9 ICD-9-CM: 413.9 ASHD (arteriosclerotic heart disease)     ICD-10-CM: I25.10 ICD-9-CM: 414.00 Malignant neoplasm of esophagus, unspecified location [facility])     ICD-10-CM: C15.9 ICD-9-CM: 150.9 Obesity (BMI 30.0-34.9)     ICD-10-CM: B42.3 ICD-9-CM: 278.00 Hypocalcemia     ICD-10-CM: E83.51 
ICD-9-CM: 275.41 Localized edema     ICD-10-CM: R60.0 ICD-9-CM: 782.3 Mild intermittent asthma without complication     YOQ-41-AT: J45.20 ICD-9-CM: 493.90 Vitals BP Pulse Temp Resp Height(growth percentile) Weight(growth percentile) 130/67 (BP 1 Location: Left arm, BP Patient Position: Sitting) (!) 50 98 °F (36.7 °C) (Oral) 16 5' 3\" (1.6 m) 185 lb (83.9 kg) SpO2 BMI OB Status Smoking Status 99% 32.77 kg/m2 Postmenopausal Never Smoker Vitals History BMI and BSA Data Body Mass Index Body Surface Area 32.77 kg/m 2 1.93 m 2 Preferred Pharmacy Pharmacy Name Phone [place] [id number] [address] [phone] Your Updated Medication List  
  
   
This list is accurate as of 10/16/18  1:22 PM.  Always use your most recent med list.  
  
  
  
  
 albuterol 90 mcg/actuation inhaler Commonly known as:  PROVENTIL HFA, VENTOLIN HFA, PROAIR HFA Take 2 Puffs by inhalation as needed. amLODIPine 2.5 mg tablet Commonly known as:  Cuong Penningtonrid Take 1 Tab by mouth daily. In place of Ranexa as not covered  
  
 aspirin 81 mg tablet Take 81 mg by mouth. atorvastatin 40 mg tablet Commonly known as:  LIPITOR Take 1 Tab by mouth daily. DEXILANT 60 mg Cpdb Generic drug:  Dexlansoprazole Take 60 mg by mouth daily. furosemide 20 mg tablet Commonly known as:  LASIX Take 1 Tab by mouth daily. insulin aspart U-100 100 unit/mL Inpn Commonly known as:  Gary Coxn Inject 4 units every evening when blood sugar over 120  
  
 insulin glargine 100 unit/mL (3 mL) Inpn Commonly known as:  LANTUS SOLOSTAR U-100 INSULIN  
32 -34units daily  
  
 losartan 50 mg tablet Commonly known as:  COZAAR Take 1 Tab by mouth daily. melatonin Tab tablet Take 5 mg by mouth nightly. metoclopramide HCl 10 mg tablet Commonly known as:  REGLAN Take 10 mg by mouth daily. MIRALAX 17 gram packet Generic drug:  polyethylene glycol Take 17 g by mouth daily. montelukast 10 mg tablet Commonly known as:  SINGULAIR Take 1 Tab by mouth daily. * nystatin powder Commonly known as:  MYCOSTATIN Apply  to affected area four (4) times daily. * nystatin topical cream  
Commonly known as:  MYCOSTATIN Apply  to affected area two (2) times a day. ONETOUCH ULTRA TEST strip Generic drug:  glucose blood VI test strips CHECK TWICE A DAY  
  
 SYMBICORT 160-4.5 mcg/actuation Hfaa Generic drug:  budesonide-formoterol Take 2 Puffs by inhalation two (2) times a day. VITAMIN B-6 100 mg tablet Generic drug:  pyridoxine (vitamin B6) Take 100 mg by mouth every morning. VITAMIN D3 2,000 unit Tab Generic drug:  cholecalciferol (vitamin D3) Take  by mouth daily. ZANTAC 150 mg tablet Generic drug:  raNITIdine Take 150 mg by mouth as needed for Indigestion. ZyrTEC 10 mg tablet Generic drug:  cetirizine Take 10 mg by mouth daily. * Notice: This list has 2 medication(s) that are the same as other medications prescribed for you. Read the directions carefully, and ask your doctor or other care provider to review them with you. Follow-up Instructions Return in about 3 months (around 1/16/2019). To-Do List   
 10/17/2018 Lab:  HEMOGLOBIN A1C WITH EAG   
  
 10/17/2018 Lab:  METABOLIC PANEL, COMPREHENSIVE   
  
 10/17/2018 Lab:  TSH 3RD GENERATION Patient Instructions GIVE NOVOLOG 6 UNITS WITH DINNER EVERY NIGHT 
 
IF PREDINNER SUGAR IS GREATER THAN 150 GIVE 8UNITS 
 
IF > 200 GIVE 10 UNITS 
IF > 250 GIVE 12UNITS 
 
IF  GREATER THAN 300 GIVE 14UNITS IF LOW SUGARS DEVELOP CONTACT THE OFFICE Introducing [facility]! Dear Tahmina Grover: Thank you for requesting a [employer] account. Our records indicate that you already have an active [employer] account. You can access your account anytime at [email or web address] Make YES! [email or web address] Did you know that you can access your hospital and ER discharge instructions at any time in [employer]? You can also review all of your test results from your hospital stay or ER visit. Additional Information If you have questions, please visit the Frequently Asked Questions section of the [employer] website at [email or web address] Remember, [employer] is NOT to be used for urgent needs. For medical emergencies, dial 911. Now available from your iPhone and Android! Please provide this summary of care documentation to your next provider. Your primary care clinician is listed as Iftikhar Cardona. If you have any questions after today's visit, please call [phone].

## 2018-11-12 RX ORDER — MONTELUKAST SODIUM 10 MG/1
TABLET ORAL
Qty: 90 TAB | Refills: 1 | Status: SHIPPED | OUTPATIENT
Start: 2018-11-12 | End: 2019-06-26 | Stop reason: SDUPTHER

## 2018-11-12 RX ORDER — LOSARTAN POTASSIUM 50 MG/1
TABLET ORAL
Qty: 90 TAB | Refills: 1 | Status: SHIPPED | OUTPATIENT
Start: 2018-11-12 | End: 2019-05-13 | Stop reason: SDUPTHER

## 2018-12-24 ENCOUNTER — OFFICE VISIT (OUTPATIENT)
Dept: URGENT CARE | Age: 63
End: 2018-12-24

## 2018-12-24 VITALS
WEIGHT: 183 LBS | HEART RATE: 75 BPM | BODY MASS INDEX: 32.43 KG/M2 | TEMPERATURE: 98.5 F | SYSTOLIC BLOOD PRESSURE: 158 MMHG | OXYGEN SATURATION: 98 % | RESPIRATION RATE: 18 BRPM | HEIGHT: 63 IN | DIASTOLIC BLOOD PRESSURE: 72 MMHG

## 2018-12-24 DIAGNOSIS — R06.2 WHEEZING: ICD-10-CM

## 2018-12-24 DIAGNOSIS — J32.9 SINUSITIS, UNSPECIFIED CHRONICITY, UNSPECIFIED LOCATION: Primary | ICD-10-CM

## 2018-12-24 RX ORDER — IPRATROPIUM BROMIDE AND ALBUTEROL SULFATE 2.5; .5 MG/3ML; MG/3ML
3 SOLUTION RESPIRATORY (INHALATION)
Status: COMPLETED | OUTPATIENT
Start: 2018-12-24 | End: 2018-12-24

## 2018-12-24 RX ORDER — AZITHROMYCIN 250 MG/1
TABLET, FILM COATED ORAL
Qty: 6 TAB | Refills: 0 | Status: SHIPPED | OUTPATIENT
Start: 2018-12-24 | End: 2019-01-16

## 2018-12-24 RX ADMIN — IPRATROPIUM BROMIDE AND ALBUTEROL SULFATE 3 ML: 2.5; .5 SOLUTION RESPIRATORY (INHALATION) at 13:00

## 2018-12-24 NOTE — PROGRESS NOTES
Cough   The history is provided by the patient. This is a new problem. Episode onset: 1 week ago. The problem occurs every few minutes. The problem has been gradually worsening. The cough is productive of sputum. There has been no fever. Associated symptoms include rhinorrhea, shortness of breath and wheezing. Pertinent negatives include no chest pain, no chills, no sweats, no ear congestion, no ear pain, no headaches, no sore throat, no myalgias, no nausea and no vomiting. Associated symptoms comments: Sinus pressure/pain. She has tried inhalers for the symptoms. The treatment provided no relief. Her past medical history is significant for COPD. Past medical history comments: IDDM, esophageal/gastric cancer s/p resection.         Past Medical History:   Diagnosis Date    Arrhythmia     bradycardia    Bloating     CAD (coronary artery disease)     Cancer ([facility] [address] 2010    esophageal/GE junction    Chest pain     Chest pain     Chronic obstructive pulmonary disease (HCC)     Chronic pain     left shoulder    Congestion of throat     Cough     Depression     & anxiety    Diabetes (HCC)     IDDM    Dyspepsia and other specified disorders of function of stomach     Fatigue     GERD (gastroesophageal reflux disease)     Headache(784.0)     Hypercholesterolemia     Hypertension     Multinodular goiter     Nausea & vomiting     Stool color black     Stress     Weakness         Past Surgical History:   Procedure Laterality Date    COLONOSCOPY N/A 8/7/2018    COLONOSCOPY performed by Anthony Francois MD at [address] [facility]; HI RISK IND  8/7/2018         HX ENDOSCOPY  4/2016    HX GI  2010    gastroesophagectomy    HX HEART CATHETERIZATION  03/2017    stent x 1    HX HEENT  1957    tonsils as child    HX HERNIA REPAIR  04/26/2012    abdominal and umbilical    HX LAP CHOLECYSTECTOMY  1995    HX ORTHOPAEDIC  02/20/2015    right shoulder manipulation    HX OTHER SURGICAL Bilateral      shoulder surgery for frozen surgery    HX OTHER SURGICAL  6/24/2015    mammogram     HX PELVIC LAPAROSCOPY  1989    HX TUBAL LIGATION  1983    lap btl    HX VASCULAR ACCESS      port a cath and removal    AR EGD BALLOON DILATION ESOPHAGUS <30 MM DIAM  4/20/2010         AR EXTRAC ERUPTED TOOTH/EXPOSED ROOT           Family History   Problem Relation Age of Onset    Diabetes Father     Cancer Father         intestinal    Heart Disease Father     Hypertension Father     Heart Disease Brother     Diabetes Brother     Diabetes Mother     Lung Disease Mother     Heart Disease Mother     Hypertension Mother     Diabetes Maternal Grandmother     Diabetes Maternal Grandfather     Elevated Lipids Maternal Aunt     Thyroid Disease Neg Hx         Social History     Socioeconomic History    Marital status:      Spouse name: Not on file    Number of children: Not on file    Years of education: Not on file    Highest education level: Not on file   Social Needs    Financial resource strain: Not on file    Food insecurity - worry: Not on file    Food insecurity - inability: Not on file   [employer] needs - medical: Not on file   [employer] needs - non-medical: Not on file   Occupational History    Not on file   Tobacco Use    Smoking status: Never Smoker    Smokeless tobacco: Never Used   Substance and Sexual Activity    Alcohol use: Yes     Alcohol/week: 0.5 oz     Types: 1 Glasses of wine per week     Comment: rarely glass of wine    Drug use: No    Sexual activity: Not on file   Other Topics Concern    Not on file   Social History Narrative    Lives in [facility] with . Has a 45 yo daughter and an adopted 24 yo daughter. 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 ALLERGIES: Adhesive    Review of Systems   Constitutional: Negative for activity change, appetite change, chills and fever.    HENT: Positive for congestion, rhinorrhea, sinus pressure and sinus pain. Negative for ear pain, sore throat and trouble swallowing. Respiratory: Positive for cough, shortness of breath and wheezing. Cardiovascular: Negative for chest pain and palpitations. Gastrointestinal: Negative for nausea and vomiting. Musculoskeletal: Negative for myalgias. Neurological: Negative for dizziness and headaches. Hematological: Negative for adenopathy. Vitals:    12/24/18 1237   BP: 158/72   Pulse: 75   Resp: 18   Temp: 98.5 °F (36.9 °C)   SpO2: 98%   Weight: 183 lb (83 kg)   Height: 5' 3\" (1.6 m)       Physical Exam   Constitutional: She appears well-developed and well-nourished. No distress. HENT:   Right Ear: Tympanic membrane, external ear and ear canal normal.   Left Ear: Tympanic membrane, external ear and ear canal normal.   Nose: Rhinorrhea present. Right sinus exhibits maxillary sinus tenderness. Right sinus exhibits no frontal sinus tenderness. Left sinus exhibits maxillary sinus tenderness. Left sinus exhibits no frontal sinus tenderness. Mouth/Throat: Oropharynx is clear and moist and mucous membranes are normal. No oropharyngeal exudate, posterior oropharyngeal edema, posterior oropharyngeal erythema or tonsillar abscesses. Cardiovascular: Normal rate, regular rhythm and normal heart sounds. Pulmonary/Chest: Effort normal. No respiratory distress. She has no decreased breath sounds. She has wheezes in the right lower field and the left lower field. She has no rhonchi. She has no rales. Lymphadenopathy:     She has cervical adenopathy. Neurological: She is alert. Skin: She is not diaphoretic. Psychiatric: She has a normal mood and affect. Her behavior is normal. Judgment and thought content normal.   Nursing note and vitals reviewed. [facility]    ICD-10-CM ICD-9-CM    1. Sinusitis, unspecified chronicity, unspecified location J32.9 473.9    2.  Wheezing R06.2 786.07      Medications Ordered Today   Medications    albuterol-ipratropium (DUO-NEB) 2.5 MG-0.5 MG/3 ML     Order Specific Question:   MODE OF DELIVERY     Answer:   Nebulizer    azithromycin (ZITHROMAX) 250 mg tablet     Sig: Take two tablets today then one tablet daily     Dispense:  6 Tab     Refill:  0     Pt reports improvement s/p duo-neb    The patients condition was discussed with the patient and they understand. The patient is to follow up with PCP. If signs and symptoms become worse the pt is to go to the ER. The patient is to take medications as prescribed.              Procedures

## 2019-01-09 RX ORDER — FUROSEMIDE 20 MG/1
TABLET ORAL
Qty: 90 TAB | Refills: 1 | Status: SHIPPED | OUTPATIENT
Start: 2019-01-09 | End: 2019-07-26 | Stop reason: SDUPTHER

## 2019-01-10 ENCOUNTER — APPOINTMENT (OUTPATIENT)
Dept: INTERNAL MEDICINE CLINIC | Age: 64
End: 2019-01-10

## 2019-01-10 DIAGNOSIS — C15.9 MALIGNANT NEOPLASM OF ESOPHAGUS, UNSPECIFIED LOCATION (HCC): ICD-10-CM

## 2019-01-10 DIAGNOSIS — I10 HYPERTENSION, UNSPECIFIED TYPE: ICD-10-CM

## 2019-01-10 DIAGNOSIS — E66.9 OBESITY (BMI 30.0-34.9): ICD-10-CM

## 2019-01-10 DIAGNOSIS — E83.51 HYPOCALCEMIA: ICD-10-CM

## 2019-01-10 DIAGNOSIS — I20.9 ANGINA, CLASS II (HCC): ICD-10-CM

## 2019-01-10 DIAGNOSIS — E11.9 DIABETES MELLITUS WITHOUT COMPLICATION (HCC): ICD-10-CM

## 2019-01-10 DIAGNOSIS — E78.00 HYPERCHOLESTEREMIA: ICD-10-CM

## 2019-01-10 DIAGNOSIS — I25.10 ASHD (ARTERIOSCLEROTIC HEART DISEASE): ICD-10-CM

## 2019-01-10 DIAGNOSIS — F32.9 REACTIVE DEPRESSION: ICD-10-CM

## 2019-01-10 DIAGNOSIS — R60.0 LOCALIZED EDEMA: ICD-10-CM

## 2019-01-11 LAB
ALBUMIN SERPL-MCNC: 4.2 G/DL (ref 3.6–4.8)
ALBUMIN/GLOB SERPL: 1.8 {RATIO} (ref 1.2–2.2)
ALP SERPL-CCNC: 59 IU/L (ref 39–117)
ALT SERPL-CCNC: 25 IU/L (ref 0–32)
AST SERPL-CCNC: 27 IU/L (ref 0–40)
BILIRUB SERPL-MCNC: 0.3 MG/DL (ref 0–1.2)
BUN SERPL-MCNC: 11 MG/DL (ref 8–27)
BUN/CREAT SERPL: 16 (ref 12–28)
CALCIUM SERPL-MCNC: 8.5 MG/DL (ref 8.7–10.3)
CHLORIDE SERPL-SCNC: 102 MMOL/L (ref 96–106)
CO2 SERPL-SCNC: 24 MMOL/L (ref 20–29)
CREAT SERPL-MCNC: 0.68 MG/DL (ref 0.57–1)
EST. AVERAGE GLUCOSE BLD GHB EST-MCNC: 157 MG/DL
GLOBULIN SER CALC-MCNC: 2.4 G/DL (ref 1.5–4.5)
GLUCOSE SERPL-MCNC: 113 MG/DL (ref 65–99)
HBA1C MFR BLD: 7.1 % (ref 4.8–5.6)
POTASSIUM SERPL-SCNC: 4.2 MMOL/L (ref 3.5–5.2)
PROT SERPL-MCNC: 6.6 G/DL (ref 6–8.5)
SODIUM SERPL-SCNC: 139 MMOL/L (ref 134–144)
TSH SERPL DL<=0.005 MIU/L-ACNC: 2.99 UIU/ML (ref 0.45–4.5)

## 2019-01-14 ENCOUNTER — DOCUMENTATION ONLY (OUTPATIENT)
Dept: INTERNAL MEDICINE CLINIC | Age: 64
End: 2019-01-14

## 2019-01-14 NOTE — PROGRESS NOTES
Medicare Part B Preventive Services Guidelines/Limitations Date last completed and Frequency Due Date   Bone Mass Measurement  (age 72 & older, biennial) Requires diagnosis related to osteoporosis or estrogen deficiency. Biennial benefit unless patient has history of long-term glucocorticoid tx or baseline is needed because initial test was by other method Completed 4/2018     Recommended every 2 years Due 4/2020   Cardiovascular Screening Blood Tests (every 5 years)  Total cholesterol, HDL, Triglycerides Order as a panel if possible Completed 7/2018     Recommended annually Due 7/2019   Colorectal Cancer Screening  -Fecal occult blood test (annual)  -Flexible sigmoidoscopy (5y)  -Screening colonoscopy (10y)  -Barium Enema   Completed 8/7/18 with Dr. Yuliana Crowe     Recommended in 5 years  Due 8/2023   Counseling to Prevent Tobacco Use (up to 8 sessions per year)  - Counseling greater than 3 and up to 10 minutes  - Counseling greater than 10 minutes Patients must be asymptomatic of tobacco-related conditions to receive as preventive service N/A N/A   Diabetes Screening Tests (at least every 3 years, Medicare covers annually or at 6-month intervals for prediabetic patients)     Fasting blood sugar (FBS) or glucose tolerance test (GTT) Patient must be diagnosed with one of the following:  -Hypertension, Dyslipidemia, obesity, previous impaired FBS or GTT  Or any two of the following: overweight, FH of diabetes, age ? 72, history of gestational diabetes, birth of baby weighing more than 9 pounds Completed 1/2019 with A1C 7.1     Recommended every 3-6 months for Pre-Diabetics and Diabetics Due 4/2019 - 7/2019   Diabetes Self-Management Training (DSMT) (no USPSTF recommendation) Requires referral by treating physician for patient with diabetes or renal disease. 10 hours of initial DSMT session of no less than 30 minutes each in a continuous 12-month period.  2 hours of follow-up DSMT in subsequent years.  N/A N/A   Glaucoma Screening (no USPSTF recommendation) Diabetes mellitus, family history, , age 48 or over,  American, age 72 or over Completed 11/2017      Recommended annually Due now   Human Immunodeficiency Virus (HIV) Screening (annually for increased risk patients)  HIV-1 and HIV-2 by EIA, COY, rapid antibody test, or oral mucosa transudate Patient must be at increased risk for HIV infection per USPSTF guidelines or pregnant.  Tests covered annually for patients at increased risk.  Pregnant patients may receive up to 3 test during pregnancy. N/A N/A   Medical Nutrition Therapy (MNT) (for diabetes or renal disease not recommended schedule) Requires referral by treating physician for patient with diabetes or renal disease.  Can be provided in same year as diabetes self-management training (DSMT), and CMS recommends medical nutrition therapy take place after DSMT.  Up to 3 hours for initial year and 2 hours in subsequent years. N/A N/A         Seasonal Influenza Vaccination (annually)   Completed Fall 2018     Recommended annually Due Fall 2019   Pneumococcal Vaccination (once after 72)   Pneumococcal 23 - 4/2009     Prevnar 13 - 11/2016     Both recommended once over the age of 72 Completed        Completed   Hepatitis B Vaccinations (if medium/high risk) Medium/high risk factors:  End-stage renal disease,  Hemophiliacs who received Factor VIII or IX concentrates, Clients of institutions for the mentally retarded, Persons who live in the same house as a HepB virus carrier, Homosexual men, Illicit injectable drug abusers. N/A N/A   Screening Mammography (biennial age 54-69)?  Annually (age 36 or over) Completed 7/2018     Recommended annually  Due 7/2019   Screening Pap Tests and Pelvic Examination (up to age 79 and after 79 if unknown history or abnormal study last 10 years) Every 24 months except high risk Completed 5/2018      Recommended every 2 years Due 5/2020   Ultrasound Screening for Abdominal Aortic Aneurysm (AAA) (once) Patient must be referred through IPPE and not have had a screening for abdominal aortic aneurysm before under Medicare.  Limited to patients who meet one of the following criteria:  - Men who are 73-68 years old and have smoked more than 100 cigarettes in their lifetime.  -Anyone with a FH of AAA  -Anyone recommended for screening by USPSTF Completed CT abd 10/2012 - negative Completed

## 2019-01-15 RX ORDER — ATORVASTATIN CALCIUM 40 MG/1
TABLET, FILM COATED ORAL
Qty: 90 TAB | Refills: 2 | Status: SHIPPED | OUTPATIENT
Start: 2019-01-15 | End: 2019-09-18 | Stop reason: SDUPTHER

## 2019-01-16 ENCOUNTER — OFFICE VISIT (OUTPATIENT)
Dept: INTERNAL MEDICINE CLINIC | Age: 64
End: 2019-01-16

## 2019-01-16 VITALS
WEIGHT: 184 LBS | OXYGEN SATURATION: 97 % | SYSTOLIC BLOOD PRESSURE: 136 MMHG | DIASTOLIC BLOOD PRESSURE: 70 MMHG | HEART RATE: 68 BPM | BODY MASS INDEX: 32.6 KG/M2 | RESPIRATION RATE: 16 BRPM | TEMPERATURE: 97.7 F | HEIGHT: 63 IN

## 2019-01-16 DIAGNOSIS — J06.9 URI, ACUTE: ICD-10-CM

## 2019-01-16 DIAGNOSIS — F32.9 REACTIVE DEPRESSION: ICD-10-CM

## 2019-01-16 DIAGNOSIS — E11.9 DIABETES MELLITUS WITHOUT COMPLICATION (HCC): Primary | ICD-10-CM

## 2019-01-16 DIAGNOSIS — B37.9 CANDIDA INFECTION: ICD-10-CM

## 2019-01-16 DIAGNOSIS — I10 HYPERTENSION, UNSPECIFIED TYPE: ICD-10-CM

## 2019-01-16 DIAGNOSIS — Z00.00 MEDICARE ANNUAL WELLNESS VISIT, SUBSEQUENT: ICD-10-CM

## 2019-01-16 DIAGNOSIS — I25.10 ASHD (ARTERIOSCLEROTIC HEART DISEASE): ICD-10-CM

## 2019-01-16 DIAGNOSIS — K21.9 GASTROESOPHAGEAL REFLUX DISEASE WITHOUT ESOPHAGITIS: ICD-10-CM

## 2019-01-16 DIAGNOSIS — E78.00 HYPERCHOLESTEREMIA: ICD-10-CM

## 2019-01-16 NOTE — PROGRESS NOTES
This is the Subsequent Medicare Annual Wellness Exam, performed 12 months or more after the Initial AWV or the last Subsequent AWV I have reviewed the patient's medical history in detail and updated the computerized patient record. History Past Medical History:  
Diagnosis Date  Arrhythmia   
 bradycardia  Bloating  CAD (coronary artery disease)  Cancer ([facility] [address] 2010  
 esophageal/GE junction  Chest pain  Chest pain  Chronic obstructive pulmonary disease ([facility] [address]  Chronic pain   
 left shoulder  Congestion of throat  Cough  Depression   
 & anxiety  Diabetes ([facility] [address] IDDM  Dyspepsia and other specified disorders of function of stomach  Fatigue  GERD (gastroesophageal reflux disease)  Headache(784.0)  Hypercholesterolemia  Hypertension  Multinodular goiter  Nausea & vomiting  Stool color black  Stress  Weakness Past Surgical History:  
Procedure Laterality Date  COLONOSCOPY N/A 8/7/2018 COLONOSCOPY performed by Alisa Fernandez MD at [facility] ENDOSCOPY  COLORECTAL SCRN; HI RISK IND  8/7/2018  HX ENDOSCOPY  4/2016  HX GI  2010  
 gastroesophagectomy  HX HEART CATHETERIZATION  03/2017  
 stent x 1  
 HX HEENT  1957  
 tonsils as child  HX HERNIA REPAIR  04/26/2012  
 abdominal and umbilical  
 HX LAP CHOLECYSTECTOMY  1995  HX ORTHOPAEDIC  02/20/2015  
 right shoulder manipulation  HX OTHER SURGICAL Bilateral   
  shoulder surgery for frozen surgery  HX OTHER SURGICAL  6/24/2015  
 mammogram   
[address]  HX TUBAL LIGATION  1983  
 lap btl  HX VASCULAR ACCESS    
 port a cath and removal  
 DC EGD BALLOON DILATION ESOPHAGUS <30 MM DIAM  4/20/2010  DC EXTRAC ERUPTED TOOTH/EXPOSED ROOT Current Outpatient Medications Medication Sig Dispense Refill  atorvastatin (LIPITOR) 40 mg tablet TAKE 1 TABLET EVERY DAY 90 Tab 2  
  losartan (COZAAR) 50 mg tablet TAKE 1 TABLET EVERY DAY 90 Tab 1  
 montelukast (SINGULAIR) 10 mg tablet TAKE 1 TABLET EVERY DAY 90 Tab 1  
 insulin glargine (LANTUS SOLOSTAR U-100 INSULIN) 100 unit/mL (3 mL) inpn 32 -34units daily 45 mL 2  cholecalciferol, vitamin D3, (VITAMIN D3) 2,000 unit tab Take  by mouth daily.  insulin aspart U-100 (NOVOLOG) 100 unit/mL inpn Inject 4 units every evening when blood sugar over 120 4 Adjustable Dose Pre-filled Pen Syringe 1  
 amLODIPine (NORVASC) 2.5 mg tablet Take 1 Tab by mouth daily. In place of Ran[employer]a as not covered 90 Tab 3  
 raNITIdine (ZANTAC) 150 mg tablet Take 150 mg by mouth as needed for Indigestion.  nystatin (MYCOSTATIN) powder Apply  to affected area four (4) times daily. 1 Bottle 3  
 nystatin (MYCOSTATIN) topical cream Apply  to affected area two (2) times a day. 15 g 1  
 albuterol (PROVENTIL HFA, VENTOLIN HFA, PROAIR HFA) 90 mcg/actuation inhaler Take 2 Puffs by inhalation as needed. 1 Inhaler 3  
 ONETOUCH ULTRA TEST strip CHECK TWICE A  Strip 12  
 SYMBICORT 160-4.5 mcg/actuation HFA inhaler Take 2 Puffs by inhalation two (2) times a day.  metoclopramide HCl (REGLAN) 10 mg tablet Take 10 mg by mouth daily.  polyethylene glycol (MIRALAX) 17 gram packet Take 17 g by mouth daily.  cetirizine (ZYRTEC) 10 mg tablet Take 10 mg by mouth daily.  melatonin 5 mg Tab Take 5 mg by mouth nightly.  Dexlansoprazole (DEXILANT) 60 mg CpDM Take 60 mg by mouth daily.  aspirin 81 mg tablet Take 81 mg by mouth.  pyridoxine (VITAMIN B-6) 100 mg tablet Take 100 mg by mouth every morning.  furosemide (LASIX) 20 mg tablet TAKE 1 TABLET BY MOUTH EVERY DAY 90 Tab 1  
 azithromycin (ZITHROMAX) 250 mg tablet Take two tablets today then one tablet daily 6 Tab 0 Allergies Allergen Reactions  Adhesive Other (comments)  
  redness Family History Problem Relation Age of Onset  Diabetes Father  Cancer Father   
     intestinal  
 Heart Disease Father  Hypertension Father  Heart Disease Brother  Diabetes Brother  Diabetes Mother  Lung Disease Mother  Heart Disease Mother  Hypertension Mother  Diabetes Maternal Grandmother  Diabetes Maternal Grandfather  Elevated Lipids Maternal Aunt  Thyroid Disease Neg Hx Social History Tobacco Use  Smoking status: Never Smoker  Smokeless tobacco: Never Used Substance Use Topics  Alcohol use: Yes Alcohol/week: 0.5 oz Types: 1 Glasses of wine per week Comment: rarely glass of wine Patient Active Problem List  
Diagnosis Code  Incisional hernia K43.2  Esophageal cancer ([facility] [address] C15.9  Hypercholesteremia E78.00  
 HTN (hypertension) I10  
 Asthma J45.909  Depression F32.9  Thyroid nodule E04.1  Shoulder capsulitis M75.80  Angina, class II (HCC) I20.9  Family history of coronary artery disease Z82.49  S/P cardiac cath A88.087  Multinodular goiter E04.2  ASHD (arteriosclerotic heart disease) I25.10  Syncope R55  Bradycardia R00.1  [facility] B03.1  Recurrent depression ([facility] F33.9 Depression Risk Factor Screening: PHQ over the last two weeks 1/16/2019 Little interest or pleasure in doing things Not at all Feeling down, depressed, irritable, or hopeless Not at all Total Score PHQ 2 0 Alcohol Risk Factor Screening: You do not drink alcohol or very rarely. Up to glass wine nightly or less Functional Ability and Level of Safety:  
Hearing Loss Hearing is good. Activities of Daily Living The home contains: no safety equipment. Patient does total self care Fall Risk Fall Risk Assessment, last 12 mths 1/16/2019 Able to walk? Yes Fall in past 12 months? No  
 
 
Abuse Screen Patient is not abused Lives with  
safe Cognitive Screening Evaluation of Cognitive Function: Has your family/caregiver stated any concerns about your memory: no 
Normal 
 
Patient Care Team  
Patient Care Team: 
Adalberto Fothergill, MD as PCP - General (Internal Medicine) Kaylen Person MD (Cardiology) Viri Parks MD as Consulting Provider (Endocrinology) Aleta Alegria MD as Physician (Cardiology) Balbina Jimenez MD (Gastroenterology) Tarsha Carias MD (Internal Medicine) Jose Hunter, [address] (Optometry) Shelton Dewey RN as Ambulatory Care Navigator Praveen Britt MD (Hematology and Oncology) Tarsha Carias MD (Internal Medicine) Karlene Stern MD (Obstetrics & Gynecology) Chauncey Lindsay MD (Obstetrics & Gynecology) Chauncey Lindsay MD (Obstetrics & Gynecology) Assessment/Plan Education and counseling provided: 
Are appropriate based on today's review and evaluation End-of-Life planning (with patient's consent) Screening for glaucoma Diabetes screening test 
 
Diagnoses and all orders for this visit: 
 
1. Medicare annual wellness visit, subsequent Health Maintenance Due Topic Date Due  Shingrix Vaccine Age 50> (1 of 2) 02/18/2003  Pneumococcal 65+ Low/Medium Risk (2 of 2 - PPSV23) 02/18/2018  
 
   Discussed with patient about advance medical directive. Provided patient blank AMD and Your Right to Decide Booklet. Requested that if completed to provide a copy of AMD to office.   
ACP not on file. SDM is her  and maybe daughter she disucssed directive with them Provided information in the past.  
   
   
Colonoscopy: 8/7/18, Dr. Torrie Saucedo, repeat 5 years AAA: FMHx (grandfather) Pap: Dr. Malvin Gomez, 5/15/18 Mammogram: 7/23/18, negative Dexa: 4/18, mild osteopenia Tdap: 9/14/2016 Pneumovax: 4/26/2009 [id number]: 11/14/2016 Zostavax: 11/18/2016, reaction on R arm Shingrix: ordered 04/09/18, backordered Flu shot: 10/16/18 Eye exam: Dr. Jeanette Garcia, 11/18, no diabetic retinopathy per pt annual  
 
A1c:  1/19 7.1 [id number] 
  Lipids: 7/18 LDL 66 annual  
Hep C screen: 11/15, negative Medication reconciliation completed by MA and reviewed by me. Medical/surgical/social/family history reviewed and updated by me. Patient provided AVS and preventative screening table. Patient verbalized understanding of all information discussed.

## 2019-01-16 NOTE — PATIENT INSTRUCTIONS
Medicare Wellness Visit, Female The best way to live healthy is to have a lifestyle where you eat a well-balanced diet, exercise regularly, limit alcohol use, and quit all forms of tobacco/nicotine, if applicable. Regular preventive services are another way to keep healthy. Preventive services (vaccines, screening tests, monitoring & exams) can help personalize your care plan, which helps you manage your own care. Screening tests can find health problems at the earliest stages, when they are easiest to treat. Erik Isbell follows the current, evidence-based guidelines published by the [facility] ([facility]) when recommending preventive services for our patients. Because we follow these guidelines, sometimes recommendations change over time as research supports it. (For example, mammograms used to be recommended annually. Even though Medicare will still pay for an annual mammogram, the newer guidelines recommend a mammogram every two years for women of average risk.) Of course, you and your doctor may decide to screen more often for some diseases, based on your risk and your health status. Preventive services for you include: - Medicare offers their members a free annual wellness visit, which is time for you and your primary care provider to discuss and plan for your preventive service needs. Take advantage of this benefit every year! 
-All adults over the age of 72 should receive the recommended pneumonia vaccines. Current USPSTF guidelines recommend a series of two vaccines for the best pneumonia protection.  
-All adults should have a flu vaccine yearly and a tetanus vaccine every 10 years. All adults age 61 and older should receive a shingles vaccine once in their lifetime.   
-A bone mass density test is recommended when a woman turns 65 to screen for osteoporosis. This test is only recommended one time, as a screening. Some providers will use this same test as a disease monitoring tool if you already have osteoporosis. -All adults age 38-68 who are overweight should have a diabetes screening test once every three years.  
-Other screening tests and preventive services for persons with diabetes include: an eye exam to screen for diabetic retinopathy, a kidney function test, a foot exam, and stricter control over your cholesterol.  
-Cardiovascular screening for adults with routine risk involves an electrocardiogram (ECG) at intervals determined by your doctor.  
-Colorectal cancer screenings should be done for adults age 54-65 with no increased risk factors for colorectal cancer. There are a number of acceptable methods of screening for this type of cancer. Each test has its own benefits and drawbacks. Discuss with your doctor what is most appropriate for you during your annual wellness visit. The different tests include: colonoscopy (considered the best screening method), a fecal occult blood test, a fecal DNA test, and sigmoidoscopy. -Breast cancer screenings are recommended every other year for women of normal risk, age 54-69. 
-Cervical cancer screenings for women over age 72 are only recommended with certain risk factors.  
-All adults born between [facility] should be screened once for Hepatitis C. Here is a list of your current Health Maintenance items (your personalized list of preventive services) with a due date: 
Health Maintenance Due Topic Date Due  Shingles Vaccine (1 of 2) 02/18/2003  Pneumococcal Vaccine (2 of 2 - PPSV23) 02/18/2018 Medicare Part B Preventive Services Guidelines/Limitations Date last completed and Frequency Due Date Bone Mass Measurement 
(age 72 & older, biennial) Requires diagnosis related to osteoporosis or estrogen deficiency.  Biennial benefit unless patient has history of long-term glucocorticoid tx or baseline is needed because initial test was by other method Completed 4/2018 
  
Recommended every 2 years Due 4/2020 Cardiovascular Screening Blood Tests (every 5 years) Total cholesterol, HDL, Triglycerides Order as a panel if possible Completed 7/2018 
  
Recommended annually Due 7/2019 Colorectal Cancer Screening 
-Fecal occult blood test (annual) -Flexible sigmoidoscopy (5y) 
-Screening colonoscopy (10y) -Barium Enema   Completed 8/7/18 with Dr. Tonya Jeter 
  
Recommended in 5 years  Due 8/2023 Counseling to Prevent Tobacco Use (up to 8 sessions per year) - Counseling greater than 3 and up to 10 minutes - Counseling greater than 10 minutes Patients must be asymptomatic of tobacco-related conditions to receive as preventive service N/A N/A Diabetes Screening Tests (at least every 3 years, Medicare covers annually or at 6-month intervals for prediabetic patients) 
  Fasting blood sugar (FBS) or glucose tolerance test (GTT) Patient must be diagnosed with one of the following: 
-Hypertension, Dyslipidemia, obesity, previous impaired FBS or GTT 
Or any two of the following: overweight, FH of diabetes, age ? 72, history of gestational diabetes, birth of baby weighing more than 9 pounds Completed 1/2019 with A1C 7.1 
  
Recommended every 3-6 months for Pre-Diabetics and Diabetics Due 4/2019 - 7/2019 Diabetes Self-Management Training (DSMT) (no USPSTF recommendation) Requires referral by treating physician for patient with diabetes or renal disease. 10 hours of initial DSMT session of no less than 30 minutes each in a continuous 12-month period.  2 hours of follow-up DSMT in subsequent years. N/A N/A Glaucoma Screening (no USPSTF recommendation) Diabetes mellitus, family history, , age 48 or over,  American, age 72 or over Completed 11/18 
  
Recommended annually Annually Human Immunodeficiency Virus (HIV) Screening (annually for increased risk patients) HIV-1 and HIV-2 by EIA, COY, rapid antibody test, or oral mucosa transudate Patient must be at increased risk for HIV infection per USPSTF guidelines or pregnant.  Tests covered annually for patients at increased risk.  Pregnant patients may receive up to 3 test during pregnancy. N/A N/A Medical Nutrition Therapy (MNT) (for diabetes or renal disease not recommended schedule) Requires referral by treating physician for patient with diabetes or renal disease.  Can be provided in same year as diabetes self-management training (DSMT), and CMS recommends medical nutrition therapy take place after DSMT.  Up to 3 hours for initial year and 2 hours in subsequent years. N/A N/A  
         
Seasonal Influenza Vaccination (annually)   Completed Fall 2018 
  
Recommended annually Due Fall 2019 Pneumococcal Vaccination (once after 65)   Pneumococcal 23 - 4/2009 
  
Prevnar 13 - 11/2016 
  
Both recommended once over the age of 72 Completed 
  
  
Completed Hepatitis B Vaccinations (if medium/high risk) Medium/high risk factors:  End-stage renal disease, Hemophiliacs who received Factor VIII or IX concentrates, Clients of institutions for the mentally retarded, Persons who live in the same house as a HepB virus carrier, Homosexual men, Illicit injectable drug abusers. N/A N/A Screening Mammography (biennial age 54-69)? Annually (age 36 or over) Completed 7/2018 
  
Recommended annually  Due 7/2019 Screening Pap Tests and Pelvic Examination (up to age 79 and after 79 if unknown history or abnormal study last 10 years) Every 24 months except high risk Completed 5/2018  
  
Recommended every 2 years Due 5/2020 Ultrasound Screening for Abdominal Aortic Aneurysm (AAA) (once) Patient must be referred through IPPE and not have had a screening for abdominal aortic aneurysm before under Medicare.  Limited to patients who meet one of the following criteria: 
 - Men who are 73-68 years old and have smoked more than 100 cigarettes in their lifetime. 
-Anyone with a FH of AAA 
-Anyone recommended for screening by USPSTF Completed CT abd 10/2012 - negative Completed  
  
  
 
Please bring in a copy of your advanced directive to your next office visit so we can have a copy on file.

## 2019-01-16 NOTE — PROGRESS NOTES
HISTORY OF PRESENT ILLNESS Deborah Ortiz is a 70 y.o. female. HPI Last here 10/16/18. Pt is here to f/u on chronic conditions 
   
BP today is 136/70 States her BP was OK the couple of times she took it Continues on losartan 50mg and norvasc 2.5mg 
  She has been taking lasix a few tmes per week- R chronically larger than L This is stable, improved from that past, lasix helps 
  
BS at home has been 110s, 120s in the AM 
BS in the afternoon in high 100s Lowest reading was around 95 Continues lantus 32U qAM 
Continues novolog 6U with dinner +sliding scale (8U if >150, 10U >200, 12U if >250, 14U if >300) - usually takes 8U She also takes ASA 81mg daily 
   
[facility] labs 1/19 
[facility] --stable x lov Discussed [address] Jolene Kay she is sceptical of this addressed again today She worries it is like nutrasystem, discussed defference She does not like group programs Pt is trying to cut back on portions Discussed diet and w/l  
   
Pt follows with Dr. Naresh Gandara (cardio) Last visit was 7/2/18 Pt is no longer taking plavix as of 3/18 Pt is no longer on ranexa dt/ cost 
Pt is now taking norvasc to prevent CP--no sx doing well Next visit scheduled for 2/5/19 
   
Pt follows annually with Dr. Erin Caro (hemo/onc) for h/o adenoma of esophagus Last visit was 7/3/18 Pt was discharged from his care 
   
Pt follows with Dr. Maureen Zhou (pulm) Last visit was 2/18 Continues singulair daily, albuterol prn and symbicort BID for breathing/asthma per pulm, which help Next visit scheduled for 2/18/19 
   
Pt has not been evaluated for DANIELLE in the past 
Recall she declines further evaluation for now  
   
Continues lipitor 40mg daily for cholesterol 
   
Continues dexilant and reglan daily for reflux, which works well Helps empty her stomach cant take more than 1 reglan per day b/c gets jittery and hyper She also takes zantac qhs prn (not daily) She avoids trigger foods and rarely has reflux/aspiration Recall has h/o esophageal cancer Pt went to Urgent Care for cough and wheezing in 12/18, was given zpak She has some residual coughing with mucous Overall improved however Pt has been using zyrtec and flonase, advised her to continue with this PT has a rash under R breast 
Recurrent issue She states this was from not wearing a bra for a few days Pt has nystatin cream to use at home Pt has up to 1 glass of wine per night Lives with her , this is a safe environment Fully functional independently Her  does most of the driving however Does not need safety equipment Denies significant memory concerns 
   
Of note, her  has bladder cancer and she is caring for him at home. 
   
ACP not on file. SDM is her . Provided information in the past.  
   
PREVENTIVE:   
Colonoscopy: 8/7/18, Dr. Rigo No, repeat 5 years EGD: 4/16, Dr. Rigo No, h/o esophageal cancer, polyp on recent EGD, biopsy nl 
AAA: FMHx (grandfather) Pap: Dr. Yogi Perez, 5/15/18 Mammogram: 7/23/18, negative Dexa: 4/18, mild osteopenia Tdap: 9/14/2016 Pneumovax: 4/26/2009 [id number]: 11/14/2016 Zostavax: 11/18/2016, reaction on R arm Shingrix: ordered 04/09/18, backordered Flu shot: 10/16/18 Foot exam: 04/09/18  
Microalbumin: 7/18, minimal 
A1c: 9/16 6.7, 12/16 6.2, 3/17 6.8, 6/17 8.8, 9/17 6.2, 12/17 6.4, 4/18 7.3, 7/18 7.4, 10/18 7.5, 1/19 7.1 Eye exam: Dr. Bri Ramos, 11/18, no diabetic retinopathy per pt   
Lipids: 7/18 LDL 66 Hep C screen: 11/15, negative Patient Active Problem List  
 Diagnosis Date Noted  Recurrent depression ([facility] [address] 12/29/2017  Diabetes mellitus without complication ([facility] [address] 71/86/1315  ASHD (arteriosclerotic heart disease) 06/18/2014  Syncope 06/18/2014  Bradycardia 06/18/2014  Multinodular goiter  Angina, class II ([facility] [address] 04/18/2013  Family history of coronary artery disease 04/18/2013  S/P cardiac cath 04/18/2013  Hypercholesteremia 02/18/2013  
 HTN (hypertension) 02/18/2013  Asthma 02/18/2013  Depression 02/18/2013  Thyroid nodule 02/18/2013  Shoulder capsulitis 02/18/2013  Incisional hernia 02/10/2011  Esophageal cancer ([facility] [address] 02/10/2011 Current Outpatient Medications Medication Sig Dispense Refill  atorvastatin (LIPITOR) 40 mg tablet TAKE 1 TABLET EVERY DAY 90 Tab 2  
 losartan (COZAAR) 50 mg tablet TAKE 1 TABLET EVERY DAY 90 Tab 1  
 montelukast (SINGULAIR) 10 mg tablet TAKE 1 TABLET EVERY DAY 90 Tab 1  
 insulin glargine (LANTUS SOLOSTAR U-100 INSULIN) 100 unit/mL (3 mL) inpn 32 -34units daily 45 mL 2  cholecalciferol, vitamin D3, (VITAMIN D3) 2,000 unit tab Take  by mouth daily.  insulin aspart U-100 (NOVOLOG) 100 unit/mL inpn Inject 4 units every evening when blood sugar over 120 4 Adjustable Dose Pre-filled Pen Syringe 1  
 amLODIPine (NORVASC) 2.5 mg tablet Take 1 Tab by mouth daily. In place of Ranexa as not covered 90 Tab 3  
 raNITIdine (ZANTAC) 150 mg tablet Take 150 mg by mouth as needed for Indigestion.  nystatin (MYCOSTATIN) powder Apply  to affected area four (4) times daily. 1 Bottle 3  
 nystatin (MYCOSTATIN) topical cream Apply  to affected area two (2) times a day. 15 g 1  
 albuterol (PROVENTIL HFA, VENTOLIN HFA, PROAIR HFA) 90 mcg/actuation inhaler Take 2 Puffs by inhalation as needed. 1 Inhaler 3  
 ONETOUCH ULTRA TEST strip CHECK TWICE A  Strip 12  
 SYMBICORT 160-4.5 mcg/actuation HFA inhaler Take 2 Puffs by inhalation two (2) times a day.  metoclopramide HCl (REGLAN) 10 mg tablet Take 10 mg by mouth daily.  polyethylene glycol (MIRALAX) 17 gram packet Take 17 g by mouth daily.  cetirizine (ZYRTEC) 10 mg tablet Take 10 mg by mouth daily.  melatonin 5 mg Tab Take 5 mg by mouth nightly.  Dexlansoprazole (DEXILANT) 60 mg CpDM Take 60 mg by mouth daily.  aspirin 81 mg tablet Take 81 mg by mouth.  pyridoxine (VITAMIN B-6) 100 mg tablet Take 100 mg by mouth every morning.  furosemide (LASIX) 20 mg tablet TAKE 1 TABLET BY MOUTH EVERY DAY 90 Tab 1  
 azithromycin (ZITHROMAX) 250 mg tablet Take two tablets today then one tablet daily 6 Tab 0 Past Surgical History:  
Procedure Laterality Date  COLONOSCOPY N/A 8/7/2018 COLONOSCOPY performed by Eric Cohn [facility] at [facility] ENDOSCOPY  COLORECTAL SCRN; HI RISK IND  8/7/2018  HX ENDOSCOPY  4/2016  HX GI  2010  
 gastroesophagectomy  HX HEART CATHETERIZATION  03/2017  
 stent x 1  
 HX HEENT  1957  
 tonsils as child  HX HERNIA REPAIR  04/26/2012  
 abdominal and umbilical  
 HX LAP CHOLECYSTECTOMY  1995  HX ORTHOPAEDIC  02/20/2015  
 right shoulder manipulation  HX OTHER SURGICAL Bilateral   
  shoulder surgery for frozen surgery  HX OTHER SURGICAL  6/24/2015  
 mammogram   
[address]  HX TUBAL LIGATION  1983  
 lap btl  HX VASCULAR ACCESS    
 port a cath and removal  
 IN EGD BALLOON DILATION ESOPHAGUS <30 MM DIAM  4/20/2010  IN EXTRAC ERUPTED TOOTH/EXPOSED ROOT Lab Results Component Value Date/Time WBC 5.3 04/02/2018 08:24 AM  
 HGB 12.3 04/02/2018 08:24 AM  
 Hemoglobin (POC) 11.9 01/28/2010 12:02 PM  
 HCT 37.9 04/02/2018 08:24 AM  
 Hematocrit (POC) 35 01/28/2010 12:02 PM  
 PLATELET 675 72/37/0378 08:24 AM  
 MCV 94 04/02/2018 08:24 AM  
 
Lab Results Component Value Date/Time Cholesterol, total 126 07/05/2018 08:34 AM  
 HDL Cholesterol 39 (L) 07/05/2018 08:34 AM  
 LDL, calculated 66 07/05/2018 08:34 AM  
 Triglyceride 104 07/05/2018 08:34 AM  
 
Lab Results Component Value Date/Time  GFR est non-AA 92 01/10/2019 08:48 AM  
 GFRNA, POC >60 01/06/2012 10:42 AM  
 GFR est  01/10/2019 08:48 AM  
 GFRAA, POC >60 01/06/2012 10:42 AM  
 Creatinine 0.68 01/10/2019 08:48 AM  
 Creatinine (POC) 0.9 01/06/2012 10:42 AM  
 BUN 11 01/10/2019 08:48 AM  
 BUN (POC) 14 01/28/2010 12:02 PM  
 Sodium 139 01/10/2019 08:48 AM  
 Sodium (POC) 141 01/28/2010 12:02 PM  
 Potassium 4.2 01/10/2019 08:48 AM  
 Potassium (POC) 4.0 01/28/2010 12:02 PM  
 Chloride 102 01/10/2019 08:48 AM  
 Chloride (POC) 103 01/28/2010 12:02 PM  
 CO2 24 01/10/2019 08:48 AM  
 Magnesium 1.9 02/08/2010 03:00 AM  
  
Review of Systems Respiratory: Positive for cough and sputum production. Negative for shortness of breath. Cardiovascular: Negative for chest pain. Skin: Positive for rash. Physical Exam  
Constitutional: She is oriented to person, place, and time. She appears well-developed and well-nourished. No distress. HENT:  
Head: Normocephalic and atraumatic. Right Ear: External ear normal.  
Left Ear: External ear normal.  
Mouth/Throat: Oropharynx is clear and moist. No oropharyngeal exudate. Eyes: Conjunctivae and EOM are normal. Right eye exhibits no discharge. Left eye exhibits no discharge. Neck: Normal range of motion. Neck supple. Cardiovascular: Normal rate, regular rhythm and normal heart sounds. Exam reveals no gallop and no friction rub. No murmur heard. Pulmonary/Chest: Effort normal and breath sounds normal. No respiratory distress. She has no wheezes. She has no rales. She exhibits no tenderness. Abdominal: Soft. She exhibits no distension and no mass. There is no tenderness. There is no rebound and no guarding. Musculoskeletal: Normal range of motion. She exhibits no edema, tenderness or deformity. Lymphadenopathy:  
  She has no cervical adenopathy. Neurological: She is alert and oriented to person, place, and time. Coordination normal.  
Skin: Skin is warm and dry. No rash noted. She is not diaphoretic. No erythema. No pallor. Erythematous patches under R breast  
Psychiatric: She has a normal mood and affect. Her behavior is normal.  
 
 
ASSESSMENT and PLAN 
  ICD-10-CM ICD-9-CM 1. Diabetes mellitus without complication ([facility] [address] Adequately controlled on lantus 32U and novolog 6U + sliding scale with dinner I78.3 224.78 METABOLIC PANEL, COMPREHENSIVE  
   HEMOGLOBIN A1C WITH EAG  
   TSH 3RD GENERATION 2. Medicare annual wellness visit, subsequent Y21.04 A48.7 METABOLIC PANEL, COMPREHENSIVE  
   HEMOGLOBIN A1C WITH EAG  
   TSH 3RD GENERATION 3. Reactive depression Not currently an active issue, doing well without medication M83.4 901.0 METABOLIC PANEL, COMPREHENSIVE  
   HEMOGLOBIN A1C WITH EAG  
   TSH 3RD GENERATION 4. Hypertension, unspecified type Well controlled on norvasc 2.5mg and losartan 50mg, continue R18 181.9 METABOLIC PANEL, COMPREHENSIVE  
   HEMOGLOBIN A1C WITH EAG  
   TSH 3RD GENERATION 5. ASHD (arteriosclerotic heart disease) Follows with Dr Sykler Maria, will see him next month, currently on norvasc to prevent CP, continue on ASA J55.97 418.73 METABOLIC PANEL, COMPREHENSIVE  
   HEMOGLOBIN A1C WITH EAG  
   TSH 3RD GENERATION 6. Hypercholesteremia Controlled on lipitor 40mg daily, continue K92.21 514.5 METABOLIC PANEL, COMPREHENSIVE  
   HEMOGLOBIN A1C WITH EAG  
   TSH 3RD GENERATION 7. Gastroesophageal reflux disease without esophagitis Controlled with dexilant daily which she gest from Dr Edinson Camargo, also gets reglan once daily for gastroparesis which helps significantly B17.2 175.53 METABOLIC PANEL, COMPREHENSIVE  
   HEMOGLOBIN A1C WITH EAG  
   TSH 3RD GENERATION 8. Candida infection Continue nystatin cream, she has at home, keep the area dry I99.5 105.1 METABOLIC PANEL, COMPREHENSIVE  
   HEMOGLOBIN A1C WITH EAG  
   TSH 3RD GENERATION 9. URI, acute Resolving, s/p abx from urgent care, no wheezing on exam, discussed continue zyrtec and flonase J06.9 465.9 Scribed by Isacc Davis of [address], as dictated by Dr. Dana Mccormack. Current diagnosis and concerns discussed with pt at length.  Pt understands risks and benefits or current treatment plan and medications, and accepts the treatment and medication with any possible risks. Pt asks appropriate questions, which were answered. Pt was instructed to call with any concerns or problems. I have reviewed the note documented by the scribe. The services provided are my own. The documentation is accurate

## 2019-02-05 ENCOUNTER — OFFICE VISIT (OUTPATIENT)
Dept: CARDIOLOGY CLINIC | Age: 64
End: 2019-02-05

## 2019-02-05 VITALS
HEART RATE: 60 BPM | OXYGEN SATURATION: 96 % | BODY MASS INDEX: 33.35 KG/M2 | WEIGHT: 188.2 LBS | DIASTOLIC BLOOD PRESSURE: 70 MMHG | SYSTOLIC BLOOD PRESSURE: 130 MMHG | RESPIRATION RATE: 12 BRPM | HEIGHT: 63 IN

## 2019-02-05 DIAGNOSIS — I10 ESSENTIAL HYPERTENSION: ICD-10-CM

## 2019-02-05 DIAGNOSIS — E11.9 DIABETES MELLITUS WITHOUT COMPLICATION (HCC): ICD-10-CM

## 2019-02-05 DIAGNOSIS — E78.00 HYPERCHOLESTEREMIA: ICD-10-CM

## 2019-02-05 DIAGNOSIS — E78.2 MIXED HYPERLIPIDEMIA: ICD-10-CM

## 2019-02-05 DIAGNOSIS — I25.10 ASHD (ARTERIOSCLEROTIC HEART DISEASE): Primary | ICD-10-CM

## 2019-02-05 NOTE — PROGRESS NOTES
[address], [address] [facility]  [phone]     Subjective:      Richard Hensley is a 70 y.o. female is here for routine f/u. Reports occasional mild sob with heavy exertion, unchanged. Recovering from bronchitis 12/18, due to see Dr Vijaya Cordon on Thursday. The patient denies chest pain, orthopnea, PND, LE edema, palpitations, syncope, or presyncope.        Patient Active Problem List    Diagnosis Date Noted    Recurrent depression ([address] 12/29/2017    Diabetes mellitus without complication ([address] 91/45/1911    ASHD (arteriosclerotic heart disease) 06/18/2014    Syncope 06/18/2014    Bradycardia 06/18/2014    Multinodular goiter     Angina, class II ([address] 04/18/2013    Family history of coronary artery disease 04/18/2013    S/P cardiac cath 04/18/2013    Hypercholesteremia 02/18/2013    HTN (hypertension) 02/18/2013    Asthma 02/18/2013    Depression 02/18/2013    Thyroid nodule 02/18/2013    Shoulder capsulitis 02/18/2013    Incisional hernia 02/10/2011    Esophageal cancer ([address] 02/10/2011      Venice Jo MD  Past Medical History:   Diagnosis Date    Arrhythmia     bradycardia    Bloating     CAD (coronary artery disease)     Cancer ([address] 2010    esophageal/GE junction    Chest pain     Chest pain     Chronic obstructive pulmonary disease (HCC)     Chronic pain     left shoulder    Congestion of throat     Cough     Depression     & anxiety    Diabetes (HCC)     IDDM    Dyspepsia and other specified disorders of function of stomach     Fatigue     GERD (gastroesophageal reflux disease)     Headache(784.0)     Hypercholesterolemia     Hypertension     Multinodular goiter     Nausea & vomiting     Stool color black     Stress     Weakness       Past Surgical History:   Procedure Laterality Date    COLONOSCOPY N/A 8/7/2018    COLONOSCOPY performed by [facility]isha Pena MD at [facility] ENDOSCOPY    COLORECTAL SCRN; HI RISK IND 8/7/2018         HX ENDOSCOPY  4/2016    HX GI  2010    gastroesophagectomy    HX HEART CATHETERIZATION  03/2017    stent x 1    HX HEENT  1957    tonsils as child    HX HERNIA REPAIR  04/26/2012    abdominal and umbilical    HX LAP CHOLECYSTECTOMY  1995    HX ORTHOPAEDIC  02/20/2015    right shoulder manipulation    HX OTHER SURGICAL Bilateral      shoulder surgery for frozen surgery    HX OTHER SURGICAL  6/24/2015    mammogram     HX PELVIC LAPAROSCOPY  1989    HX TUBAL LIGATION  1983    lap btl    HX VASCULAR ACCESS      port a cath and removal    DE EGD BALLOON DILATION ESOPHAGUS <30 MM DIAM  4/20/2010         DE EXTRAC ERUPTED TOOTH/EXPOSED ROOT       Allergies   Allergen Reactions    Adhesive Other (comments)     redness      Family History   Problem Relation Age of Onset    Diabetes Father     Cancer Father         intestinal    Heart Disease Father     Hypertension Father     Heart Disease Brother     Diabetes Brother     Diabetes Mother     Lung Disease Mother     Heart Disease Mother     Hypertension Mother     Diabetes Maternal Grandmother     Diabetes Maternal Grandfather     Elevated Lipids Maternal Aunt     Thyroid Disease Neg Hx       Social History     Socioeconomic History    Marital status:      Spouse name: Not on file    Number of children: Not on file    Years of education: Not on file    Highest education level: Not on file   Social Needs    Financial resource strain: Not on file    Food insecurity - worry: Not on file    Food insecurity - inability: Not on file   [employer] needs - medical: Not on file   [employer] needs - non-medical: Not on file   Occupational History    Not on file   Tobacco Use    Smoking status: Never Smoker    Smokeless tobacco: Never Used   Substance and Sexual Activity    Alcohol use:  Yes     Alcohol/week: 0.5 oz     Types: 1 Glasses of wine per week     Comment: rarely glass of wine    Drug use: No    Sexual activity: Not on file   Other Topics Concern    Not on file   Social History Narrative    Lives in [facility] with . Has a 45 yo daughter and an adopted 24 yo daughter. 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 Current Outpatient Medications   Medication Sig    glucose blood VI test strips (ONETOUCH ULTRA TEST) strip CHECK TWICE A DAY    atorvastatin (LIPITOR) 40 mg tablet TAKE 1 TABLET EVERY DAY    furosemide (LASIX) 20 mg tablet TAKE 1 TABLET BY MOUTH EVERY DAY (Patient taking differently: TAKE 1 TABLET BY MOUTH EVERY DAY/ PRN)    losartan (COZAAR) 50 mg tablet TAKE 1 TABLET EVERY DAY    montelukast (SINGULAIR) 10 mg tablet TAKE 1 TABLET EVERY DAY    insulin glargine (LANTUS SOLOSTAR U-100 INSULIN) 100 unit/mL (3 mL) inpn 32 -34units daily    cholecalciferol, vitamin D3, (VITAMIN D3) 2,000 unit tab Take  by mouth daily.  insulin aspart U-100 (NOVOLOG) 100 unit/mL inpn Inject 4 units every evening when blood sugar over 120    amLODIPine (NORVASC) 2.5 mg tablet Take 1 Tab by mouth daily. In place of Ranexa as not covered    raNITIdine (ZANTAC) 150 mg tablet Take 150 mg by mouth as needed for Indigestion.  nystatin (MYCOSTATIN) powder Apply  to affected area four (4) times daily.  nystatin (MYCOSTATIN) topical cream Apply  to affected area two (2) times a day.  albuterol (PROVENTIL HFA, VENTOLIN HFA, PROAIR HFA) 90 mcg/actuation inhaler Take 2 Puffs by inhalation as needed.  SYMBICORT 160-4.5 mcg/actuation HFA inhaler Take 2 Puffs by inhalation two (2) times a day.  metoclopramide HCl (REGLAN) 10 mg tablet Take 10 mg by mouth daily.  polyethylene glycol (MIRALAX) 17 gram packet Take 17 g by mouth daily.  cetirizine (ZYRTEC) 10 mg tablet Take 10 mg by mouth daily.  melatonin 5 mg Tab Take 5 mg by mouth nightly.  Dexlansoprazole (DEXILANT) 60 mg CpDM Take 60 mg by mouth daily.  aspirin 81 mg tablet Take 81 mg by mouth.     pyridoxine (VITAMIN B-6) 100 mg tablet Take 100 mg by mouth every morning. No current facility-administered medications for this visit. Review of Symptoms:  11 systems reviewed, negative other than as stated in the HPI    Physical ExamPhysical Exam:    There were no vitals filed for this visit. There is no height or weight on file to calculate BMI. General PE   Gen:  NAD  Mental Status - Alert. General Appearance - Not in acute distress. Chest and Lung Exam   Inspection: Accessory muscles - No use of accessory muscles in breathing. Auscultation:   Breath sounds: - Normal.   Cardiovascular   Inspection: Jugular vein - Bilateral - Inspection Normal.   Palpation/Percussion:   Apical Impulse: - Normal.   Auscultation: Rhythm - Regular. Heart Sounds - S1 WNL and S2 WNL. No S3 or S4. Murmurs & Other Heart Sounds: Auscultation of the heart reveals - No Murmurs. Peripheral Vascular   Upper Extremity: Inspection - Bilateral - No Cyanotic nailbeds or Digital clubbing. Lower Extremity:   Palpation: Edema - Bilateral - No edema. Abdomen:   Soft, non-tender, bowel sounds are active.   Neuro: A&O times 3, CN and motor grossly WNL    Labs:   Lab Results   Component Value Date/Time    Cholesterol, total 126 07/05/2018 08:34 AM    Cholesterol, total 140 09/20/2017 08:41 AM    Cholesterol, total 147 09/14/2016 01:49 PM    Cholesterol, total 139 10/07/2015 08:44 AM    Cholesterol, total 136 04/07/2015 08:46 AM    HDL Cholesterol 39 (L) 07/05/2018 08:34 AM    HDL Cholesterol 51 09/20/2017 08:41 AM    HDL Cholesterol 55 09/14/2016 01:49 PM    HDL Cholesterol 47 10/07/2015 08:44 AM    HDL Cholesterol 49 04/07/2015 08:46 AM    LDL, calculated 66 07/05/2018 08:34 AM    LDL, calculated 71 09/20/2017 08:41 AM    LDL, calculated 73 09/14/2016 01:49 PM    LDL, calculated 66 10/07/2015 08:44 AM    LDL, calculated 71 04/07/2015 08:46 AM    Triglyceride 104 07/05/2018 08:34 AM    Triglyceride 91 09/20/2017 08:41 AM    Triglyceride 95 09/14/2016 01:49 PM    Triglyceride 128 10/07/2015 08:44 AM    Triglyceride 82 04/07/2015 08:46 AM     No results found for: CPK, CPKX, CPX  Lab Results   Component Value Date/Time    Sodium 139 01/10/2019 08:48 AM    Potassium 4.2 01/10/2019 08:48 AM    Chloride 102 01/10/2019 08:48 AM    CO2 24 01/10/2019 08:48 AM    Anion gap 5 04/26/2012 10:00 AM    Glucose 113 (H) 01/10/2019 08:48 AM    BUN 11 01/10/2019 08:48 AM    Creatinine 0.68 01/10/2019 08:48 AM    BUN/Creatinine ratio 16 01/10/2019 08:48 AM    GFR est  01/10/2019 08:48 AM    GFR est non-AA 92 01/10/2019 08:48 AM    Calcium 8.5 (L) 01/10/2019 08:48 AM    Bilirubin, total 0.3 01/10/2019 08:48 AM    AST (SGOT) 27 01/10/2019 08:48 AM    Alk. phosphatase 59 01/10/2019 08:48 AM    Protein, total 6.6 01/10/2019 08:48 AM    Albumin 4.2 01/10/2019 08:48 AM    Globulin 3.4 02/01/2010 04:40 AM    A-G Ratio 1.8 01/10/2019 08:48 AM    ALT (SGPT) 25 01/10/2019 08:48 AM       EKG:  SB RBBB     Assessment:     Assessment:      1. ASHD (arteriosclerotic heart disease)    2. Hypercholesteremia    3. Mixed hyperlipidemia    4. Essential hypertension    5. Diabetes mellitus without complication ([facility]        Orders Placed This Encounter    AMB POC EKG ROUTINE W/ 12 LEADS, INTER & REP     Order Specific Question:   Reason for Exam:     Answer:   routine        Plan:     Patient presents doing well and is stable from cardiac stand point. Reports occasional mild sob with heavy exertion, unchanged. Recovering from bronchitis 12/18, due to see Dr Bravo Salinas on Thursday.     1. CAD, KATHERINE to PDA 3/17: Clinically stable. Continue ASA, CCB, statin  2. HLD: 7/18  LDL 66. On statin. Lipids and labs followed by PCP  3. HTN: Controlled with current therapy. Normal EF with no significant valvular pathology per echo in 2016.  4. DM: On Insulin therapy. 5.  COPD: On inhalers.  Followed by Dr Suhas Son current care and f/u in 1 yr    Juan Aguilar, [facility]    2/5/2019         Patient seen, examined by me personally. Plan discussed as detailed. Agree with note as outlined by  NP. I confirm findings in history and physical exam. No additional findings noted. Agree with plan as outlined above.      Regina Lanes, MD

## 2019-02-05 NOTE — PROGRESS NOTES
1. Have you been to the ER, urgent care clinic since your last visit? Hospitalized since your last visit? Yes When: 8/2018 Endoscopy & UC Bronchitis. 2. Have you seen or consulted any other health care providers outside of the [address] [place] [address] since your last visit? Include any pap smears or colon screening. No     Patient has no cardiac complaints has pulmonary this week due to recent bronchitis.

## 2019-02-06 ENCOUNTER — TELEPHONE (OUTPATIENT)
Dept: INTERNAL MEDICINE CLINIC | Age: 64
End: 2019-02-06

## 2019-02-06 RX ORDER — BLOOD-GLUCOSE METER
EACH MISCELLANEOUS
Qty: 1 EACH | Refills: 1 | Status: SHIPPED | OUTPATIENT
Start: 2019-02-06

## 2019-02-06 NOTE — TELEPHONE ENCOUNTER
Kedar Godoy with ([facility]) is requesting a call back regarding a insurance referral. Best contact is [phone].      Message received & copied from [facility]

## 2019-02-06 NOTE — TELEPHONE ENCOUNTER
PCP: Lo Vanessa MD    Last appt: 1/16/2019  Future Appointments   Date Time Provider Dc Adler   4/17/2019  3:00 PM Lo Vanessa MD [address]   2/6/2020  1:00 PM Maritza Matthews MD [address] [facility]       Requested Prescriptions     Pending Prescriptions Disp Refills    Blood-Glucose Meter (ACCU-CHEK SUSANNA PLUS METER) misc 1 Each 1     Sig: Check glucose twice daily.     glucose blood VI test strips (ACCU-CHEK SUSANNA PLUS TEST STRP) strip 100 Strip 3     Sig: Check glucose twice daily

## 2019-03-04 DIAGNOSIS — E11.9 DIABETES MELLITUS WITHOUT COMPLICATION (HCC): ICD-10-CM

## 2019-03-04 RX ORDER — INSULIN ASPART 100 [IU]/ML
INJECTION, SOLUTION INTRAVENOUS; SUBCUTANEOUS
Qty: 15 ADJUSTABLE DOSE PRE-FILLED PEN SYRINGE | Refills: 1 | Status: SHIPPED | OUTPATIENT
Start: 2019-03-04 | End: 2019-07-10 | Stop reason: SDUPTHER

## 2019-03-29 ENCOUNTER — OFFICE VISIT (OUTPATIENT)
Dept: URGENT CARE | Age: 64
End: 2019-03-29

## 2019-03-29 VITALS
SYSTOLIC BLOOD PRESSURE: 179 MMHG | WEIGHT: 188 LBS | RESPIRATION RATE: 18 BRPM | HEIGHT: 63 IN | OXYGEN SATURATION: 97 % | HEART RATE: 70 BPM | BODY MASS INDEX: 33.31 KG/M2 | DIASTOLIC BLOOD PRESSURE: 79 MMHG | TEMPERATURE: 97.7 F

## 2019-03-29 DIAGNOSIS — J40 BRONCHITIS: Primary | ICD-10-CM

## 2019-03-29 DIAGNOSIS — R05.9 COUGH: ICD-10-CM

## 2019-03-29 RX ORDER — IPRATROPIUM BROMIDE AND ALBUTEROL SULFATE 2.5; .5 MG/3ML; MG/3ML
3 SOLUTION RESPIRATORY (INHALATION)
Status: COMPLETED | OUTPATIENT
Start: 2019-03-29 | End: 2019-03-29

## 2019-03-29 RX ORDER — BENZONATATE 200 MG/1
200 CAPSULE ORAL
Qty: 30 CAP | Refills: 0 | Status: SHIPPED | OUTPATIENT
Start: 2019-03-29 | End: 2020-08-18 | Stop reason: ALTCHOICE

## 2019-03-29 RX ORDER — DOXYCYCLINE 100 MG/1
100 CAPSULE ORAL 2 TIMES DAILY
Qty: 20 CAP | Refills: 0 | Status: SHIPPED | OUTPATIENT
Start: 2019-03-29 | End: 2019-04-08

## 2019-03-29 RX ADMIN — IPRATROPIUM BROMIDE AND ALBUTEROL SULFATE 3 ML: 2.5; .5 SOLUTION RESPIRATORY (INHALATION) at 13:40

## 2019-03-29 NOTE — PROGRESS NOTES
Cough   The history is provided by the patient. This is a new problem. Episode onset: 1 week ago. The problem occurs every few minutes. The problem has been gradually worsening. The cough is productive of sputum. There has been no fever. Associated symptoms include shortness of breath and wheezing. Pertinent negatives include no chest pain, no chills, no sweats, no ear congestion, no ear pain, no headaches, no rhinorrhea, no sore throat, no myalgias, no nausea and no vomiting. She has tried inhalers for the symptoms. The treatment provided no relief. She is not a smoker. Her past medical history is significant for asthma.         Past Medical History:   Diagnosis Date    Arrhythmia     bradycardia    Bloating     CAD (coronary artery disease)     Cancer ([facility] 2010    esophageal/GE junction    Chest pain     Chest pain     Chronic obstructive pulmonary disease (HCC)     Chronic pain     left shoulder    Congestion of throat     Cough     Depression     & anxiety    Diabetes (HCC)     IDDM    Dyspepsia and other specified disorders of function of stomach     Fatigue     GERD (gastroesophageal reflux disease)     Headache(784.0)     Hypercholesterolemia     Hypertension     Multinodular goiter     Nausea & vomiting     Stool color black     Stress     Weakness         Past Surgical History:   Procedure Laterality Date    COLONOSCOPY N/A 8/7/2018    COLONOSCOPY performed by Alisa Fernandez MD at [address]; HI RISK IND  8/7/2018         HX ENDOSCOPY  4/2016    HX GI  2010    gastroesophagectomy    HX HEART CATHETERIZATION  03/2017    stent x 1    HX HEENT  1957    tonsils as child    HX HERNIA REPAIR  04/26/2012    abdominal and umbilical    HX LAP CHOLECYSTECTOMY  1995    HX ORTHOPAEDIC  02/20/2015    right shoulder manipulation    HX OTHER SURGICAL Bilateral      shoulder surgery for frozen surgery    HX OTHER SURGICAL  6/24/2015    mammogram     HX PELVIC LAPAROSCOPY  1989    HX TUBAL LIGATION  1983    lap btl    HX VASCULAR ACCESS      port a cath and removal    [place] EGD BALLOON DILATION ESOPHAGUS <30 MM DIAM  4/20/2010         [place] EXTRAC ERUPTED TOOTH/EXPOSED ROOT           Family History   Problem Relation Age of Onset    Diabetes Father     Cancer Father         intestinal    Heart Disease Father     Hypertension Father     Heart Disease Brother     Diabetes Brother     Diabetes Mother     Lung Disease Mother     Heart Disease Mother     Hypertension Mother     Diabetes Maternal Grandmother     Diabetes Maternal Grandfather     Elevated Lipids Maternal Aunt     Thyroid Disease Neg Hx         Social History     Socioeconomic History    Marital status:      Spouse name: Not on file    Number of children: Not on file    Years of education: Not on file    Highest education level: Not on file   Occupational History    Not on file   Social Needs    Financial resource strain: Not on file    Food insecurity:     Worry: Not on file     Inability: Not on file    Transportation needs:     Medical: Not on file     Non-medical: Not on file   Tobacco Use    Smoking status: Never Smoker    Smokeless tobacco: Never Used   Substance and Sexual Activity    Alcohol use:  Yes     Alcohol/week: 0.5 oz     Types: 1 Glasses of wine per week     Comment: rarely glass of wine    Drug use: No     Types: Prescription, OTC    Sexual activity: Not on file   Lifestyle    Physical activity:     Days per week: Not on file     Minutes per session: Not on file    Stress: Not on file   Relationships    Social connections:     Talks on phone: Not on file     Gets together: Not on file     Attends Buddhism service: Not on file     Active member of club or organization: Not on file     Attends meetings of clubs or organizations: Not on file     Relationship status: Not on file    Intimate partner violence:     Fear of current or ex partner: Not on file Emotionally abused: Not on file     Physically abused: Not on file     Forced sexual activity: Not on file   Other Topics Concern    Not on file   Social History Narrative    Lives in [facility] with . Has a 45 yo daughter and an adopted 24 yo daughter. 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 ALLERGIES: Adhesive    Review of Systems   Constitutional: Negative for activity change, appetite change, chills and fever. HENT: Negative for congestion, ear pain, rhinorrhea, sinus pressure, sinus pain, sore throat and trouble swallowing. Respiratory: Positive for cough, shortness of breath and wheezing. Cardiovascular: Negative for chest pain and palpitations. Gastrointestinal: Negative for nausea and vomiting. Musculoskeletal: Negative for myalgias. Neurological: Negative for dizziness and headaches. Hematological: Negative for adenopathy. Vitals:    03/29/19 1330 03/29/19 1331   BP:  179/79   Pulse:  70   Resp:  18   Temp:  97.7 °F (36.5 °C)   SpO2:  97%   Weight: 188 lb (85.3 kg) 188 lb (85.3 kg)   Height: 5' 3\" (1.6 m) 5' 3\" (1.6 m)       Physical Exam   Constitutional: She appears well-developed and well-nourished. No distress. HENT:   Right Ear: Tympanic membrane, external ear and ear canal normal.   Left Ear: Tympanic membrane, external ear and ear canal normal.   Nose: Nose normal. Right sinus exhibits no maxillary sinus tenderness and no frontal sinus tenderness. Left sinus exhibits no maxillary sinus tenderness and no frontal sinus tenderness. Mouth/Throat: Oropharynx is clear and moist and mucous membranes are normal. No oropharyngeal exudate, posterior oropharyngeal edema, posterior oropharyngeal erythema or tonsillar abscesses. Cardiovascular: Normal rate, regular rhythm and normal heart sounds. Pulmonary/Chest: Effort normal. No respiratory distress. She has decreased breath sounds.  She has wheezes in the right upper field, the right lower field, the left upper field and the left lower field. She has no rhonchi. She has no rales. Lymphadenopathy:     She has no cervical adenopathy. Neurological: She is alert. Skin: She is not diaphoretic. Psychiatric: She has a normal mood and affect. Her behavior is normal. Judgment and thought content normal.   Nursing note and vitals reviewed. MDM    ICD-10-CM ICD-9-CM    1. Bronchitis J40 490    2. Cough R05 786.2 XR CHEST PA LAT     Medications Ordered Today   Medications    albuterol-ipratropium (DUO-NEB) 2.5 MG-0.5 MG/3 ML     Order Specific Question:   MODE OF DELIVERY     Answer:   Nebulizer    doxycycline (VIBRAMYCIN) 100 mg capsule     Sig: Take 1 Cap by mouth two (2) times a day for 10 days. Dispense:  20 Cap     Refill:  0    benzonatate (TESSALON) 200 mg capsule     Sig: Take 1 Cap by mouth three (3) times daily as needed for Cough. Dispense:  30 Cap     Refill:  0     The patients condition was discussed with the patient and they understand. The patient is to follow up with PCP. If signs and symptoms become worse the pt is to go to the ER. The patient is to take medications as prescribed. XR Results (most recent):  Results from Appointment encounter on 03/29/19   XR CHEST PA LAT    Narrative EXAM:  XR CHEST PA LAT    INDICATION: Cough and shortness of breath for one week    COMPARISON: 1/11/2018    TECHNIQUE: PA and lateral chest views    FINDINGS:  The cardiomediastinal contours are stable. The pulmonary vasculature  is within normal limits. The lungs and pleural spaces are clear. There is no pneumothorax. The bones and  upper abdomen are stable. Impression IMPRESSION:    No acute process.              Procedures

## 2019-03-29 NOTE — PATIENT INSTRUCTIONS
Bronchitis: Care Instructions  Your Care Instructions    Bronchitis is inflammation of the bronchial tubes, which carry air to the lungs. The tubes swell and produce mucus, or phlegm. The mucus and inflamed bronchial tubes make you cough. You may have trouble breathing. Most cases of bronchitis are caused by viruses like those that cause colds. Antibiotics usually do not help and they may be harmful. Bronchitis usually develops rapidly and lasts about 2 to 3 weeks in otherwise healthy people. Follow-up care is a key part of your treatment and safety. Be sure to make and go to all appointments, and call your doctor if you are having problems. It's also a good idea to know your test results and keep a list of the medicines you take. How can you care for yourself at home? · Take all medicines exactly as prescribed. Call your doctor if you think you are having a problem with your medicine. · Get some extra rest.  · Take an over-the-counter pain medicine, such as acetaminophen (Tylenol), ibuprofen (Advil, Motrin), or naproxen (Aleve) to reduce fever and relieve body aches. Read and follow all instructions on the label. · Do not take two or more pain medicines at the same time unless the doctor told you to. Many pain medicines have acetaminophen, which is Tylenol. Too much acetaminophen (Tylenol) can be harmful. · Take an over-the-counter cough medicine that contains dextromethorphan to help quiet a dry, hacking cough so that you can sleep. Avoid cough medicines that have more than one active ingredient. Read and follow all instructions on the label. · Breathe moist air from a humidifier, hot shower, or sink filled with hot water. The heat and moisture will thin mucus so you can cough it out. · Do not smoke. Smoking can make bronchitis worse. If you need help quitting, talk to your doctor about stop-smoking programs and medicines. These can increase your chances of quitting for good.   When should you call for help? Call 911 anytime you think you may need emergency care. For example, call if:    · You have severe trouble breathing.    Call your doctor now or seek immediate medical care if:    · You have new or worse trouble breathing.     · You cough up dark brown or bloody mucus (sputum).     · You have a new or higher fever.     · You have a new rash.    Watch closely for changes in your health, and be sure to contact your doctor if:    · You cough more deeply or more often, especially if you notice more mucus or a change in the color of your mucus.     · You are not getting better as expected. Where can you learn more? Go to http://alfonzo-andi.info/. Enter H333 in the search box to learn more about \"Bronchitis: Care Instructions. \"  Current as of: September 5, 2018  Content Version: 11.9  © 3203-1624 [employer]. Care instructions adapted under license by [employer] (which disclaims liability or warranty for this information). If you have questions about a medical condition or this instruction, always ask your healthcare professional. [address] any warranty or liability for your use of this information.

## 2019-04-08 RX ORDER — AMLODIPINE BESYLATE 2.5 MG/1
2.5 TABLET ORAL DAILY
Qty: 90 TAB | Refills: 3 | Status: SHIPPED | OUTPATIENT
Start: 2019-04-08 | End: 2020-04-17

## 2019-04-10 DIAGNOSIS — K21.9 GASTROESOPHAGEAL REFLUX DISEASE WITHOUT ESOPHAGITIS: ICD-10-CM

## 2019-04-10 DIAGNOSIS — I10 HYPERTENSION, UNSPECIFIED TYPE: ICD-10-CM

## 2019-04-10 DIAGNOSIS — Z00.00 MEDICARE ANNUAL WELLNESS VISIT, SUBSEQUENT: ICD-10-CM

## 2019-04-10 DIAGNOSIS — B37.9 CANDIDA INFECTION: ICD-10-CM

## 2019-04-10 DIAGNOSIS — I25.10 ASHD (ARTERIOSCLEROTIC HEART DISEASE): ICD-10-CM

## 2019-04-10 DIAGNOSIS — E78.00 HYPERCHOLESTEREMIA: ICD-10-CM

## 2019-04-10 DIAGNOSIS — F32.9 REACTIVE DEPRESSION: ICD-10-CM

## 2019-04-10 DIAGNOSIS — E11.9 DIABETES MELLITUS WITHOUT COMPLICATION (HCC): ICD-10-CM

## 2019-04-11 LAB
ALBUMIN SERPL-MCNC: 3.8 G/DL (ref 3.6–4.8)
ALBUMIN/GLOB SERPL: 1.5 {RATIO} (ref 1.2–2.2)
ALP SERPL-CCNC: 64 IU/L (ref 39–117)
ALT SERPL-CCNC: 19 IU/L (ref 0–32)
AST SERPL-CCNC: 19 IU/L (ref 0–40)
BILIRUB SERPL-MCNC: 0.5 MG/DL (ref 0–1.2)
BUN SERPL-MCNC: 14 MG/DL (ref 8–27)
BUN/CREAT SERPL: 14 (ref 12–28)
CALCIUM SERPL-MCNC: 9.2 MG/DL (ref 8.7–10.3)
CHLORIDE SERPL-SCNC: 99 MMOL/L (ref 96–106)
CO2 SERPL-SCNC: 24 MMOL/L (ref 20–29)
CREAT SERPL-MCNC: 0.99 MG/DL (ref 0.57–1)
EST. AVERAGE GLUCOSE BLD GHB EST-MCNC: 157 MG/DL
GLOBULIN SER CALC-MCNC: 2.5 G/DL (ref 1.5–4.5)
GLUCOSE SERPL-MCNC: 113 MG/DL (ref 65–99)
HBA1C MFR BLD: 7.1 % (ref 4.8–5.6)
POTASSIUM SERPL-SCNC: 4.6 MMOL/L (ref 3.5–5.2)
PROT SERPL-MCNC: 6.3 G/DL (ref 6–8.5)
SODIUM SERPL-SCNC: 138 MMOL/L (ref 134–144)
TSH SERPL DL<=0.005 MIU/L-ACNC: 2.84 UIU/ML (ref 0.45–4.5)

## 2019-04-17 ENCOUNTER — OFFICE VISIT (OUTPATIENT)
Dept: INTERNAL MEDICINE CLINIC | Age: 64
End: 2019-04-17

## 2019-04-17 VITALS
BODY MASS INDEX: 33.13 KG/M2 | TEMPERATURE: 97.9 F | SYSTOLIC BLOOD PRESSURE: 127 MMHG | WEIGHT: 187 LBS | HEART RATE: 59 BPM | HEIGHT: 63 IN | DIASTOLIC BLOOD PRESSURE: 77 MMHG | RESPIRATION RATE: 16 BRPM | OXYGEN SATURATION: 97 %

## 2019-04-17 DIAGNOSIS — E11.9 DIABETES MELLITUS WITHOUT COMPLICATION (HCC): Primary | ICD-10-CM

## 2019-04-17 DIAGNOSIS — R05.9 COUGH: ICD-10-CM

## 2019-04-17 DIAGNOSIS — K21.9 GASTROESOPHAGEAL REFLUX DISEASE WITHOUT ESOPHAGITIS: ICD-10-CM

## 2019-04-17 DIAGNOSIS — I25.10 ASHD (ARTERIOSCLEROTIC HEART DISEASE): ICD-10-CM

## 2019-04-17 DIAGNOSIS — E66.9 OBESITY (BMI 30.0-34.9): ICD-10-CM

## 2019-04-17 DIAGNOSIS — C15.9 MALIGNANT NEOPLASM OF ESOPHAGUS, UNSPECIFIED LOCATION (HCC): ICD-10-CM

## 2019-04-17 DIAGNOSIS — E78.00 HYPERCHOLESTEREMIA: ICD-10-CM

## 2019-04-17 DIAGNOSIS — I10 HYPERTENSION, UNSPECIFIED TYPE: ICD-10-CM

## 2019-04-17 DIAGNOSIS — F33.9 RECURRENT DEPRESSION (HCC): ICD-10-CM

## 2019-04-17 RX ORDER — PEN NEEDLE, DIABETIC 30 GX3/16"
NEEDLE, DISPOSABLE MISCELLANEOUS
Qty: 1 PACKAGE | Refills: 11 | Status: SHIPPED | OUTPATIENT
Start: 2019-04-17 | End: 2021-05-26

## 2019-04-17 NOTE — PROGRESS NOTES
HISTORY OF PRESENT ILLNESS  Beto Cheung is a 75 y.o. female. HPI  Last here 1/16/19. Pt is here to f/u on chronic conditions      BP today is controlled  No home readings  Continues on losartan 50mg and norvasc 2.5mg     She has been taking lasix a few tmes per week not needing very much - R chronically larger than L  This is stable, improved from that past, lasix helps when used  Hands no swelling      BS at home has been 110s in the AM  BS in the evenings in 160s  Denies any lows <80, lowest were in 90s  Continues lantus 32U qAM  Continues novolog 6U with dinner +sliding scale (8U if >150, 10U >200, 12U if >250, 14U if >300) - usually takes 6U  Sometimes takes 8 units not more usually   She also takes ASA 81mg daily      Reviewed labs 4/19  Ordered labs to complete prior to next visit      Wt today is 187 lbs --up 3 lbs x lov  Discussed [facility] she is sceptical of this addressed again today 4/19  She does not like group programs  Pt is trying to cut back on portions  Discussed diet and w/l       Pt follows with Dr. Ravi Mendez (cardio)  Reviewed notes 2/5/19: Patient presents doing well and is stable from cardiac stand point. Reports occasional mild sob with heavy exertion, unchanged. Recovering from bronchitis 12/18, due to see Dr Ede Mauricio on Thursday. 1. CAD, KATHERINE to PDA 3/17: Clinically stable. Continue ASA, CCB, statin  2.  HLD: 7/18  LDL 66. On statin.  Lipids and labs followed by PCP  3. HTN: Controlled with current therapy. Normal EF with no significant valvular pathology per echo in 2016.  4. DM: On Insulin therapy. 5.  COPD: On inhalers.  Followed by Dr Anne Francisco current care and f/u in 1 yr  Pt is no longer taking plavix as of 3/18  Pt is no longer on ranexa dt/ cost  Pt is now taking norvasc to prevent CP--no sx doing well       Pt follows annually with Dr. Graham Prescott (hemo/onc) for h/o adenoma of esophagus   Last visit was 7/3/18  Pt was discharged from his care      Pt follows with Dr. Ede Mauricio (pulm)   Last visit was 2/19: persistent asthma, had one exacerbation of bronchitis with cough in the last year, had had sx into her chest in 12/18, continue symbicort and albuterol prn, work on w/l, thinking about PFTs in the future  Continues singulair daily, albuterol prn, and symbicort BID for breathing/asthma per pulm, which help      Pt has not been evaluated for DANIELLE in the past  Recall she declines further evaluation for now       Continues lipitor 40mg daily for cholesterol      Continues dexilant and reglan daily for reflux, which works well  She gets jittery if she takes reglan more than once a day  She also takes zantac qhs prn (not daily)   Pt wanted to see Dr Tristan Buerger and switch dexilant to prescription prilosec d/t dexilant being expensive  Discussed prilosec would likely not work as well  Discussed she should likely see Dr Tristan Buerger at least every other year d/t reglan rx  Recall has h/o esophageal cancer    Pt went to Urgent Care on 3/29/19 for a cough  She states that it was aggravating her hernia and causing pain  Pt was given doxycycline and tessalon perles  Reviewed CXR 3/19: No acute process. It is improved but she is still coughing up some phlegm sometimes periodically throughout the day     Of note, her  has bladder cancer and she is caring for him at home.      ACP not on file. SDM is her .   Provided information in the past.       PREVENTIVE:    Colonoscopy: 8/7/18, Dr. Tristan Buerger, repeat 5 years  EGD: 4/16, Dr. Tristan Buerger, h/o esophageal cancer, polyp on recent EGD, biopsy nl  AAA: FMHx (grandfather)  Pap: Dr. Imelda Yoo, 5/15/18  Mammogram: 7/23/18, negative  Dexa: 4/18, mild osteopenia  Tdap: 9/14/2016  Pneumovax: 4/26/2009  [id number]: 11/14/2016  Zostavax: 11/18/2016, reaction on R arm  Shingrix: ordered 04/09/18, backordered  Flu shot: 10/16/18   Foot exam: 04/17/19   Microalbumin: 7/18, minimal  A1c: 9/16 6.7, 12/16 6.2, 3/17 6.8, 6/17 8.8, 9/17 6.2, 12/17 6.4, 4/18 7.3, 7/18 7.4, 10/18 7.5, 1/19 7.1, 4/19 7.1  Eye exam: Dr. Tata Crocker, 11/12/18, reviewed notes  Lipids: 7/18 LDL 66  Hep C screen: 11/15, negative    Patient Active Problem List    Diagnosis Date Noted    Recurrent depression ([facility] 12/29/2017    Diabetes mellitus without complication ([facility] 59/98/5116    ASHD (arteriosclerotic heart disease) 06/18/2014    Syncope 06/18/2014    Bradycardia 06/18/2014    Multinodular goiter     Angina, class II ([facility] 04/18/2013    Family history of coronary artery disease 04/18/2013    S/P cardiac cath 04/18/2013    Hypercholesteremia 02/18/2013    HTN (hypertension) 02/18/2013    Asthma 02/18/2013    Depression 02/18/2013    Thyroid nodule 02/18/2013    Shoulder capsulitis 02/18/2013    Incisional hernia 02/10/2011    Esophageal cancer ([facility] 02/10/2011     Current Outpatient Medications   Medication Sig Dispense Refill    amLODIPine (NORVASC) 2.5 mg tablet TAKE 1 TAB BY MOUTH DAILY. IN PLACE OF RANEXA AS NOT COVERED 90 Tab 3    benzonatate (TESSALON) 200 mg capsule Take 1 Cap by mouth three (3) times daily as needed for Cough. 30 Cap 0    insulin aspart U-100 (NOVOLOG FLEXPEN U-100 INSULIN) 100 unit/mL inpn INJECT 4 UNITS EVERY EVENING WHEN BLOOD SUGAR OVER 120 15 Adjustable Dose Pre-filled Pen Syringe 1    Blood-Glucose Meter (ACCU-CHEK SUSANNA PLUS METER) misc Check glucose twice daily.  1 Each 1    glucose blood VI test strips (ACCU-CHEK SUSANNA PLUS TEST STRP) strip Check glucose twice daily 100 Strip 3    glucose blood VI test strips (ONETOUCH ULTRA TEST) strip CHECK TWICE A  Strip 12    atorvastatin (LIPITOR) 40 mg tablet TAKE 1 TABLET EVERY DAY 90 Tab 2    furosemide (LASIX) 20 mg tablet TAKE 1 TABLET BY MOUTH EVERY DAY (Patient taking differently: TAKE 1 TABLET BY MOUTH EVERY DAY/ PRN) 90 Tab 1    losartan (COZAAR) 50 mg tablet TAKE 1 TABLET EVERY DAY 90 Tab 1    montelukast (SINGULAIR) 10 mg tablet TAKE 1 TABLET EVERY DAY 90 Tab 1    insulin glargine (LANTUS SOLOSTAR U-100 INSULIN) 100 unit/mL (3 mL) inpn 32 -34units daily 45 mL 2    cholecalciferol, vitamin D3, (VITAMIN D3) 2,000 unit tab Take  by mouth daily.  raNITIdine (ZANTAC) 150 mg tablet Take 150 mg by mouth as needed for Indigestion.  nystatin (MYCOSTATIN) powder Apply  to affected area four (4) times daily. 1 Bottle 3    nystatin (MYCOSTATIN) topical cream Apply  to affected area two (2) times a day. 15 g 1    albuterol (PROVENTIL HFA, VENTOLIN HFA, PROAIR HFA) 90 mcg/actuation inhaler Take 2 Puffs by inhalation as needed. 1 Inhaler 3    SYMBICORT 160-4.5 mcg/actuation HFA inhaler Take 2 Puffs by inhalation two (2) times a day.  metoclopramide HCl (REGLAN) 10 mg tablet Take 10 mg by mouth daily.  polyethylene glycol (MIRALAX) 17 gram packet Take 17 g by mouth daily.  cetirizine (ZYRTEC) 10 mg tablet Take 10 mg by mouth daily.  melatonin 5 mg Tab Take 5 mg by mouth nightly.  Dexlansoprazole (DEXILANT) 60 mg CpDM Take 60 mg by mouth daily.  aspirin 81 mg tablet Take 81 mg by mouth.  pyridoxine (VITAMIN B-6) 100 mg tablet Take 100 mg by mouth every morning.        Past Surgical History:   Procedure Laterality Date    COLONOSCOPY N/A 8/7/2018    COLONOSCOPY performed by Duane Knights., MD at [facility]; HI RISK IND  8/7/2018         HX ENDOSCOPY  4/2016    HX GI  2010    gastroesophagectomy    HX HEART CATHETERIZATION  03/2017    stent x 1    HX HEENT  1957    tonsils as child    HX HERNIA REPAIR  04/26/2012    abdominal and umbilical    HX LAP CHOLECYSTECTOMY  1995    HX ORTHOPAEDIC  02/20/2015    right shoulder manipulation    HX OTHER SURGICAL Bilateral      shoulder surgery for frozen surgery    HX OTHER SURGICAL  6/24/2015    mammogram     HX PELVIC LAPAROSCOPY  1989    HX TUBAL LIGATION  1983    lap btl    HX VASCULAR ACCESS      port a cath and removal    MO EGD BALLOON DILATION ESOPHAGUS <30 MM DIAM  4/20/2010  SD EXTRAC ERUPTED TOOTH/EXPOSED ROOT        Lab Results   Component Value Date/Time    WBC 5.3 04/02/2018 08:24 AM    HGB 12.3 04/02/2018 08:24 AM    Hemoglobin (POC) 11.9 01/28/2010 12:02 PM    HCT 37.9 04/02/2018 08:24 AM    Hematocrit (POC) 35 01/28/2010 12:02 PM    PLATELET 986 78/68/9741 08:24 AM    MCV 94 04/02/2018 08:24 AM     Lab Results   Component Value Date/Time    Cholesterol, total 126 07/05/2018 08:34 AM    HDL Cholesterol 39 (L) 07/05/2018 08:34 AM    LDL, calculated 66 07/05/2018 08:34 AM    Triglyceride 104 07/05/2018 08:34 AM     Lab Results   Component Value Date/Time    GFR est non-AA 60 04/10/2019 08:22 AM    GFRNA, POC >60 01/06/2012 10:42 AM    GFR est AA 69 04/10/2019 08:22 AM    GFRAA, POC >60 01/06/2012 10:42 AM    Creatinine 0.99 04/10/2019 08:22 AM    Creatinine (POC) 0.9 01/06/2012 10:42 AM    BUN 14 04/10/2019 08:22 AM    BUN (POC) 14 01/28/2010 12:02 PM    Sodium 138 04/10/2019 08:22 AM    Sodium (POC) 141 01/28/2010 12:02 PM    Potassium 4.6 04/10/2019 08:22 AM    Potassium (POC) 4.0 01/28/2010 12:02 PM    Chloride 99 04/10/2019 08:22 AM    Chloride (POC) 103 01/28/2010 12:02 PM    CO2 24 04/10/2019 08:22 AM    Magnesium 1.9 02/08/2010 03:00 AM        Review of Systems   Respiratory: Negative for shortness of breath. Cardiovascular: Negative for chest pain. Physical Exam   Constitutional: She is oriented to person, place, and time. She appears well-developed and well-nourished. No distress. HENT:   Head: Normocephalic and atraumatic. Right Ear: External ear normal.   Left Ear: External ear normal.   Mouth/Throat: Oropharynx is clear and moist. No oropharyngeal exudate. Eyes: Conjunctivae and EOM are normal. Right eye exhibits no discharge. Left eye exhibits no discharge. Neck: Normal range of motion. Neck supple. Cardiovascular: Normal rate, regular rhythm and normal heart sounds. Exam reveals no gallop and no friction rub. No murmur heard.   Pulmonary/Chest: Effort normal and breath sounds normal. No respiratory distress. She has no wheezes. She has no rales. She exhibits no tenderness. Musculoskeletal: Normal range of motion. She exhibits no edema, tenderness or deformity. Lymphadenopathy:     She has no cervical adenopathy. Neurological: She is alert and oriented to person, place, and time. Coordination normal.   Monofilament nl BLE, good peripheral pulses, no ulcers    Skin: Skin is warm and dry. No rash noted. She is not diaphoretic. No erythema. No pallor. Psychiatric: She has a normal mood and affect. Her behavior is normal.       ASSESSMENT and PLAN    ICD-10-CM ICD-9-CM    1. Diabetes mellitus without complication ([facility]    Borderline control, adequate given comorbidities and risk for hypoglycemia, continue lantus 32U and novolog 6U + sliding scale, closely monitor glucose, work more aggressively on diet, goal will be a1c <7.5, no additional changes today   E11.9 250.00  DIABETES FOOT EXAM      LIPID PANEL      MICROALBUMIN, UR, RAND W/ MICROALB/CREAT RATIO      HEMOGLOBIN A1C WITH EAG      METABOLIC PANEL, COMPREHENSIVE      CBC W/O DIFF   2. Malignant neoplasm of esophagus, unspecified location ([facility]    In remission, has chronic gastroparesis and refllux x this, continues on dexilant and reglan daily per Dr Platt Plater   C15.9 150.9  DIABETES FOOT EXAM      LIPID PANEL      MICROALBUMIN, UR, RAND W/ MICROALB/CREAT RATIO      HEMOGLOBIN A1C WITH EAG      METABOLIC PANEL, COMPREHENSIVE      CBC W/O DIFF   3. Recurrent depression ([facility]    Has been doing well without meds for quite some time   F33.9 296.30  DIABETES FOOT EXAM      LIPID PANEL      MICROALBUMIN, UR, RAND W/ MICROALB/CREAT RATIO      HEMOGLOBIN A1C WITH EAG      METABOLIC PANEL, COMPREHENSIVE      CBC W/O DIFF   4.  ASHD (arteriosclerotic heart disease)    UTD with Dr Alysa Parr, now on norvasc to help prevent CP, takes daily ASA, medically managed   I25.10 414.00  DIABETES FOOT EXAM LIPID PANEL      MICROALBUMIN, UR, RAND W/ MICROALB/CREAT RATIO      HEMOGLOBIN A1C WITH EAG      METABOLIC PANEL, COMPREHENSIVE      CBC W/O DIFF   5. Hypertension, unspecified type    Controlled on losartan 50 and norvasc 2.5   I10 401.9 HM DIABETES FOOT EXAM      LIPID PANEL      MICROALBUMIN, UR, RAND W/ MICROALB/CREAT RATIO      HEMOGLOBIN A1C WITH EAG      METABOLIC PANEL, COMPREHENSIVE      CBC W/O DIFF   6. Hypercholesteremia    Controlled on lipitor 40mg daily   E78.00 272.0 HM DIABETES FOOT EXAM      LIPID PANEL      MICROALBUMIN, UR, RAND W/ MICROALB/CREAT RATIO      HEMOGLOBIN A1C WITH EAG      METABOLIC PANEL, COMPREHENSIVE      CBC W/O DIFF   7. Cough    Improving, did not take steroids d/t h/o diabetes ,completed abx, has minimal residual cough, discussed zyrtec and flonase, continue symbicort and albuterol prn   R05 786.2    8. Obesity (BMI 30.0-34. 9)    Discussed [employer], she is not interested in this but she will try to focus more aggressively on w/l   E66.9 278.00    9. Gastroesophageal reflux disease without esophagitis    Controlled with dexilant, continue   K21.9 530.81         Scribed by Azalia Tariq of [address] [place] [address] [address], as dictated by Dr. Servando Nava. Current diagnosis and concerns discussed with pt at length. Pt understands risks and benefits or current treatment plan and medications, and accepts the treatment and medication with any possible risks. Pt asks appropriate questions, which were answered. Pt was instructed to call with any concerns or problems. I have reviewed the note documented by the scribe. The services provided are my own.   The documentation is accurate

## 2019-05-13 RX ORDER — LOSARTAN POTASSIUM 50 MG/1
TABLET ORAL
Qty: 90 TAB | Refills: 1 | Status: SHIPPED | OUTPATIENT
Start: 2019-05-13 | End: 2020-01-22

## 2019-06-26 RX ORDER — MONTELUKAST SODIUM 10 MG/1
TABLET ORAL
Qty: 90 TAB | Refills: 1 | Status: SHIPPED | OUTPATIENT
Start: 2019-06-26 | End: 2019-11-13 | Stop reason: SDUPTHER

## 2019-07-03 DIAGNOSIS — I25.10 ASHD (ARTERIOSCLEROTIC HEART DISEASE): ICD-10-CM

## 2019-07-03 DIAGNOSIS — E11.9 DIABETES MELLITUS WITHOUT COMPLICATION (HCC): ICD-10-CM

## 2019-07-03 DIAGNOSIS — F33.9 RECURRENT DEPRESSION (HCC): ICD-10-CM

## 2019-07-03 DIAGNOSIS — I10 HYPERTENSION, UNSPECIFIED TYPE: ICD-10-CM

## 2019-07-03 DIAGNOSIS — C15.9 MALIGNANT NEOPLASM OF ESOPHAGUS, UNSPECIFIED LOCATION (HCC): ICD-10-CM

## 2019-07-03 DIAGNOSIS — E78.00 HYPERCHOLESTEREMIA: ICD-10-CM

## 2019-07-04 LAB
ALBUMIN SERPL-MCNC: 4 G/DL (ref 3.6–4.8)
ALBUMIN/CREAT UR: 6.7 MG/G CREAT (ref 0–30)
ALBUMIN/GLOB SERPL: 1.6 {RATIO} (ref 1.2–2.2)
ALP SERPL-CCNC: 68 IU/L (ref 39–117)
ALT SERPL-CCNC: 28 IU/L (ref 0–32)
AST SERPL-CCNC: 25 IU/L (ref 0–40)
BILIRUB SERPL-MCNC: 0.3 MG/DL (ref 0–1.2)
BUN SERPL-MCNC: 15 MG/DL (ref 8–27)
BUN/CREAT SERPL: 17 (ref 12–28)
CALCIUM SERPL-MCNC: 8.8 MG/DL (ref 8.7–10.3)
CHLORIDE SERPL-SCNC: 102 MMOL/L (ref 96–106)
CHOLEST SERPL-MCNC: 134 MG/DL (ref 100–199)
CO2 SERPL-SCNC: 25 MMOL/L (ref 20–29)
CREAT SERPL-MCNC: 0.9 MG/DL (ref 0.57–1)
CREAT UR-MCNC: 186.7 MG/DL
ERYTHROCYTE [DISTWIDTH] IN BLOOD BY AUTOMATED COUNT: 14.5 % (ref 12.3–15.4)
EST. AVERAGE GLUCOSE BLD GHB EST-MCNC: 151 MG/DL
GLOBULIN SER CALC-MCNC: 2.5 G/DL (ref 1.5–4.5)
GLUCOSE SERPL-MCNC: 140 MG/DL (ref 65–99)
HBA1C MFR BLD: 6.9 % (ref 4.8–5.6)
HCT VFR BLD AUTO: 37.4 % (ref 34–46.6)
HDLC SERPL-MCNC: 44 MG/DL
HGB BLD-MCNC: 12.9 G/DL (ref 11.1–15.9)
LDLC SERPL CALC-MCNC: 74 MG/DL (ref 0–99)
MCH RBC QN AUTO: 30.9 PG (ref 26.6–33)
MCHC RBC AUTO-ENTMCNC: 34.5 G/DL (ref 31.5–35.7)
MCV RBC AUTO: 90 FL (ref 79–97)
MICROALBUMIN UR-MCNC: 12.5 UG/ML
PLATELET # BLD AUTO: 216 X10E3/UL (ref 150–450)
POTASSIUM SERPL-SCNC: 4.8 MMOL/L (ref 3.5–5.2)
PROT SERPL-MCNC: 6.5 G/DL (ref 6–8.5)
RBC # BLD AUTO: 4.17 X10E6/UL (ref 3.77–5.28)
SODIUM SERPL-SCNC: 140 MMOL/L (ref 134–144)
TRIGL SERPL-MCNC: 79 MG/DL (ref 0–149)
VLDLC SERPL CALC-MCNC: 16 MG/DL (ref 5–40)
WBC # BLD AUTO: 4.6 X10E3/UL (ref 3.4–10.8)

## 2019-07-08 ENCOUNTER — OFFICE VISIT (OUTPATIENT)
Dept: INTERNAL MEDICINE CLINIC | Age: 64
End: 2019-07-08

## 2019-07-08 VITALS
WEIGHT: 187 LBS | TEMPERATURE: 98 F | OXYGEN SATURATION: 98 % | SYSTOLIC BLOOD PRESSURE: 138 MMHG | RESPIRATION RATE: 16 BRPM | BODY MASS INDEX: 33.13 KG/M2 | HEART RATE: 58 BPM | HEIGHT: 63 IN | DIASTOLIC BLOOD PRESSURE: 78 MMHG

## 2019-07-08 DIAGNOSIS — F33.9 RECURRENT DEPRESSION (HCC): ICD-10-CM

## 2019-07-08 DIAGNOSIS — J45.20 MILD INTERMITTENT ASTHMA WITHOUT COMPLICATION: ICD-10-CM

## 2019-07-08 DIAGNOSIS — E11.9 DIABETES MELLITUS WITHOUT COMPLICATION (HCC): Primary | ICD-10-CM

## 2019-07-08 DIAGNOSIS — R01.1 CARDIAC MURMUR: ICD-10-CM

## 2019-07-08 DIAGNOSIS — J44.9 CHRONIC OBSTRUCTIVE PULMONARY DISEASE, UNSPECIFIED COPD TYPE (HCC): ICD-10-CM

## 2019-07-08 DIAGNOSIS — I20.9 ANGINA, CLASS II (HCC): ICD-10-CM

## 2019-07-08 DIAGNOSIS — C15.9 MALIGNANT NEOPLASM OF ESOPHAGUS, UNSPECIFIED LOCATION (HCC): ICD-10-CM

## 2019-07-08 DIAGNOSIS — E78.00 HYPERCHOLESTEREMIA: ICD-10-CM

## 2019-07-08 DIAGNOSIS — I10 HYPERTENSION, UNSPECIFIED TYPE: ICD-10-CM

## 2019-07-08 RX ORDER — PEN NEEDLE, DIABETIC 30 GX3/16"
NEEDLE, DISPOSABLE MISCELLANEOUS
Qty: 1 PACKAGE | Refills: 11 | Status: SHIPPED | OUTPATIENT
Start: 2019-07-08 | End: 2020-08-18 | Stop reason: SDUPTHER

## 2019-07-08 NOTE — PATIENT INSTRUCTIONS
Office Policies Phone calls/patient messages: Please allow up to 24 hours for someone in the office to contact you about your call or message. Be mindful your provider may be out of the office or your message may require further review. We encourage you to use [employer] for your messages as this is a faster, more efficient way to communicate with our office Medication Refills: 
         
Prescription medications require 48-72 business hours to process. We encourage you to use [employer] for your refills. For controlled medications: Please allow 72 business hours to process. Certain medications may require you to  a written prescription at our office. NO narcotic/controlled medications will be prescribed after 4pm Monday through Friday or on weekends Form/Paperwork Completion: 
         
Please note a $25 fee may incur for all paperwork for completed by our providers. We ask that you allow 7-10 business days. Pre-payment is due prior to picking up/faxing the completed form. You may also download your forms to [employer] to have your doctor print off. DISCUSS ECHOCARDIOGRAM FOR MURMUR WITH RAFAEL

## 2019-07-08 NOTE — PROGRESS NOTES
HISTORY OF PRESENT ILLNESS  Rock Rivero is a 75 y.o. female. HPI  Last here 4/17/19.   Pt is here to f/u on chronic conditions      BP today is 138/78  Pt checks her BP at home 120s-130s/70s   Continues on losartan 50mg and norvasc 2.5mg     She has been taking lasix  Pt takes this PRN- takes this less than once a week   This is stable, improved from that past, lasix helps      BS at home has been 110s in the AM  BS in AM 1-teens, had one week of 90s, lowest was 86   BS in the afternoon never higher than 300s  Continues lantus 32U qAM  Continues novolog 6U with dinner +sliding scale (8U if >150, 10U >200, 12U if >250, 14U if >300) - usually takes 6U  Takes 8U on average   She also takes ASA 81mg daily      Reviewed labs 7/19  [facility] today is 187 lbs --stable x lov   Discussed Bettye Pi is skeptical of this addressed again today 7/19   Pt is trying to cut back with portions   Pt is also trying to cut out deserts and wine   Also trying to be more active   Discussed diet and w/l       Pt follows with Dr. Nevaeh Polanco (cardio)  Last visit was 2/5/19-follows annually   Pt is no longer taking plavix as of 3/18  Pt is now taking norvasc to prevent CP   Last ECHO 2016-discussed repeating ECHO with Dr Nevaeh Polanco due to murmur       Pt follows annually with Dr. Suyapa Linares (hemo/onc) for h/o adenoma of esophagus   Last visit was 7/3/18  Pt was discharged from his care      Pt follows with Dr. Gilmar Jefferson (pulm)   Last visit was 2/19-follows annually   Continues singulair daily, albuterol prn, and symbicort BID for breathing/asthma per pulm, which help  Has not needed to use albuterol since March      Discussed seeing derm If scab on L arm does not heal     Pt has not been evaluated for DANIELLE in the past   Recall she declines further evaluation for now       Continues lipitor 40mg daily for cholesterol       Follows with Dr Mita Alejo (GI)  Continues dexilant and reglan daily for reflux, which works well  She had a flare up a couple of weeks ago Pt takes zantac periodically when eating greasy foods   Recall has h/o esophageal cancer     Of note, her  has bladder cancer and she is caring for him at home.      ACP not on file. SDM is her .   Provided information in the past.       PREVENTIVE:    Colonoscopy: 8/7/18, Dr. Christinia Scheuermann, repeat 5 years  EGD: 4/16, Dr. Christinia Scheuermann, h/o esophageal cancer, polyp on recent EGD, biopsy nl  AAA: FMHx (grandfather)  Pap: Dr. Michael Muñoz, 5/15/18  Mammogram: 7/23/18, negative, scheduled for 7/24/19   Dexa: 4/18, mild osteopenia  Tdap: 9/14/2016  Pneumovax: 07/08/19 --told to get at her pharm we are out  [id number]: 11/14/2016  Zostavax: 11/18/2016, reaction on R arm  Shingrix: ordered 04/09/18, backordered, pt will check on this   Flu shot: 10/16/18   Foot exam: 04/17/19   Microalbumin: 7/19, minimal  A1c: 9/16 6.7, 12/16 6.2, 3/17 6.8, 6/17 8.8, 9/17 6.2, 12/17 6.4, 4/18 7.3, 7/18 7.4, 10/18 7.5, 1/19 7.1, 4/19 7.1, 7/19 6.9   Eye exam: Dr. Castillo, 11/12/18, reviewed notes  Lipids: 7/19 LDL 74  Hep C screen: 11/15, negative      Patient Active Problem List    Diagnosis Date Noted    Recurrent depression ([facility] 12/29/2017    Diabetes mellitus without complication ([facility] 05/63/7224    ASHD (arteriosclerotic heart disease) 06/18/2014    Syncope 06/18/2014    Bradycardia 06/18/2014    Multinodular goiter     Angina, class II ([facility] 04/18/2013    Family history of coronary artery disease 04/18/2013    S/P cardiac cath 04/18/2013    Hypercholesteremia 02/18/2013    HTN (hypertension) 02/18/2013    Asthma 02/18/2013    Depression 02/18/2013    Thyroid nodule 02/18/2013    Shoulder capsulitis 02/18/2013    Incisional hernia 02/10/2011    Esophageal [facility] 02/10/2011     Current Outpatient Medications   Medication Sig Dispense Refill    montelukast (SINGULAIR) 10 mg tablet TAKE 1 TABLET EVERY DAY 90 Tab 1    losartan (COZAAR) 50 mg tablet TAKE 1 TABLET EVERY DAY 90 Tab 1    Insulin Needles, Disposable, (BD ULTRA-FINE SHORT PEN NEEDLE) 31 gauge x 5/16\" ndle Use twice daily to inject insulin 1 Package 11    amLODIPine (NORVASC) 2.5 mg tablet TAKE 1 TAB BY MOUTH DAILY. IN PLACE OF RANEXA AS NOT COVERED 90 Tab 3    benzonatate (TESSALON) 200 mg capsule Take 1 Cap by mouth three (3) times daily as needed for Cough. 30 Cap 0    insulin aspart U-100 (NOVOLOG FLEXPEN U-100 INSULIN) 100 unit/mL inpn INJECT 4 UNITS EVERY EVENING WHEN BLOOD SUGAR OVER 120 (Patient taking differently: INJECT per sliding scale) 15 Adjustable Dose Pre-filled Pen Syringe 1    Blood-Glucose Meter (ACCU-CHEK SUSANNA PLUS METER) misc Check glucose twice daily. 1 Each 1    glucose blood VI test strips (ACCU-CHEK SUSANNA PLUS TEST STRP) strip Check glucose twice daily 100 Strip 3    glucose blood VI test strips (ONETOUCH ULTRA TEST) strip CHECK TWICE A  Strip 12    atorvastatin (LIPITOR) 40 mg tablet TAKE 1 TABLET EVERY DAY 90 Tab 2    furosemide (LASIX) 20 mg tablet TAKE 1 TABLET BY MOUTH EVERY DAY (Patient taking differently: TAKE 1 TABLET BY MOUTH EVERY DAY/ PRN) 90 Tab 1    insulin glargine (LANTUS SOLOSTAR U-100 INSULIN) 100 unit/mL (3 mL) inpn 32 -34units daily 45 mL 2    cholecalciferol, vitamin D3, (VITAMIN D3) 2,000 unit tab Take  by mouth daily.  raNITIdine (ZANTAC) 150 mg tablet Take 150 mg by mouth as needed for Indigestion.  nystatin (MYCOSTATIN) powder Apply  to affected area four (4) times daily. 1 Bottle 3    nystatin (MYCOSTATIN) topical cream Apply  to affected area two (2) times a day. 15 g 1    albuterol (PROVENTIL HFA, VENTOLIN HFA, PROAIR HFA) 90 mcg/actuation inhaler Take 2 Puffs by inhalation as needed. 1 Inhaler 3    SYMBICORT 160-4.5 mcg/actuation HFA inhaler Take 2 Puffs by inhalation two (2) times a day.  metoclopramide HCl (REGLAN) 10 mg tablet Take 10 mg by mouth daily.  polyethylene glycol (MIRALAX) 17 gram packet Take 17 g by mouth daily.       cetirizine (ZYRTEC) 10 mg tablet Take 10 mg by mouth daily.  melatonin 5 mg Tab Take 5 mg by mouth nightly.  Dexlansoprazole (DEXILANT) 60 mg CpDM Take 60 mg by mouth daily.  aspirin 81 mg tablet Take 81 mg by mouth.  pyridoxine (VITAMIN B-6) 100 mg tablet Take 100 mg by mouth every morning.        Past Surgical History:   Procedure Laterality Date    COLONOSCOPY N/A 8/7/2018    COLONOSCOPY performed by Merlin Napoleon., MD at [facility]; HI RISK IND  8/7/2018         HX ENDOSCOPY  4/2016    HX GI  2010    gastroesophagectomy    HX HEART CATHETERIZATION  03/2017    stent x 1    HX HEENT  1957    tonsils as child    HX HERNIA REPAIR  04/26/2012    abdominal and umbilical    HX LAP CHOLECYSTECTOMY  1995    HX ORTHOPAEDIC  02/20/2015    right shoulder manipulation    HX OTHER SURGICAL Bilateral      shoulder surgery for frozen surgery    HX OTHER SURGICAL  6/24/2015    mammogram     HX PELVIC LAPAROSCOPY  1989    HX TUBAL LIGATION  1983    lap btl    HX VASCULAR ACCESS      port a cath and removal    CA EGD BALLOON DILATION ESOPHAGUS <30 MM DIAM  4/20/2010         CA EXTRAC ERUPTED TOOTH/EXPOSED ROOT        Lab Results   Component Value Date/Time    WBC 4.6 07/03/2019 08:29 AM    HGB 12.9 07/03/2019 08:29 AM    Hemoglobin (POC) 11.9 01/28/2010 12:02 PM    HCT 37.4 07/03/2019 08:29 AM    Hematocrit (POC) 35 01/28/2010 12:02 PM    PLATELET 088 15/83/4521 08:29 AM    MCV 90 07/03/2019 08:29 AM     Lab Results   Component Value Date/Time    Cholesterol, total 134 07/03/2019 08:29 AM    HDL Cholesterol 44 07/03/2019 08:29 AM    LDL, calculated 74 07/03/2019 08:29 AM    Triglyceride 79 07/03/2019 08:29 AM     Lab Results   Component Value Date/Time    GFR est non-AA 67 07/03/2019 08:29 AM    GFRNA, POC >60 01/06/2012 10:42 AM    GFR est AA 77 07/03/2019 08:29 AM    GFRAA, POC >60 01/06/2012 10:42 AM    Creatinine 0.90 07/03/2019 08:29 AM    Creatinine (POC) 0.9 01/06/2012 10:42 AM BUN 15 07/03/2019 08:29 AM    BUN (POC) 14 01/28/2010 12:02 PM    Sodium 140 07/03/2019 08:29 AM    Sodium (POC) 141 01/28/2010 12:02 PM    Potassium 4.8 07/03/2019 08:29 AM    Potassium (POC) 4.0 01/28/2010 12:02 PM    Chloride 102 07/03/2019 08:29 AM    Chloride (POC) 103 01/28/2010 12:02 PM    CO2 25 07/03/2019 08:29 AM    Magnesium 1.9 02/08/2010 03:00 AM        Review of Systems   Respiratory: Negative for shortness of breath. Cardiovascular: Negative for chest pain. Physical Exam   Constitutional: She is oriented to person, place, and time. She appears well-developed and well-nourished. No distress. HENT:   Head: Normocephalic and atraumatic. Mouth/Throat: Oropharynx is clear and moist. No oropharyngeal exudate. Eyes: Conjunctivae and EOM are normal. Right eye exhibits no discharge. Left eye exhibits no discharge. Neck: Normal range of motion. Neck supple. Cardiovascular: Normal rate and regular rhythm. Exam reveals no gallop and no friction rub. Murmur (2/6) heard. Pulmonary/Chest: Effort normal and breath sounds normal. No respiratory distress. She has no wheezes. She has no rales. She exhibits no tenderness. Musculoskeletal: Normal range of motion. She exhibits no edema, tenderness or deformity. Lymphadenopathy:     She has no cervical adenopathy. Neurological: She is alert and oriented to person, place, and time. Coordination abnormal.   Skin: Skin is warm and dry. No rash noted. She is not diaphoretic. No erythema. No pallor. Psychiatric: She has a normal mood and affect. Her behavior is normal.       ASSESSMENT and PLAN    ICD-10-CM ICD-9-CM    1. Diabetes mellitus without complication ([address]          a1c at goal on recent labs, continue lantus 32U, novalog 6 U with dinner plus sliding scale  E11.9 250.00    2.  Malignant neoplasm of esophagus, unspecified location [facility])      Follows with Dr César Dudley annually, last was seen 7/18, she was far enough out from her surgery that she was discharged from his care, does continue to see Dr. Camila Richards, has been chronically on dexilant and reglan since surg    C15.9 150.9    3. Angina, class II ([facility]        Follows with Dr Karyn Silveira for CAD, is on norvasc to combat chest pain, renexa was too expensive I20.9 413.9    4. Recurrent depression ([facility]            Was on meds years ago, doing well w/o meds  F33.9 296.30    5. Hypertension, unspecified type        Controlled on norvasc and losartan continue  I10 401.9    6. Hypercholesteremia          Controlled on lipitor 40 mg daily on recent labs  E78.00 272.0    7. Mild intermittent asthma without complication          Follows with Dr Esteban Abbasi, continues on singulair and symbicort, albuterol PRN  J45.20 493.90    8. COPD- stable, follows with Esteban Abbasi   9. Papule- discussed possible AK and following with derm, she states it a healing wound, if does not resolve will see derm     Depression screen reviewed and negative. Scribed by Reza Saucedo of [address] [place] [address] [address], as dictated by Dr. Shin Koenig. Current diagnosis and concerns discussed with pt at length. Pt understands risks and benefits or current treatment plan and medications, and accepts the treatment and medication with any possible risks. Pt asks appropriate questions, which were answered. Pt was instructed to call with any concerns or problems. I have reviewed the note documented by the scribe. The services provided are my own.   The documentation is accurate

## 2019-07-10 DIAGNOSIS — E11.9 DIABETES MELLITUS WITHOUT COMPLICATION (HCC): ICD-10-CM

## 2019-07-11 RX ORDER — INSULIN ASPART 100 [IU]/ML
INJECTION, SOLUTION INTRAVENOUS; SUBCUTANEOUS
Qty: 15 ADJUSTABLE DOSE PRE-FILLED PEN SYRINGE | Refills: 1 | Status: SHIPPED | OUTPATIENT
Start: 2019-07-11 | End: 2020-01-08

## 2019-07-11 NOTE — TELEPHONE ENCOUNTER
PCP: Felice Robertson MD    Last appt: 7/8/2019  Future Appointments   Date Time Provider Dc Adler   7/25/2019 10:00 AM [facility]   10/14/2019 12:00 PM Felice Robertson MD [address]   2/6/2020  1:00 PM Nicholas Marie MD [address] [facility]       Requested Prescriptions     Pending Prescriptions Disp Refills    insulin aspart U-100 (NOVOLOG FLEXPEN U-100 INSULIN) 100 unit/mL (3 mL) inpn 15 Adjustable Dose Pre-filled Pen Syringe 1     Sig: INJECT 4 UNITS EVERY EVENING WHEN BLOOD SUGAR OVER 120

## 2019-07-25 ENCOUNTER — HOSPITAL ENCOUNTER (OUTPATIENT)
Dept: MAMMOGRAPHY | Age: 64
Discharge: HOME | End: 2019-07-25
Attending: INTERNAL MEDICINE
Payer: MEDICARE

## 2019-07-25 DIAGNOSIS — Z12.39 SCREENING BREAST EXAMINATION: ICD-10-CM

## 2019-07-25 PROCEDURE — 77067 SCR MAMMO BI INCL CAD: CPT

## 2019-07-26 RX ORDER — FUROSEMIDE 20 MG/1
TABLET ORAL
Qty: 90 TAB | Refills: 1 | Status: SHIPPED | OUTPATIENT
Start: 2019-07-26 | End: 2021-06-22 | Stop reason: SDUPTHER

## 2019-09-11 RX ORDER — INSULIN GLARGINE 100 [IU]/ML
INJECTION, SOLUTION SUBCUTANEOUS
Qty: 30 ML | Refills: 2 | Status: SHIPPED | OUTPATIENT
Start: 2019-09-11 | End: 2020-07-09 | Stop reason: SDUPTHER

## 2019-09-18 RX ORDER — ATORVASTATIN CALCIUM 40 MG/1
TABLET, FILM COATED ORAL
Qty: 90 TAB | Refills: 2 | Status: SHIPPED | OUTPATIENT
Start: 2019-09-18 | End: 2020-08-31

## 2019-09-24 RX ORDER — BLOOD SUGAR DIAGNOSTIC
STRIP MISCELLANEOUS
Qty: 100 STRIP | Refills: 3 | Status: SHIPPED | OUTPATIENT
Start: 2019-09-24 | End: 2020-03-04

## 2019-10-10 DIAGNOSIS — I20.9 ANGINA, CLASS II (HCC): ICD-10-CM

## 2019-10-10 DIAGNOSIS — C15.9 MALIGNANT NEOPLASM OF ESOPHAGUS, UNSPECIFIED LOCATION (HCC): ICD-10-CM

## 2019-10-10 DIAGNOSIS — E11.9 DIABETES MELLITUS WITHOUT COMPLICATION (HCC): ICD-10-CM

## 2019-10-10 DIAGNOSIS — J45.20 MILD INTERMITTENT ASTHMA WITHOUT COMPLICATION: ICD-10-CM

## 2019-10-10 DIAGNOSIS — E78.00 HYPERCHOLESTEREMIA: ICD-10-CM

## 2019-10-10 DIAGNOSIS — F33.9 RECURRENT DEPRESSION (HCC): ICD-10-CM

## 2019-10-10 DIAGNOSIS — I10 HYPERTENSION, UNSPECIFIED TYPE: ICD-10-CM

## 2019-10-11 LAB
BUN SERPL-MCNC: 15 MG/DL (ref 8–27)
BUN/CREAT SERPL: 15 (ref 12–28)
CALCIUM SERPL-MCNC: 8.7 MG/DL (ref 8.7–10.3)
CHLORIDE SERPL-SCNC: 96 MMOL/L (ref 96–106)
CO2 SERPL-SCNC: 23 MMOL/L (ref 20–29)
CREAT SERPL-MCNC: 0.99 MG/DL (ref 0.57–1)
EST. AVERAGE GLUCOSE BLD GHB EST-MCNC: 151 MG/DL
GLUCOSE SERPL-MCNC: 130 MG/DL (ref 65–99)
HBA1C MFR BLD: 6.9 % (ref 4.8–5.6)
POTASSIUM SERPL-SCNC: 4.3 MMOL/L (ref 3.5–5.2)
SODIUM SERPL-SCNC: 136 MMOL/L (ref 134–144)

## 2019-10-14 ENCOUNTER — OFFICE VISIT (OUTPATIENT)
Dept: INTERNAL MEDICINE CLINIC | Age: 64
End: 2019-10-14

## 2019-10-14 VITALS
BODY MASS INDEX: 32.25 KG/M2 | SYSTOLIC BLOOD PRESSURE: 135 MMHG | OXYGEN SATURATION: 97 % | TEMPERATURE: 98 F | HEART RATE: 57 BPM | WEIGHT: 182 LBS | DIASTOLIC BLOOD PRESSURE: 73 MMHG | HEIGHT: 63 IN | RESPIRATION RATE: 16 BRPM

## 2019-10-14 DIAGNOSIS — I10 HYPERTENSION, UNSPECIFIED TYPE: Primary | ICD-10-CM

## 2019-10-14 DIAGNOSIS — J45.20 MILD INTERMITTENT ASTHMA WITHOUT COMPLICATION: ICD-10-CM

## 2019-10-14 DIAGNOSIS — Z23 ENCOUNTER FOR IMMUNIZATION: ICD-10-CM

## 2019-10-14 DIAGNOSIS — E78.00 HYPERCHOLESTEREMIA: ICD-10-CM

## 2019-10-14 DIAGNOSIS — C15.9 MALIGNANT NEOPLASM OF ESOPHAGUS, UNSPECIFIED LOCATION (HCC): ICD-10-CM

## 2019-10-14 DIAGNOSIS — E11.9 DIABETES MELLITUS WITHOUT COMPLICATION (HCC): ICD-10-CM

## 2019-10-14 DIAGNOSIS — F33.9 RECURRENT DEPRESSION (HCC): ICD-10-CM

## 2019-10-14 DIAGNOSIS — R60.0 LOCALIZED EDEMA: ICD-10-CM

## 2019-10-14 DIAGNOSIS — I25.10 ASHD (ARTERIOSCLEROTIC HEART DISEASE): ICD-10-CM

## 2019-10-14 DIAGNOSIS — E66.9 OBESITY (BMI 30.0-34.9): ICD-10-CM

## 2019-10-14 NOTE — PROGRESS NOTES
After obtaining consent, and per verbal order from Dr. Katy Clarke, patient received influenza vaccine given by Claribel Milton in left Deltoid. Influenza Vaccine 0.5 mL IM now. Patient was observed for 10 minutes post injection. Patient tolerated injection well. VIS given. After obtaining consent, and per verbal order from Dr. Katy Clarke, patient received pneumovax 23 vaccine given by Claribel Milton in right Deltoid. Pneumonia Vaccine 0.5 mL IM now. Patient was observed for 10 minutes post injection. Patient tolerated injection well. VIS given.

## 2019-10-14 NOTE — PROGRESS NOTES
HISTORY OF PRESENT ILLNESS  Stefany Lozano is a 75 y.o. female. HPI   Last here 7/8/19.   Pt is here to f/u on chronic conditions      BP today is 135/73   BP at home has been 117-135/67   Pt checks her BP at home 120s-130s/70s   Continues on losartan 50mg and norvasc 2.5mg     She has lasix to use prn-- has not been needing this recently swelling has been down   This is stable, improved from that past, lasix helps      She is diabetic  BS at home has been 90s and 120s in the AM   States she has not many high readings, only once a while   Has noticed she gets a higher reading when she takes antacid the night before   Had a sugar of 200 and took 10U, noticed she was feeling weird after this, checked sugar and it was 57   Improved to 119 after she ate   That was the only low  Continues lantus 32U qAM  Continues novolog 6U with dinner +sliding scale (8U if >150, 10U >200, 12U if >250, 14U if >300) - usually takes 6U   Takes 8U on average   She also takes ASA 81mg daily      Reviewed labs 10/19   Ordered labs for next time   [employer] today is 182 lbs-- down 5 lbs x lov   Discussed diet and w/l   Pt is working on her diet and monitoring calories       Pt follows with Dr. Alexy Canchola (cardio)  Last visit was 2/5/19-follows annually   Next visit scheduled for 2/20   Pt is no longer taking plavix as of 3/18  Pt is now taking norvasc to prevent CP --no pain   Last ECHO 2016-      Pt follows annually with Dr. Hayes Whyte (hemo/onc) for h/o adenoma of esophagus   Last visit was 7/3/18  Pt was discharged from his care      Pt follows with Dr. Santiago Caballero (pulm)   Last visit was 2/19-follows annually   Next visit scheduled for 2/20   Continues singulair daily, albuterol prn, and symbicort BID for breathing/asthma per pulm, which help  Has had to use albuterol recently due to weather change and from caring for her granddaughter        Pt has not been evaluated for DANIELLE in the past   Recall she declines further evaluation for now 10/19  [facility] lipitor 40mg daily for cholesterol         Follows with Dr Annabel Goodman (GI)  Continues dexilant and reglan daily for reflux, which works well  She had a flare up a couple of weeks ago   Pt takes zantac periodically when eating trigger foods   Recall has h/o esophageal cancer        Reviewed mammo 7/19: negative      Of note, her  has bladder cancer and she is caring for him at home.      ACP not on file. SDM is her .   Provided information in the past.       PREVENTIVE:    Colonoscopy: 8/7/18, Dr. Annabel Goodman, repeat 5 years  EGD: 4/16, Dr. Annabel Goodman, h/o esophageal cancer, polyp on recent EGD, biopsy nl  AAA: FMHx (grandfather)  Pap: Dr. Dipesh Garcia, 5/15/18  Mammogram:  7/24/19, negative   Dexa: 4/18, mild osteopenia  Tdap: 9/14/2016  Pneumovax: 10/14/19   [id number]: 11/14/2016  Zostavax: 11/18/2016, reaction on R arm  Shingrix: 1st round completed 8/19   Flu shot: 10/14/19   Foot exam: 10/14/19   Microalbumin: 7/19, minimal  A1c:  6/17 8.8, 9/17 6.2, 12/17 6.4, 4/18 7.3, 7/18 7.4, 10/18 7.5, 1/19 7.1, 4/19 7.1, 7/19 6.9, 10/19 6.9   Eye exam: Dr. Castillo, 11/12/18, reviewed notes, pt is working on scheduling   Lipids: 7/19 LDL 74  Hep C screen: 11/15, negative       Patient Active Problem List    Diagnosis Date Noted    Recurrent depression ([facility] [address] 12/29/2017    Diabetes mellitus without complication ([facility] 22/15/9846    ASHD (arteriosclerotic heart disease) 06/18/2014    Syncope 06/18/2014    Bradycardia 06/18/2014    Multinodular goiter     Angina, class II ([facility] [address] 04/18/2013    Family history of coronary artery disease 04/18/2013    S/P cardiac cath 04/18/2013    Hypercholesteremia 02/18/2013    HTN (hypertension) 02/18/2013    Asthma 02/18/2013    Depression 02/18/2013    Thyroid nodule 02/18/2013    Shoulder capsulitis 02/18/2013    Incisional hernia 02/10/2011    Esophageal cancer ([facility] [address] 02/10/2011     Current Outpatient Medications   Medication Sig Dispense Refill    ACCU-CHEK SUSANNA PLUS TEST STRP strip CHECK BLOOD SUGAR TWICE DAILY 100 Strip 3    atorvastatin (LIPITOR) 40 mg tablet TAKE 1 TABLET EVERY DAY 90 Tab 2    insulin glargine (LANTUS SOLOSTAR U-100 INSULIN) 100 unit/mL (3 mL) inpn INJECT  32  TO  34 UNITS SUBCUTANEOUSLY EVERY DAY 30 mL 2    furosemide (LASIX) 20 mg tablet TAKE 1 TABLET BY MOUTH EVERY DAY 90 Tab 1    insulin aspart U-100 (NOVOLOG FLEXPEN U-100 INSULIN) 100 unit/mL (3 mL) inpn INJECT 6 UNITS EVERY EVENING WHEN BLOOD SUGAR OVER 120 15 Adjustable Dose Pre-filled Pen Syringe 1    Insulin Needles, Disposable, (PEN NEEDLE) 31 gauge x 5/16\" ndle Use with insulin twice daily 1 Package 11    montelukast (SINGULAIR) 10 mg tablet TAKE 1 TABLET EVERY DAY 90 Tab 1    losartan (COZAAR) 50 mg tablet TAKE 1 TABLET EVERY DAY 90 Tab 1    Insulin Needles, Disposable, (BD ULTRA-FINE SHORT PEN NEEDLE) 31 gauge x 5/16\" ndle Use twice daily to inject insulin 1 Package 11    amLODIPine (NORVASC) 2.5 mg tablet TAKE 1 TAB BY MOUTH DAILY. IN PLACE OF RANEXA AS NOT COVERED 90 Tab 3    benzonatate (TESSALON) 200 mg capsule Take 1 Cap by mouth three (3) times daily as needed for Cough. 30 Cap 0    Blood-Glucose Meter (ACCU-CHEK SUSANNA PLUS METER) misc Check glucose twice daily. 1 Each 1    glucose blood VI test strips (ACCU-CHEK SUSANNA PLUS TEST STRP) strip Check glucose twice daily 100 Strip 3    glucose blood VI test strips (ONETOUCH ULTRA TEST) strip CHECK TWICE A  Strip 12    cholecalciferol, vitamin D3, (VITAMIN D3) 2,000 unit tab Take  by mouth daily.  raNITIdine (ZANTAC) 150 mg tablet Take 150 mg by mouth as needed for Indigestion.  nystatin (MYCOSTATIN) powder Apply  to affected area four (4) times daily. 1 Bottle 3    nystatin (MYCOSTATIN) topical cream Apply  to affected area two (2) times a day. 15 g 1    albuterol (PROVENTIL HFA, VENTOLIN HFA, PROAIR HFA) 90 mcg/actuation inhaler Take 2 Puffs by inhalation as needed.  1 Inhaler 3    SYMBICORT 160-4.5 mcg/actuation HFA inhaler Take 2 Puffs by inhalation two (2) times a day.  metoclopramide HCl (REGLAN) 10 mg tablet Take 10 mg by mouth daily.  polyethylene glycol (MIRALAX) 17 gram packet Take 17 g by mouth daily.  cetirizine (ZYRTEC) 10 mg tablet Take 10 mg by mouth daily.  melatonin 5 mg Tab Take 5 mg by mouth nightly.  Dexlansoprazole (DEXILANT) 60 mg CpDM Take 60 mg by mouth daily.  aspirin 81 mg tablet Take 81 mg by mouth.  pyridoxine (VITAMIN B-6) 100 mg tablet Take 100 mg by mouth every morning.        Past Surgical History:   Procedure Laterality Date    COLONOSCOPY N/A 8/7/2018    COLONOSCOPY performed by Elmer Marie MD at [address]; HI RISK IND  8/7/2018         HX ENDOSCOPY  4/2016    HX GI  2010    gastroesophagectomy    HX HEART CATHETERIZATION  03/2017    stent x 1    HX HEENT  1957    tonsils as child    HX HERNIA REPAIR  04/26/2012    abdominal and umbilical    HX LAP CHOLECYSTECTOMY  1995    HX ORTHOPAEDIC  02/20/2015    right shoulder manipulation    HX OTHER SURGICAL Bilateral      shoulder surgery for frozen surgery    HX OTHER SURGICAL  6/24/2015    mammogram     HX PELVIC LAPAROSCOPY  1989    HX TUBAL LIGATION  1983    lap btl    HX VASCULAR ACCESS      port a cath and removal    DC EGD BALLOON DILATION ESOPHAGUS <30 MM DIAM  4/20/2010         DC EXTRAC ERUPTED TOOTH/EXPOSED ROOT        Lab Results   Component Value Date/Time    WBC 4.6 07/03/2019 08:29 AM    HGB 12.9 07/03/2019 08:29 AM    Hemoglobin (POC) 11.9 01/28/2010 12:02 PM    HCT 37.4 07/03/2019 08:29 AM    Hematocrit (POC) 35 01/28/2010 12:02 PM    PLATELET 010 23/15/1450 08:29 AM    MCV 90 07/03/2019 08:29 AM     Lab Results   Component Value Date/Time    Cholesterol, total 134 07/03/2019 08:29 AM    HDL Cholesterol 44 07/03/2019 08:29 AM    LDL, calculated 74 07/03/2019 08:29 AM    Triglyceride 79 07/03/2019 08:29 AM     Lab Results Component Value Date/Time    GFR est non-AA 60 10/10/2019 08:15 AM    GFRNA, POC >60 01/06/2012 10:42 AM    GFR est AA 69 10/10/2019 08:15 AM    GFRAA, POC >60 01/06/2012 10:42 AM    Creatinine 0.99 10/10/2019 08:15 AM    Creatinine (POC) 0.9 01/06/2012 10:42 AM    BUN 15 10/10/2019 08:15 AM    BUN (POC) 14 01/28/2010 12:02 PM    Sodium 136 10/10/2019 08:15 AM    Sodium (POC) 141 01/28/2010 12:02 PM    Potassium 4.3 10/10/2019 08:15 AM    Potassium (POC) 4.0 01/28/2010 12:02 PM    Chloride 96 10/10/2019 08:15 AM    Chloride (POC) 103 01/28/2010 12:02 PM    CO2 23 10/10/2019 08:15 AM    Magnesium 1.9 02/08/2010 03:00 AM        Review of Systems   Respiratory: Negative for shortness of breath. Cardiovascular: Negative for chest pain. Physical Exam   Constitutional: She is oriented to person, place, and time. She appears well-developed and well-nourished. No distress. HENT:   Head: Normocephalic and atraumatic. Mouth/Throat: Oropharynx is clear and moist. No oropharyngeal exudate. Eyes: Conjunctivae and EOM are normal. Right eye exhibits no discharge. Left eye exhibits no discharge. Neck: Normal range of motion. Neck supple. Cardiovascular: Normal rate and regular rhythm. Exam reveals no gallop and no friction rub. Murmur heard. Pulmonary/Chest: Effort normal and breath sounds normal. No respiratory distress. She has no wheezes. She has no rales. She exhibits no tenderness. Musculoskeletal: Normal range of motion. She exhibits no edema, tenderness or deformity. Lymphadenopathy:     She has no cervical adenopathy. Neurological: She is alert and oriented to person, place, and time. Coordination normal.   Monofilament nl BLE, good peripheral pulses, no ulcers    Skin: Skin is warm and dry. No rash noted. She is not diaphoretic. No erythema. No pallor. Psychiatric: She has a normal mood and affect.  Her behavior is normal.       ASSESSMENT and PLAN    ICD-10-CM ICD-9-CM    1. Malignant neoplasm of esophagus, unspecified location [facility])              In remission follows annually with dr Steff Srivastava hematology but was dc'ed from his care last year sees dr Norval Halsted prn chronically on dexilant and Reglan  C15.9 150.9    2. Diabetes mellitus without complication (HCC)              Controlled on lantus 32U novolog sliding scale with dinner no change to dose  E11.9 250.00    3. Recurrent depression ([address]              Doing well w/o meds  F33.9 296.30    4. Hypertension, unspecified type                  Controlled on norvasc and losartan  I10 401.9    5. Hypercholesteremia              Controlled on lipitor 40 mg  E78.00 272.0    6. ASHD (arteriosclerotic heart disease)                Follows with cardio dr Aguilar dang replaced ranexa to help with anginal sxs asymptomatic and will see ta in feb  I25.10 414.00    7. Mild intermittent asthma without complication          Sees dr Neena Heath in feb stable on symbicort and singulair    J45.20 493.90    8. Obesity (BMI 30.0-34. 9)            Discussed diet wt loss ww  E66.9 278.00    9. Localized edema          Uses lasix prn but not daily  R60.0 782. 3           Depression screen reviewed and negative. Scribed by Clarissa Hooker [address], as dictated by Dr. Maxi Muse. Current diagnosis and concerns discussed with pt at length. Pt understands risks and benefits or current treatment plan and medications, and accepts the treatment and medication with any possible risks. Pt asks appropriate questions, which were answered. Pt was instructed to call with any concerns or problems. I have reviewed the note documented by the scribe. The services provided are my own.   The documentation is accurate

## 2019-11-13 RX ORDER — MONTELUKAST SODIUM 10 MG/1
TABLET ORAL
Qty: 90 TAB | Refills: 0 | Status: SHIPPED | OUTPATIENT
Start: 2019-11-13 | End: 2020-01-20

## 2020-01-07 DIAGNOSIS — J45.20 MILD INTERMITTENT ASTHMA WITHOUT COMPLICATION: ICD-10-CM

## 2020-01-07 DIAGNOSIS — C15.9 MALIGNANT NEOPLASM OF ESOPHAGUS, UNSPECIFIED LOCATION (HCC): ICD-10-CM

## 2020-01-07 DIAGNOSIS — F33.9 RECURRENT DEPRESSION (HCC): ICD-10-CM

## 2020-01-07 DIAGNOSIS — R60.0 LOCALIZED EDEMA: ICD-10-CM

## 2020-01-07 DIAGNOSIS — I10 HYPERTENSION, UNSPECIFIED TYPE: ICD-10-CM

## 2020-01-07 DIAGNOSIS — E66.9 OBESITY (BMI 30.0-34.9): ICD-10-CM

## 2020-01-07 DIAGNOSIS — E78.00 HYPERCHOLESTEREMIA: ICD-10-CM

## 2020-01-07 DIAGNOSIS — I25.10 ASHD (ARTERIOSCLEROTIC HEART DISEASE): ICD-10-CM

## 2020-01-07 DIAGNOSIS — E11.9 DIABETES MELLITUS WITHOUT COMPLICATION (HCC): ICD-10-CM

## 2020-01-08 DIAGNOSIS — E11.9 DIABETES MELLITUS WITHOUT COMPLICATION (HCC): ICD-10-CM

## 2020-01-08 LAB
ALBUMIN SERPL-MCNC: 4.3 G/DL (ref 3.6–4.8)
ALBUMIN/GLOB SERPL: 1.9 {RATIO} (ref 1.2–2.2)
ALP SERPL-CCNC: 70 IU/L (ref 39–117)
ALT SERPL-CCNC: 22 IU/L (ref 0–32)
AST SERPL-CCNC: 19 IU/L (ref 0–40)
BILIRUB SERPL-MCNC: 0.5 MG/DL (ref 0–1.2)
BUN SERPL-MCNC: 14 MG/DL (ref 8–27)
BUN/CREAT SERPL: 16 (ref 12–28)
CALCIUM SERPL-MCNC: 10.1 MG/DL (ref 8.7–10.3)
CHLORIDE SERPL-SCNC: 98 MMOL/L (ref 96–106)
CO2 SERPL-SCNC: 25 MMOL/L (ref 20–29)
CREAT SERPL-MCNC: 0.86 MG/DL (ref 0.57–1)
EST. AVERAGE GLUCOSE BLD GHB EST-MCNC: 151 MG/DL
GLOBULIN SER CALC-MCNC: 2.3 G/DL (ref 1.5–4.5)
GLUCOSE SERPL-MCNC: 128 MG/DL (ref 65–99)
HBA1C MFR BLD: 6.9 % (ref 4.8–5.6)
POTASSIUM SERPL-SCNC: 4.7 MMOL/L (ref 3.5–5.2)
PROT SERPL-MCNC: 6.6 G/DL (ref 6–8.5)
SODIUM SERPL-SCNC: 138 MMOL/L (ref 134–144)
TSH SERPL DL<=0.005 MIU/L-ACNC: 3.75 UIU/ML (ref 0.45–4.5)

## 2020-01-08 RX ORDER — INSULIN ASPART 100 [IU]/ML
INJECTION, SOLUTION INTRAVENOUS; SUBCUTANEOUS
Qty: 15 ADJUSTABLE DOSE PRE-FILLED PEN SYRINGE | Refills: 0 | Status: SHIPPED | OUTPATIENT
Start: 2020-01-08 | End: 2020-08-19 | Stop reason: SDUPTHER

## 2020-01-13 RX ORDER — ALBUTEROL SULFATE 90 UG/1
2 AEROSOL, METERED RESPIRATORY (INHALATION) AS NEEDED
Qty: 1 INHALER | Refills: 3 | Status: SHIPPED | OUTPATIENT
Start: 2020-01-13 | End: 2020-01-14 | Stop reason: SDUPTHER

## 2020-01-13 NOTE — TELEPHONE ENCOUNTER
PCP: Shavonne Joyce MD    Last appt: 1/7/2020  Future Appointments   Date Time Provider Dc Adler   1/15/2020  1:00 PM Shavonne Joyce MD [address]   2/6/2020  1:00 PM Kanwal Troy MD [address] [facility]       Requested Prescriptions     Pending Prescriptions Disp Refills    albuterol (PROVENTIL HFA, VENTOLIN HFA, PROAIR HFA) 90 mcg/actuation inhaler 1 Inhaler 3     Sig: Take 2 Puffs by inhalation as needed.

## 2020-01-14 ENCOUNTER — DOCUMENTATION ONLY (OUTPATIENT)
Dept: INTERNAL MEDICINE CLINIC | Age: 65
End: 2020-01-14

## 2020-01-14 RX ORDER — ALBUTEROL SULFATE 90 UG/1
2 AEROSOL, METERED RESPIRATORY (INHALATION)
Qty: 1 INHALER | Refills: 3 | Status: SHIPPED | OUTPATIENT
Start: 2020-01-14

## 2020-01-14 NOTE — TELEPHONE ENCOUNTER
PCP: Yung Gallardo MD    Last appt: 1/7/2020  Future Appointments   Date Time Provider Dc Adler   1/15/2020 12:15 PM Yung Gallardo MD [address]   2/6/2020  1:00 PM Rossi Reyes MD [address] [facility]       Requested Prescriptions     Pending Prescriptions Disp Refills    albuterol (PROVENTIL HFA, VENTOLIN HFA, PROAIR HFA) 90 mcg/actuation inhaler 1 Inhaler 3     Sig: Take 2 Puffs by inhalation every four (4) hours as needed for Wheezing.

## 2020-01-15 ENCOUNTER — OFFICE VISIT (OUTPATIENT)
Dept: INTERNAL MEDICINE CLINIC | Age: 65
End: 2020-01-15

## 2020-01-15 VITALS
RESPIRATION RATE: 16 BRPM | SYSTOLIC BLOOD PRESSURE: 147 MMHG | HEART RATE: 48 BPM | HEIGHT: 63 IN | WEIGHT: 184 LBS | BODY MASS INDEX: 32.6 KG/M2 | TEMPERATURE: 98.1 F | DIASTOLIC BLOOD PRESSURE: 80 MMHG | OXYGEN SATURATION: 97 %

## 2020-01-15 DIAGNOSIS — I10 HYPERTENSION, UNSPECIFIED TYPE: ICD-10-CM

## 2020-01-15 DIAGNOSIS — K21.9 GASTROESOPHAGEAL REFLUX DISEASE WITHOUT ESOPHAGITIS: ICD-10-CM

## 2020-01-15 DIAGNOSIS — Z00.00 MEDICARE ANNUAL WELLNESS VISIT, SUBSEQUENT: ICD-10-CM

## 2020-01-15 DIAGNOSIS — J44.9 CHRONIC OBSTRUCTIVE PULMONARY DISEASE, UNSPECIFIED COPD TYPE (HCC): ICD-10-CM

## 2020-01-15 DIAGNOSIS — E66.9 OBESITY (BMI 30.0-34.9): ICD-10-CM

## 2020-01-15 DIAGNOSIS — E11.9 DIABETES MELLITUS WITHOUT COMPLICATION (HCC): Primary | ICD-10-CM

## 2020-01-15 DIAGNOSIS — F33.9 RECURRENT DEPRESSION (HCC): ICD-10-CM

## 2020-01-15 DIAGNOSIS — J45.20 MILD INTERMITTENT ASTHMA WITHOUT COMPLICATION: ICD-10-CM

## 2020-01-15 DIAGNOSIS — R05.9 COUGH: ICD-10-CM

## 2020-01-15 DIAGNOSIS — C15.9 MALIGNANT NEOPLASM OF ESOPHAGUS, UNSPECIFIED LOCATION (HCC): ICD-10-CM

## 2020-01-15 DIAGNOSIS — E78.00 HYPERCHOLESTEREMIA: ICD-10-CM

## 2020-01-15 DIAGNOSIS — I25.10 ASHD (ARTERIOSCLEROTIC HEART DISEASE): ICD-10-CM

## 2020-01-15 RX ORDER — PREDNISONE 20 MG/1
TABLET ORAL
Qty: 12 TAB | Refills: 0 | Status: SHIPPED | OUTPATIENT
Start: 2020-01-15 | End: 2020-02-06 | Stop reason: ALTCHOICE

## 2020-01-15 NOTE — PATIENT INSTRUCTIONS
Medicare Wellness Visit, Female The best way to live healthy is to have a lifestyle where you eat a well-balanced diet, exercise regularly, limit alcohol use, and quit all forms of tobacco/nicotine, if applicable. Regular preventive services are another way to keep healthy. Preventive services (vaccines, screening tests, monitoring & exams) can help personalize your care plan, which helps you manage your own care. Screening tests can find health problems at the earliest stages, when they are easiest to treat. [facility] follows the current, evidence-based guidelines published by the [facility] Santi Coleman ([facility]) when recommending preventive services for our patients. Because we follow these guidelines, sometimes recommendations change over time as research supports it. (For example, mammograms used to be recommended annually. Even though Medicare will still pay for an annual mammogram, the newer guidelines recommend a mammogram every two years for women of average risk). Of course, you and your doctor may decide to screen more often for some diseases, based on your risk and your co-morbidities (chronic disease you are already diagnosed with). Preventive services for you include: - Medicare offers their members a free annual wellness visit, which is time for you and your primary care provider to discuss and plan for your preventive service needs. Take advantage of this benefit every year! 
-All adults over the age of 72 should receive the recommended pneumonia vaccines. Current USPSTF guidelines recommend a series of two vaccines for the best pneumonia protection.  
-All adults should have a flu vaccine yearly and a tetanus vaccine every 10 years.  
-All adults age 48 and older should receive the shingles vaccines (series of two vaccines). -All adults age 38-68 who are overweight should have a diabetes screening test once every three years. -All adults born between [birth year] and 1965 should be screened once for Hepatitis C. 
-Other screening tests and preventive services for persons with diabetes include: an eye exam to screen for diabetic retinopathy, a kidney function test, a foot exam, and stricter control over your cholesterol.  
-Cardiovascular screening for adults with routine risk involves an electrocardiogram (ECG) at intervals determined by your doctor.  
-Colorectal cancer screenings should be done for adults age 54-65 with no increased risk factors for colorectal cancer. There are a number of acceptable methods of screening for this type of cancer. Each test has its own benefits and drawbacks. Discuss with your doctor what is most appropriate for you during your annual wellness visit. The different tests include: colonoscopy (considered the best screening method), a fecal occult blood test, a fecal DNA test, and sigmoidoscopy. 
 
-A bone mass density test is recommended when a woman turns 65 to screen for osteoporosis. This test is only recommended one time, as a screening. Some providers will use this same test as a disease monitoring tool if you already have osteoporosis. -Breast cancer screenings are recommended every other year for women of normal risk, age 54-69. 
-Cervical cancer screenings for women over age 72 are only recommended with certain risk factors. Here is a list of your current Health Maintenance items (your personalized list of preventive services) with a due date: 
Health Maintenance Due Topic Date Due  Shingles Vaccine (2 of 2) 10/14/2019 [address] [place] [address] Annual Well Visit  01/17/2020 Medicare Part B Preventive Services Guidelines/Limitations Date last completed and Frequency Due Date Bone Mass Measurement 
(age 72 & older, biennial) Requires diagnosis related to osteoporosis or estrogen deficiency.  Biennial benefit unless patient has history of long-term glucocorticoid tx or baseline is needed because initial test was by other method Completed 4/2018 
  
Recommended every 2 years Due 4/2020 Cardiovascular Screening Blood Tests (every 5 years) Total cholesterol, HDL, Triglycerides Order as a panel if possible Completed 7/2019  
  
Recommended annually Due 7/2020 Colorectal Cancer Screening 
-Fecal occult blood test (annual) -Flexible sigmoidoscopy (5y) 
-Screening colonoscopy (10y) -Barium Enema   Completed 8/7/18 with Dr. Rachna Cedillo  [facility] 5/5709 Counseling to Prevent Tobacco Use (up to 8 sessions per year) - Counseling greater than 3 and up to 10 minutes - Counseling greater than 10 minutes Patients must be asymptomatic of tobacco-related conditions to receive as preventive service N/A N/A Diabetes Screening Tests (at least every 3 years, Medicare covers annually or at 6-month intervals for prediabetic patients) 
  Fasting blood sugar (FBS) or glucose tolerance test (GTT) Patient must be diagnosed with one of the following: 
-Hypertension, Dyslipidemia, obesity, previous impaired FBS or GTT 
Or any two of the following: overweight, FH of diabetes, age ? 72, history of gestational diabetes, birth of baby weighing more than 9 pounds Completed 1/2020  with A1C 6.9  
  
Recommended every 3-6 months for Pre-Diabetics and Diabetics Due 4/2020-7/2020 Diabetes Self-Management Training (DSMT) (no USPSTF recommendation) Requires referral by treating physician for patient with diabetes or renal disease. 10 hours of initial DSMT session of no less than 30 minutes each in a continuous 12-month period.  2 hours of follow-up DSMT in subsequent years. N/A N/A Glaucoma Screening (no USPSTF recommendation) Diabetes mellitus, family history, , age 48 or over,  American, age 72 or over Completed 11/18 
  
Recommended annually Annually Human Immunodeficiency Virus (HIV) Screening (annually for increased risk patients) HIV-1 and HIV-2 by EIA, COY, rapid antibody test, or oral mucosa transudate Patient must be at increased risk for HIV infection per USPSTF guidelines or pregnant.  Tests covered annually for patients at increased risk.  Pregnant patients may receive up to 3 test during pregnancy. N/A N/A Medical Nutrition Therapy (MNT) (for diabetes or renal disease not recommended schedule) Requires referral by treating physician for patient with diabetes or renal disease.  Can be provided in same year as diabetes self-management training (DSMT), and CMS recommends medical nutrition therapy take place after DSMT.  Up to 3 hours for initial year and 2 hours in subsequent years. N/A N/A  
         
Seasonal Influenza Vaccination (annually)   Completed Fall 2019  
  
Recommended annually Due Fall 2020 Pneumococcal Vaccination (once after 65)   Pneumococcal 23 - 10/14/19 
  
Prevnar 13 - 11/2016 
  
Both recommended once over the age of 72 Completed 
  
  
Completed Hepatitis B Vaccinations (if medium/high risk) Medium/high risk factors:  End-stage renal disease, Hemophiliacs who received Factor VIII or IX concentrates, Clients of institutions for the mentally retarded, Persons who live in the same house as a HepB virus carrier, Homosexual men, Illicit injectable drug abusers. N/A N/A Screening Mammography (biennial age 54-69)? Annually (age 36 or over) Completed 7/2019  
  
Recommended annually  Due 7/2020 Screening Pap Tests and Pelvic Examination (up to age 79 and after 79 if unknown history or abnormal study last 10 years) Every 24 months except high risk Completed 5/2018  
  
Recommended every 2 years Due 5/2020 Ultrasound Screening for Abdominal Aortic Aneurysm (AAA) (once) Patient must be referred through IPPE and not have had a screening for abdominal aortic aneurysm before under Medicare.  Limited to patients who meet one of the following criteria: 
- Men who are 73-68 years old and have smoked more than 100 cigarettes in their lifetime. 
-Anyone with a FH of AAA 
-Anyone recommended for screening by USPSTF Completed CT abd 10/2012 - negative Completed  
  
 
 
If cough or wheezing progresses fill prednisone And increase insulin by 2 units each time when on prednisione

## 2020-01-15 NOTE — PROGRESS NOTES
HISTORY OF PRESENT ILLNESS  Remedios Reyes is a 75 y.o. female.   HPI   Last here 10/14/19 Pt is here to f/u on chronic conditions      BP JKNUF CK 805/84-- will get repeat   BP at home 130/70s    Continues on losartan 50mg and norvasc 2.5mg     She has lasix to use prn-- has not been needing this recently swelling has been down   This is stable, improved from that past, lasix helps      She is diabetic  BS at home has been 99, 1-teens in the AM   States highest was 132, some 120s   Has noticed she gets a higher reading when she takes antacid the night before   Denies any lows  States she gets [id number] after her dinner insulin   Continues lantus 32U qAM  Continues novolog 6U with dinner +sliding scale (8U if >150, 10U >200, 12U if >250, 14U if >300) - usually takes 8U  Takes 8U on average   Discussed if borderline between whether to take 8U or 6U to take 6U to be safe   She also takes ASA 81mg daily      Reviewed labs 1/20   Ordered labs for next time       Wt today is 184 lbs-- up 2 lbs x lov   Discussed diet and w/l       Pt follows with Dr. Kylee Mendez (cardio)  Last visit was 2/5/19-follows annually   Next visit scheduled for 2/20   Pt is no longer taking plavix as of 3/18  Pt is now taking norvasc to prevent CP --no pain   Last ECHO 2016-      Pt follows annually with Dr. Ivania Hannah (hemo/onc) for h/o adenoma of esophagus   Last visit was 7/3/18  Pt was discharged from his care  Will only f/u prn       Pt follows with Dr. Indra Blanco (pulm)   Last visit was 2/19-follows annually   Next visit scheduled for 2/20   Continues singulair daily, albuterol prn, and symbicort BID for breathing/asthma per pulm, which help  Has had to use albuterol recently for a cough   C/o cough since the holidays, states it got worse in the last week   States she was wheezing yesterday   Denies f/c   Denies ear pain or sore throat currently   Will give prednisone to have if cough or wheezing progresses   Discussed to increase insulin by 2U while on prednisone         Pt has not been evaluated for DANIELLE in the past   Recall she declines further evaluation for now 1/20       Continues lipitor 40mg daily for cholesterol         Follows with Dr Raquel Stiles (GI)  Continues dexilant and reglan daily for reflux, which works well  She now takes pepcid periodically when eating trigger foods   Sometimes gets gerd in the middle of the night but takes pepcid with milk and it resolves   Recall has h/o esophageal cancer         Lives with her      Pt is functionally independent     No memory concerns   Knows the month, day, year   Can recall 3/3 objects       Of note, her  has bladder cancer and she is caring for him at home.      ACP not on file. SDM is her .   Provided information       PREVENTIVE:    Colonoscopy: 8/7/18, Dr. Raquel Stiles, repeat 5 years  EGD: 4/16, Dr. Raquel Stiles, h/o esophageal cancer, polyp on recent EGD, biopsy nl  AAA: FMHx (grandfather)  Pap: Dr. Constanza Coles, 5/15/18  Mammogram:  7/24/19, negative   Dexa: 4/18, mild osteopenia  Tdap: 9/14/2016  Pneumovax: 10/14/19   Dootxhg29: 11/14/2016  Zostavax: 11/18/2016, reaction on R arm  Shingrix: 1st round completed 8/19, will get 2nd round next month   Flu shot: 10/14/19   Foot exam: 10/14/19   Microalbumin: 7/19, minimal  A1c:  6/17 8.8, 9/17 6.2, 12/17 6.4, 4/18 7.3, 7/18 7.4, 10/18 7.5, 1/19 7.1, 4/19 7.1, 7/19 6.9, 10/19 6.9  1/20 6.9   Eye exam: Dr. Castillo, 11/12/18, reviewed notes, scheduled for 1/29/20   Lipids: 7/19 LDL 74  Hep C screen: 11/15, negative    Patient Active Problem List    Diagnosis Date Noted    Recurrent depression ([facility] [address] 12/29/2017    Diabetes mellitus without complication ([facility] [address] 84/39/2096    ASHD (arteriosclerotic heart disease) 06/18/2014    Bradycardia 06/18/2014    Hypercholesteremia 02/18/2013    HTN (hypertension) 02/18/2013    Asthma 02/18/2013    Thyroid nodule 02/18/2013    Incisional hernia 02/10/2011    Esophageal cancer ([facility] [address] 02/10/2011     Current Outpatient Medications   Medication Sig Dispense Refill    albuterol (PROVENTIL HFA, VENTOLIN HFA, PROAIR HFA) 90 mcg/actuation inhaler Take 2 Puffs by inhalation every four (4) hours as needed for Wheezing. 1 Inhaler 3    insulin aspart U-100 (NOVOLOG) 100 unit/mL (3 mL) inpn INJECT 6 UNITS EVERY EVENING WHEN BLOOD SUGAR OVER 120 15 Adjustable Dose Pre-filled Pen Syringe 0    montelukast (SINGULAIR) 10 mg tablet TAKE 1 TABLET EVERY DAY 90 Tab 0    ACCU-CHEK SUSANNA PLUS TEST STRP strip CHECK BLOOD SUGAR TWICE DAILY 100 Strip 3    atorvastatin (LIPITOR) 40 mg tablet TAKE 1 TABLET EVERY DAY 90 Tab 2    insulin glargine (LANTUS SOLOSTAR U-100 INSULIN) 100 unit/mL (3 mL) inpn INJECT  32  TO  34 UNITS SUBCUTANEOUSLY EVERY DAY 30 mL 2    furosemide (LASIX) 20 mg tablet TAKE 1 TABLET BY MOUTH EVERY DAY 90 Tab 1    Insulin Needles, Disposable, (PEN NEEDLE) 31 gauge x 5/16\" ndle Use with insulin twice daily 1 Package 11    losartan (COZAAR) 50 mg tablet TAKE 1 TABLET EVERY DAY 90 Tab 1    Insulin Needles, Disposable, (BD ULTRA-FINE SHORT PEN NEEDLE) 31 gauge x 5/16\" ndle Use twice daily to inject insulin 1 Package 11    amLODIPine (NORVASC) 2.5 mg tablet TAKE 1 TAB BY MOUTH DAILY. IN PLACE OF RANEXA AS NOT COVERED 90 Tab 3    benzonatate (TESSALON) 200 mg capsule Take 1 Cap by mouth three (3) times daily as needed for Cough. 30 Cap 0    Blood-Glucose Meter (ACCU-CHEK SUSANNA PLUS METER) misc Check glucose twice daily. 1 Each 1    glucose blood VI test strips (ACCU-CHEK SUSANNA PLUS TEST STRP) strip Check glucose twice daily 100 Strip 3    glucose blood VI test strips (ONETOUCH ULTRA TEST) strip CHECK TWICE A  Strip 12    cholecalciferol, vitamin D3, (VITAMIN D3) 2,000 unit tab Take  by mouth daily.  raNITIdine (ZANTAC) 150 mg tablet Take 150 mg by mouth as needed for Indigestion.  nystatin (MYCOSTATIN) powder Apply  to affected area four (4) times daily.  1 Bottle 3    nystatin (MYCOSTATIN) topical cream Apply  to affected area two (2) times a day. 15 g 1    SYMBICORT 160-4.5 mcg/actuation HFA inhaler Take 2 Puffs by inhalation two (2) times a day.  metoclopramide HCl (REGLAN) 10 mg tablet Take 10 mg by mouth daily.  polyethylene glycol (MIRALAX) 17 gram packet Take 17 g by mouth daily.  cetirizine (ZYRTEC) 10 mg tablet Take 10 mg by mouth daily.  melatonin 5 mg Tab Take 5 mg by mouth nightly.  Dexlansoprazole (DEXILANT) 60 mg CpDM Take 60 mg by mouth daily.  aspirin 81 mg tablet Take 81 mg by mouth.  pyridoxine (VITAMIN B-6) 100 mg tablet Take 100 mg by mouth every morning.        Past Surgical History:   Procedure Laterality Date    COLONOSCOPY N/A 8/7/2018    COLONOSCOPY performed by Clarisse Benson MD at [facility]; HI RISK IND  8/7/2018         HX ENDOSCOPY  4/2016    HX GI  2010    gastroesophagectomy    HX HEART CATHETERIZATION  03/2017    stent x 1    HX HEENT  1957    tonsils as child    HX HERNIA REPAIR  04/26/2012    abdominal and umbilical    HX LAP CHOLECYSTECTOMY  1995    HX ORTHOPAEDIC  02/20/2015    right shoulder manipulation    HX OTHER SURGICAL Bilateral      shoulder surgery for frozen surgery    HX OTHER SURGICAL  6/24/2015    mammogram     HX PELVIC LAPAROSCOPY  1989    HX TUBAL LIGATION  1983    lap btl    HX VASCULAR ACCESS      port a cath and removal    SC EGD BALLOON DILATION ESOPHAGUS <30 MM DIAM  4/20/2010         SC EXTRAC ERUPTED TOOTH/EXPOSED ROOT        Lab Results   Component Value Date/Time    WBC 4.6 07/03/2019 08:29 AM    HGB 12.9 07/03/2019 08:29 AM    Hemoglobin (POC) 11.9 01/28/2010 12:02 PM    HCT 37.4 07/03/2019 08:29 AM    Hematocrit (POC) 35 01/28/2010 12:02 PM    PLATELET 036 08/29/9855 08:29 AM    MCV 90 07/03/2019 08:29 AM     Lab Results   Component Value Date/Time    Cholesterol, total 134 07/03/2019 08:29 AM    HDL Cholesterol 44 07/03/2019 08:29 AM    LDL, calculated 74 07/03/2019 08:29 AM    Triglyceride 79 07/03/2019 08:29 AM     Lab Results   Component Value Date/Time    GFR est non-AA 71 01/07/2020 08:40 AM    GFRNA, POC >60 01/06/2012 10:42 AM    GFR est AA 81 01/07/2020 08:40 AM    GFRAA, POC >60 01/06/2012 10:42 AM    Creatinine 0.86 01/07/2020 08:40 AM    Creatinine (POC) 0.9 01/06/2012 10:42 AM    BUN 14 01/07/2020 08:40 AM    BUN (POC) 14 01/28/2010 12:02 PM    Sodium 138 01/07/2020 08:40 AM    Sodium (POC) 141 01/28/2010 12:02 PM    Potassium 4.7 01/07/2020 08:40 AM    Potassium (POC) 4.0 01/28/2010 12:02 PM    Chloride 98 01/07/2020 08:40 AM    Chloride (POC) 103 01/28/2010 12:02 PM    CO2 25 01/07/2020 08:40 AM    Magnesium 1.9 02/08/2010 03:00 AM        Review of Systems   Respiratory: Negative for shortness of breath. Cardiovascular: Negative for chest pain. Physical Exam  Constitutional:       General: She is not in acute distress. Appearance: She is well-developed. She is not diaphoretic. HENT:      Head: Normocephalic and atraumatic. Right Ear: Tympanic membrane, ear canal and external ear normal.      Left Ear: Tympanic membrane, ear canal and external ear normal.      Mouth/Throat:      Mouth: Mucous membranes are moist.      Pharynx: Oropharynx is clear. No oropharyngeal exudate or posterior oropharyngeal erythema. Eyes:      General: No scleral icterus. Right eye: No discharge. Left eye: No discharge. Conjunctiva/sclera: Conjunctivae normal.      Pupils: Pupils are equal, round, and reactive to light. Neck:      Musculoskeletal: Normal range of motion and neck supple. Vascular: No carotid bruit. Cardiovascular:      Rate and Rhythm: Normal rate and regular rhythm. Heart sounds: Normal heart sounds. No murmur. No friction rub. No gallop. Pulmonary:      Effort: Pulmonary effort is normal. No respiratory distress. Breath sounds: Normal breath sounds. No wheezing or rales.    Chest:      Chest wall: No tenderness. Abdominal:      General: There is no distension. Palpations: Abdomen is soft. There is no mass. Tenderness: There is no tenderness. There is no guarding or rebound. Hernia: No hernia is present. Comments: Ventral diastasis    Musculoskeletal: Normal range of motion. General: No tenderness or deformity. Lymphadenopathy:      Cervical: No cervical adenopathy. Skin:     General: Skin is warm and dry. Coloration: Skin is not pale. Findings: No erythema or rash. Neurological:      Mental Status: She is alert and oriented to person, place, and time. Coordination: Coordination normal.   Psychiatric:         Mood and Affect: Mood normal.         Behavior: Behavior normal.         ASSESSMENT and PLAN    ICD-10-CM ICD-9-CM    1. Diabetes mellitus without complication ([address]            Adequately controlled on lantus 32U novolog 6-8U with dinner uses sliding scale continue no change to dose    E11.9 250.00    2. Malignant neoplasm of esophagus, unspecified location [facility])          Follows with dr Claudia Lunsford and alek as needed in remission  C15.9 150.9    3. Recurrent depression (HCC)                  Stable w/o meds  F33.9 296.30    4. Hypercholesteremia                Controlled on lipitor 40 mg  E78.00 272.0    5. Hypertension, unspecified type                Controlled on losartan and norvasc at home and normally here    Upset about her tooth  Will have her rtc for nv in 2 weeks no change to meds I10 401.9    6. ASHD (arteriosclerotic heart disease)                  On norvasc to help prevent CP ranexa was too $$ follows with dr Sridevi Ortez will see him next month  I25.10 414.00    7. Mild intermittent asthma without complication                    Follows with dr Henry Sifuentes annually will see him next month continues on Symbicort bid singular and albuterol prn no recent flares  J45.20 493.90    8. Obesity (BMI 30.0-34. 9)                Discussed portion control ww E66.9 278.00    9. COPD--- see above follows with [place] will see him next month    10. Cough- no signs of bacterial infection will give prednisone taper to have if cough or wheezing progresses discussed increasing insulin if takes prednisone   11. gerd-- improved with dexillant and has reglan for gastroparesis       Scribed by Santiago Goss of [facility], as dictated by Dr. Jessie Sharif. Current diagnosis and concerns discussed with pt at length. Pt understands risks and benefits or current treatment plan and medications, and accepts the treatment and medication with any possible risks. Pt asks appropriate questions, which were answered. Pt was instructed to call with any concerns or problems. I have reviewed the note documented by the scribe. The services provided are my own.   The documentation is accurate

## 2020-01-20 RX ORDER — MONTELUKAST SODIUM 10 MG/1
TABLET ORAL
Qty: 90 TAB | Refills: 0 | Status: SHIPPED | OUTPATIENT
Start: 2020-01-20 | End: 2020-03-30

## 2020-01-22 RX ORDER — LOSARTAN POTASSIUM 50 MG/1
TABLET ORAL
Qty: 90 TAB | Refills: 1 | Status: SHIPPED | OUTPATIENT
Start: 2020-01-22 | End: 2020-07-20 | Stop reason: SDUPTHER

## 2020-01-23 ENCOUNTER — CLINICAL SUPPORT (OUTPATIENT)
Dept: INTERNAL MEDICINE CLINIC | Age: 65
End: 2020-01-23

## 2020-01-23 VITALS — DIASTOLIC BLOOD PRESSURE: 74 MMHG | SYSTOLIC BLOOD PRESSURE: 138 MMHG

## 2020-01-23 DIAGNOSIS — I10 HYPERTENSION, UNSPECIFIED TYPE: Primary | ICD-10-CM

## 2020-02-06 ENCOUNTER — OFFICE VISIT (OUTPATIENT)
Dept: CARDIOLOGY CLINIC | Age: 65
End: 2020-02-06

## 2020-02-06 VITALS
BODY MASS INDEX: 32.34 KG/M2 | DIASTOLIC BLOOD PRESSURE: 76 MMHG | HEART RATE: 87 BPM | OXYGEN SATURATION: 97 % | SYSTOLIC BLOOD PRESSURE: 136 MMHG | HEIGHT: 63 IN | WEIGHT: 182.5 LBS | RESPIRATION RATE: 16 BRPM

## 2020-02-06 DIAGNOSIS — J44.9 CHRONIC OBSTRUCTIVE PULMONARY DISEASE, UNSPECIFIED COPD TYPE (HCC): ICD-10-CM

## 2020-02-06 DIAGNOSIS — E78.2 MIXED HYPERLIPIDEMIA: ICD-10-CM

## 2020-02-06 DIAGNOSIS — I10 ESSENTIAL HYPERTENSION: ICD-10-CM

## 2020-02-06 DIAGNOSIS — I25.10 ASHD (ARTERIOSCLEROTIC HEART DISEASE): Primary | ICD-10-CM

## 2020-02-06 NOTE — PROGRESS NOTES
[address], [address] [facility]  [phone]     Subjective:      Zulma Hensley is a 75 y.o. female is here for routine f/u. Last seen by us in 2/19. She remains in her usual state of health. Unchanged sob, d/t copd, last seen by Dr Janett Goetz 3rd. Wt down 6 lbs, trying to lose some more. Occasional use of lasix for leg swelling, has not used it for a while. The patient denies chest pain, orthopnea, PND, LE edema, palpitations, syncope, or presyncope.        Patient Active Problem List    Diagnosis Date Noted    Recurrent depression ([facility] 12/29/2017    Diabetes mellitus without complication ([facility] 90/55/4821    ASHD (arteriosclerotic heart disease) 06/18/2014    Bradycardia 06/18/2014    Hypercholesteremia 02/18/2013    HTN (hypertension) 02/18/2013    Asthma 02/18/2013    Thyroid nodule 02/18/2013    Incisional hernia 02/10/2011    Esophageal cancer ([facility] 02/10/2011      Gricelda Garcia MD  Past Medical History:   Diagnosis Date    Arrhythmia     bradycardia    Bloating     CAD (coronary artery disease)     Cancer ([facility] [address] 2010    esophageal/GE junction    Chest pain     Chest pain     Chronic obstructive pulmonary disease (HCC)     Chronic pain     left shoulder    Congestion of throat     Cough     Depression     & anxiety    Diabetes (HCC)     IDDM    Dyspepsia and other specified disorders of function of stomach     Fatigue     GERD (gastroesophageal reflux disease)     Headache(784.0)     Hypercholesterolemia     Hypertension     Multinodular goiter     Nausea & vomiting     Stool color black     Stress     Weakness       Past Surgical History:   Procedure Laterality Date    COLONOSCOPY N/A 8/7/2018    COLONOSCOPY performed by Luis Childers MD at [facility]; HI RISK IND  8/7/2018         HX ENDOSCOPY  4/2016    HX GI  2010    gastroesophagectomy    HX HEART CATHETERIZATION  03/2017    stent x 1  HX HEENT  1957    tonsils as child    HX HERNIA REPAIR  04/26/2012    abdominal and umbilical    HX LAP CHOLECYSTECTOMY  1995    HX ORTHOPAEDIC  02/20/2015    right shoulder manipulation    HX OTHER SURGICAL Bilateral      shoulder surgery for frozen surgery    HX OTHER SURGICAL  6/24/2015    mammogram     HX PELVIC LAPAROSCOPY  1989    HX TUBAL LIGATION  1983    lap btl    HX VASCULAR ACCESS      port a cath and removal    ME EGD BALLOON DILATION ESOPHAGUS <30 MM DIAM  4/20/2010         ME EXTRAC ERUPTED TOOTH/EXPOSED ROOT       Allergies   Allergen Reactions    Adhesive Other (comments)     redness      Family History   Problem Relation Age of Onset    Diabetes Father     Cancer Father         intestinal    Heart Disease Father     Hypertension Father     Heart Disease Brother     Diabetes Brother     Diabetes Mother     Lung Disease Mother     Heart Disease Mother     Hypertension Mother     Diabetes Maternal Grandmother     Diabetes Maternal Grandfather     Elevated Lipids Maternal Aunt     Thyroid Disease Neg Hx       Social History     Socioeconomic History    Marital status:      Spouse name: Not on file    Number of children: Not on file    Years of education: Not on file    Highest education level: Not on file   Occupational History    Not on file   Social Needs    Financial resource strain: Not on file    Food insecurity:     Worry: Not on file     Inability: Not on file    Transportation needs:     Medical: Not on file     Non-medical: Not on file   Tobacco Use    Smoking status: Never Smoker    Smokeless tobacco: Never Used   Substance and Sexual Activity    Alcohol use:  Yes     Alcohol/week: 0.8 standard drinks     Types: 1 Glasses of wine per week     Comment: rarely glass of wine    Drug use: No     Types: Prescription, OTC    Sexual activity: Not on file   Lifestyle    Physical activity:     Days per week: Not on file     Minutes per session: Not on file    Stress: Not on file   Relationships    Social connections:     Talks on phone: Not on file     Gets together: Not on file     Attends Church service: Not on file     Active member of club or organization: Not on file     Attends meetings of clubs or organizations: Not on file     Relationship status: Not on file    Intimate partner violence:     Fear of current or ex partner: Not on file     Emotionally abused: Not on file     Physically abused: Not on file     Forced sexual activity: Not on file   Other Topics Concern    Not on file   Social History Narrative    Lives in [facility] with . Has a 43 yo daughter and an adopted 24 yo daughter. 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 Current Outpatient Medications   Medication Sig    losartan (COZAAR) 50 mg tablet TAKE 1 TABLET EVERY DAY    montelukast (SINGULAIR) 10 mg tablet TAKE 1 TABLET EVERY DAY    predniSONE (DELTASONE) 20 mg tablet 60mg po daily for 2days, 40mg po daily for 2 days, 20mg po daily for 2days then stop    albuterol (PROVENTIL HFA, VENTOLIN HFA, PROAIR HFA) 90 mcg/actuation inhaler Take 2 Puffs by inhalation every four (4) hours as needed for Wheezing.  insulin aspart U-100 (NOVOLOG) 100 unit/mL (3 mL) inpn INJECT 6 UNITS EVERY EVENING WHEN BLOOD SUGAR OVER 120    ACCU-CHEK SUSANNA PLUS TEST STRP strip CHECK BLOOD SUGAR TWICE DAILY    atorvastatin (LIPITOR) 40 mg tablet TAKE 1 TABLET EVERY DAY    insulin glargine (LANTUS SOLOSTAR U-100 INSULIN) 100 unit/mL (3 mL) inpn INJECT  32  TO  34 UNITS SUBCUTANEOUSLY EVERY DAY    furosemide (LASIX) 20 mg tablet TAKE 1 TABLET BY MOUTH EVERY DAY    Insulin Needles, Disposable, (PEN NEEDLE) 31 gauge x 5/16\" ndle Use with insulin twice daily    Insulin Needles, Disposable, (BD ULTRA-FINE SHORT PEN NEEDLE) 31 gauge x 5/16\" ndle Use twice daily to inject insulin    amLODIPine (NORVASC) 2.5 mg tablet TAKE 1 TAB BY MOUTH DAILY.  IN PLACE OF RANEXA AS NOT COVERED    benzonatate (TESSALON) 200 mg capsule Take 1 Cap by mouth three (3) times daily as needed for Cough.  Blood-Glucose Meter (ACCU-CHEK SUSANNA PLUS METER) misc Check glucose twice daily.  glucose blood VI test strips (ACCU-CHEK SUSANNA PLUS TEST STRP) strip Check glucose twice daily    glucose blood VI test strips (ONETOUCH ULTRA TEST) strip CHECK TWICE A DAY    cholecalciferol, vitamin D3, (VITAMIN D3) 2,000 unit tab Take  by mouth daily.  raNITIdine (ZANTAC) 150 mg tablet Take 150 mg by mouth as needed for Indigestion.  nystatin (MYCOSTATIN) powder Apply  to affected area four (4) times daily.  nystatin (MYCOSTATIN) topical cream Apply  to affected area two (2) times a day.  SYMBICORT 160-4.5 mcg/actuation HFA inhaler Take 2 Puffs by inhalation two (2) times a day.  metoclopramide HCl (REGLAN) 10 mg tablet Take 10 mg by mouth daily.  polyethylene glycol (MIRALAX) 17 gram packet Take 17 g by mouth daily.  cetirizine (ZYRTEC) 10 mg tablet Take 10 mg by mouth daily.  melatonin 5 mg Tab Take 5 mg by mouth nightly.  Dexlansoprazole (DEXILANT) 60 mg CpDM Take 60 mg by mouth daily.  aspirin 81 mg tablet Take 81 mg by mouth.  pyridoxine (VITAMIN B-6) 100 mg tablet Take 100 mg by mouth every morning. No current facility-administered medications for this visit. Review of Symptoms:  11 systems reviewed, negative other than as stated in the HPI    Physical ExamPhysical Exam:    There were no vitals filed for this visit. There is no height or weight on file to calculate BMI. General PE  Gen:  NAD  Mental Status - Alert. General Appearance - Not in acute distress. HEENT:  PERRL, no carotid bruits or JVD  Chest and Lung Exam   Inspection: Accessory muscles - No use of accessory muscles in breathing.    Auscultation:   Breath sounds: - few wheezing posterior  Cardiovascular   Inspection: Jugular vein - Bilateral - Inspection Normal.   Palpation/Percussion:   Apical Impulse: - Normal.   Auscultation: Rhythm - Regular. Heart Sounds - S1 WNL and S2 WNL. No S3 or S4. Murmurs & Other Heart Sounds: Auscultation of the heart reveals - No Murmurs. Peripheral Vascular   Upper Extremity: Inspection - Bilateral - No Cyanotic nailbeds or Digital clubbing. Lower Extremity:   Palpation: Edema - Bilateral - No edema. Abdomen:   Soft, non-tender, bowel sounds are active.   Neuro: A&O times 3, CN and motor grossly WNL    Labs:   Lab Results   Component Value Date/Time    Cholesterol, total 134 07/03/2019 08:29 AM    Cholesterol, total 126 07/05/2018 08:34 AM    Cholesterol, total 140 09/20/2017 08:41 AM    Cholesterol, total 147 09/14/2016 01:49 PM    Cholesterol, total 139 10/07/2015 08:44 AM    HDL Cholesterol 44 07/03/2019 08:29 AM    HDL Cholesterol 39 (L) 07/05/2018 08:34 AM    HDL Cholesterol 51 09/20/2017 08:41 AM    HDL Cholesterol 55 09/14/2016 01:49 PM    HDL Cholesterol 47 10/07/2015 08:44 AM    LDL, calculated 74 07/03/2019 08:29 AM    LDL, calculated 66 07/05/2018 08:34 AM    LDL, calculated 71 09/20/2017 08:41 AM    LDL, calculated 73 09/14/2016 01:49 PM    LDL, calculated 66 10/07/2015 08:44 AM    Triglyceride 79 07/03/2019 08:29 AM    Triglyceride 104 07/05/2018 08:34 AM    Triglyceride 91 09/20/2017 08:41 AM    Triglyceride 95 09/14/2016 01:49 PM    Triglyceride 128 10/07/2015 08:44 AM     No results found for: CPK, CPKX, CPX  Lab Results   Component Value Date/Time    Sodium 138 01/07/2020 08:40 AM    Potassium 4.7 01/07/2020 08:40 AM    Chloride 98 01/07/2020 08:40 AM    CO2 25 01/07/2020 08:40 AM    Anion gap 5 04/26/2012 10:00 AM    Glucose 128 (H) 01/07/2020 08:40 AM    BUN 14 01/07/2020 08:40 AM    Creatinine 0.86 01/07/2020 08:40 AM    BUN/Creatinine ratio 16 01/07/2020 08:40 AM    GFR est AA 81 01/07/2020 08:40 AM    GFR est non-AA 71 01/07/2020 08:40 AM    Calcium 10.1 01/07/2020 08:40 AM    Bilirubin, total 0.5 01/07/2020 08:40 AM    AST (SGOT) 19 01/07/2020 08:40 AM    Alk. phosphatase 70 01/07/2020 08:40 AM    Protein, total 6.6 01/07/2020 08:40 AM    Albumin 4.3 01/07/2020 08:40 AM    Globulin 3.4 02/01/2010 04:40 AM    A-G Ratio 1.9 01/07/2020 08:40 AM    ALT (SGPT) 22 01/07/2020 08:40 AM       EKG:  NSR RBBB     Assessment:     Assessment:      1. ASHD (arteriosclerotic heart disease)    2. Essential hypertension    3. Mixed hyperlipidemia    4. Chronic obstructive pulmonary disease, unspecified COPD type ([facility]        No orders of the defined types were placed in this encounter. Plan:     Patient presents for f/u, doing well and stable from cardiac standpoint. Last seen by us in 2/19. She remains in her usual state of health. Unchanged sob, d/t copd, last seen by Dr Joel Cross 3rd. Wt down 6 lbs, trying to lose some more. Occasional use of lasix for leg swelling, has not used it for a while. CAD, KATHERINE to PDA 3/17:   Clinically stable. Continue ASA, CCB, statin    Normal EF with no significant valvular pathology per echo in 2016. HLD:   7/19  LDL 71. On statin.  Lipids and labs followed by PCP    HTN:   Controlled with current therapy. DM:   On Insulin therapy. COPD:   On inhalers. Followed by Dr Nataly Menendez current care and f/u in 1 yr      Ledy Courtney NP       [facility]    2/6/2020         Patient seen, examined by me personally. Plan discussed as detailed. Agree with note as outlined by  NP. I confirm findings in history and physical exam. No additional findings noted. Agree with plan as outlined above. Encouraged exercise.     Juve Aldrich MD

## 2020-02-06 NOTE — PROGRESS NOTES
1. Have you been to the ER, urgent care clinic since your last visit? Hospitalized since your last visit? YES, URGENT BRONCHITIS, MARCH 2019.    2. Have you seen or consulted any other health care providers outside of the [address] [place] [address] since your last visit? Include any pap smears or colon screening. NO    ANNUAL. NO CARDIAC C/O.

## 2020-03-04 RX ORDER — BLOOD SUGAR DIAGNOSTIC
STRIP MISCELLANEOUS
Qty: 100 STRIP | Refills: 3 | Status: SHIPPED | OUTPATIENT
Start: 2020-03-04 | End: 2020-06-15 | Stop reason: SDUPTHER

## 2020-03-30 RX ORDER — MONTELUKAST SODIUM 10 MG/1
TABLET ORAL
Qty: 90 TAB | Refills: 0 | Status: SHIPPED | OUTPATIENT
Start: 2020-03-30 | End: 2020-05-26

## 2020-04-17 RX ORDER — AMLODIPINE BESYLATE 2.5 MG/1
2.5 TABLET ORAL DAILY
Qty: 90 TAB | Refills: 3 | Status: SHIPPED | OUTPATIENT
Start: 2020-04-17 | End: 2021-03-01 | Stop reason: SDUPTHER

## 2020-05-06 ENCOUNTER — TELEPHONE (OUTPATIENT)
Dept: INTERNAL MEDICINE CLINIC | Age: 65
End: 2020-05-06

## 2020-05-06 NOTE — TELEPHONE ENCOUNTER
Jemal, [facility] Office Pool             Appointment not available     [facility] first and last name and relationship to patient (if not the patient):       Best contact number: [phone]       Preferred date and time: Within the week of 05.11.2020       Scheduled appointment date and time: N/A       Reason for appointment:       Details to clarify the request: pt returning call to schedule tlemedicine appt       [address]

## 2020-05-07 NOTE — TELEPHONE ENCOUNTER
Called, spoke to pt. Two pt identifiers confirmed. Pt offered/accepted Virtual appt for 5/11/20 at 1215. Informed pt that it will be billed under insurance and pt may expect a co-pay. Informed that pt must have access to a smartphone and/or a computer w/ a camera and a microphone. Informed pt that a PSR may contact pt roughly 15 minutes prior to Laura Hwang 1237 for check-in. Pt verbalized understanding of information discussed w/ no further questions at this time.

## 2020-05-11 NOTE — PROGRESS NOTES
HISTORY OF PRESENT ILLNESS  Zoraida Rahman is a 77 y.o. female. HPI   Last here 1/15/20 Pt is here to f/u on chronic conditions  This is an established visit completed with telemedicine was completed with video assist  the patient acknowledges and agrees to this method of visitation  doxyme           BP at home 126/63 at home this am  Has been ranging in the 120-130's sbp  Continues on losartan 50mg and norvasc 2.5mg     She has lasix to use prn-- has not been needing this recently swelling has been down --none recently        She is diabetic  BS at [facility] been 115-120 in the AM  Lowest was 85, highest was 145     In the evening around 150 or so    Continues lantus 32U qAM  Continues novolog 6U with dinner +sliding scale (8U if >150, 10U >200, 12U if >250, 14U if >300) - usually takes 8U  Takes 8U on average   Discussed if borderline between whether to take 8U or 6U to take 6U to be safe   She also takes ASA 81mg daily      Reviewed labs       Wt was 182-184   At home 182lb this am        Pt follows with Dr. Aishwarya Bejarano (cardio)  Last visit was 2/6/20 with NP Augustin Guerrero:    Patient presents for f/u, doing well and stable from cardiac standpoint. Last seen by us in 2/19. She remains in her usual state of health. Unchanged sob, d/t copd, last seen by Dr Simin Oconnor 3rd. Wt down 6 lbs, trying to lose some more. Occasional use of lasix for leg swelling, has not used it for a while.     CAD, KATHERINE to PDA 3/17:   Clinically stable. Continue ASA, CCB, statin   Normal EF with no significant valvular pathology per echo in 2016.   HLD:   7/19  LDL 71. On statin.  Lipids and labs followed by PCP   HTN:   Controlled with current therapy.    DM:   On Insulin therapy.    COPD:   On inhalers.  Followed by Dr Lisset Arellano current care and f/u in 1 yr    Pt is no longer taking plavix as of 3/18  Pt is now taking norvasc to prevent CP --no pain   Last ECHO 2016-      Pt follows annually with Dr. Daniella Gutierrez (hemo/onc) for h/o adenoma of esophagus   Last visit was 7/3/18  Pt was discharged from his care  Will only f/u prn       Pt follows with Dr. Bib Francis (pulm)   Last visit was  2/3/20   Continues singulair daily, albuterol prn, and symbicort BID for breathing/asthma per pulm, which help  Has had to use albuterol recently for a cough   Cough is now resolved          Pt has not been evaluated for DANIELLE in the past   Recall she declines further evaluation for now 5/20       Continues lipitor 40mg daily for cholesterol         Follows with Dr Jose Alberto Shepard (GI)  Continues dexilant   She ran out of reglan in 2/20  Decided not to continue this and feels better has less heart burn doing well w/o it    She very rarely needs pepcid now  Recall has h/o esophageal cancer      has spot on calf  Tender  Thought wart maybe, horny, growing       Of note, her  has bladder cancer and she is caring for him at home.      ACP not on file. SDM is her .   Provided information       PREVENTIVE:    Colonoscopy: 8/7/18, Dr. Jose Alberto Shepard, repeat 5 years  EGD: 4/16, Dr. Jose Alberto Shepard, h/o esophageal cancer, polyp on recent EGD, biopsy nl  AAA: FMHx (grandfather)  Pap: Dr. Danae Webster, 5/15/18--due will schedule  Mammogram:  7/24/19, negative   Dexa: 4/18, mild osteopenia  Tdap: 9/14/2016  Pneumovax: 10/14/19   Ayhjesq24: 11/14/2016  Zostavax: 11/18/2016, reaction on R arm  Shingrix: 1st round completed 8/19,2/20   Flu shot: 10/14/19   Foot exam: 10/14/19   Microalbumin: 7/19, minimal  A1c:  , 10/18 7.5, 1/19 7.1, 4/19 7.1, 7/19 6.9, 10/19 6.9  1/20 6.9   Eye exam:VEI  1/29/20   Lipids: 7/19 LDL 74  Hep C screen: 11/15, negative       Patient Active Problem List   Diagnosis Code    Incisional hernia K43.2    Esophageal cancer ([facility] C15.9    Hypercholesteremia E78.00    HTN (hypertension) I10    Asthma J45.909    Thyroid nodule E04.1    ASHD (arteriosclerotic heart disease) I25.10    Bradycardia R00.1    [facility] M59.9    Recurrent depression (HCC) F33.9     Current Outpatient Medications   Medication Sig Dispense Refill    amLODIPine (NORVASC) 2.5 mg tablet TAKE 1 TAB BY MOUTH DAILY. IN PLACE OF RANEXA AS NOT COVERED 90 Tab 3    montelukast (SINGULAIR) 10 mg tablet TAKE 1 TABLET EVERY DAY 90 Tab 0    ACCU-CHEK SUSANNA PLUS TEST STRP strip CHECK BLOOD SUGAR TWICE DAILY 100 Strip 3    losartan (COZAAR) 50 mg tablet TAKE 1 TABLET EVERY DAY 90 Tab 1    albuterol (PROVENTIL HFA, VENTOLIN HFA, PROAIR HFA) 90 mcg/actuation inhaler Take 2 Puffs by inhalation every four (4) hours as needed for Wheezing. 1 Inhaler 3    insulin aspart U-100 (NOVOLOG) 100 unit/mL (3 mL) inpn INJECT 6 UNITS EVERY EVENING WHEN BLOOD SUGAR OVER 120 15 Adjustable Dose Pre-filled Pen Syringe 0    atorvastatin (LIPITOR) 40 mg tablet TAKE 1 TABLET EVERY DAY 90 Tab 2    insulin glargine (LANTUS SOLOSTAR U-100 INSULIN) 100 unit/mL (3 mL) inpn INJECT  32  TO  34 UNITS SUBCUTANEOUSLY EVERY DAY 30 mL 2    furosemide (LASIX) 20 mg tablet TAKE 1 TABLET BY MOUTH EVERY DAY (Patient taking differently: Take 20 mg by mouth as needed.) 90 Tab 1    Insulin Needles, Disposable, (PEN NEEDLE) 31 gauge x 5/16\" ndle Use with insulin twice daily 1 Package 11    Insulin Needles, Disposable, (BD ULTRA-FINE SHORT PEN NEEDLE) 31 gauge x 5/16\" ndle Use twice daily to inject insulin 1 Package 11    benzonatate (TESSALON) 200 mg capsule Take 1 Cap by mouth three (3) times daily as needed for Cough. 30 Cap 0    Blood-Glucose Meter (ACCU-CHEK SUSANNA PLUS METER) misc Check glucose twice daily. 1 Each 1    glucose blood VI test strips (ACCU-CHEK SUSANNA PLUS TEST STRP) strip Check glucose twice daily 100 Strip 3    glucose blood VI test strips (ONETOUCH ULTRA TEST) strip CHECK TWICE A  Strip 12    cholecalciferol, vitamin D3, (VITAMIN D3) 2,000 unit tab Take  by mouth daily.  SYMBICORT 160-4.5 mcg/actuation HFA inhaler Take 2 Puffs by inhalation two (2) times a day.       polyethylene glycol (MIRALAX) 17 gram packet Take 17 g by mouth daily.  cetirizine (ZYRTEC) 10 mg tablet Take 10 mg by mouth daily.  melatonin 5 mg Tab Take 5 mg by mouth nightly.  Dexlansoprazole (DEXILANT) 60 mg CpDM Take 60 mg by mouth daily.  aspirin 81 mg tablet Take 81 mg by mouth.  pyridoxine (VITAMIN B-6) 100 mg tablet Take 100 mg by mouth every morning. Lab Results   Component Value Date/Time    Cholesterol, total 134 07/03/2019 08:29 AM    HDL Cholesterol 44 07/03/2019 08:29 AM    LDL, calculated 74 07/03/2019 08:29 AM    Triglyceride 79 07/03/2019 08:29 AM     Lab Results   Component Value Date/Time    GFR est non-AA 71 01/07/2020 08:40 AM    GFRNA, POC >60 01/06/2012 10:42 AM    GFR est AA 81 01/07/2020 08:40 AM    GFRAA, POC >60 01/06/2012 10:42 AM    Creatinine 0.86 01/07/2020 08:40 AM    Creatinine (POC) 0.9 01/06/2012 10:42 AM    BUN 14 01/07/2020 08:40 AM    BUN (POC) 14 01/28/2010 12:02 PM    Sodium 138 01/07/2020 08:40 AM    Sodium (POC) 141 01/28/2010 12:02 PM    Potassium 4.7 01/07/2020 08:40 AM    Potassium (POC) 4.0 01/28/2010 12:02 PM    Chloride 98 01/07/2020 08:40 AM    Chloride (POC) 103 01/28/2010 12:02 PM    CO2 25 01/07/2020 08:40 AM    Magnesium 1.9 02/08/2010 03:00 AM        Review of Systems   Respiratory: Negative for shortness of breath. Cardiovascular: Negative for chest pain. Physical Exam  Constitutional:       General: She is not in acute distress. Appearance: Normal appearance. She is not ill-appearing, toxic-appearing or diaphoretic. HENT:      Head: Normocephalic and atraumatic. Eyes:      General:         Right eye: No discharge. Left eye: No discharge. Conjunctiva/sclera: Conjunctivae normal.   Neurological:      General: No focal deficit present. Mental Status: She is alert and oriented to person, place, and time.    Psychiatric:         Mood and Affect: Mood normal.         Behavior: Behavior normal.         ASSESSMENT and PLAN    ICD-10-CM ICD-9-CM    1. Diabetes mellitus without complication (HCC)    Continues lantus 32U qAM  Continues novolog 6U with dinner +sliding scale (8U if >150, 10U >200, 12U if >250, 14U if >300) - usually takes 8U    Home readings ok    Check a1c     Adjust further as needed   E11.9 250.00    2. Recurrent depression ([facility]--  Controlled w/o meds F33.9 296.30    3. Essential hypertension    Controlled at home and lov    Continues on losartan 50mg and norvasc 2.5mg   I10 401.9    4. Malignant neoplasm of esophagus, unspecified location [facility])    Chronically on dexilant    No longer on reglan     Rarely needs pepcid C15.9 150.9    5. Hypercholesteremia        Controlled on lipitor  E78.00 272.0    6. ASHD (arteriosclerotic heart disease)    utd with ta    Med managed I25.10 414.00    7. Encounter for screening mammogram for malignant neoplasm of breast    mammo due Z12.31 V76.12 EUGENE MAMMO BI SCREENING INCL CAD   8. Postmenopausal state    dxa due Z78.0 V49.81 DEXA BONE DENSITY STUDY AXIAL         Ju Bear is a 77 y.o. female being evaluated by a Virtual Visit (video visit) encounter to address concerns as mentioned above. A caregiver was present when appropriate. Due to this being a TeleHealth encounter (During GTSGX-35 public health emergency), evaluation of the following organ systems was limited: Vitals/Constitutional/EENT/Resp/CV/GI//MS/Neuro/Skin/Heme-Lymph-Imm. Pursuant to the emergency declaration under the [facility], [address] [place] [address] authority and the [employer] Act, this Virtual Visit was conducted with patient's (and/or legal guardian's) consent, to reduce the risk of exposure to COVID-19 and provide necessary medical care. Services were provided through a video synchronous discussion virtually to substitute for in-person encounter.     --Isaias Garcia MD on 5/12/2020 at 11:58 AM    An electronic signature was used to authenticate this note.

## 2020-05-12 ENCOUNTER — VIRTUAL VISIT (OUTPATIENT)
Dept: INTERNAL MEDICINE CLINIC | Age: 65
End: 2020-05-12

## 2020-05-12 DIAGNOSIS — E11.9 DIABETES MELLITUS WITHOUT COMPLICATION (HCC): Primary | ICD-10-CM

## 2020-05-12 DIAGNOSIS — R23.8 PAPULE: ICD-10-CM

## 2020-05-12 DIAGNOSIS — Z78.0 POSTMENOPAUSAL STATE: ICD-10-CM

## 2020-05-12 DIAGNOSIS — I25.10 ASHD (ARTERIOSCLEROTIC HEART DISEASE): ICD-10-CM

## 2020-05-12 DIAGNOSIS — Z12.31 ENCOUNTER FOR SCREENING MAMMOGRAM FOR MALIGNANT NEOPLASM OF BREAST: ICD-10-CM

## 2020-05-12 DIAGNOSIS — F33.9 RECURRENT DEPRESSION (HCC): ICD-10-CM

## 2020-05-12 DIAGNOSIS — I10 ESSENTIAL HYPERTENSION: ICD-10-CM

## 2020-05-12 DIAGNOSIS — C15.9 MALIGNANT NEOPLASM OF ESOPHAGUS, UNSPECIFIED LOCATION (HCC): ICD-10-CM

## 2020-05-12 DIAGNOSIS — E78.00 HYPERCHOLESTEREMIA: ICD-10-CM

## 2020-05-12 NOTE — PATIENT INSTRUCTIONS
Medicare Wellness Visit, Female The best way to live healthy is to have a lifestyle where you eat a well-balanced diet, exercise regularly, limit alcohol use, and quit all forms of tobacco/nicotine, if applicable. Regular preventive services are another way to keep healthy. Preventive services (vaccines, screening tests, monitoring & exams) can help personalize your care plan, which helps you manage your own care. Screening tests can find health problems at the earliest stages, when they are easiest to treat. [facility] follows the current, evidence-based guidelines published by the [facility] Santi Coleman ([facility]) when recommending preventive services for our patients. Because we follow these guidelines, sometimes recommendations change over time as research supports it. (For example, mammograms used to be recommended annually. Even though Medicare will still pay for an annual mammogram, the newer guidelines recommend a mammogram every two years for women of average risk). Of course, you and your doctor may decide to screen more often for some diseases, based on your risk and your co-morbidities (chronic disease you are already diagnosed with). Preventive services for you include: - Medicare offers their members a free annual wellness visit, which is time for you and your primary care provider to discuss and plan for your preventive service needs. Take advantage of this benefit every year! 
-All adults over the age of 72 should receive the recommended pneumonia vaccines. Current USPSTF guidelines recommend a series of two vaccines for the best pneumonia protection.  
-All adults should have a flu vaccine yearly and a tetanus vaccine every 10 years.  
-All adults age 48 and older should receive the shingles vaccines (series of two vaccines). -All adults age 38-68 who are overweight should have a diabetes screening test once every three years. -All adults born between [birth year] and 1967 should be screened once for Hepatitis C. 
-Other screening tests and preventive services for persons with diabetes include: an eye exam to screen for diabetic retinopathy, a kidney function test, a foot exam, and stricter control over your cholesterol.  
-Cardiovascular screening for adults with routine risk involves an electrocardiogram (ECG) at intervals determined by your doctor.  
-Colorectal cancer screenings should be done for adults age 54-65 with no increased risk factors for colorectal cancer. There are a number of acceptable methods of screening for this type of cancer. Each test has its own benefits and drawbacks. Discuss with your doctor what is most appropriate for you during your annual wellness visit. The different tests include: colonoscopy (considered the best screening method), a fecal occult blood test, a fecal DNA test, and sigmoidoscopy. 
 
-A bone mass density test is recommended when a woman turns 65 to screen for osteoporosis. This test is only recommended one time, as a screening. Some providers will use this same test as a disease monitoring tool if you already have osteoporosis. -Breast cancer screenings are recommended every other year for women of normal risk, age 54-69. 
-Cervical cancer screenings for women over age 72 are only recommended with certain risk factors. Here is a list of your current Health Maintenance items (your personalized list of preventive services) with a due date: 
Health Maintenance Due Topic Date Due  Shingles Vaccine (2 of 2) 10/14/2019

## 2020-05-19 LAB
ALBUMIN SERPL-MCNC: 4.4 G/DL (ref 3.8–4.8)
ALBUMIN/CREAT UR: 9 MG/G CREAT (ref 0–29)
ALBUMIN/GLOB SERPL: 2.1 {RATIO} (ref 1.2–2.2)
ALP SERPL-CCNC: 77 IU/L (ref 39–117)
ALT SERPL-CCNC: 35 IU/L (ref 0–32)
AST SERPL-CCNC: 27 IU/L (ref 0–40)
BASOPHILS # BLD AUTO: 0.1 X10E3/UL (ref 0–0.2)
BASOPHILS NFR BLD AUTO: 1 %
BILIRUB SERPL-MCNC: 0.5 MG/DL (ref 0–1.2)
BUN SERPL-MCNC: 13 MG/DL (ref 8–27)
BUN/CREAT SERPL: 15 (ref 12–28)
CALCIUM SERPL-MCNC: 9 MG/DL (ref 8.7–10.3)
CHLORIDE SERPL-SCNC: 101 MMOL/L (ref 96–106)
CHOLEST SERPL-MCNC: 153 MG/DL (ref 100–199)
CO2 SERPL-SCNC: 24 MMOL/L (ref 20–29)
CREAT SERPL-MCNC: 0.85 MG/DL (ref 0.57–1)
CREAT UR-MCNC: 204.1 MG/DL
EOSINOPHIL # BLD AUTO: 0.1 X10E3/UL (ref 0–0.4)
EOSINOPHIL NFR BLD AUTO: 2 %
ERYTHROCYTE [DISTWIDTH] IN BLOOD BY AUTOMATED COUNT: 13.1 % (ref 11.7–15.4)
EST. AVERAGE GLUCOSE BLD GHB EST-MCNC: 166 MG/DL
GLOBULIN SER CALC-MCNC: 2.1 G/DL (ref 1.5–4.5)
GLUCOSE SERPL-MCNC: 141 MG/DL (ref 65–99)
HBA1C MFR BLD: 7.4 % (ref 4.8–5.6)
HCT VFR BLD AUTO: 39.6 % (ref 34–46.6)
HDLC SERPL-MCNC: 48 MG/DL
HGB BLD-MCNC: 13.3 G/DL (ref 11.1–15.9)
IMM GRANULOCYTES # BLD AUTO: 0 X10E3/UL (ref 0–0.1)
IMM GRANULOCYTES NFR BLD AUTO: 0 %
LDLC SERPL CALC-MCNC: 84 MG/DL (ref 0–99)
LYMPHOCYTES # BLD AUTO: 1.3 X10E3/UL (ref 0.7–3.1)
LYMPHOCYTES NFR BLD AUTO: 20 %
MCH RBC QN AUTO: 30.9 PG (ref 26.6–33)
MCHC RBC AUTO-ENTMCNC: 33.6 G/DL (ref 31.5–35.7)
MCV RBC AUTO: 92 FL (ref 79–97)
MICROALBUMIN UR-MCNC: 17.4 UG/ML
MONOCYTES # BLD AUTO: 0.6 X10E3/UL (ref 0.1–0.9)
MONOCYTES NFR BLD AUTO: 9 %
NEUTROPHILS # BLD AUTO: 4.5 X10E3/UL (ref 1.4–7)
NEUTROPHILS NFR BLD AUTO: 68 %
PLATELET # BLD AUTO: 221 X10E3/UL (ref 150–450)
POTASSIUM SERPL-SCNC: 4.4 MMOL/L (ref 3.5–5.2)
PROT SERPL-MCNC: 6.5 G/DL (ref 6–8.5)
RBC # BLD AUTO: 4.3 X10E6/UL (ref 3.77–5.28)
SODIUM SERPL-SCNC: 138 MMOL/L (ref 134–144)
TRIGL SERPL-MCNC: 104 MG/DL (ref 0–149)
TSH SERPL DL<=0.005 MIU/L-ACNC: 4.85 UIU/ML (ref 0.45–4.5)
VLDLC SERPL CALC-MCNC: 21 MG/DL (ref 5–40)
WBC # BLD AUTO: 6.6 X10E3/UL (ref 3.4–10.8)

## 2020-05-19 NOTE — TELEPHONE ENCOUNTER
Please call patient back about results  Borderline thyroid test    repest tsh and freet4 and tpo ab in 3-4 weeks to see if tx needed    a1c mild worse--no change to insulin yet, focus on diet    Borderline lft elevation, likely fatty liver will monitor

## 2020-05-20 DIAGNOSIS — Z13.29 SCREENING FOR THYROID DISORDER: Primary | ICD-10-CM

## 2020-05-20 NOTE — TELEPHONE ENCOUNTER
Called, spoke to pt. Two pt identifiers confirmed. Pt informed per Dr. Huseyin Jacobson her thyroid test was borderline. Pt informed per Dr. Huseyin Jacobson she needs to repeat tsh and freet4 and tpo ab in 3-4 weeks to see if tx needed--Labs ordered and mailed to pt. Pt informed per Dr. Huseyin Jacobson her a1c mild worse--no change to insulin yet, focus on diet. Pt informed per Dr. Huseyin Jacobson she has borderline lft elevation likely fatty liver and will monitor it. Pt verbalized understanding of information discussed w/ no further questions at this time. Notify vitamin d level is normal

## 2020-05-26 RX ORDER — MONTELUKAST SODIUM 10 MG/1
TABLET ORAL
Qty: 90 TAB | Refills: 0 | Status: SHIPPED | OUTPATIENT
Start: 2020-05-26 | End: 2020-10-26 | Stop reason: SDUPTHER

## 2020-06-01 DIAGNOSIS — R23.8 PAPULE: Primary | ICD-10-CM

## 2020-06-13 LAB
T4 FREE SERPL-MCNC: 1.25 NG/DL (ref 0.82–1.77)
THYROPEROXIDASE AB SERPL-ACNC: 235 IU/ML (ref 0–34)
TSH SERPL DL<=0.005 MIU/L-ACNC: 3.12 UIU/ML (ref 0.45–4.5)

## 2020-06-15 DIAGNOSIS — E11.9 DIABETES MELLITUS WITHOUT COMPLICATION (HCC): Primary | ICD-10-CM

## 2020-06-15 RX ORDER — BLOOD SUGAR DIAGNOSTIC
STRIP MISCELLANEOUS
Qty: 100 STRIP | Refills: 3 | Status: SHIPPED | OUTPATIENT
Start: 2020-06-15

## 2020-06-15 NOTE — TELEPHONE ENCOUNTER
CP: Earnest Amos MD    Last appt: 5/12/2020  Future Appointments   Date Time Provider Dc Adler   7/27/2020  9:20 AM [facility] EUGENE 1 [facility] RLMAMMO LABURNUM IM   7/27/2020 10:00 AM [facility] DEXA 1 [facility] RLDEXA LABURNUM IM   8/18/2020 12:00 PM Earnest Amos MD [address]   2/11/2021  1:00 PM Serena Bradshaw MD [address] [facility]       Requested Prescriptions     Pending Prescriptions Disp Refills    glucose blood VI test strips (Accu-Chek Corrine Plus test strp) strip 100 Strip 3

## 2020-06-16 NOTE — PROGRESS NOTES
Please call patient back about results  Let her know thyroid fxn ok for now--will monitor    Has positive ab so at risk of hypothyroid in future

## 2020-06-18 ENCOUNTER — PATIENT MESSAGE (OUTPATIENT)
Dept: INTERNAL MEDICINE CLINIC | Age: 65
End: 2020-06-18

## 2020-07-09 RX ORDER — INSULIN GLARGINE 100 [IU]/ML
INJECTION, SOLUTION SUBCUTANEOUS
Qty: 30 ML | Refills: 2 | Status: SHIPPED | OUTPATIENT
Start: 2020-07-09 | End: 2021-04-15 | Stop reason: SDUPTHER

## 2020-07-20 RX ORDER — LOSARTAN POTASSIUM 50 MG/1
50 TABLET ORAL DAILY
Qty: 90 TAB | Refills: 1 | Status: SHIPPED | OUTPATIENT
Start: 2020-07-20 | End: 2021-02-05

## 2020-07-20 NOTE — TELEPHONE ENCOUNTER
PCP: Deonte Lowry MD    Last appt: 5/12/2020  Future Appointments   Date Time Provider Dc Adler   7/27/2020  9:20 AM [facility] EUGENE 1 [facility] RLMAMMO LABURNUM IM   7/27/2020 10:00 AM [facility] DEXA 1 [facility] RLDEXA LABURNUM IM   8/18/2020 12:00 PM Deonte Lowry MD [address]    2/11/2021  1:00 PM Serena Bradshaw MD [address] [facility]       Requested Prescriptions     Pending Prescriptions Disp Refills    losartan (COZAAR) 50 mg tablet 90 Tab 1

## 2020-07-27 ENCOUNTER — HOSPITAL ENCOUNTER (OUTPATIENT)
Dept: MAMMOGRAPHY | Age: 65
Discharge: HOME | End: 2020-07-27
Attending: INTERNAL MEDICINE
Payer: MEDICARE

## 2020-07-27 ENCOUNTER — HOSPITAL ENCOUNTER (OUTPATIENT)
Dept: BONE DENSITY | Age: 65
Discharge: HOME | End: 2020-07-27
Attending: INTERNAL MEDICINE
Payer: MEDICARE

## 2020-07-27 DIAGNOSIS — Z78.0 POSTMENOPAUSAL STATE: ICD-10-CM

## 2020-07-27 DIAGNOSIS — Z12.31 ENCOUNTER FOR SCREENING MAMMOGRAM FOR MALIGNANT NEOPLASM OF BREAST: ICD-10-CM

## 2020-07-27 PROCEDURE — 77067 SCR MAMMO BI INCL CAD: CPT

## 2020-07-27 PROCEDURE — 77080 DXA BONE DENSITY AXIAL: CPT

## 2020-08-11 NOTE — PROGRESS NOTES
HISTORY OF PRESENT ILLNESS  Lianna Mattson is a 77 y.o. female.   HPI     Last here 5/12/20 Pt is here to f/u on chronic conditions       She wonders about getting her ear lobe fixed   Discussed this would be a plastic surgeon   Will give referral to Dr. Jose Daniel Womack     BP today is 136/58  BP at home 135/60  Continues on losartan 50mg and norvasc 2.5mg     She has lasix to use prn-- has not been needing this recently swelling has been down --none recently   Had significant swelling in the past but not recently     She is diabetic  BS at home has been 110s-120s in am,     Continues lantus 32U qAM  Continues novolog 8U with dinner +sliding scale (8U if >150, 10U >200, 12U if >250, 14U if >300)   Takes 8U on average   She also takes ASA 81mg daily      Reviewed labs 6/20  Discussed positive antibody and thyroid hypothyroidism runs in her family discussed she is at increased risk of developing it no medication for now discussed whether or not she wanted do a trial of Synthroid she refers not to for now  [place] is 182 - stable x lov      Pt follows with Dr. Tanisha Servin (cardio) for CAD she is on Norvasc to medically managed chest pain not having any active symptoms has a history of a stent will see them annually  Last visit was 2/6/20 with NP Aicha LIZARRAGA 2016-      Pt follows annually with Dr. Donnell Real (hemo/onc) for h/o adenoma of esophagus   Last visit was 7/3/18  Pt was discharged from his care  Will only f/u prn       Pt follows with Dr. Varghese Esquivel (pulm) for history of asthma annually  Last visit was  2/3/20   Continues singulair daily, albuterol prn, and symbicort BID for breathing/asthma per pulm, which help  Has had to use albuterol for cough  Has not had any flares     [facility]  Last visit 6/20  She had pre cancerous spot removed from her left calf  Will f/u in a year     Pt has not been evaluated for DANIELLE in the past   Recall she declines further evaluation for now 8/20       Continues lipitor 40mg daily for cholesterol         Follows with Dr Michael Ramirez (GI)  Continues dexilant  this controls her reflux symptoms well  Had vv 8/20  No longer on reglan x 2/20     She very rarely needs pepcid now  Recall has h/o esophageal cancer     Of note, her  has bladder cancer and she is caring for him at home.      Reviewed Mammogram:  7/27/20 negative  Reviewed Dexa: 7/27/20 nl    ACP not on file. SDM is her . Provided information       PREVENTIVE:    Colonoscopy: 8/7/18, Dr. Michael Ramirez, repeat 5 years  EGD: 4/16, Dr. Michael Ramirez, h/o esophageal cancer, polyp on recent EGD, biopsy nl  AAA: FMHx (grandfather)  Pap: Dr. Romero Koenig, 5/15/18--scheduled for September  Mammogram:  7/27/20 negative  Dexa: 7/27/20 nl  Tdap: 9/14/2016  Pneumovax: 10/14/19   Aeejshv03: 11/14/2016  Zostavax: 11/18/2016, reaction on R arm  Shingrix: 1st round completed 8/19,2/20   Flu shot: 10/14/19   Foot exam: 8/18/20  Microalbumin: 5/20  A1c:  , 10/18 7.5, 1/19 7.1, 4/19 7.1, 7/19 6.9, 10/19 6.9  1/20 6.9 5/20 7.4  Eye exam:VEI  1/29/20   Lipids: 5/20 LDL 84  Hep C screen: 11/15, negative    Patient Active Problem List    Diagnosis Date Noted    Recurrent depression ([address] 12/29/2017    Diabetes mellitus without complication ([address] 37/19/8319    ASHD (arteriosclerotic heart disease) 06/18/2014    Bradycardia 06/18/2014    Hypercholesteremia 02/18/2013    HTN (hypertension) 02/18/2013    Asthma 02/18/2013    Thyroid nodule 02/18/2013    Incisional hernia 02/10/2011    Esophageal cancer ([address] 02/10/2011     Current Outpatient Medications   Medication Sig Dispense Refill    Insulin Needles, Disposable, (Pen Needle) 31 gauge x 5/16\" ndle Use with insulin twice daily 1 Package 11    losartan (COZAAR) 50 mg tablet Take 1 Tab by mouth daily.  90 Tab 1    insulin glargine (Lantus Solostar U-100 Insulin) 100 unit/mL (3 mL) inpn INJECT  32  TO  34 UNITS SUBCUTANEOUSLY EVERY DAY 30 mL 2    glucose blood VI test strips (Accu-Chek Corrine Plus test strp) strip CHECK BLOOD SUGAR TWICE DAILY 100 Strip 3    montelukast (SINGULAIR) 10 mg tablet TAKE 1 TABLET EVERY DAY 90 Tab 0    amLODIPine (NORVASC) 2.5 mg tablet TAKE 1 TAB BY MOUTH DAILY. IN PLACE OF RANEXA AS NOT COVERED 90 Tab 3    albuterol (PROVENTIL HFA, VENTOLIN HFA, PROAIR HFA) 90 mcg/actuation inhaler Take 2 Puffs by inhalation every four (4) hours as needed for Wheezing. 1 Inhaler 3    insulin aspart U-100 (NOVOLOG) 100 unit/mL (3 mL) inpn INJECT 6 UNITS EVERY EVENING WHEN BLOOD SUGAR OVER 120 15 Adjustable Dose Pre-filled Pen Syringe 0    atorvastatin (LIPITOR) 40 mg tablet TAKE 1 TABLET EVERY DAY 90 Tab 2    Insulin Needles, Disposable, (BD ULTRA-FINE SHORT PEN NEEDLE) 31 gauge x 5/16\" ndle Use twice daily to inject insulin 1 Package 11    Blood-Glucose Meter (ACCU-CHEK SUSANNA PLUS METER) misc Check glucose twice daily. 1 Each 1    glucose blood VI test strips (ACCU-CHEK SUSANNA PLUS TEST STRP) strip Check glucose twice daily 100 Strip 3    glucose blood VI test strips (ONETOUCH ULTRA TEST) strip CHECK TWICE A  Strip 12    cholecalciferol, vitamin D3, (VITAMIN D3) 2,000 unit tab Take  by mouth daily.  SYMBICORT 160-4.5 mcg/actuation HFA inhaler Take 2 Puffs by inhalation two (2) times a day.  polyethylene glycol (MIRALAX) 17 gram packet Take 17 g by mouth daily.  cetirizine (ZYRTEC) 10 mg tablet Take 10 mg by mouth daily.  melatonin 5 mg Tab Take 5 mg by mouth nightly.  Dexlansoprazole (DEXILANT) 60 mg CpDM Take 60 mg by mouth daily.  aspirin 81 mg tablet Take 81 mg by mouth.  pyridoxine (VITAMIN B-6) 100 mg tablet Take 100 mg by mouth every morning.       furosemide (LASIX) 20 mg tablet TAKE 1 TABLET BY MOUTH EVERY DAY (Patient taking differently: Take 20 mg by mouth as needed.) 90 Tab 1     Past Surgical History:   Procedure Laterality Date    COLONOSCOPY N/A 8/7/2018    COLONOSCOPY performed by Nael Houston MD at hospitals ENDOSCOPY  COLORECTAL SCRN; HI RISK IND  8/7/2018         HX ENDOSCOPY  4/2016    HX GI  2010    gastroesophagectomy    HX HEART CATHETERIZATION  03/2017    stent x 1    HX HEENT  1957    tonsils as child    HX HERNIA REPAIR  04/26/2012    abdominal and umbilical    HX LAP CHOLECYSTECTOMY  1995    HX ORTHOPAEDIC  02/20/2015    right shoulder manipulation    HX OTHER SURGICAL Bilateral      shoulder surgery for frozen surgery    HX OTHER SURGICAL  6/24/2015    mammogram     HX PELVIC LAPAROSCOPY  1989    HX TUBAL LIGATION  1983    lap btl    HX VASCULAR ACCESS      port a cath and removal    CA EGD BALLOON DILATION ESOPHAGUS <30 MM DIAM  4/20/2010         CA EXTRAC ERUPTED TOOTH/EXPOSED ROOT        Lab Results   Component Value Date/Time    WBC 6.6 05/18/2020 08:49 AM    HGB 13.3 05/18/2020 08:49 AM    Hemoglobin (POC) 11.9 01/28/2010 12:02 PM    HCT 39.6 05/18/2020 08:49 AM    Hematocrit (POC) 35 01/28/2010 12:02 PM    PLATELET 245 16/02/5616 08:49 AM    MCV 92 05/18/2020 08:49 AM     Lab Results   Component Value Date/Time    Cholesterol, total 153 05/18/2020 08:49 AM    HDL Cholesterol 48 05/18/2020 08:49 AM    LDL, calculated 84 05/18/2020 08:49 AM    Triglyceride 104 05/18/2020 08:49 AM     Lab Results   Component Value Date/Time    GFR est non-AA 71 05/18/2020 08:49 AM    GFRNA, POC >60 01/06/2012 10:42 AM    GFR est AA 82 05/18/2020 08:49 AM    GFRAA, POC >60 01/06/2012 10:42 AM    Creatinine 0.85 05/18/2020 08:49 AM    Creatinine (POC) 0.9 01/06/2012 10:42 AM    BUN 13 05/18/2020 08:49 AM    BUN (POC) 14 01/28/2010 12:02 PM    Sodium 138 05/18/2020 08:49 AM    Sodium (POC) 141 01/28/2010 12:02 PM    Potassium 4.4 05/18/2020 08:49 AM    Potassium (POC) 4.0 01/28/2010 12:02 PM    Chloride 101 05/18/2020 08:49 AM    Chloride (POC) 103 01/28/2010 12:02 PM    CO2 24 05/18/2020 08:49 AM    Magnesium 1.9 02/08/2010 03:00 AM        Review of Systems   Respiratory: Negative for shortness of breath. Cardiovascular: Negative for chest pain. Physical Exam  Constitutional:       General: She is not in acute distress. Appearance: Normal appearance. She is not toxic-appearing or diaphoretic. HENT:      Head: Normocephalic and atraumatic. Right Ear: External ear normal.      Left Ear: External ear normal.      Mouth/Throat:      Pharynx: No oropharyngeal exudate. Eyes:      General:         Right eye: No discharge. Left eye: No discharge. Conjunctiva/sclera: Conjunctivae normal.   Neck:      Musculoskeletal: Normal range of motion and neck supple. Cardiovascular:      Rate and Rhythm: Normal rate and regular rhythm. Pulses: Normal pulses. Heart sounds: Murmur (slight) present. No friction rub. No gallop. Pulmonary:      Effort: No respiratory distress. Breath sounds: Normal breath sounds. No wheezing or rales. Chest:      Chest wall: No tenderness. Musculoskeletal: Normal range of motion. Skin:     General: Skin is warm and dry. Neurological:      Mental Status: She is alert and oriented to person, place, and time. Mental status is at baseline. Coordination: Coordination normal.      Gait: Gait normal.      Comments: Sensory exam of the foot is normal, tested with the monofilament. Good pulses, no lesions or ulcers, good peripheral pulses. Psychiatric:         Mood and Affect: Mood normal.         Behavior: Behavior normal.         ASSESSMENT and PLAN    ICD-10-CM ICD-9-CM    1.  Diabetes mellitus without complication (HCC)         Last A1c had climbed slightly Home readings are adequate    Continue Lantus 32 units    Continue 6 to 8 units of NovoLog with dinner patient has a sliding scale to use as needed    Overall adequate control given age and being on insulin A1c under 7.5 closer to 7 is ideal   E11.9 250.00 Insulin Needles, Disposable, (Pen Needle) 31 gauge x 5/16\" ndle       DIABETES FOOT EXAM      METABOLIC PANEL, COMPREHENSIVE HEMOGLOBIN A1C WITH EAG   2. Recurrent depression (HCC)  J09.4 668.99 METABOLIC PANEL, COMPREHENSIVE   No longer on medication for this doing fine   HEMOGLOBIN A1C WITH EAG   3. Hypercholesteremia  L55.27 290.6 METABOLIC PANEL, COMPREHENSIVE   Controlled on Lipitor 40 mg continue   HEMOGLOBIN A1C WITH EAG   4. ASHD (arteriosclerotic heart disease)  D08.20 396.27 METABOLIC PANEL, COMPREHENSIVE   Medically managed now on Norvasc for further vent chest pain previously on Ranexa but was too expensive follows with cardiology annual    Due to see cardiology in February no active signs of disease   HEMOGLOBIN A1C WITH EAG   5. Essential hypertension  W93 921.7 METABOLIC PANEL, COMPREHENSIVE   Controlled on Norvasc 2.5 mg and losartan 50 continue   HEMOGLOBIN A1C WITH EAG   6. Subclinical hypothyroidism  A36.4 418.1 METABOLIC PANEL, COMPREHENSIVE   Discussed this with patient    TPO antibody was positive so she is increased risk of developing hypothyroidism in the future ultimately TFTs came back okay    Discussed whether or not she wanted to start thyroid medication    For now she prefers not to we will continue to monitor thyroid function closely and treat further in the future if needed of note her daughter has hypothyroidism   HEMOGLOBIN A1C WITH EAG   7. Mild intermittent asthma without complication  D66.83 716.94 METABOLIC PANEL, COMPREHENSIVE   Medically managed stable on Singulair and Symbicort    No recent flares   HEMOGLOBIN A1C WITH EAG   8. Malignant neoplasm of esophagus, unspecified location (HCC)  W39.0 570.8 METABOLIC PANEL, COMPREHENSIVE   No signs of recurrence previously followed with hematology    Just saw Dr. Layne Canas virtually    Continues on [address] [place] [address] for reflux and this works well   HEMOGLOBIN A1C WITH EAG   9.  Laceration of left earlobe, subsequent encounter  S01.312D V58.89 REFERRAL TO PLASTIC SURGERY   Would like to see a plastic surgeon for this  872.01         Scribed by Marie Abdullahi of [employer], as dictated by Dr. Isaias Cerda. Current diagnosis and concerns discussed with pt at length. Pt understands risks and benefits or current treatment plan and medications, and accepts the treatment and medication with any possible risks. Pt asks appropriate questions, which were answered. Pt was instructed to call with any concerns or problems. I have reviewed the note documented by the scribe. The services provided are my own.   The documentation is accurate

## 2020-08-18 ENCOUNTER — OFFICE VISIT (OUTPATIENT)
Dept: INTERNAL MEDICINE CLINIC | Age: 65
End: 2020-08-18
Payer: MEDICARE

## 2020-08-18 VITALS
SYSTOLIC BLOOD PRESSURE: 136 MMHG | OXYGEN SATURATION: 94 % | DIASTOLIC BLOOD PRESSURE: 58 MMHG | HEIGHT: 63 IN | HEART RATE: 74 BPM | WEIGHT: 182.2 LBS | BODY MASS INDEX: 32.28 KG/M2 | RESPIRATION RATE: 16 BRPM | TEMPERATURE: 98.3 F

## 2020-08-18 DIAGNOSIS — S01.312D LACERATION OF LEFT EARLOBE, SUBSEQUENT ENCOUNTER: ICD-10-CM

## 2020-08-18 DIAGNOSIS — J45.20 MILD INTERMITTENT ASTHMA WITHOUT COMPLICATION: ICD-10-CM

## 2020-08-18 DIAGNOSIS — E03.8 SUBCLINICAL HYPOTHYROIDISM: ICD-10-CM

## 2020-08-18 DIAGNOSIS — I25.10 ASHD (ARTERIOSCLEROTIC HEART DISEASE): ICD-10-CM

## 2020-08-18 DIAGNOSIS — E78.00 HYPERCHOLESTEREMIA: ICD-10-CM

## 2020-08-18 DIAGNOSIS — E11.9 DIABETES MELLITUS WITHOUT COMPLICATION (HCC): Primary | ICD-10-CM

## 2020-08-18 DIAGNOSIS — I10 ESSENTIAL HYPERTENSION: ICD-10-CM

## 2020-08-18 DIAGNOSIS — C15.9 MALIGNANT NEOPLASM OF ESOPHAGUS, UNSPECIFIED LOCATION (HCC): ICD-10-CM

## 2020-08-18 DIAGNOSIS — F33.9 RECURRENT DEPRESSION (HCC): ICD-10-CM

## 2020-08-18 PROCEDURE — G8417 CALC BMI ABV UP PARAM F/U: HCPCS | Performed by: INTERNAL MEDICINE

## 2020-08-18 PROCEDURE — 3051F HG A1C>EQUAL 7.0%<8.0%: CPT | Performed by: INTERNAL MEDICINE

## 2020-08-18 PROCEDURE — G8427 DOCREV CUR MEDS BY ELIG CLIN: HCPCS | Performed by: INTERNAL MEDICINE

## 2020-08-18 PROCEDURE — G8752 SYS BP LESS 140: HCPCS | Performed by: INTERNAL MEDICINE

## 2020-08-18 PROCEDURE — 99214 OFFICE O/P EST MOD 30 MIN: CPT | Performed by: INTERNAL MEDICINE

## 2020-08-18 PROCEDURE — G9717 DOC PT DX DEP/BP F/U NT REQ: HCPCS | Performed by: INTERNAL MEDICINE

## 2020-08-18 PROCEDURE — 2022F DILAT RTA XM EVC RTNOPTHY: CPT | Performed by: INTERNAL MEDICINE

## 2020-08-18 PROCEDURE — 1101F PT FALLS ASSESS-DOCD LE1/YR: CPT | Performed by: INTERNAL MEDICINE

## 2020-08-18 PROCEDURE — 3017F COLORECTAL CA SCREEN DOC REV: CPT | Performed by: INTERNAL MEDICINE

## 2020-08-18 PROCEDURE — G9899 SCRN MAM PERF RSLTS DOC: HCPCS | Performed by: INTERNAL MEDICINE

## 2020-08-18 PROCEDURE — G8536 NO DOC ELDER MAL SCRN: HCPCS | Performed by: INTERNAL MEDICINE

## 2020-08-18 PROCEDURE — G8399 PT W/DXA RESULTS DOCUMENT: HCPCS | Performed by: INTERNAL MEDICINE

## 2020-08-18 PROCEDURE — 1090F PRES/ABSN URINE INCON ASSESS: CPT | Performed by: INTERNAL MEDICINE

## 2020-08-18 PROCEDURE — G8754 DIAS BP LESS 90: HCPCS | Performed by: INTERNAL MEDICINE

## 2020-08-18 RX ORDER — PEN NEEDLE, DIABETIC 30 GX3/16"
NEEDLE, DISPOSABLE MISCELLANEOUS
Qty: 1 PACKAGE | Refills: 11 | Status: SHIPPED | OUTPATIENT
Start: 2020-08-18 | End: 2020-10-15 | Stop reason: SDUPTHER

## 2020-08-19 DIAGNOSIS — E11.9 DIABETES MELLITUS WITHOUT COMPLICATION (HCC): ICD-10-CM

## 2020-08-19 LAB
ALBUMIN SERPL-MCNC: 4.6 G/DL (ref 3.8–4.8)
ALBUMIN/GLOB SERPL: 2.2 {RATIO} (ref 1.2–2.2)
ALP SERPL-CCNC: 93 IU/L (ref 39–117)
ALT SERPL-CCNC: 28 IU/L (ref 0–32)
AST SERPL-CCNC: 26 IU/L (ref 0–40)
BILIRUB SERPL-MCNC: 0.3 MG/DL (ref 0–1.2)
BUN SERPL-MCNC: 12 MG/DL (ref 8–27)
BUN/CREAT SERPL: 14 (ref 12–28)
CALCIUM SERPL-MCNC: 8.8 MG/DL (ref 8.7–10.3)
CHLORIDE SERPL-SCNC: 99 MMOL/L (ref 96–106)
CO2 SERPL-SCNC: 26 MMOL/L (ref 20–29)
CREAT SERPL-MCNC: 0.87 MG/DL (ref 0.57–1)
EST. AVERAGE GLUCOSE BLD GHB EST-MCNC: 166 MG/DL
GLOBULIN SER CALC-MCNC: 2.1 G/DL (ref 1.5–4.5)
GLUCOSE SERPL-MCNC: 200 MG/DL (ref 65–99)
HBA1C MFR BLD: 7.4 % (ref 4.8–5.6)
POTASSIUM SERPL-SCNC: 5.1 MMOL/L (ref 3.5–5.2)
PROT SERPL-MCNC: 6.7 G/DL (ref 6–8.5)
SODIUM SERPL-SCNC: 138 MMOL/L (ref 134–144)

## 2020-08-19 NOTE — PROGRESS NOTES
Please call patient back about results  Increase lantus to 34units (from 32U)    Increase novolog to 10 units (rather than 8units) plus SSI as a1c not improved

## 2020-08-19 NOTE — PROGRESS NOTES
Pt notified via [employer] of results and recommendations per Dr. Lynn Wylie. Orders placed/pended prn. Pt has read and/or responded w/ understanding of information discussed w/ no further questions at this time.

## 2020-08-20 RX ORDER — INSULIN ASPART 100 [IU]/ML
INJECTION, SOLUTION INTRAVENOUS; SUBCUTANEOUS
Qty: 15 ADJUSTABLE DOSE PRE-FILLED PEN SYRINGE | Refills: 0 | Status: SHIPPED | OUTPATIENT
Start: 2020-08-20 | End: 2020-08-21 | Stop reason: SDUPTHER

## 2020-08-20 NOTE — TELEPHONE ENCOUNTER
Future Appointments:  Future Appointments   Date Time Provider Dc Adler   11/17/2020 11:00 AM Valerie Wilcox MD [facility] BS AMB   2/11/2021  1:00 PM Eliud Serrato MD [facility] BS AMB        Last Appointment With Me:  8/18/2020     Last Appointment My Department:  5/12/2020    Requested Prescriptions     Pending Prescriptions Disp Refills    insulin aspart U-100 (NOVOLOG) 100 unit/mL (3 mL) inpn 15 Adjustable Dose Pre-filled Pen Syringe 0

## 2020-08-21 DIAGNOSIS — E11.9 DIABETES MELLITUS WITHOUT COMPLICATION (HCC): ICD-10-CM

## 2020-08-22 RX ORDER — INSULIN ASPART 100 [IU]/ML
INJECTION, SOLUTION INTRAVENOUS; SUBCUTANEOUS
Qty: 15 ADJUSTABLE DOSE PRE-FILLED PEN SYRINGE | Refills: 0 | Status: SHIPPED | OUTPATIENT
Start: 2020-08-22 | End: 2022-06-08

## 2020-08-31 RX ORDER — ATORVASTATIN CALCIUM 40 MG/1
TABLET, FILM COATED ORAL
Qty: 90 TAB | Refills: 2 | Status: SHIPPED | OUTPATIENT
Start: 2020-08-31 | End: 2021-07-07 | Stop reason: SDUPTHER

## 2020-10-15 DIAGNOSIS — E11.9 DIABETES MELLITUS WITHOUT COMPLICATION (HCC): ICD-10-CM

## 2020-10-16 RX ORDER — PEN NEEDLE, DIABETIC 30 GX3/16"
NEEDLE, DISPOSABLE MISCELLANEOUS
Qty: 1 PACKAGE | Refills: 11 | Status: SHIPPED | OUTPATIENT
Start: 2020-10-16 | End: 2021-03-02 | Stop reason: SDUPTHER

## 2020-10-27 RX ORDER — MONTELUKAST SODIUM 10 MG/1
10 TABLET ORAL DAILY
Qty: 90 TAB | Refills: 0 | Status: SHIPPED | OUTPATIENT
Start: 2020-10-27 | End: 2021-02-10 | Stop reason: SDUPTHER

## 2020-11-13 NOTE — PROGRESS NOTES
HISTORY OF PRESENT ILLNESS  Beto Cheung is a 77 y.o. female.   HPI     Last here 8/18/20 Pt is here to f/u on chronic conditions      She c/o cough   She will use her inhaler for this     BP today is 131/70  BP at home has been good, around what was seen today   Continues on losartan 50mg and norvasc 2.5mg     She has lasix to use prn - has not needed any recently      She is diabetic  BS at home has been 120s, 87 in am 150s in evening    lov, increased lantus from 32U to 34U qAm after A1c  Lov, increased novolog from 8U to 10U with dinner +sliding scale (8U if >150, 10U >200, 12U if >250, 14U if >300)   She has had quite a few lows and has had to watch what she eats  Will decrease novolog back down to 8U  Takes 8U on average   She also takes ASA 81mg daily      Reviewed labs    Will get labs today       Wt is 180 lbs - down 2 lbs x lov    She has been walking more and running with daughter   She has been taking care of granddaughter who is 4      Pt follows with Dr. Ravi Mendez (cardio) for CAD she is on Norvasc to medically managed chest pain not having any active symptoms has a history of a stent will see them annually  Last visit was 2/6/20 with NP Blessing Perez ECHO 2016-  Scheduled 2/21      Pt follows annually with Dr. Graham Prescott (hemo/onc) for h/o adenoma of esophagus   Last visit was 7/3/18  Pt was discharged from his care  Will only f/u prn       Pt follows with Dr. Ede Mauricio (pulm) for history of asthma annually  Last visit was  2/3/20   Continues singulair daily, albuterol prn, and symbicort BID for breathing/asthma per pulm, which help  Has had to use albuterol for cough  Has not had any flares   Scheduled 2/21     [facility]  Last visit 6/20  She had pre cancerous spot removed from her left calf  Will f/u in a year     Pt has not been evaluated for DANIELLE in the past   Recall she declines further evaluation for now 11/20       Continues lipitor 40mg daily for cholesterol      Follows with  Yash (GI)  Continues dexilant  this controls her reflux symptoms well  Had vv 8/20  No longer on reglan x 2/20   She takes pepcid prn  Recall has h/o esophageal cancer     Of note, her  has bladder cancer and she is caring for him at home. Her daughter is planning on moving in with her      ACP not on file. SDM is her . Provided information       PREVENTIVE:    Colonoscopy: 8/7/18, Dr. Warden Dixon, repeat 5 years  EGD: 4/16, Dr. Warden Dixon, h/o esophageal cancer, polyp on recent EGD, biopsy nl  AAA: FMHx (grandfather)  Pap: Dr. Parson Apa, 9/20  Mammogram:  7/27/20 negative  Dexa: 7/27/20 nl  Tdap: 9/14/2016  Pneumovax: 10/14/19   [id number]: 11/14/2016  Zostavax: 11/18/2016, reaction on R arm  Shingrix: both completed 2/20  Flu shot: 11/17/20  Foot exam: 8/18/20  Microalbumin: 5/20  A1c:  , 10/18 7.5, 1/19 7.1, 4/19 7.1, 7/19 6.9, 10/19 6.9  1/20 6.9 5/20 7.4 8/20 7.4  Eye exam:VEI  1/29/20   Lipids: 5/20 LDL 84  Hep C screen: 11/15, negative    Patient Active Problem List    Diagnosis Date Noted    Recurrent depression ([facility] 12/29/2017    Diabetes mellitus without complication ([facility] 57/70/2360    ASHD (arteriosclerotic heart disease) 06/18/2014    Bradycardia 06/18/2014    Hypercholesteremia 02/18/2013    HTN (hypertension) 02/18/2013    Asthma 02/18/2013    Thyroid nodule 02/18/2013    Incisional hernia 02/10/2011    Esophageal cancer ([facility] 02/10/2011     Current Outpatient Medications   Medication Sig Dispense Refill    MAGNESIUM PO Take  by mouth daily. Indications: leg cramps      montelukast (SINGULAIR) 10 mg tablet Take 1 Tab by mouth daily.  TAKE 1 TABLET EVERY DAY 90 Tab 0    Insulin Needles, Disposable, (Pen Needle) 31 gauge x 5/16\" ndle Use with insulin twice daily 1 Package 11    atorvastatin (LIPITOR) 40 mg tablet TAKE 1 TABLET EVERY DAY 90 Tab 2    insulin aspart U-100 (NOVOLOG) 100 unit/mL (3 mL) inpn Inject 10 units subq plus SSI; dx E11.9 15 Adjustable Dose Pre-filled Pen Syringe 0    losartan (COZAAR) 50 mg tablet Take 1 Tab by mouth daily. 90 Tab 1    insulin glargine (Lantus Solostar U-100 Insulin) 100 unit/mL (3 mL) inpn INJECT  32  TO  34 UNITS SUBCUTANEOUSLY EVERY DAY 30 mL 2    glucose blood VI test strips (Accu-Chek Corrine Plus test strp) strip CHECK BLOOD SUGAR TWICE DAILY 100 Strip 3    amLODIPine (NORVASC) 2.5 mg tablet TAKE 1 TAB BY MOUTH DAILY. IN PLACE OF RANEXA AS NOT COVERED 90 Tab 3    albuterol (PROVENTIL HFA, VENTOLIN HFA, PROAIR HFA) 90 mcg/actuation inhaler Take 2 Puffs by inhalation every four (4) hours as needed for Wheezing. 1 Inhaler 3    furosemide (LASIX) 20 mg tablet TAKE 1 TABLET BY MOUTH EVERY DAY (Patient taking differently: Take 20 mg by mouth as needed.) 90 Tab 1    Insulin Needles, Disposable, (BD ULTRA-FINE SHORT PEN NEEDLE) 31 gauge x 5/16\" ndle Use twice daily to inject insulin 1 Package 11    Blood-Glucose Meter (ACCU-CHEK CORRINE PLUS METER) misc Check glucose twice daily. 1 Each 1    glucose blood VI test strips (ACCU-CHEK CORRINE PLUS TEST STRP) strip Check glucose twice daily 100 Strip 3    cholecalciferol, vitamin D3, (VITAMIN D3) 2,000 unit tab Take  by mouth daily.  SYMBICORT 160-4.5 mcg/actuation HFA inhaler Take 2 Puffs by inhalation two (2) times a day.  polyethylene glycol (MIRALAX) 17 gram packet Take 17 g by mouth daily.  cetirizine (ZYRTEC) 10 mg tablet Take 10 mg by mouth daily.  melatonin 5 mg Tab Take 5 mg by mouth nightly.  Dexlansoprazole (DEXILANT) 60 mg CpDM Take 60 mg by mouth daily.  aspirin 81 mg tablet Take 81 mg by mouth.  pyridoxine (VITAMIN B-6) 100 mg tablet Take 100 mg by mouth every morning.       glucose blood VI test strips (ONETOUCH ULTRA TEST) strip CHECK TWICE A  Strip 12     Past Surgical History:   Procedure Laterality Date    COLONOSCOPY N/A 8/7/2018    COLONOSCOPY performed by Su Ríos MD at [facility] ENDOSCOPY   [facility]; HI RISK IND  8/7/2018         HX ENDOSCOPY  4/2016    HX GI  2010    gastroesophagectomy    HX HEART CATHETERIZATION  03/2017    stent x 1    HX HEENT  1957    tonsils as child    HX HERNIA REPAIR  04/26/2012    abdominal and umbilical    HX LAP CHOLECYSTECTOMY  1995    HX ORTHOPAEDIC  02/20/2015    right shoulder manipulation    HX OTHER SURGICAL Bilateral      shoulder surgery for frozen surgery    HX OTHER SURGICAL  6/24/2015    mammogram     HX PELVIC LAPAROSCOPY  1989    HX TUBAL LIGATION  1983    lap btl    HX VASCULAR ACCESS      port a cath and removal    CA EGD BALLOON DILATION ESOPHAGUS <30 MM DIAM  4/20/2010         CA EXTRAC ERUPTED TOOTH/EXPOSED ROOT        Lab Results   Component Value Date/Time    WBC 6.6 05/18/2020 08:49 AM    HGB 13.3 05/18/2020 08:49 AM    Hemoglobin (POC) 11.9 01/28/2010 12:02 PM    HCT 39.6 05/18/2020 08:49 AM    Hematocrit (POC) 35 01/28/2010 12:02 PM    PLATELET 922 56/32/2089 08:49 AM    MCV 92 05/18/2020 08:49 AM     Lab Results   Component Value Date/Time    Cholesterol, total 153 05/18/2020 08:49 AM    HDL Cholesterol 48 05/18/2020 08:49 AM    LDL, calculated 84 05/18/2020 08:49 AM    Triglyceride 104 05/18/2020 08:49 AM     Lab Results   Component Value Date/Time    GFR est non-AA 69 08/18/2020 12:21 PM    GFRNA, POC >60 01/06/2012 10:42 AM    GFR est AA 80 08/18/2020 12:21 PM    GFRAA, POC >60 01/06/2012 10:42 AM    Creatinine 0.87 08/18/2020 12:21 PM    Creatinine (POC) 0.9 01/06/2012 10:42 AM    BUN 12 08/18/2020 12:21 PM    BUN (POC) 14 01/28/2010 12:02 PM    Sodium 138 08/18/2020 12:21 PM    Sodium (POC) 141 01/28/2010 12:02 PM    Potassium 5.1 08/18/2020 12:21 PM    Potassium (POC) 4.0 01/28/2010 12:02 PM    Chloride 99 08/18/2020 12:21 PM    Chloride (POC) 103 01/28/2010 12:02 PM    CO2 26 08/18/2020 12:21 PM    Magnesium 1.9 02/08/2010 03:00 AM        Review of Systems   Respiratory: Positive for cough. Negative for shortness of breath.     Cardiovascular: Negative for chest pain. Physical Exam  Constitutional:       General: She is not in acute distress. Appearance: Normal appearance. She is not ill-appearing, toxic-appearing or diaphoretic. HENT:      Head: Normocephalic and atraumatic. Right Ear: External ear normal.      Left Ear: External ear normal.   Eyes:      General:         Right eye: No discharge. Left eye: No discharge. Conjunctiva/sclera: Conjunctivae normal.      Pupils: Pupils are equal, round, and reactive to light. Neck:      Musculoskeletal: Normal range of motion and neck supple. Cardiovascular:      Rate and Rhythm: Normal rate and regular rhythm. Pulses: Normal pulses. Heart sounds: Murmur (slight) present. No friction rub. No gallop. Pulmonary:      Effort: No respiratory distress. Breath sounds: Normal breath sounds. No wheezing or rales. Chest:      Chest wall: No tenderness. Musculoskeletal: Normal range of motion. Right lower leg: Edema (trace) present. Left lower leg: Edema present. Skin:     General: Skin is warm and dry. Neurological:      Mental Status: She is alert and oriented to person, place, and time. Mental status is at baseline. Coordination: Coordination normal.      Gait: Gait normal.   Psychiatric:         Mood and Affect: Mood normal.         Behavior: Behavior normal.         ASSESSMENT and PLAN    ICD-10-CM ICD-9-CM    1.  Di[facility]       Diabetes had been borderline last check    Increase her insulin but now she has become more active walking routinely and having lower readings    She is currently on Lantus 34 units    Continue this    She is taking NovoLog 10 units plus sliding scale insulin and having lower sugars because of this    We will decrease NovoLog to 8 units with meals plus sliding scale    Check A1c today     D25.0 909.13 METABOLIC PANEL, COMPREHENSIVE      HEMOGLOBIN A1C WITH EAG      TSH 3RD GENERATION T4, FREE   2. Needs flu shot  Z23 V04.81 IA IMMUNIZ ADMIN,1 SINGLE/COMB VAC/TOXOID      FLU (FLUAD QUAD INFLUENZA VACCINE,QUAD,ADJUVANTED)      METABOLIC PANEL, COMPREHENSIVE      HEMOGLOBIN A1C WITH EAG      TSH 3RD GENERATION      T4, FREE   3. Essential hypertension  C41 098.7 METABOLIC PANEL, COMPREHENSIVE   Controlled on losartan Norvasc continue   HEMOGLOBIN A1C WITH EAG      TSH 3RD GENERATION      T4, FREE   4. ASHD (arteriosclerotic heart disease)  S31.28 562.57 METABOLIC PANEL, COMPREHENSIVE      HEMOGLOBIN A1C WITH EAG   Medically managed Norvasc event chest pain follow-up with cardiology as in February   TSH 3RD GENERATION      T4, FREE   5. Recurrent depression (HCC)  Q39.1 845.79 METABOLIC PANEL, COMPREHENSIVE      HEMOGLOBIN A1C WITH EAG   Control without medication   TSH 3RD GENERATION      T4, FREE   6. Hypercholesteremia  K09.30 954.9 METABOLIC PANEL, COMPREHENSIVE      HEMOGLOBIN A1C WITH EAG   Controlled Lipitor   TSH 3RD GENERATION      T4, FREE   7. Mild intermittent asthma without complication  A95.44 380.79 METABOLIC PANEL, COMPREHENSIVE      HEMOGLOBIN A1C WITH EAG   No recent flares doing well with Symbicort twice a day Singulair daily and albuterol as needed   TSH 3RD GENERATION      T4, FREE   8. Subclinical hypothyroidism  G17.7 305.1 METABOLIC PANEL, COMPREHENSIVE      HEMOGLOBIN A1C WITH EAG   Monitor TFTs   TSH 3RD GENERATION      T4, FREE   9. Malignant neoplasm of esophagus, unspecified location [facility])       Intermission no longer sees hematology follows with Dr. Zina Posadas gastroenterology as needed saw him in August continues on Dexilant    Rarely needs Pepcid C15.9 150.9            Scribed by Jo-Ann Mixon of [employer], as dictated by Dr. Liu Sung. Current diagnosis and concerns discussed with pt at length. Pt understands risks and benefits or current treatment plan and medications, and accepts the treatment and medication with any possible risks.  Pt asks appropriate questions, which were answered. Pt was instructed to call with any concerns or problems. I have reviewed the note documented by the scribe. The services provided are my own.   The documentation is accurate

## 2020-11-17 ENCOUNTER — OFFICE VISIT (OUTPATIENT)
Dept: INTERNAL MEDICINE CLINIC | Age: 65
End: 2020-11-17
Payer: MEDICARE

## 2020-11-17 VITALS
WEIGHT: 180 LBS | BODY MASS INDEX: 31.89 KG/M2 | TEMPERATURE: 96.7 F | HEART RATE: 58 BPM | HEIGHT: 63 IN | DIASTOLIC BLOOD PRESSURE: 70 MMHG | SYSTOLIC BLOOD PRESSURE: 131 MMHG | OXYGEN SATURATION: 97 %

## 2020-11-17 DIAGNOSIS — J45.20 MILD INTERMITTENT ASTHMA WITHOUT COMPLICATION: ICD-10-CM

## 2020-11-17 DIAGNOSIS — E78.00 HYPERCHOLESTEREMIA: ICD-10-CM

## 2020-11-17 DIAGNOSIS — Z23 NEEDS FLU SHOT: ICD-10-CM

## 2020-11-17 DIAGNOSIS — I10 ESSENTIAL HYPERTENSION: ICD-10-CM

## 2020-11-17 DIAGNOSIS — I25.10 ASHD (ARTERIOSCLEROTIC HEART DISEASE): ICD-10-CM

## 2020-11-17 DIAGNOSIS — E03.8 SUBCLINICAL HYPOTHYROIDISM: ICD-10-CM

## 2020-11-17 DIAGNOSIS — F33.9 RECURRENT DEPRESSION (HCC): ICD-10-CM

## 2020-11-17 DIAGNOSIS — E11.9 DIABETES MELLITUS WITHOUT COMPLICATION (HCC): Primary | ICD-10-CM

## 2020-11-17 DIAGNOSIS — C15.9 MALIGNANT NEOPLASM OF ESOPHAGUS, UNSPECIFIED LOCATION (HCC): ICD-10-CM

## 2020-11-17 PROCEDURE — G8427 DOCREV CUR MEDS BY ELIG CLIN: HCPCS | Performed by: INTERNAL MEDICINE

## 2020-11-17 PROCEDURE — G8752 SYS BP LESS 140: HCPCS | Performed by: INTERNAL MEDICINE

## 2020-11-17 PROCEDURE — G8754 DIAS BP LESS 90: HCPCS | Performed by: INTERNAL MEDICINE

## 2020-11-17 PROCEDURE — G0008 ADMIN INFLUENZA VIRUS VAC: HCPCS | Performed by: INTERNAL MEDICINE

## 2020-11-17 PROCEDURE — 1090F PRES/ABSN URINE INCON ASSESS: CPT | Performed by: INTERNAL MEDICINE

## 2020-11-17 PROCEDURE — 3017F COLORECTAL CA SCREEN DOC REV: CPT | Performed by: INTERNAL MEDICINE

## 2020-11-17 PROCEDURE — G8399 PT W/DXA RESULTS DOCUMENT: HCPCS | Performed by: INTERNAL MEDICINE

## 2020-11-17 PROCEDURE — 90694 VACC AIIV4 NO PRSRV 0.5ML IM: CPT | Performed by: INTERNAL MEDICINE

## 2020-11-17 PROCEDURE — 2022F DILAT RTA XM EVC RTNOPTHY: CPT | Performed by: INTERNAL MEDICINE

## 2020-11-17 PROCEDURE — G9717 DOC PT DX DEP/BP F/U NT REQ: HCPCS | Performed by: INTERNAL MEDICINE

## 2020-11-17 PROCEDURE — G8536 NO DOC ELDER MAL SCRN: HCPCS | Performed by: INTERNAL MEDICINE

## 2020-11-17 PROCEDURE — 1101F PT FALLS ASSESS-DOCD LE1/YR: CPT | Performed by: INTERNAL MEDICINE

## 2020-11-17 PROCEDURE — G9899 SCRN MAM PERF RSLTS DOC: HCPCS | Performed by: INTERNAL MEDICINE

## 2020-11-17 PROCEDURE — 3051F HG A1C>EQUAL 7.0%<8.0%: CPT | Performed by: INTERNAL MEDICINE

## 2020-11-17 PROCEDURE — 99214 OFFICE O/P EST MOD 30 MIN: CPT | Performed by: INTERNAL MEDICINE

## 2020-11-17 PROCEDURE — G8417 CALC BMI ABV UP PARAM F/U: HCPCS | Performed by: INTERNAL MEDICINE

## 2020-11-17 NOTE — PROGRESS NOTES
After obtaining consent, and per verbal order from Dr. Janie Fernandes, patient received influenza vaccine given by Arron Rios LPN in R Deltoid. Influenza Vaccine 0.5 mL IM now. Patient was observed for 10 minutes post injection. Patient tolerated injection well. VIS given.

## 2020-11-18 LAB
ALBUMIN SERPL-MCNC: 4.1 G/DL (ref 3.8–4.8)
ALBUMIN/GLOB SERPL: 2.1 {RATIO} (ref 1.2–2.2)
ALP SERPL-CCNC: 83 IU/L (ref 39–117)
ALT SERPL-CCNC: 26 IU/L (ref 0–32)
AST SERPL-CCNC: 28 IU/L (ref 0–40)
BILIRUB SERPL-MCNC: 0.4 MG/DL (ref 0–1.2)
BUN SERPL-MCNC: 9 MG/DL (ref 8–27)
BUN/CREAT SERPL: 11 (ref 12–28)
CALCIUM SERPL-MCNC: 8.6 MG/DL (ref 8.7–10.3)
CHLORIDE SERPL-SCNC: 100 MMOL/L (ref 96–106)
CO2 SERPL-SCNC: 26 MMOL/L (ref 20–29)
CREAT SERPL-MCNC: 0.81 MG/DL (ref 0.57–1)
EST. AVERAGE GLUCOSE BLD GHB EST-MCNC: 169 MG/DL
GLOBULIN SER CALC-MCNC: 2 G/DL (ref 1.5–4.5)
GLUCOSE SERPL-MCNC: 157 MG/DL (ref 65–99)
HBA1C MFR BLD: 7.5 % (ref 4.8–5.6)
POTASSIUM SERPL-SCNC: 4.7 MMOL/L (ref 3.5–5.2)
PROT SERPL-MCNC: 6.1 G/DL (ref 6–8.5)
SODIUM SERPL-SCNC: 140 MMOL/L (ref 134–144)
T4 FREE SERPL-MCNC: 1.17 NG/DL (ref 0.82–1.77)
TSH SERPL DL<=0.005 MIU/L-ACNC: 2.4 UIU/ML (ref 0.45–4.5)

## 2021-02-05 RX ORDER — LOSARTAN POTASSIUM 50 MG/1
TABLET ORAL
Qty: 90 TAB | Refills: 1 | Status: SHIPPED | OUTPATIENT
Start: 2021-02-05 | End: 2021-08-18 | Stop reason: SDUPTHER

## 2021-02-06 ENCOUNTER — TELEPHONE (OUTPATIENT)
Dept: CARDIOLOGY CLINIC | Age: 66
End: 2021-02-06

## 2021-02-06 NOTE — TELEPHONE ENCOUNTER
Left message on cell phone voicemail appointment with Dr Vipin Bettencourt       CANCELLED 2/11/21    RESCHEDULE TO 5/4/21 at 10:30am    Thanks  Bacilio Todd      Ask patient to call and confirm receipt of this message

## 2021-02-10 RX ORDER — MONTELUKAST SODIUM 10 MG/1
10 TABLET ORAL DAILY
Qty: 90 TAB | Refills: 0 | Status: SHIPPED | OUTPATIENT
Start: 2021-02-10 | End: 2021-05-26 | Stop reason: SDUPTHER

## 2021-02-12 NOTE — PROGRESS NOTES
HISTORY OF PRESENT ILLNESS  Ritu Rizvi is a 77 y.o. female.   HPI     Last here 11/17/20 Pt is here to f/u on chronic conditions       She has been out of electricity for several days    BP today is controlled 118/72  BP at home 120/50-60   Continues on losartan 50mg and norvasc 2.5mg     She has lasix to use prn - has not needed any recently      She is diabetic  BS 90s in am   Not as many lows as before in afterrnon  She is taking lantus 34U qAm    Lov,decreased novolog from 10U to 8U with dinner +sliding scale (8U if >150, 10U >200, 12U if >250, 14U if >300) - have not needed much sliding scale insulin  Believes she may have missed insulin yesterday due to dealing with power outage  She also takes ASA 81mg daily      Reviewed labs    Will get labs today       Wt is 171 lbs - down 9 lbs x lov    She has been taking care of granddaughter who is 4      Pt follows with Dr. Boogie Joy (cardio) for CAD she is on Norvasc to medically managed chest pain not having any active symptoms has a history of a stent will see them annually  Last visit was 2/6/20 with NP Essie Fair ECHO 2016-  Rescheduled to 5/21      Pt follows annually with Dr. Margarita Cordova (hemo/onc) for h/o adenoma of esophagus   Last visit was 7/3/18  Pt was discharged from his care  Will only f/u prn       Pt follows with Dr. Ángela Christianson (pulm) for history of asthma annually  Last visit was  2/4/21 - everything fine per pt  Continues singulair daily, albuterol prn, and symbicort BID for breathing/asthma per pulm, which help  Has not had any flares      She [facility]  Last visit 6/20  She had pre cancerous spot removed from her left calf  Will f/u in a year     Pt has not been evaluated for DANIELLE in the past   Recall she declines further evaluation for now 11/20       Continues lipitor 40mg daily for cholesterol      Follows with Dr Emelyn Burnett (GI)  Continues dexilant and pepcid at night this controls her reflux symptoms well  Had vv 8/20  No longer on reglan x 2/20   She takes pepcid prn  Recall has h/o esophageal cancer     Of note, her  has bladder cancer and she is caring for him at home. Her daughter is planning on moving in with her     Discussed covid vaccine     No safety equip  Pt lives with  and daughter     Pt is functionally independent       No memory concerns   Knows the month, date, year, location   Can recall 3/3 objects      ACP not on file. SDM is her . Reminded pt to bring in this       PREVENTIVE:    Colonoscopy: 8/7/18, Dr. Martinez Necessary, repeat 5 years  EGD: 4/16, Dr. Martinez Necessary, h/o esophageal cancer, polyp on recent EGD, biopsy nl  AAA: FMHx (grandfather)  Pap: Dr. Kermitt Hodgkin, 9/20  Mammogram:  7/27/20 negative  Dexa: 7/27/20 nl  Tdap: 9/14/2016  Pneumovax: 10/14/19   [id number]: 11/14/2016  Zostavax: 11/18/2016, reaction on R arm  Shingrix: both completed 2/20  Flu shot: 11/17/20  Foot exam: 02/16/21  Microalbumin: 5/20  A1c:  , 10/18 7.5, 1/19 7.1, 4/19 7.1, 7/19 6.9, 10/19 6.9  1/20 6.9 5/20 7.4 8/20 7.4 11/20 7.5  Eye exam:VEI  1/29/20   Lipids: 5/20 LDL 84  Hep C screen: 11/15, negative    Patient Active Problem List    Diagnosis Date Noted    Recurrent depression ([facility] 12/29/2017    Diabetes mellitus without complication ([facility] 29/11/5367    ASHD (arteriosclerotic heart disease) 06/18/2014    Bradycardia 06/18/2014    Hypercholesteremia 02/18/2013    HTN (hypertension) 02/18/2013    Asthma 02/18/2013    Thyroid nodule 02/18/2013    Incisional hernia 02/10/2011    Esophageal cancer ([facility] 02/10/2011     Current Outpatient Medications   Medication Sig Dispense Refill    montelukast (SINGULAIR) 10 mg tablet Take 1 Tab by mouth daily. TAKE 1 TABLET EVERY DAY 90 Tab 0    losartan (COZAAR) 50 mg tablet TAKE 1 TABLET EVERY DAY 90 Tab 1    MAGNESIUM PO Take  by mouth daily.  Indications: leg cramps      Insulin Needles, Disposable, (Pen Needle) 31 gauge x 5/16\" ndle Use with insulin twice daily 1 Package 11    atorvastatin (LIPITOR) 40 mg tablet TAKE 1 TABLET EVERY DAY 90 Tab 2    insulin aspart U-100 (NOVOLOG) 100 unit/mL (3 mL) inpn Inject 10 units subq plus SSI; dx E11.9 15 Adjustable Dose Pre-filled Pen Syringe 0    insulin glargine (Lantus Solostar U-100 Insulin) 100 unit/mL (3 mL) inpn INJECT  32  TO  34 UNITS SUBCUTANEOUSLY EVERY DAY 30 mL 2    glucose blood VI test strips (Accu-Chek Corrine Plus test strp) strip CHECK BLOOD SUGAR TWICE DAILY 100 Strip 3    amLODIPine (NORVASC) 2.5 mg tablet TAKE 1 TAB BY MOUTH DAILY. IN PLACE OF RANEXA AS NOT COVERED 90 Tab 3    albuterol (PROVENTIL HFA, VENTOLIN HFA, PROAIR HFA) 90 mcg/actuation inhaler Take 2 Puffs by inhalation every four (4) hours as needed for Wheezing. 1 Inhaler 3    furosemide (LASIX) 20 mg tablet TAKE 1 TABLET BY MOUTH EVERY DAY (Patient taking differently: Take 20 mg by mouth as needed.) 90 Tab 1    Insulin Needles, Disposable, (BD ULTRA-FINE SHORT PEN NEEDLE) 31 gauge x 5/16\" ndle Use twice daily to inject insulin 1 Package 11    Blood-Glucose Meter (ACCU-CHEK CORRINE PLUS METER) misc Check glucose twice daily. 1 Each 1    glucose blood VI test strips (ACCU-CHEK CORRINE PLUS TEST STRP) strip Check glucose twice daily 100 Strip 3    glucose blood VI test strips (ONETOUCH ULTRA TEST) strip CHECK TWICE A  Strip 12    cholecalciferol, vitamin D3, (VITAMIN D3) 2,000 unit tab Take  by mouth daily.  SYMBICORT 160-4.5 mcg/actuation HFA inhaler Take 2 Puffs by inhalation two (2) times a day.  polyethylene glycol (MIRALAX) 17 gram packet Take 17 g by mouth daily.  cetirizine (ZYRTEC) 10 mg tablet Take 10 mg by mouth daily.  melatonin 5 mg Tab Take 5 mg by mouth nightly.  Dexlansoprazole (DEXILANT) 60 mg CpDM Take 60 mg by mouth daily.  aspirin 81 mg tablet Take 81 mg by mouth.  pyridoxine (VITAMIN B-6) 100 mg tablet Take 100 mg by mouth every morning.        Past Surgical History:   Procedure Laterality Date    COLONOSCOPY N/A 8/7/2018    COLONOSCOPY performed by Soumya Callahan MD at [address]; HI RISK IND  8/7/2018         HX ENDOSCOPY  4/2016    HX GI  2010    gastroesophagectomy    HX HEART CATHETERIZATION  03/2017    stent x 1    HX HEENT  1957    tonsils as child    HX HERNIA REPAIR  04/26/2012    abdominal and umbilical    HX LAP CHOLECYSTECTOMY  1995    HX ORTHOPAEDIC  02/20/2015    right shoulder manipulation    HX OTHER SURGICAL Bilateral      shoulder surgery for frozen surgery    HX OTHER SURGICAL  6/24/2015    mammogram     HX PELVIC LAPAROSCOPY  1989    HX TUBAL LIGATION  1983    lap btl    HX VASCULAR ACCESS      port a cath and removal    [place] EGD BALLOON DILATION ESOPHAGUS <30 MM DIAM  4/20/2010         [place] EXTRAC ERUPTED TOOTH/EXPOSED ROOT        Lab Results   Component Value Date/Time    WBC 6.6 05/18/2020 08:49 AM    HGB 13.3 05/18/2020 08:49 AM    Hemoglobin (POC) 11.9 01/28/2010 12:02 PM    HCT 39.6 05/18/2020 08:49 AM    Hematocrit (POC) 35 01/28/2010 12:02 PM    PLATELET 756 54/74/6938 08:49 AM    MCV 92 05/18/2020 08:49 AM     Lab Results   Component Value Date/Time    Cholesterol, total 153 05/18/2020 08:49 AM    HDL Cholesterol 48 05/18/2020 08:49 AM    LDL, calculated 84 05/18/2020 08:49 AM    Triglyceride 104 05/18/2020 08:49 AM     Lab Results   Component Value Date/Time    GFR est non-AA 75 11/17/2020 10:34 AM    GFRNA, POC >60 01/06/2012 10:42 AM    GFR est AA 87 11/17/2020 10:34 AM    GFRAA, POC >60 01/06/2012 10:42 AM    Creatinine 0.81 11/17/2020 10:34 AM    Creatinine (POC) 0.9 01/06/2012 10:42 AM    BUN 9 11/17/2020 10:34 AM    BUN (POC) 14 01/28/2010 12:02 PM    Sodium 140 11/17/2020 10:34 AM    Sodium (POC) 141 01/28/2010 12:02 PM    Potassium 4.7 11/17/2020 10:34 AM    Potassium (POC) 4.0 01/28/2010 12:02 PM    Chloride 100 11/17/2020 10:34 AM    Chloride (POC) 103 01/28/2010 12:02 PM    CO2 26 11/17/2020 10:34 AM    Magnesium 1.9 02/08/2010 03:00 AM        Review of Systems   Respiratory: Negative for shortness of breath. Cardiovascular: Negative for chest pain. Physical Exam  Constitutional:       General: She is not in acute distress. Appearance: Normal appearance. She is not ill-appearing, toxic-appearing or diaphoretic. HENT:      Head: Normocephalic and atraumatic. Right Ear: External ear normal.      Left Ear: External ear normal.   Eyes:      General:         Right eye: No discharge. Left eye: No discharge. Conjunctiva/sclera: Conjunctivae normal.      Pupils: Pupils are equal, round, and reactive to light. Neck:      Musculoskeletal: Normal range of motion and neck supple. Cardiovascular:      Rate and Rhythm: Normal rate and regular rhythm. Pulses: Normal pulses. Heart sounds: Normal heart sounds. No murmur. No friction rub. No gallop. Pulmonary:      Effort: No respiratory distress. Breath sounds: Normal breath sounds. No wheezing or rales. Chest:      Chest wall: No tenderness. Abdominal:      General: Abdomen is flat. There is no distension. Palpations: Abdomen is soft. There is no mass. Tenderness: There is no abdominal tenderness. There is no guarding or rebound. Hernia: No hernia is present. Musculoskeletal: Normal range of motion. Right lower leg: No edema. Left lower leg: No edema. Skin:     General: Skin is warm and dry. Neurological:      Mental Status: She is alert and oriented to person, place, and time. Mental status is at baseline. Coordination: Coordination normal.      Gait: Gait normal.      Comments: Sensory exam of the foot is normal, tested with the monofilament. Good pulses, no lesions or ulcers, good peripheral pulses. Psychiatric:         Mood and Affect: Mood normal.         Behavior: Behavior normal.         ASSESSMENT and PLAN    ICD-10-CM ICD-9-CM    1.  Diabetes mellitus without complication ([facility] Home readings good last a1c adequate, wt improved, check a1c today, continue lantus 34U novolog 8U with dinner + sliding scale, adjust futher S40.8 378.57 METABOLIC PANEL, COMPREHENSIVE      MICROALBUMIN, UR, RAND W/ MICROALB/CREAT RATIO      HEMOGLOBIN A1C WITH EAG      LIPID PANEL       DIABETES FOOT EXAM   2. Medicare annual wellness visit, subsequent  W02.63 A80.2 METABOLIC PANEL, COMPREHENSIVE      MICROALBUMIN, UR, RAND W/ MICROALB/CREAT RATIO      HEMOGLOBIN A1C WITH EAG      LIPID PANEL   3. Malignant neoplasm of esophagus, unspecified location (HCC)  F67.2 141.3 METABOLIC PANEL, COMPREHENSIVE   In remission previously followed with hematology sees gastroenterology as needed   MICROALBUMIN, UR, RAND W/ MICROALB/CREAT RATIO      HEMOGLOBIN A1C WITH EAG      LIPID PANEL   4. Essential hypertension         Controlled on losartan and norvasc M75 463.7 METABOLIC PANEL, COMPREHENSIVE      MICROALBUMIN, UR, RAND W/ MICROALB/CREAT RATIO      HEMOGLOBIN A1C WITH EAG      LIPID PANEL   5. ASHD (arteriosclerotic heart disease)        Cardio apt pushed back to 5/21 with ta  On norvasc to prevent CP   medically managed V34.25 362.20 METABOLIC PANEL, COMPREHENSIVE      MICROALBUMIN, UR, RAND W/ MICROALB/CREAT RATIO      HEMOGLOBIN A1C WITH EAG      LIPID PANEL   6. Hypercholesteremia       Controlled on lipitor 40 mg  X13.58 385.3 METABOLIC PANEL, COMPREHENSIVE      MICROALBUMIN, UR, RAND W/ MICROALB/CREAT RATIO      HEMOGLOBIN A1C WITH EAG      LIPID PANEL   7. Mild intermittent asthma without complication         utd with pulm, no recent flares, continue symbicort B92.21 666.47 METABOLIC PANEL, COMPREHENSIVE      MICROALBUMIN, UR, RAND W/ MICROALB/CREAT RATIO      HEMOGLOBIN A1C WITH EAG      LIPID PANEL   8.  Recurrent depression ([facility]         Controlled w/o meds, issue in past X57.3 535.75 METABOLIC PANEL, COMPREHENSIVE      MICROALBUMIN, UR, RAND W/ MICROALB/CREAT RATIO      HEMOGLOBIN A1C WITH EAG LIPID PANEL      9. gerd - dexilant daily and pepcid in evening     Scribed by Yifan Robison of [facility], as dictated by Dr. June Bernal. Current diagnosis and concerns discussed with pt at length. Pt understands risks and benefits or current treatment plan and medications, and accepts the treatment and medication with any possible risks. Pt asks appropriate questions, which were answered. Pt was instructed to call with any concerns or problems. I have reviewed the note documented by the scribe. The services provided are my own.   The documentation is accurate

## 2021-02-16 ENCOUNTER — OFFICE VISIT (OUTPATIENT)
Dept: INTERNAL MEDICINE CLINIC | Age: 66
End: 2021-02-16
Payer: MEDICARE

## 2021-02-16 VITALS
TEMPERATURE: 97.3 F | HEART RATE: 57 BPM | SYSTOLIC BLOOD PRESSURE: 118 MMHG | BODY MASS INDEX: 30.33 KG/M2 | OXYGEN SATURATION: 96 % | HEIGHT: 63 IN | DIASTOLIC BLOOD PRESSURE: 72 MMHG | RESPIRATION RATE: 16 BRPM | WEIGHT: 171.2 LBS

## 2021-02-16 DIAGNOSIS — C15.9 MALIGNANT NEOPLASM OF ESOPHAGUS, UNSPECIFIED LOCATION (HCC): ICD-10-CM

## 2021-02-16 DIAGNOSIS — J45.20 MILD INTERMITTENT ASTHMA WITHOUT COMPLICATION: ICD-10-CM

## 2021-02-16 DIAGNOSIS — F33.9 RECURRENT DEPRESSION (HCC): ICD-10-CM

## 2021-02-16 DIAGNOSIS — E11.9 DIABETES MELLITUS WITHOUT COMPLICATION (HCC): Primary | ICD-10-CM

## 2021-02-16 DIAGNOSIS — Z00.00 MEDICARE ANNUAL WELLNESS VISIT, SUBSEQUENT: ICD-10-CM

## 2021-02-16 DIAGNOSIS — E78.00 HYPERCHOLESTEREMIA: ICD-10-CM

## 2021-02-16 DIAGNOSIS — I25.10 ASHD (ARTERIOSCLEROTIC HEART DISEASE): ICD-10-CM

## 2021-02-16 DIAGNOSIS — I10 ESSENTIAL HYPERTENSION: ICD-10-CM

## 2021-02-16 PROCEDURE — 1090F PRES/ABSN URINE INCON ASSESS: CPT | Performed by: INTERNAL MEDICINE

## 2021-02-16 PROCEDURE — 2022F DILAT RTA XM EVC RTNOPTHY: CPT | Performed by: INTERNAL MEDICINE

## 2021-02-16 PROCEDURE — 3046F HEMOGLOBIN A1C LEVEL >9.0%: CPT | Performed by: INTERNAL MEDICINE

## 2021-02-16 PROCEDURE — G9899 SCRN MAM PERF RSLTS DOC: HCPCS | Performed by: INTERNAL MEDICINE

## 2021-02-16 PROCEDURE — G8754 DIAS BP LESS 90: HCPCS | Performed by: INTERNAL MEDICINE

## 2021-02-16 PROCEDURE — G9717 DOC PT DX DEP/BP F/U NT REQ: HCPCS | Performed by: INTERNAL MEDICINE

## 2021-02-16 PROCEDURE — G8752 SYS BP LESS 140: HCPCS | Performed by: INTERNAL MEDICINE

## 2021-02-16 PROCEDURE — G8536 NO DOC ELDER MAL SCRN: HCPCS | Performed by: INTERNAL MEDICINE

## 2021-02-16 PROCEDURE — 99214 OFFICE O/P EST MOD 30 MIN: CPT | Performed by: INTERNAL MEDICINE

## 2021-02-16 PROCEDURE — 1101F PT FALLS ASSESS-DOCD LE1/YR: CPT | Performed by: INTERNAL MEDICINE

## 2021-02-16 PROCEDURE — G8417 CALC BMI ABV UP PARAM F/U: HCPCS | Performed by: INTERNAL MEDICINE

## 2021-02-16 PROCEDURE — G8399 PT W/DXA RESULTS DOCUMENT: HCPCS | Performed by: INTERNAL MEDICINE

## 2021-02-16 PROCEDURE — G0439 PPPS, SUBSEQ VISIT: HCPCS | Performed by: INTERNAL MEDICINE

## 2021-02-16 PROCEDURE — 3017F COLORECTAL CA SCREEN DOC REV: CPT | Performed by: INTERNAL MEDICINE

## 2021-02-16 PROCEDURE — G8427 DOCREV CUR MEDS BY ELIG CLIN: HCPCS | Performed by: INTERNAL MEDICINE

## 2021-02-16 NOTE — PROGRESS NOTES
This is the Subsequent Medicare Annual Wellness Exam, performed 12 months or more after the Initial AWV or the last Subsequent AWV    I have reviewed the patient's medical history in detail and updated the computerized patient record. Depression Risk Factor Screening:     3 most recent PHQ Screens 2/16/2021   Little interest or pleasure in doing things Not at all   Feeling down, depressed, irritable, or hopeless Not at all   Total Score PHQ 2 0       Alcohol Risk Screen    Do you average more than 1 drink per night or more than 7 drinks a week:  No    On any one occasion in the past three months have you have had more than 3 drinks containing alcohol:  No        Functional Ability and Level of Safety:    Hearing: Hearing is good. Activities of Daily Living: The home contains: no safety equipment. Patient does total self care      Ambulation: with no difficulty     Fall Risk:  Fall Risk Assessment, last 12 mths 2/16/2021   Able to walk? Yes   Fall in past 12 months? 0   Do you feel unsteady? 0      Abuse Screen:  Patient is not abused   daughter and grandkid and  lives at home safe    Cognitive Screening    Has your family/caregiver stated any concerns about your memory: no     Cognitive Screening: Normal - Mini Cog Test        No memory concerns   Pt knows the month, day and year   Can recall 3/3 objects   Assessment/Plan   Education and counseling provided:  Are appropriate based on today's review and evaluation  End-of-Life planning (with patient's consent)  Influenza Vaccine  Screening Mammography  Screening Pap and pelvic (covered once every 2 years)  Colorectal cancer screening tests  Cardiovascular screening blood test  Bone mass measurement (DEXA)  Screening for glaucoma  Diabetes screening test    Diagnoses and all orders for this visit:    1.  Medicare annual wellness visit, subsequent        Health Maintenance Due     Health Maintenance Due   Topic Date Due    COVID-19 Vaccine (1 of 2) 02/18/1971       Patient Care Team   Patient Care Team:  Mati Ring MD as PCP - General (Internal Medicine)  Mati Ring MD as PCP - [facility] Empaneled Provider  Nikko Issa MD (Cardiology)  Damien Foster MD as Consulting Provider (Endocrinology)  Anastasiia Merchant MD as Physician (Cardiology)  Kristal Quispe MD (Gastroenterology)  Chino Wilder MD (Internal Medicine)  Justine Gong OD (Optometry)  Demetrius Bailey, JOSE as Ambulatory Care Manager  Xochilt Bautista MD (Hematology and Oncology)  Chino Wilder MD (Internal Medicine)  Tay Cardenas MD (Obstetrics & Gynecology)  Anjana Dudley MD (Obstetrics & Gynecology)  Anjana Dudley MD (Obstetrics & Gynecology)  Gen Huston RD (Optometry)     updated    History     Patient Active Problem List   Diagnosis Code    Incisional hernia K43.2    Esophageal cancer ([address] C15.9    Hypercholesteremia E78.00    HTN (hypertension) I10    Asthma J45.909    Thyroid nodule E04.1    ASHD (arteriosclerotic heart disease) I25.10    Bradycardia R00.1    Diabetes mellitus without complication ([address] R53.1    Recurrent depression ([address] F33.9     Past Medical History:   Diagnosis Date    Arrhythmia     bradycardia    Bloating     CAD (coronary artery disease)     Cancer ([address] 2010    esophageal/GE junction    Chest pain     Chest pain     Chronic obstructive pulmonary disease ([address]     Chronic pain     left shoulder    Congestion of throat     Cough     Depression     & anxiety    Diabetes ([address]     IDDM    Dyspepsia and other specified disorders of function of stomach     Fatigue     GERD (gastroesophageal reflux disease)     Headache(784.0)     Hypercholesterolemia     Hypertension     Multinodular goiter     Nausea & vomiting     Stool color black     Stress     Weakness       Past Surgical History:   Procedure Laterality Date    COLONOSCOPY N/A 8/7/2018    COLONOSCOPY performed by Sherron May MD at [address]; HI RISK IND  8/7/2018         HX ENDOSCOPY  4/2016    HX GI  2010    gastroesophagectomy    HX HEART CATHETERIZATION  03/2017    stent x 1    HX HEENT  1957    tonsils as child    HX HERNIA REPAIR  04/26/2012    abdominal and umbilical    HX LAP CHOLECYSTECTOMY  1995    HX ORTHOPAEDIC  02/20/2015    right shoulder manipulation    HX OTHER SURGICAL Bilateral      shoulder surgery for frozen surgery    HX OTHER SURGICAL  6/24/2015    mammogram     HX PELVIC LAPAROSCOPY  1989    HX TUBAL LIGATION  1983    lap btl    HX VASCULAR ACCESS      port a cath and removal    [place] EGD BALLOON DILATION ESOPHAGUS <30 MM DIAM  4/20/2010         [place] EXTRAC ERUPTED TOOTH/EXPOSED ROOT       Current Outpatient Medications   Medication Sig Dispense Refill    montelukast (SINGULAIR) 10 mg tablet Take 1 Tab by mouth daily. TAKE 1 TABLET EVERY DAY 90 Tab 0    losartan (COZAAR) 50 mg tablet TAKE 1 TABLET EVERY DAY 90 Tab 1    MAGNESIUM PO Take  by mouth daily. Indications: leg cramps      Insulin Needles, Disposable, (Pen Needle) 31 gauge x 5/16\" ndle Use with insulin twice daily 1 Package 11    atorvastatin (LIPITOR) 40 mg tablet TAKE 1 TABLET EVERY DAY 90 Tab 2    insulin aspart U-100 (NOVOLOG) 100 unit/mL (3 mL) inpn Inject 10 units subq plus SSI; dx E11.9 15 Adjustable Dose Pre-filled Pen Syringe 0    insulin glargine (Lantus Solostar U-100 Insulin) 100 unit/mL (3 mL) inpn INJECT  32  TO  34 UNITS SUBCUTANEOUSLY EVERY DAY 30 mL 2    glucose blood VI test strips (Accu-Chek Corrine Plus test strp) strip CHECK BLOOD SUGAR TWICE DAILY 100 Strip 3    amLODIPine (NORVASC) 2.5 mg tablet TAKE 1 TAB BY MOUTH DAILY. IN PLACE OF RANEXA AS NOT COVERED 90 Tab 3    albuterol (PROVENTIL HFA, VENTOLIN HFA, PROAIR HFA) 90 mcg/actuation inhaler Take 2 Puffs by inhalation every four (4) hours as needed for Wheezing.  1 Inhaler 3    furosemide (LASIX) 20 mg tablet TAKE 1 TABLET BY MOUTH EVERY DAY (Patient taking differently: Take 20 mg by mouth as needed.) 90 Tab 1    Insulin Needles, Disposable, (BD ULTRA-FINE SHORT PEN NEEDLE) 31 gauge x 5/16\" ndle Use twice daily to inject insulin 1 Package 11    Blood-Glucose Meter (ACCU-CHEK SUSANNA PLUS METER) misc Check glucose twice daily. 1 Each 1    glucose blood VI test strips (ACCU-CHEK SUSANNA PLUS TEST STRP) strip Check glucose twice daily 100 Strip 3    glucose blood VI test strips (ONETOUCH ULTRA TEST) strip CHECK TWICE A  Strip 12    cholecalciferol, vitamin D3, (VITAMIN D3) 2,000 unit tab Take  by mouth daily.  SYMBICORT 160-4.5 mcg/actuation HFA inhaler Take 2 Puffs by inhalation two (2) times a day.  polyethylene glycol (MIRALAX) 17 gram packet Take 17 g by mouth daily.  cetirizine (ZYRTEC) 10 mg tablet Take 10 mg by mouth daily.  melatonin 5 mg Tab Take 5 mg by mouth nightly.  Dexlansoprazole (DEXILANT) 60 mg CpDM Take 60 mg by mouth daily.  aspirin 81 mg tablet Take 81 mg by mouth.  pyridoxine (VITAMIN B-6) 100 mg tablet Take 100 mg by mouth every morning. Allergies   Allergen Reactions    Adhesive Other (comments)     redness       Family History   Problem Relation Age of Onset    Diabetes Father     Cancer Father         intestinal    Heart Disease Father     Hypertension Father     Heart Disease Brother     Diabetes Brother     Diabetes Mother     Lung Disease Mother     Heart Disease Mother     Hypertension Mother     Diabetes Maternal Grandmother     Diabetes Maternal Grandfather     Elevated Lipids Maternal Aunt     Thyroid Disease Neg Hx      Social History     Tobacco Use    Smoking status: Never Smoker    Smokeless tobacco: Never Used   Substance Use Topics    Alcohol use: Yes     Alcohol/week: 0.8 standard drinks     Types: 1 Glasses of wine per week     Comment: rarely glass of wine   ACP not on file. SDM is her .   Reminded pt to bring in this         Colonoscopy: 8/7/18, Dr. Cherylyn Leventhal, repeat 5 years  AAA: FMHx (grandfather)  Pap: Dr. Jens Harman, 9/20 q 2 years  Mammogram:  7/27/20 negative  Annually   Dexa: 7/27/20 nl  q 2 years    Tdap: 9/14/2016  Pneumovax: 10/14/19   Rogena Peabody: 11/14/2016  Zostavax: 11/18/2016, reaction on R arm  Shingrix: both completed 2/20  Flu shot: 11/17/20    A1c:  11/20 7.5 due now  Lipids: 5/20 LDL 84 annual     Eye exam:VEI  1/29/20  annual     Hep C screen: 11/15, negative    Medication reconciliation completed by MA and reviewed by me. Medical/surgical/social/family history reviewed and updated by me. Patient provided AVS and preventative screening table. Patient verbalized understanding of all information discussed.

## 2021-02-16 NOTE — PATIENT INSTRUCTIONS
Personalized Recommendations for Ivelisse Diver Here is a list of your current Health Maintenance items that are due (your personalized list of preventive services) Health Maintenance Due Topic Date Due  
 COVID-19 Vaccine (1 of 2) 02/18/1971 Keep a list of your medications (prescribed and over the counter) and bring it to every appointment. Taking your medications as prescribed is important for your health and safety. Use this sample medication list to create your own. Update the list whenever changes are made. Medication Dosage Frequency Indication Example: Aspirin 81 mg Once daily in the morning Heart Health How to stay healthy between visits The best way to live healthy is to have a healthy lifestyle by eating a well-balanced diet, exercising regularly, limiting alcohol and stopping smoking if you are a smoker. Try to exercise at least 30 minutes a day, this is better than any prescription medicine to keep you healthy. Eat at least 5 servings of fruits and vegetables every daily and reduce red meats, processed meat (such as deli cold cuts), white breads and pastas. Limit or eliminate sweets and sugary drinks from your diet. Try to keep your weight healthy. Your body mass index (BMI) should be between 18 and 25. If it is between 22 and 30 you are overweight and if it is 30 or higher, that is called obesity. Being overweight or obese increases risk of high blood pressure, arthritis, sleep apnea, diabetes and many cancers. High blood pressure causes damage to your blood vessels, heart, kidneys and other organs if untreated. Your blood pressure should be under 140/90 with an ideal level of 120/80 or less to prevent heart disease, strokes and kidney disease. Wear your seat belt every time you are in a vehicle. Use sunscreen or cover your skin when you are outdoors. 1 in 61 people will develop melanoma (skin cancer) which is mostly preventable. Smoking makes all health problems worse. If you smoke, talk to your provider about stop-smoking programs and medicines. See a dentist one or two times a year for checkups and to have your teeth cleaned. Annual eye exams are recommended to screen for glaucoma and cataracts. Immunizations  Additional Information Everyone should have a yearly flu shot and a Tetanus, Diphtheria & Pertussis (TDaP) vaccine every 10 years. The shingles vaccine called Shingrix is recommended once in a lifetime after age 48. This is given as a 2-dose series and you can get it at your local pharmacy. Shingrix is now recommended instead of the older Zostavax as it is 90% effective compared to 50% for the Zostavax. You can get the Shingrix vaccine even if you had the Zostavax as long as one year has passed. All people over age 72 should have a pneumonia vaccine to prevent pneumonia. This is called Pneumovax-23 and is once in a lifetime vaccines except in special cases. Some people may benefit from a different vaccine called Prevnar-13 in addition to 1 [place]. Screening Tests  Additional Information Screening for diabetes mellitus with a blood sugar test should be done annually if you have risk factors such as high blood pressure, high cholesterol, are overweight, have a family history of diabetes or you have had high blood sugars in the past, especially during pregnancy (gestational diabetes). Cholesterol tests check for elevated lipids (fatty part of blood) which can lead to heart disease and strokes are recommended every 5 years after age 39. If you have elevated cholesterol, diabetes or have had a heart attack or stroke you should have testing more frequently. Screening for cervical cancer with a pap smear and pelvic exam is recommended for women every 3-5 years from age 24 until age 72 if previous Pap smears have been normal. If you have had abnormal pap smears or increased risk for cervical cancer then testing may be recommended more frequently. Breast cancer screening with a mammogram is recommended every 2 years for women age 54-69. More frequent screening has not been shown to reduce the risk of dying from breast cancer and increases the chance you might need a biopsy or get treatment for a breast cancer that would not have harmed you. If you are over 75, mammograms may do more harm than good and there have been no studies showing that screening in this age group reduces the chance of death. Screening between age 36 and 48 does reduce the risk for breast cancer death but is not as effective as it is over age 48 and can lead to more biopsies and false positive tests. Colon cancer screening that evaluates for blood or polyps in your colon can prevent colon cancer and should be done yearly as a stool test or every 10 years as a colonoscopy up to age 76. Screening can be done up to age 80 if you are still very healthy but has higher risks after age 76. Colonoscopies may be recommended more frequently if precancerous lesions were found. A bone density test to screen for osteoporosis (thin or brittle bones) should be done at age 72 and periodically after that depending on the results and your history. Medicare will cover this up to every 2 years. Abdominal Aortic Aneurysm (AAA) screening at 72 is recommended if you have a family history of AAA. Lung Cancer Screening with an annual low-dose CT scan is recommended if you are between age 54 and 68, smoked at least 30 pack years (the equivalent of 1 pack per day for 30 years), and are a current smoker or quit less than 15 years ago. Hepatitis C screening is recommended one time for everyone between 18-79. HIV and syphilis screening are recommended if you are risk. Medicare Wellness Visit, Female The best way to live healthy is to have a lifestyle where you eat a well-balanced diet, exercise regularly, limit alcohol use, and quit all forms of tobacco/nicotine, if applicable. Regular preventive services are another way to keep healthy. Preventive services (vaccines, screening tests, monitoring & exams) can help personalize your care plan, which helps you manage your own care. Screening tests can find health problems at the earliest stages, when they are easiest to treat. [facility] follows the current, evidence-based guidelines published by the [facility] [place] ([facility]) when recommending preventive services for our patients. Because we follow these guidelines, sometimes recommendations change over time as research supports it. (For example, mammograms used to be recommended annually. Even though Medicare will still pay for an annual mammogram, the newer guidelines recommend a mammogram every two years for women of average risk). Of course, you and your doctor may decide to screen more often for some diseases, based on your risk and your co-morbidities (chronic disease you are already diagnosed with). Preventive services for you include: - Medicare offers their members a free annual wellness visit, which is time for you and your primary care provider to discuss and plan for your preventive service needs. Take advantage of this benefit every year! 
-All adults over the age of 72 should receive the recommended pneumonia vaccines. Current USPSTF guidelines recommend a series of two vaccines for the best pneumonia protection.  
-All adults should have a flu vaccine yearly and a tetanus vaccine every 10 years.  
-All adults age 48 and older should receive the shingles vaccines (series of two vaccines). -All adults age 38-68 who are overweight should have a diabetes screening test once every three years.  
-All adults born between 80 and 1967 should be screened once for Hepatitis C. 
-Other screening tests and preventive services for persons with diabetes include: an eye exam to screen for diabetic retinopathy, a kidney function test, a foot exam, and stricter control over your cholesterol.  
-Cardiovascular screening for adults with routine risk involves an electrocardiogram (ECG) at intervals determined by your doctor.  
-Colorectal cancer screenings should be done for adults age 54-65 with no increased risk factors for colorectal cancer. There are a number of acceptable methods of screening for this type of cancer. Each test has its own benefits and drawbacks. Discuss with your doctor what is most appropriate for you during your annual wellness visit. The different tests include: colonoscopy (considered the best screening method), a fecal occult blood test, a fecal DNA test, and sigmoidoscopy. 
 
-A bone mass density test is recommended when a woman turns 65 to screen for osteoporosis. This test is only recommended one time, as a screening. Some providers will use this same test as a disease monitoring tool if you already have osteoporosis. -Breast cancer screenings are recommended every other year for women of normal risk, age 54-69. 
-Cervical cancer screenings for women over age 72 are only recommended with certain risk factors. Here is a list of your current Health Maintenance items (your personalized list of preventive services) with a due date: 
Health Maintenance Due Topic Date Due  
 COVID-19 Vaccine (1 of 2) 02/18/1971

## 2021-02-17 LAB
ALBUMIN SERPL-MCNC: 4.7 G/DL (ref 3.8–4.8)
ALBUMIN/CREAT UR: 6 MG/G CREAT (ref 0–29)
ALBUMIN/GLOB SERPL: 2 {RATIO} (ref 1.2–2.2)
ALP SERPL-CCNC: 85 IU/L (ref 39–117)
ALT SERPL-CCNC: 20 IU/L (ref 0–32)
AST SERPL-CCNC: 27 IU/L (ref 0–40)
BILIRUB SERPL-MCNC: 0.4 MG/DL (ref 0–1.2)
BUN SERPL-MCNC: 18 MG/DL (ref 8–27)
BUN/CREAT SERPL: 18 (ref 12–28)
CALCIUM SERPL-MCNC: 9.4 MG/DL (ref 8.7–10.3)
CHLORIDE SERPL-SCNC: 97 MMOL/L (ref 96–106)
CHOLEST SERPL-MCNC: 128 MG/DL (ref 100–199)
CO2 SERPL-SCNC: 26 MMOL/L (ref 20–29)
CREAT SERPL-MCNC: 1 MG/DL (ref 0.57–1)
CREAT UR-MCNC: 165 MG/DL
EST. AVERAGE GLUCOSE BLD GHB EST-MCNC: 160 MG/DL
GLOBULIN SER CALC-MCNC: 2.4 G/DL (ref 1.5–4.5)
GLUCOSE SERPL-MCNC: 138 MG/DL (ref 65–99)
HBA1C MFR BLD: 7.2 % (ref 4.8–5.6)
HDLC SERPL-MCNC: 48 MG/DL
LDLC SERPL CALC-MCNC: 61 MG/DL (ref 0–99)
MICROALBUMIN UR-MCNC: 10.1 UG/ML
POTASSIUM SERPL-SCNC: 4.6 MMOL/L (ref 3.5–5.2)
PROT SERPL-MCNC: 7.1 G/DL (ref 6–8.5)
SODIUM SERPL-SCNC: 138 MMOL/L (ref 134–144)
TRIGL SERPL-MCNC: 101 MG/DL (ref 0–149)
VLDLC SERPL CALC-MCNC: 19 MG/DL (ref 5–40)

## 2021-03-01 RX ORDER — AMLODIPINE BESYLATE 2.5 MG/1
2.5 TABLET ORAL DAILY
Qty: 90 TAB | Refills: 3 | Status: SHIPPED | OUTPATIENT
Start: 2021-03-01 | End: 2022-03-31 | Stop reason: SDUPTHER

## 2021-03-02 DIAGNOSIS — E11.9 DIABETES MELLITUS WITHOUT COMPLICATION (HCC): ICD-10-CM

## 2021-03-02 RX ORDER — PEN NEEDLE, DIABETIC 30 GX3/16"
NEEDLE, DISPOSABLE MISCELLANEOUS
Qty: 1 PACKAGE | Refills: 11 | Status: SHIPPED | OUTPATIENT
Start: 2021-03-02

## 2021-03-02 NOTE — TELEPHONE ENCOUNTER
PCP: Laurel Abbott MD    Last appt: 2/16/2021  Future Appointments   Date Time Provider Dc Adler   5/4/2021 10:45 AM Mauro Reyes MD [facility] BS AMB   5/19/2021 12:00 PM Laurel Abbott MD [facility] BS AMB       Requested Prescriptions     Pending Prescriptions Disp Refills    Insulin Needles, Disposable, (Pen Needle) 31 gauge x 5/16\" ndle 1 Package 11     Sig: Use with insulin twice daily; dx E11.9

## 2021-04-15 RX ORDER — INSULIN GLARGINE 100 [IU]/ML
INJECTION, SOLUTION SUBCUTANEOUS
Qty: 30 ML | Refills: 2 | Status: SHIPPED | OUTPATIENT
Start: 2021-04-15 | End: 2021-11-29 | Stop reason: SDUPTHER

## 2021-04-15 NOTE — TELEPHONE ENCOUNTER
Future Appointments:  Future Appointments   Date Time Provider Dc Adler   5/19/2021 12:00 PM Rere Peng MD [facility] BS AMB   5/25/2021 11:00 AM Elbert Celis MD [facility] BS AMB        Last Appointment With Me:  2/16/2021     Requested Prescriptions     Pending Prescriptions Disp Refills    insulin glargine (Lantus Solostar U-100 Insulin) 100 unit/mL (3 mL) inpn 30 mL 2     Sig: INJECT  32  TO  34 UNITS SUBCUTANEOUSLY EVERY DAY

## 2021-05-17 NOTE — PROGRESS NOTES
HISTORY OF PRESENT ILLNESS  David Foy is a 74 y.o. female. HPI     Last here 2/16/21 Pt is here to f/u on chronic conditions        BP today is 123/72   BP at home 120s/60-70s   Continues on losartan 50mg and norvasc 2.5mg     She has lasix to use prn - has not needed any recently      She is diabetic  BS 90-100s in am  Lowest 70-80s  She is taking lantus 34U qAm    She is taking novolog  8U with dinner +sliding scale (8U if >150, 10U >200, 12U if >250, 14U if >300)   She also takes ASA 81mg daily      Reviewed labs    Will get labs today     Wt is 173 lbs - up 2 lbs x lov    Discussed diet and wt/l     Pt follows with Dr. Cheryl Valdez (cardio) for CAD she is on Norvasc to medically managed chest pain not having any active symptoms has a history of a stent will see them annually  Last visit was 2/6/20 with JOS Tirado Poster:   Last ECHO 2016-  Rescheduled to 5/25/21      Pt follows annually with Dr. Ashtyn Cassidy (hemo/onc) for h/o adenoma of esophagus   Last visit was 7/3/18  Pt was discharged from his care  Will only f/u prn       Pt follows with Dr. Dena Marcano (pulm) for history of asthma annually  Last visit was  2/4/21  - will get notes for review  Continues singulair daily, albuterol prn, and symbicort BID for breathing/asthma per pulm   Has not had any flares      [facility]  Last visit 6/20  She had pre cancerous spot removed from her left calf  Will f/u in a year due for follow-up now     Pt has not been evaluated for DANIELLE in the past   Recall she declines further evaluation for now 5/21      Continues lipitor 40mg daily for cholesterol      Follows with Dr Sri Finley (GI)  Continues dexilant and pepcid at night this controls her reflux symptoms well  Had vv 8/20  Recall has h/o esophageal cancer    Provided referral for plastic surgeon for her ear     Of note, her  has bladder cancer and she is caring for him at home. Her daughter is planning on moving in with her        ACP not on file.  SDM is her . Reminded pt to bring in this       PREVENTIVE:    Colonoscopy: 8/7/18, Dr. Hugo Rosas, repeat 5 years  EGD: 4/16, Dr. Hugo Rosas, h/o esophageal cancer, polyp on recent EGD, biopsy nl  AAA: FMHx (grandfather)  Pap: Dr. Jose Howard  Mammogram:  7/27/20 negative  Dexa: 7/27/20 nl  Tdap: 9/14/2016  Pneumovax: 10/14/19   [id number]: 11/14/2016  Zostavax: 11/18/2016, reaction on R arm  Shingrix: both completed 2/20  Flu shot: 11/17/20  Foot exam: 02/16/21  Microalbumin: 2/21  A1c: 7/19 6.9, 10/19 6.9  1/20 6.9 5/20 7.4 8/20 7.4 11/20 7.5 2/21 7.2  Eye exam: VEI 1/21  Lipids: 2/21 LDL 61  Hep C screen: 11/15, negative  Covid: both (Tristin Cousin)    Patient Active Problem List    Diagnosis Date Noted    Recurrent depression ([facility] [address] 12/29/2017    Diabetes mellitus without complication ([facility] [address] 74/19/8963    ASHD (arteriosclerotic heart disease) 06/18/2014    Bradycardia 06/18/2014    Hypercholesteremia 02/18/2013    HTN (hypertension) 02/18/2013    Asthma 02/18/2013    Thyroid nodule 02/18/2013    Incisional hernia 02/10/2011    Esophageal cancer ([facility] [address] 02/10/2011     Current Outpatient Medications   Medication Sig Dispense Refill    insulin glargine (Lantus Solostar U-100 Insulin) 100 unit/mL (3 mL) inpn INJECT  32  TO  34 UNITS SUBCUTANEOUSLY EVERY DAY 30 mL 2    Insulin Needles, Disposable, (Pen Needle) 31 gauge x 5/16\" ndle Use with insulin twice daily; dx E11.9 1 Package 11    amLODIPine (NORVASC) 2.5 mg tablet Take 1 Tab by mouth daily. In place of Ranexa as not covered 90 Tab 3    montelukast (SINGULAIR) 10 mg tablet Take 1 Tab by mouth daily. TAKE 1 TABLET EVERY DAY 90 Tab 0    losartan (COZAAR) 50 mg tablet TAKE 1 TABLET EVERY DAY 90 Tab 1    MAGNESIUM PO Take  by mouth daily.  Indications: leg cramps      atorvastatin (LIPITOR) 40 mg tablet TAKE 1 TABLET EVERY DAY 90 Tab 2    insulin aspart U-100 (NOVOLOG) 100 unit/mL (3 mL) inpn Inject 10 units subq plus SSI; dx E11.9 15 Adjustable Dose Pre-filled Pen Syringe 0    glucose blood VI test strips (Accu-Chek Corrine Plus test strp) strip CHECK BLOOD SUGAR TWICE DAILY 100 Strip 3    albuterol (PROVENTIL HFA, VENTOLIN HFA, PROAIR HFA) 90 mcg/actuation inhaler Take 2 Puffs by inhalation every four (4) hours as needed for Wheezing. 1 Inhaler 3    furosemide (LASIX) 20 mg tablet TAKE 1 TABLET BY MOUTH EVERY DAY (Patient taking differently: Take 20 mg by mouth as needed.) 90 Tab 1    Insulin Needles, Disposable, (BD ULTRA-FINE SHORT PEN NEEDLE) 31 gauge x 5/16\" ndle Use twice daily to inject insulin 1 Package 11    Blood-Glucose Meter (ACCU-CHEK CORRINE PLUS METER) misc Check glucose twice daily. 1 Each 1    glucose blood VI test strips (ACCU-CHEK CORRINE PLUS TEST STRP) strip Check glucose twice daily 100 Strip 3    glucose blood VI test strips (ONETOUCH ULTRA TEST) strip CHECK TWICE A  Strip 12    cholecalciferol, vitamin D3, (VITAMIN D3) 2,000 unit tab Take  by mouth daily.  SYMBICORT 160-4.5 mcg/actuation HFA inhaler Take 2 Puffs by inhalation two (2) times a day.  polyethylene glycol (MIRALAX) 17 gram packet Take 17 g by mouth daily.  cetirizine (ZYRTEC) 10 mg tablet Take 10 mg by mouth daily.  melatonin 5 mg Tab Take 5 mg by mouth nightly.  Dexlansoprazole (DEXILANT) 60 mg CpDM Take 60 mg by mouth daily.  aspirin 81 mg tablet Take 81 mg by mouth.  pyridoxine (VITAMIN B-6) 100 mg tablet Take 100 mg by mouth every morning.        Past Surgical History:   Procedure Laterality Date    COLONOSCOPY N/A 8/7/2018    COLONOSCOPY performed by Todd Cyr MD at [address] [facility]; HI RISK IND  8/7/2018         HX ENDOSCOPY  4/2016    HX GI  2010    gastroesophagectomy    HX HEART CATHETERIZATION  03/2017    stent x 1    HX HEENT  1957    tonsils as child    HX HERNIA REPAIR  04/26/2012    abdominal and umbilical    HX LAP CHOLECYSTECTOMY  1995    HX ORTHOPAEDIC  02/20/2015    right shoulder manipulation    HX OTHER SURGICAL Bilateral      shoulder surgery for frozen surgery    HX OTHER SURGICAL  6/24/2015    mammogram     HX PELVIC LAPAROSCOPY  1989    HX TUBAL LIGATION  1983    lap btl    HX VASCULAR ACCESS      port a cath and removal    [place] EGD BALLOON DILATION ESOPHAGUS <30 MM DIAM  4/20/2010         [place] EXTRAC ERUPTED TOOTH/EXPOSED ROOT        Lab Results   Component Value Date/Time    WBC 6.6 05/18/2020 08:49 AM    HGB 13.3 05/18/2020 08:49 AM    Hemoglobin (POC) 11.9 01/28/2010 12:02 PM    HCT 39.6 05/18/2020 08:49 AM    Hematocrit (POC) 35 01/28/2010 12:02 PM    PLATELET 812 69/38/8888 08:49 AM    MCV 92 05/18/2020 08:49 AM     Lab Results   Component Value Date/Time    Cholesterol, total 128 02/16/2021 12:20 PM    HDL Cholesterol 48 02/16/2021 12:20 PM    LDL, calculated 61 02/16/2021 12:20 PM    LDL, calculated 84 05/18/2020 08:49 AM    Triglyceride 101 02/16/2021 12:20 PM     Lab Results   Component Value Date/Time    GFR est non-AA 58 (L) 02/16/2021 12:20 PM    GFRNA, POC >60 01/06/2012 10:42 AM    GFR est AA 67 02/16/2021 12:20 PM    GFRAA, POC >60 01/06/2012 10:42 AM    Creatinine 1.00 02/16/2021 12:20 PM    Creatinine (POC) 0.9 01/06/2012 10:42 AM    BUN 18 02/16/2021 12:20 PM    BUN (POC) 14 01/28/2010 12:02 PM    Sodium 138 02/16/2021 12:20 PM    Sodium (POC) 141 01/28/2010 12:02 PM    Potassium 4.6 02/16/2021 12:20 PM    Potassium (POC) 4.0 01/28/2010 12:02 PM    Chloride 97 02/16/2021 12:20 PM    Chloride (POC) 103 01/28/2010 12:02 PM    CO2 26 02/16/2021 12:20 PM    Magnesium 1.9 02/08/2010 03:00 AM        Review of Systems   Respiratory: Negative for shortness of breath. Cardiovascular: Negative for chest pain. Physical Exam  Constitutional:       General: She is not in acute distress. Appearance: Normal appearance. She is not ill-appearing, toxic-appearing or diaphoretic. HENT:      Head: Normocephalic and atraumatic.       Right Ear: External ear normal. Left Ear: External ear normal.   Eyes:      General:         Right eye: No discharge. Left eye: No discharge. Conjunctiva/sclera: Conjunctivae normal.      Pupils: Pupils are equal, round, and reactive to light. Cardiovascular:      Rate and Rhythm: Normal rate and regular rhythm. Pulses: Normal pulses. Heart sounds: Normal heart sounds. No murmur heard. No friction rub. No gallop. Pulmonary:      Effort: No respiratory distress. Breath sounds: Normal breath sounds. No wheezing or rales. Chest:      Chest wall: No tenderness. Musculoskeletal:         General: Normal range of motion. Cervical back: Normal range of motion and neck supple. Right lower leg: Edema (stable 1+ pitting) present. Left lower leg: Edema present. Skin:     General: Skin is warm and dry. Neurological:      Mental Status: She is alert and oriented to person, place, and time. Mental status is at baseline. Coordination: Coordination normal.      Gait: Gait normal.   Psychiatric:         Mood and Affect: Mood normal.         Behavior: Behavior normal.         ASSESSMENT and PLAN    ICD-10-CM ICD-9-CM    1. Diabetes mellitus without complication (HCC)         Controlled on Lantus 34 units and NovoLog 8 units with dinner plus sliding scale check A1c adjust medication further as needed     Q36.9 350.86 METABOLIC PANEL, COMPREHENSIVE      HEMOGLOBIN A1C WITH EAG      TSH 3RD GENERATION   2. Chronic obstructive pulmonary disease, unspecified COPD type [facility]  S30.6 347 METABOLIC PANEL, COMPREHENSIVE      HEMOGLOBIN A1C WITH EAG   Follows routinely with pulmonary has not had any recent flares this year likely because of masking    Continues on Symbicort twice daily Singulair and albuterol       TSH 3RD GENERATION   3.  Malignant neoplasm of esophagus, unspecified location (HCC)  Y32.3 533.3 METABOLIC PANEL, COMPREHENSIVE      HEMOGLOBIN A1C WITH EAG   History of in the past status post surgery has been discharged from hematology care does follow with GI routinely no longer on Reglan does use Dexilant and Pepcid for reflux symptoms from this       TSH 3RD GENERATION   4. Recurrent depression ([address]  K79.7 606.85 METABOLIC PANEL, COMPREHENSIVE   Doing well without medication   HEMOGLOBIN A1C WITH EAG      TSH 3RD GENERATION   5. Essential hypertension  T58 464.2 METABOLIC PANEL, COMPREHENSIVE      HEMOGLOBIN A1C WITH EAG   Controlled on losartan 50 mg and Norvasc 2.5 mg continue       TSH 3RD GENERATION   6. ASHD (arteriosclerotic heart disease)  Z64.29 981.46 METABOLIC PANEL, COMPREHENSIVE      HEMOGLOBIN A1C WITH EAG   Medically managed follows with cardiology routinely has follow-up pending for the end of this month no active signs or symptoms of CAD   TSH 3RD GENERATION   7. Hypercholesteremia  I67.01 520.5 METABOLIC PANEL, COMPREHENSIVE   Controlled on Lipitor continue monitor lipids adjust dosing as needed   HEMOGLOBIN A1C WITH EAG      TSH 3RD GENERATION   8. Mild intermittent asthma without complication  E68.59 598.55 METABOLIC PANEL, COMPREHENSIVE      HEMOGLOBIN A1C WITH EAG   See above stable on Symbicort   TSH 3RD GENERATION   9. Tear of left earlobe, subsequent encounter  S01.312D V58.89 REFERRAL TO PLASTIC SURGERY   Would like plastic surgeon referral  [phone]       Depression screen reviewed and negative      Scribed by Delroy Clifton of [address] [place] [address] [address], as dictated by Dr. Ulysses Haley. Current diagnosis and concerns discussed with pt at length. Pt understands risks and benefits or current treatment plan and medications, and accepts the treatment and medication with any possible risks. Pt asks appropriate questions, which were answered. Pt was instructed to call with any concerns or problems. I have reviewed the note documented by the scribe. The services provided are my own.   The documentation is accurate

## 2021-05-19 ENCOUNTER — OFFICE VISIT (OUTPATIENT)
Dept: INTERNAL MEDICINE CLINIC | Age: 66
End: 2021-05-19
Payer: MEDICARE

## 2021-05-19 VITALS
DIASTOLIC BLOOD PRESSURE: 72 MMHG | OXYGEN SATURATION: 99 % | HEIGHT: 63 IN | RESPIRATION RATE: 18 BRPM | SYSTOLIC BLOOD PRESSURE: 123 MMHG | TEMPERATURE: 97.6 F | HEART RATE: 68 BPM | BODY MASS INDEX: 30.65 KG/M2 | WEIGHT: 173 LBS

## 2021-05-19 DIAGNOSIS — I25.10 ASHD (ARTERIOSCLEROTIC HEART DISEASE): ICD-10-CM

## 2021-05-19 DIAGNOSIS — S01.312D TEAR OF LEFT EARLOBE, SUBSEQUENT ENCOUNTER: ICD-10-CM

## 2021-05-19 DIAGNOSIS — J45.20 MILD INTERMITTENT ASTHMA WITHOUT COMPLICATION: ICD-10-CM

## 2021-05-19 DIAGNOSIS — E11.9 DIABETES MELLITUS WITHOUT COMPLICATION (HCC): Primary | ICD-10-CM

## 2021-05-19 DIAGNOSIS — C15.9 MALIGNANT NEOPLASM OF ESOPHAGUS, UNSPECIFIED LOCATION (HCC): ICD-10-CM

## 2021-05-19 DIAGNOSIS — E78.00 HYPERCHOLESTEREMIA: ICD-10-CM

## 2021-05-19 DIAGNOSIS — I10 ESSENTIAL HYPERTENSION: ICD-10-CM

## 2021-05-19 DIAGNOSIS — J44.9 CHRONIC OBSTRUCTIVE PULMONARY DISEASE, UNSPECIFIED COPD TYPE (HCC): ICD-10-CM

## 2021-05-19 DIAGNOSIS — F33.9 RECURRENT DEPRESSION (HCC): ICD-10-CM

## 2021-05-19 PROCEDURE — G9899 SCRN MAM PERF RSLTS DOC: HCPCS | Performed by: INTERNAL MEDICINE

## 2021-05-19 PROCEDURE — 2022F DILAT RTA XM EVC RTNOPTHY: CPT | Performed by: INTERNAL MEDICINE

## 2021-05-19 PROCEDURE — G9717 DOC PT DX DEP/BP F/U NT REQ: HCPCS | Performed by: INTERNAL MEDICINE

## 2021-05-19 PROCEDURE — G8399 PT W/DXA RESULTS DOCUMENT: HCPCS | Performed by: INTERNAL MEDICINE

## 2021-05-19 PROCEDURE — 3017F COLORECTAL CA SCREEN DOC REV: CPT | Performed by: INTERNAL MEDICINE

## 2021-05-19 PROCEDURE — 1101F PT FALLS ASSESS-DOCD LE1/YR: CPT | Performed by: INTERNAL MEDICINE

## 2021-05-19 PROCEDURE — 1090F PRES/ABSN URINE INCON ASSESS: CPT | Performed by: INTERNAL MEDICINE

## 2021-05-19 PROCEDURE — 99214 OFFICE O/P EST MOD 30 MIN: CPT | Performed by: INTERNAL MEDICINE

## 2021-05-19 PROCEDURE — G8752 SYS BP LESS 140: HCPCS | Performed by: INTERNAL MEDICINE

## 2021-05-19 PROCEDURE — G8754 DIAS BP LESS 90: HCPCS | Performed by: INTERNAL MEDICINE

## 2021-05-19 PROCEDURE — G8536 NO DOC ELDER MAL SCRN: HCPCS | Performed by: INTERNAL MEDICINE

## 2021-05-19 PROCEDURE — G8417 CALC BMI ABV UP PARAM F/U: HCPCS | Performed by: INTERNAL MEDICINE

## 2021-05-19 PROCEDURE — G8427 DOCREV CUR MEDS BY ELIG CLIN: HCPCS | Performed by: INTERNAL MEDICINE

## 2021-05-20 LAB
ALBUMIN SERPL-MCNC: 3.8 G/DL (ref 3.5–5)
ALBUMIN/GLOB SERPL: 1.3 {RATIO} (ref 1.1–2.2)
ALP SERPL-CCNC: 78 U/L (ref 45–117)
ALT SERPL-CCNC: 27 U/L (ref 12–78)
ANION GAP SERPL CALC-SCNC: 7 MMOL/L (ref 5–15)
AST SERPL-CCNC: 17 U/L (ref 15–37)
BILIRUB SERPL-MCNC: 0.3 MG/DL (ref 0.2–1)
BUN SERPL-MCNC: 14 MG/DL (ref 6–20)
BUN/CREAT SERPL: 18 (ref 12–20)
CALCIUM SERPL-MCNC: 8.3 MG/DL (ref 8.5–10.1)
CHLORIDE SERPL-SCNC: 106 MMOL/L (ref 97–108)
CO2 SERPL-SCNC: 26 MMOL/L (ref 21–32)
CREAT SERPL-MCNC: 0.76 MG/DL (ref 0.55–1.02)
EST. AVERAGE GLUCOSE BLD GHB EST-MCNC: 157 MG/DL
GLOBULIN SER CALC-MCNC: 2.9 G/DL (ref 2–4)
GLUCOSE SERPL-MCNC: 110 MG/DL (ref 65–100)
HBA1C MFR BLD: 7.1 % (ref 4–5.6)
POTASSIUM SERPL-SCNC: 4.4 MMOL/L (ref 3.5–5.1)
PROT SERPL-MCNC: 6.7 G/DL (ref 6.4–8.2)
SODIUM SERPL-SCNC: 139 MMOL/L (ref 136–145)
TSH SERPL DL<=0.05 MIU/L-ACNC: 3.58 UIU/ML (ref 0.36–3.74)

## 2021-05-25 NOTE — PROGRESS NOTES
[address] [place] [address]  [phone]     Subjective:      Lissette Noe is a 74 y.o. female is here for routine f/u. Last in our office 2/1/20. She denies chest pain, orthopnea, PND, or LE edema. She reports her SOB is at baseline, not worsening on her current medications. She denies palpitations, syncope, or presyncope. She is getting ready to go to [facility] for the first time to see her granddaughter who is a crime  for the local paper there.      Patient Active Problem List    Diagnosis Date Noted    Recurrent depression ([facility] 12/29/2017    Diabetes mellitus without complication ([facility] 71/56/4934    ASHD (arteriosclerotic heart disease) 06/18/2014    Bradycardia 06/18/2014    Hypercholesteremia 02/18/2013    HTN (hypertension) 02/18/2013    Asthma 02/18/2013    Thyroid nodule 02/18/2013    Incisional hernia 02/10/2011    Esophageal cancer ([facility] 02/10/2011      Miky Abrams MD  Past Medical History:   Diagnosis Date    Arrhythmia     bradycardia    Bloating     CAD (coronary artery disease)     Cancer ([facility] 2010    esophageal/GE junction    Chest pain     Chest pain     Chronic obstructive pulmonary disease (HCC)     Chronic pain     left shoulder    Congestion of throat     Cough     Depression     & anxiety    Diabetes (HCC)     IDDM    Dyspepsia and other specified disorders of function of stomach     Fatigue     GERD (gastroesophageal reflux disease)     Headache(784.0)     Hypercholesterolemia     Hypertension     Multinodular goiter     Nausea & vomiting     Stool color black     Stress     Weakness       Past Surgical History:   Procedure Laterality Date    COLONOSCOPY N/A 8/7/2018    COLONOSCOPY performed by Ira Sherman MD at  [facility]; HI RISK IND  8/7/2018         HX ENDOSCOPY  4/2016    HX GI  2010    gastroesophagectomy    HX HEART CATHETERIZATION  03/2017    stent x 1  HX HEENT  1957    tonsils as child    HX HERNIA REPAIR  04/26/2012    abdominal and umbilical    HX LAP CHOLECYSTECTOMY  1995    HX ORTHOPAEDIC  02/20/2015    right shoulder manipulation    HX OTHER SURGICAL Bilateral      shoulder surgery for frozen surgery    HX OTHER SURGICAL  6/24/2015    mammogram     HX PELVIC LAPAROSCOPY  1989    HX TUBAL LIGATION  1983    lap btl    HX VASCULAR ACCESS      port a cath and removal    AL EGD BALLOON DILATION ESOPHAGUS <30 MM DIAM  4/20/2010         AL EXTRAC ERUPTED TOOTH/EXPOSED ROOT       Allergies   Allergen Reactions    Adhesive Other (comments)     redness      Family History   Problem Relation Age of Onset    Diabetes Father     Cancer Father         intestinal    Heart Disease Father     Hypertension Father     Heart Disease Brother     Diabetes Brother     Diabetes Mother     Lung Disease Mother     Heart Disease Mother     Hypertension Mother     Diabetes Maternal Grandmother     Diabetes Maternal Grandfather     Elevated Lipids Maternal Aunt     Thyroid Disease Neg Hx       Social History     Socioeconomic History    Marital status:      Spouse name: Not on file    Number of children: Not on file    Years of education: Not on file    Highest education level: Not on file   Occupational History    Not on file   Tobacco Use    Smoking status: Never Smoker    Smokeless tobacco: Never Used   Substance and Sexual Activity    Alcohol use: Yes     Alcohol/week: 0.8 standard drinks     Types: 1 Glasses of wine per week     Comment: rarely glass of wine    Drug use: No     Types: Prescription, OTC    Sexual activity: Not on file   Other Topics Concern    Not on file   Social History Narrative    Lives in [facility] with . Has a 43 yo daughter and an adopted 24 yo daughter. 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        Social Determinants of Health     Financial Resource Strain:     Difficulty of Paying Living Expenses:    Food Insecurity:     Worried About [facility] in the Last Year:     [facility] in the Last Year:    Transportation Needs:     Lack of Transportation (Medical):  Lack of Transportation (Non-Medical):    Physical Activity:     Days of Exercise per Week:     Minutes of Exercise per Session:    Stress:     Feeling of Stress :    Social Connections:     Frequency of Communication with Friends and Family:     Frequency of Social Gatherings with Friends and Family:     Attends Christian Services:     Active Member of Clubs or Organizations:     Attends Club or Organization Meetings:     Marital Status:    Intimate Partner Violence:     Fear of Current or Ex-Partner:     Emotionally Abused:     Physically Abused:     Sexually Abused:       Current Outpatient Medications   Medication Sig    glucose blood VI test strips (Accu-Chek Corrine Plus test strp) strip Check glucose twice daily    montelukast (SINGULAIR) 10 mg tablet Take 1 Tablet by mouth daily. TAKE 1 TABLET EVERY DAY    insulin glargine (Lantus Solostar U-100 Insulin) 100 unit/mL (3 mL) inpn INJECT  32  TO  34 UNITS SUBCUTANEOUSLY EVERY DAY    Insulin Needles, Disposable, (Pen Needle) 31 gauge x 5/16\" ndle Use with insulin twice daily; dx E11.9    amLODIPine (NORVASC) 2.5 mg tablet Take 1 Tab by mouth daily. In place of Ranexa as not covered    losartan (COZAAR) 50 mg tablet TAKE 1 TABLET EVERY DAY    MAGNESIUM PO Take  by mouth daily. Indications: leg cramps    atorvastatin (LIPITOR) 40 mg tablet TAKE 1 TABLET EVERY DAY    insulin aspart U-100 (NOVOLOG) 100 unit/mL (3 mL) inpn Inject 10 units subq plus SSI; dx E11.9    glucose blood VI test strips (Accu-Chek Corrine Plus test strp) strip CHECK BLOOD SUGAR TWICE DAILY    albuterol (PROVENTIL HFA, VENTOLIN HFA, PROAIR HFA) 90 mcg/actuation inhaler Take 2 Puffs by inhalation every four (4) hours as needed for Wheezing.     Blood-Glucose Meter (ACCU-CHEK SUSANNA PLUS METER) misc Check glucose twice daily.  cholecalciferol, vitamin D3, (VITAMIN D3) 2,000 unit tab Take  by mouth daily.  SYMBICORT 160-4.5 mcg/actuation HFA inhaler Take 2 Puffs by inhalation two (2) times a day.  polyethylene glycol (MIRALAX) 17 gram packet Take 17 g by mouth daily.  cetirizine (ZYRTEC) 10 mg tablet Take 10 mg by mouth daily.  melatonin 5 mg Tab Take 5 mg by mouth nightly.  Dexlansoprazole (DEXILANT) 60 mg CpDM Take 60 mg by mouth daily.  aspirin 81 mg tablet Take 81 mg by mouth.  pyridoxine (VITAMIN B-6) 100 mg tablet Take 100 mg by mouth every morning.  furosemide (LASIX) 20 mg tablet TAKE 1 TABLET BY MOUTH EVERY DAY (Patient not taking: Reported on 5/26/2021)     No current facility-administered medications for this visit. Review of Symptoms:  11 systems reviewed, negative other than as stated in the HPI    Physical ExamPhysical Exam:    Vitals:    05/26/21 1321   BP: 132/68   Pulse: (!) 54   Resp: 18   SpO2: 97%   Weight: 172 lb 8 oz (78.2 kg)   Height: 5' 3\" (1.6 m)     Body mass index is 30.56 kg/m². General PE  Gen:  NAD  Mental Status - Alert. General Appearance - Not in acute distress. HEENT:  PERRL, no carotid bruits or JVD  Chest and Lung Exam   Inspection: Accessory muscles - No use of accessory muscles in breathing. Auscultation:   Breath sounds: -   Cardiovascular   Inspection: Jugular vein - Bilateral - Inspection Normal.   Palpation/Percussion:   Apical Impulse: - Normal.   Auscultation: Rhythm - Regular. Heart Sounds - S1 WNL and S2 WNL. No S3 or S4. Murmurs & Other Heart Sounds: Auscultation of the heart reveals - No Murmurs. Peripheral Vascular   Upper Extremity: Inspection - Bilateral - No Cyanotic nailbeds or Digital clubbing. Lower Extremity:   Palpation: Edema - Bilateral - No edema. Abdomen:   Soft, non-tender, bowel sounds are active.   Neuro: A&O times 3, CN and motor grossly WNL    Labs:   Lab Results   Component Value Date/Time    Cholesterol, total 128 02/16/2021 12:20 PM    Cholesterol, total 153 05/18/2020 08:49 AM    Cholesterol, total 134 07/03/2019 08:29 AM    Cholesterol, total 126 07/05/2018 08:34 AM    Cholesterol, total 140 09/20/2017 08:41 AM    HDL Cholesterol 48 02/16/2021 12:20 PM    HDL Cholesterol 48 05/18/2020 08:49 AM    HDL Cholesterol 44 07/03/2019 08:29 AM    HDL Cholesterol 39 (L) 07/05/2018 08:34 AM    HDL Cholesterol 51 09/20/2017 08:41 AM    LDL, calculated 61 02/16/2021 12:20 PM    LDL, calculated 84 05/18/2020 08:49 AM    LDL, calculated 74 07/03/2019 08:29 AM    LDL, calculated 66 07/05/2018 08:34 AM    LDL, calculated 71 09/20/2017 08:41 AM    LDL, calculated 73 09/14/2016 01:49 PM    Triglyceride 101 02/16/2021 12:20 PM    Triglyceride 104 05/18/2020 08:49 AM    Triglyceride 79 07/03/2019 08:29 AM    Triglyceride 104 07/05/2018 08:34 AM    Triglyceride 91 09/20/2017 08:41 AM     No results found for: CPK, CPKX, CPX  Lab Results   Component Value Date/Time    Sodium 139 05/20/2021 08:36 AM    Potassium 4.4 05/20/2021 08:36 AM    Chloride 106 05/20/2021 08:36 AM    CO2 26 05/20/2021 08:36 AM    Anion gap 7 05/20/2021 08:36 AM    Glucose 110 (H) 05/20/2021 08:36 AM    BUN 14 05/20/2021 08:36 AM    Creatinine 0.76 05/20/2021 08:36 AM    BUN/Creatinine ratio 18 05/20/2021 08:36 AM    GFR est AA >60 05/20/2021 08:36 AM    GFR est non-AA >60 05/20/2021 08:36 AM    Calcium 8.3 (L) 05/20/2021 08:36 AM    Bilirubin, total 0.3 05/20/2021 08:36 AM    Alk. phosphatase 78 05/20/2021 08:36 AM    Protein, total 6.7 05/20/2021 08:36 AM    Albumin 3.8 05/20/2021 08:36 AM    Globulin 2.9 05/20/2021 08:36 AM    A-G Ratio 1.3 05/20/2021 08:36 AM    ALT (SGPT) 27 05/20/2021 08:36 AM       EKG:  NSR     Assessment:     Assessment:      1. Coronary artery disease involving native coronary artery of native heart without angina pectoris    2. Essential hypertension    3. Mixed hyperlipidemia    4.  Severe persistent asthma without complication        Orders Placed This Encounter    AMB POC EKG ROUTINE W/ 12 LEADS, INTER & REP     Order Specific Question:   Reason for Exam:     Answer:   ROUTINE        Plan:     CAD: Hx KATHERINE to PDA 3/2017:   Denies angina or anginal equivalent symptoms. SOB is stable. EKG SR without significant changes when c/w EKG 2/6/20. Continue ASA, Amlodipine, and Lipitor    2016 Echo EF 55-60% without significant valvular pathology    HLD:   2/16/21  LDL 64. On Lipitor 40mg.  Lipids and labs followed by PCP    HTN:   Controlled with current therapy. On Amlodipine 2.5mg and Losartan 50mg. Lasix 20mg prn.  5/20/21 Creatinine 0.76. DM:   On Insulin therapy. A1C 7.1 5/20/21, down from 7.2  2/16/21. Asthma:  On inhalers. Followed by Dr Vonnie Williamson. Last seen 2/4/21 and was stable.        Continue current care and f/u in 1 yr with Dr Sami Marsh.       Leonila Subramanian NP

## 2021-05-26 ENCOUNTER — OFFICE VISIT (OUTPATIENT)
Dept: CARDIOLOGY CLINIC | Age: 66
End: 2021-05-26
Payer: MEDICARE

## 2021-05-26 VITALS
HEART RATE: 54 BPM | OXYGEN SATURATION: 97 % | HEIGHT: 63 IN | RESPIRATION RATE: 18 BRPM | WEIGHT: 172.5 LBS | SYSTOLIC BLOOD PRESSURE: 132 MMHG | BODY MASS INDEX: 30.56 KG/M2 | DIASTOLIC BLOOD PRESSURE: 68 MMHG

## 2021-05-26 DIAGNOSIS — E78.2 MIXED HYPERLIPIDEMIA: ICD-10-CM

## 2021-05-26 DIAGNOSIS — I25.10 CORONARY ARTERY DISEASE INVOLVING NATIVE CORONARY ARTERY OF NATIVE HEART WITHOUT ANGINA PECTORIS: Primary | ICD-10-CM

## 2021-05-26 DIAGNOSIS — J45.50 SEVERE PERSISTENT ASTHMA WITHOUT COMPLICATION: ICD-10-CM

## 2021-05-26 DIAGNOSIS — I10 ESSENTIAL HYPERTENSION: ICD-10-CM

## 2021-05-26 PROCEDURE — 1090F PRES/ABSN URINE INCON ASSESS: CPT | Performed by: NURSE PRACTITIONER

## 2021-05-26 PROCEDURE — 1101F PT FALLS ASSESS-DOCD LE1/YR: CPT | Performed by: NURSE PRACTITIONER

## 2021-05-26 PROCEDURE — 93000 ELECTROCARDIOGRAM COMPLETE: CPT | Performed by: NURSE PRACTITIONER

## 2021-05-26 PROCEDURE — G9899 SCRN MAM PERF RSLTS DOC: HCPCS | Performed by: NURSE PRACTITIONER

## 2021-05-26 PROCEDURE — G8752 SYS BP LESS 140: HCPCS | Performed by: NURSE PRACTITIONER

## 2021-05-26 PROCEDURE — 3017F COLORECTAL CA SCREEN DOC REV: CPT | Performed by: NURSE PRACTITIONER

## 2021-05-26 PROCEDURE — G8536 NO DOC ELDER MAL SCRN: HCPCS | Performed by: NURSE PRACTITIONER

## 2021-05-26 PROCEDURE — G8399 PT W/DXA RESULTS DOCUMENT: HCPCS | Performed by: NURSE PRACTITIONER

## 2021-05-26 PROCEDURE — G8754 DIAS BP LESS 90: HCPCS | Performed by: NURSE PRACTITIONER

## 2021-05-26 PROCEDURE — G8427 DOCREV CUR MEDS BY ELIG CLIN: HCPCS | Performed by: NURSE PRACTITIONER

## 2021-05-26 PROCEDURE — G9717 DOC PT DX DEP/BP F/U NT REQ: HCPCS | Performed by: NURSE PRACTITIONER

## 2021-05-26 PROCEDURE — 99214 OFFICE O/P EST MOD 30 MIN: CPT | Performed by: NURSE PRACTITIONER

## 2021-05-26 PROCEDURE — G8417 CALC BMI ABV UP PARAM F/U: HCPCS | Performed by: NURSE PRACTITIONER

## 2021-05-26 RX ORDER — BLOOD SUGAR DIAGNOSTIC
STRIP MISCELLANEOUS
Qty: 100 STRIP | Refills: 3 | Status: SHIPPED | OUTPATIENT
Start: 2021-05-26 | End: 2022-03-11 | Stop reason: SDUPTHER

## 2021-05-26 RX ORDER — MONTELUKAST SODIUM 10 MG/1
10 TABLET ORAL DAILY
Qty: 90 TABLET | Refills: 0 | Status: SHIPPED | OUTPATIENT
Start: 2021-05-26 | End: 2021-08-18 | Stop reason: SDUPTHER

## 2021-05-26 NOTE — PROGRESS NOTES
1. Have you been to the ER, urgent care clinic since your last visit? Hospitalized since your last visit? No    2. Have you seen or consulted any other health care providers outside of the [address] [place] [address] since your last visit? Include any pap smears or colon screening.  No             Chief Complaint   Patient presents with    Coronary Artery Disease     C/O  SOB    Cholesterol Problem    Hypertension

## 2021-05-26 NOTE — TELEPHONE ENCOUNTER
Future Appointments:  Future Appointments   Date Time Provider Dc Adler   5/26/2021  1:20 PM Alexys Duron NP [facility] BS AMB   8/18/2021  1:30 PM Supa Sanchez MD [facility] BS AMB        Last Appointment With Me:  5/19/2021     Requested Prescriptions     Pending Prescriptions Disp Refills    glucose blood VI test strips (Accu-Chek Corrine Plus test strp) strip 100 Strip 3     Sig: Check glucose twice daily    montelukast (SINGULAIR) 10 mg tablet 90 Tablet 0     Sig: Take 1 Tablet by mouth daily.  TAKE 1 TABLET EVERY DAY

## 2021-05-26 NOTE — LETTER
5/26/2021 Patient: Francisco Pablo YOB: 1955 Date of Visit: 5/26/2021 Colette Pepe MD 
[address] [facility] [facility] [address] [id number] [address] [facility] [address] Dear Colette Pepe MD, Thank you for referring Ms. Valeri Ram to [facility] for evaluation. My notes for this consultation are attached. If you have questions, please do not hesitate to call me. I look forward to following your patient along with you. Sincerely, Lily Oppenheim, NP

## 2021-06-22 RX ORDER — FUROSEMIDE 20 MG/1
TABLET ORAL
Qty: 90 TABLET | Refills: 1 | Status: SHIPPED | OUTPATIENT
Start: 2021-06-22 | End: 2022-07-22

## 2021-06-22 NOTE — TELEPHONE ENCOUNTER
Future Appointments:  Future Appointments   Date Time Provider Dc Adler   8/18/2021  1:30 PM Alda Kat MD [facility] BS AMB   5/26/2022  1:30 PM Kobe Carlin MD [facility] BS AMB        Last Appointment With Me:  5/19/2021     Requested Prescriptions     Pending Prescriptions Disp Refills    furosemide (LASIX) 20 mg tablet 90 Tablet 1

## 2021-06-30 ENCOUNTER — TRANSCRIBE ORDER (OUTPATIENT)
Dept: SCHEDULING | Age: 66
End: 2021-06-30

## 2021-06-30 DIAGNOSIS — Z12.31 SCREENING MAMMOGRAM FOR HIGH-RISK PATIENT: Primary | ICD-10-CM

## 2021-07-07 RX ORDER — ATORVASTATIN CALCIUM 40 MG/1
40 TABLET, FILM COATED ORAL DAILY
Qty: 90 TABLET | Refills: 0 | Status: SHIPPED | OUTPATIENT
Start: 2021-07-07 | End: 2021-09-30

## 2021-07-07 NOTE — TELEPHONE ENCOUNTER
Future Appointments:  Future Appointments   Date Time Provider Dc Castilloi   7/28/2021 10:00 AM [facility] EUGENE 3 [facility]   8/18/2021  1:30 PM Tran Jackson MD [facility] BS AMB   5/26/2022  1:30 PM Toni Beckwith MD [facility] BS AMB        Last Appointment With Me:  5/19/2021     Requested Prescriptions     Pending Prescriptions Disp Refills    atorvastatin (LIPITOR) 40 mg tablet 90 Tablet 2

## 2021-07-28 ENCOUNTER — HOSPITAL ENCOUNTER (OUTPATIENT)
Dept: MAMMOGRAPHY | Age: 66
Discharge: HOME | End: 2021-07-28
Attending: INTERNAL MEDICINE
Payer: MEDICARE

## 2021-07-28 DIAGNOSIS — Z12.31 SCREENING MAMMOGRAM FOR HIGH-RISK PATIENT: ICD-10-CM

## 2021-07-28 PROCEDURE — 77067 SCR MAMMO BI INCL CAD: CPT

## 2021-08-17 NOTE — PROGRESS NOTES
HISTORY OF PRESENT ILLNESS  Lissette Champagne is a 74 y.o. female. HPI     Last here 2/16/21 Pt is here to f/u on chronic conditions. She wonders about covid booster  Discussed that might be 8 months after last covid vaccine       BP today is 127/69  BP 120s/60-70s  Continues on losartan 50mg and norvasc 2.5mg     She has lasix to use prn   She used twice after doing a lot of walking and riding in a car     She is diabetic  BS 120s-130s, one reading of 67 before dinner  She is taking lantus 34U qAm    She is taking novolog  8U with dinner +sliding scale (8U if >150, 10U >200, 12U if >250, 14U if >300)   She also takes ASA 81mg daily      Reviewed labs       Wt is 175 lbs, up 2 lbs x lov  Discussed diet and wt/l     Pt follows with Dr. Ewelina Herndon (cardio) for CAD she is on Norvasc to medically managed chest pain not having any active symptoms has a history of a stent will see them annually     Reviewed note from 5/26/21 with NP Duglas Rogers:  CAD: Hx KATHERINE to PDA 3/2017:   Denies angina or anginal equivalent symptoms. SOB is stable. EKG SR without significant changes when c/w EKG 2/6/20. Continue ASA, Amlodipine, and Lipitor  2016 Echo EF 55-60% without significant valvular pathology  HLD:   2/16/21  LDL 61. On Lipitor 40mg.  Lipids and labs followed by PCP  HTN:   Controlled with current therapy. On Amlodipine 2.5mg and Losartan 50mg. Lasix 20mg prn.  5/20/21 Creatinine 0.76. DM:   On Insulin therapy. A1C 7.1 5/20/21, down from 7.2  2/16/21.   Asthma:  On inhalers. Followed by Dr Cheko Butcher. Last seen 2/4/21 and was stable.    Continue current care and f/u in 1 yr with Dr Ewelina Herndon.    [facility] ECHO 2016-      Pt follows annually with Dr. Gaurav Claudio (hemo/onc) for h/o adenoma of esophagus   Last visit was 7/3/18  Pt was discharged from his care  Will only f/u prn       Pt follows with Dr. Cheko Butcher (pulm) for history of asthma annually  Last visit was  2/4/21  - reviewed notes: f/u on asthma, continue symbicort, use albuterol prn  Continues singulair daily, albuterol prn, and symbicort BID for breathing/asthma per pulm      She went to [facility]  Last visit 7/21  She had pre cancerous spot removed from her left calf in the past  Will f/u in a year due for follow-up now     Pt has not been evaluated for DANIELLE in the past   Recall she declines further evaluation for now       Continues lipitor 40mg daily for cholesterol      Follows with Dr Morris Mcclain (GI)  Continues dexilant and pepcid at night this controls her reflux symptoms well  Had vv 8/20  Recall has h/o esophageal cancer     Provided referral for plastic surgeon for her ear     Of note, her  has bladder cancer and she is caring for him at home. Reviewed mammo 7/28/21: negative       ACP not on file. SDM is her .   Reminded pt to bring in this lov      PREVENTIVE:    Colonoscopy: 8/7/18, Dr. Morris Mcclain, repeat 5 years  EGD: 4/16, Dr. Morris Mcclain, h/o esophageal cancer, polyp on recent EGD, biopsy nl  AAA: FMHx (grandfather)  Pap: Dr. Anjum Mcfarland  Mammogram:  7/28/21 negative  Dexa: 7/27/20 nl  Tdap: 9/14/2016  Pneumovax: 10/14/19   [id number]: 11/14/2016  Zostavax: 11/18/2016, reaction on R arm  Shingrix: both completed 2/20  Flu shot: 11/17/20  Foot exam: 08/18/21  Microalbumin: 2/21  A1c: 7/19 6.9, 10/19 6.9  1/20 6.9 5/20 7.4 8/20 7.4 11/20 7.5 2/21 7.2 5/21 7.1  Eye exam: VEI 1/21  Lipids: 2/21 LDL 61  Hep C screen: 11/15, negative  Covid: both (Kelsir Gilbert)    Patient Active Problem List    Diagnosis Date Noted    Recurrent depression ([facility] 12/29/2017    Diabetes mellitus without complication ([facility] 24/19/1127    ASHD (arteriosclerotic heart disease) 06/18/2014    Bradycardia 06/18/2014    Hypercholesteremia 02/18/2013    HTN (hypertension) 02/18/2013    Asthma 02/18/2013    Thyroid nodule 02/18/2013    Incisional hernia 02/10/2011    Esophageal cancer ([facility] 02/10/2011     Current Outpatient Medications   Medication Sig Dispense Refill    atorvastatin (LIPITOR) 40 mg tablet Take 1 Tablet by mouth daily. 90 Tablet 0    furosemide (LASIX) 20 mg tablet Every day prn 90 Tablet 1    glucose blood VI test strips (Accu-Chek Corrine Plus test strp) strip Check glucose twice daily 100 Strip 3    montelukast (SINGULAIR) 10 mg tablet Take 1 Tablet by mouth daily. TAKE 1 TABLET EVERY DAY 90 Tablet 0    insulin glargine (Lantus Solostar U-100 Insulin) 100 unit/mL (3 mL) inpn INJECT  32  TO  34 UNITS SUBCUTANEOUSLY EVERY DAY 30 mL 2    Insulin Needles, Disposable, (Pen Needle) 31 gauge x 5/16\" ndle Use with insulin twice daily; dx E11.9 1 Package 11    amLODIPine (NORVASC) 2.5 mg tablet Take 1 Tab by mouth daily. In place of Ranexa as not covered 90 Tab 3    losartan (COZAAR) 50 mg tablet TAKE 1 TABLET EVERY DAY 90 Tab 1    MAGNESIUM PO Take  by mouth daily. Indications: leg cramps      insulin aspart U-100 (NOVOLOG) 100 unit/mL (3 mL) inpn Inject 10 units subq plus SSI; dx E11.9 15 Adjustable Dose Pre-filled Pen Syringe 0    glucose blood VI test strips (Accu-Chek Crorine Plus test strp) strip CHECK BLOOD SUGAR TWICE DAILY 100 Strip 3    albuterol (PROVENTIL HFA, VENTOLIN HFA, PROAIR HFA) 90 mcg/actuation inhaler Take 2 Puffs by inhalation every four (4) hours as needed for Wheezing. 1 Inhaler 3    Blood-Glucose Meter (ACCU-CHEK CORRINE PLUS METER) misc Check glucose twice daily. 1 Each 1    cholecalciferol, vitamin D3, (VITAMIN D3) 2,000 unit tab Take  by mouth daily.  SYMBICORT 160-4.5 mcg/actuation HFA inhaler Take 2 Puffs by inhalation two (2) times a day.  polyethylene glycol (MIRALAX) 17 gram packet Take 17 g by mouth daily.  cetirizine (ZYRTEC) 10 mg tablet Take 10 mg by mouth daily.  melatonin 5 mg Tab Take 5 mg by mouth nightly.  Dexlansoprazole (DEXILANT) 60 mg CpDM Take 60 mg by mouth daily.  aspirin 81 mg tablet Take 81 mg by mouth.  pyridoxine (VITAMIN B-6) 100 mg tablet Take 100 mg by mouth every morning.        Past Surgical History: Procedure Laterality Date    COLONOSCOPY N/A 8/7/2018    COLONOSCOPY performed by Casandra Bingham MD at [address]; [place] RISK IND  8/7/2018         HX ENDOSCOPY  4/2016    HX GI  2010    gastroesophagectomy    HX HEART CATHETERIZATION  03/2017    stent x 1    HX HEENT  1957    tonsils as child    HX HERNIA REPAIR  04/26/2012    abdominal and umbilical    HX LAP CHOLECYSTECTOMY  1995    HX ORTHOPAEDIC  02/20/2015    right shoulder manipulation    HX OTHER SURGICAL Bilateral      shoulder surgery for frozen surgery    HX OTHER SURGICAL  6/24/2015    mammogram     HX PELVIC LAPAROSCOPY  1989    HX TUBAL LIGATION  1983    lap btl    HX VASCULAR ACCESS      port a cath and removal    SD EGD BALLOON DILATION ESOPHAGUS <30 MM DIAM  4/20/2010         SD EXTRAC ERUPTED TOOTH/EXPOSED ROOT        Lab Results   Component Value Date/Time    WBC 6.6 05/18/2020 08:49 AM    HGB 13.3 05/18/2020 08:49 AM    Hemoglobin (POC) 11.9 01/28/2010 12:02 PM    HCT 39.6 05/18/2020 08:49 AM    Hematocrit (POC) 35 01/28/2010 12:02 PM    PLATELET 195 21/14/5276 08:49 AM    MCV 92 05/18/2020 08:49 AM     Lab Results   Component Value Date/Time    Cholesterol, total 128 02/16/2021 12:20 PM    HDL Cholesterol 48 02/16/2021 12:20 PM    LDL, calculated 61 02/16/2021 12:20 PM    LDL, calculated 84 05/18/2020 08:49 AM    Triglyceride 101 02/16/2021 12:20 PM     Lab Results   Component Value Date/Time    GFR est non-AA >60 05/20/2021 08:36 AM    GFRNA, POC >60 01/06/2012 10:42 AM    GFR est AA >60 05/20/2021 08:36 AM    GFRAA, POC >60 01/06/2012 10:42 AM    Creatinine 0.76 05/20/2021 08:36 AM    Creatinine (POC) 0.9 01/06/2012 10:42 AM    BUN 14 05/20/2021 08:36 AM    BUN (POC) 14 01/28/2010 12:02 PM    Sodium 139 05/20/2021 08:36 AM    Sodium (POC) 141 01/28/2010 12:02 PM    Potassium 4.4 05/20/2021 08:36 AM    Potassium (POC) 4.0 01/28/2010 12:02 PM    Chloride 106 05/20/2021 08:36 AM    Chloride (POC) 103 01/28/2010 12:02 PM    CO2 26 05/20/2021 08:36 AM    Magnesium 1.9 02/08/2010 03:00 AM        Review of Systems   Respiratory: Negative for shortness of breath. Cardiovascular: Negative for chest pain. Physical Exam  Constitutional:       General: She is not in acute distress. Appearance: Normal appearance. She is not ill-appearing, toxic-appearing or diaphoretic. HENT:      Head: Normocephalic and atraumatic. Right Ear: External ear normal.      Left Ear: External ear normal.   Eyes:      General:         Right eye: No discharge. Left eye: No discharge. Conjunctiva/sclera: Conjunctivae normal.      Pupils: Pupils are equal, round, and reactive to light. Cardiovascular:      Rate and Rhythm: Normal rate and regular rhythm. Heart sounds: No murmur heard. No friction rub. No gallop. Pulmonary:      Effort: No respiratory distress. Breath sounds: Normal breath sounds. No wheezing or rales. Chest:      Chest wall: No tenderness. Musculoskeletal:         General: Normal range of motion. Cervical back: Normal range of motion and neck supple. Right lower leg: Edema present. Left lower leg: Edema present. Comments: Mild pitting   Skin:     General: Skin is warm and dry. Neurological:      Mental Status: She is alert and oriented to person, place, and time. Mental status is at baseline. Coordination: Coordination normal.      Gait: Gait normal.      Comments: Sensory exam of the foot is normal, tested with the monofilament. Good pulses, no lesions or ulcers, good peripheral pulses. Psychiatric:         Mood and Affect: Mood normal.         Behavior: Behavior normal.         ASSESSMENT and PLAN    ICD-10-CM ICD-9-CM    1.  Diabetes mellitus without complication ([facility])      N71.3 417.46 METABOLIC PANEL, COMPREHENSIVE   Adequate control on current regimen    Continue Lantus 34 units    NovoLog 8 units with dinner for sliding scale    Check A1c today    Adjust medication further as needed       HEMOGLOBIN A1C WITH EAG      CBC W/O DIFF   2. Essential hypertension       Controlled losartan Norvasc continue     I10 401.9 CBC W/O DIFF   3. ASHD (arteriosclerotic heart disease)        Medically managed up-to-date with cardiology amlodipine is supposed to assist with preventing chest pain    I25.10 414.00 CBC W/O DIFF   4. Malignant neoplasm of esophagus, unspecified location [facility])        History of has had reflux since then continues on Dexilant which works well and Pepcid follows with GI annually       C15.9 150.9 CBC W/O DIFF   5. Recurrent depression ([facility]      Stable without medication     F33.9 296.30 CBC W/O DIFF   6. Hypercholesteremia       Controlled Lipitor       E78.00 272.0 CBC W/O DIFF   7. Mild intermittent asthma without complication       Up-to-date with pulmonary continues on Symbicort and albuterol as needed doing well no recent flares J45.20 493.90 CBC W/O DIFF        Scribed by Lanette Barrios of [facility], as dictated by Dr. Theresa Rouse. Current diagnosis and concerns discussed with pt at length. Pt understands risks and benefits or current treatment plan and medications, and accepts the treatment and medication with any possible risks. Pt asks appropriate questions, which were answered. Pt was instructed to call with any concerns or problems. I have reviewed the note documented by the scribe. The services provided are my own.   The documentation is accurate

## 2021-08-18 ENCOUNTER — OFFICE VISIT (OUTPATIENT)
Dept: INTERNAL MEDICINE CLINIC | Age: 66
End: 2021-08-18
Payer: MEDICARE

## 2021-08-18 VITALS
TEMPERATURE: 98.2 F | BODY MASS INDEX: 31.08 KG/M2 | WEIGHT: 175.4 LBS | SYSTOLIC BLOOD PRESSURE: 127 MMHG | DIASTOLIC BLOOD PRESSURE: 69 MMHG | OXYGEN SATURATION: 97 % | HEIGHT: 63 IN | RESPIRATION RATE: 16 BRPM | HEART RATE: 66 BPM

## 2021-08-18 DIAGNOSIS — E11.9 DIABETES MELLITUS WITHOUT COMPLICATION (HCC): Primary | ICD-10-CM

## 2021-08-18 DIAGNOSIS — F33.9 RECURRENT DEPRESSION (HCC): ICD-10-CM

## 2021-08-18 DIAGNOSIS — C15.9 MALIGNANT NEOPLASM OF ESOPHAGUS, UNSPECIFIED LOCATION (HCC): ICD-10-CM

## 2021-08-18 DIAGNOSIS — J45.20 MILD INTERMITTENT ASTHMA WITHOUT COMPLICATION: ICD-10-CM

## 2021-08-18 DIAGNOSIS — I10 ESSENTIAL HYPERTENSION: ICD-10-CM

## 2021-08-18 DIAGNOSIS — E78.00 HYPERCHOLESTEREMIA: ICD-10-CM

## 2021-08-18 DIAGNOSIS — I25.10 ASHD (ARTERIOSCLEROTIC HEART DISEASE): ICD-10-CM

## 2021-08-18 PROCEDURE — 1090F PRES/ABSN URINE INCON ASSESS: CPT | Performed by: INTERNAL MEDICINE

## 2021-08-18 PROCEDURE — 3017F COLORECTAL CA SCREEN DOC REV: CPT | Performed by: INTERNAL MEDICINE

## 2021-08-18 PROCEDURE — G8754 DIAS BP LESS 90: HCPCS | Performed by: INTERNAL MEDICINE

## 2021-08-18 PROCEDURE — 1101F PT FALLS ASSESS-DOCD LE1/YR: CPT | Performed by: INTERNAL MEDICINE

## 2021-08-18 PROCEDURE — G8417 CALC BMI ABV UP PARAM F/U: HCPCS | Performed by: INTERNAL MEDICINE

## 2021-08-18 PROCEDURE — G8399 PT W/DXA RESULTS DOCUMENT: HCPCS | Performed by: INTERNAL MEDICINE

## 2021-08-18 PROCEDURE — G8536 NO DOC ELDER MAL SCRN: HCPCS | Performed by: INTERNAL MEDICINE

## 2021-08-18 PROCEDURE — G9717 DOC PT DX DEP/BP F/U NT REQ: HCPCS | Performed by: INTERNAL MEDICINE

## 2021-08-18 PROCEDURE — G9899 SCRN MAM PERF RSLTS DOC: HCPCS | Performed by: INTERNAL MEDICINE

## 2021-08-18 PROCEDURE — G8752 SYS BP LESS 140: HCPCS | Performed by: INTERNAL MEDICINE

## 2021-08-18 PROCEDURE — 2022F DILAT RTA XM EVC RTNOPTHY: CPT | Performed by: INTERNAL MEDICINE

## 2021-08-18 PROCEDURE — 99214 OFFICE O/P EST MOD 30 MIN: CPT | Performed by: INTERNAL MEDICINE

## 2021-08-18 PROCEDURE — G8427 DOCREV CUR MEDS BY ELIG CLIN: HCPCS | Performed by: INTERNAL MEDICINE

## 2021-08-19 LAB
ALBUMIN SERPL-MCNC: 4 G/DL (ref 3.5–5)
ALBUMIN/GLOB SERPL: 1.4 {RATIO} (ref 1.1–2.2)
ALP SERPL-CCNC: 87 U/L (ref 45–117)
ALT SERPL-CCNC: 40 U/L (ref 12–78)
ANION GAP SERPL CALC-SCNC: 6 MMOL/L (ref 5–15)
AST SERPL-CCNC: 24 U/L (ref 15–37)
BILIRUB SERPL-MCNC: 0.4 MG/DL (ref 0.2–1)
BUN SERPL-MCNC: 11 MG/DL (ref 6–20)
BUN/CREAT SERPL: 12 (ref 12–20)
CALCIUM SERPL-MCNC: 8.9 MG/DL (ref 8.5–10.1)
CHLORIDE SERPL-SCNC: 103 MMOL/L (ref 97–108)
CO2 SERPL-SCNC: 27 MMOL/L (ref 21–32)
CREAT SERPL-MCNC: 0.91 MG/DL (ref 0.55–1.02)
ERYTHROCYTE [DISTWIDTH] IN BLOOD BY AUTOMATED COUNT: 14.1 % (ref 11.5–14.5)
EST. AVERAGE GLUCOSE BLD GHB EST-MCNC: 180 MG/DL
GLOBULIN SER CALC-MCNC: 2.9 G/DL (ref 2–4)
GLUCOSE SERPL-MCNC: 170 MG/DL (ref 65–100)
HBA1C MFR BLD: 7.9 % (ref 4–5.6)
HCT VFR BLD AUTO: 40.8 % (ref 35–47)
HGB BLD-MCNC: 13.4 G/DL (ref 11.5–16)
MCH RBC QN AUTO: 31 PG (ref 26–34)
MCHC RBC AUTO-ENTMCNC: 32.8 G/DL (ref 30–36.5)
MCV RBC AUTO: 94.4 FL (ref 80–99)
NRBC # BLD: 0 K/UL (ref 0–0.01)
NRBC BLD-RTO: 0 PER 100 WBC
PLATELET # BLD AUTO: 244 K/UL (ref 150–400)
PMV BLD AUTO: 10.5 FL (ref 8.9–12.9)
POTASSIUM SERPL-SCNC: 4.7 MMOL/L (ref 3.5–5.1)
PROT SERPL-MCNC: 6.9 G/DL (ref 6.4–8.2)
RBC # BLD AUTO: 4.32 M/UL (ref 3.8–5.2)
SODIUM SERPL-SCNC: 136 MMOL/L (ref 136–145)
WBC # BLD AUTO: 5.9 K/UL (ref 3.6–11)

## 2021-08-19 RX ORDER — MONTELUKAST SODIUM 10 MG/1
10 TABLET ORAL DAILY
Qty: 90 TABLET | Refills: 0 | Status: SHIPPED | OUTPATIENT
Start: 2021-08-19 | End: 2021-11-04

## 2021-08-19 RX ORDER — LOSARTAN POTASSIUM 50 MG/1
50 TABLET ORAL DAILY
Qty: 90 TABLET | Refills: 1 | Status: SHIPPED | OUTPATIENT
Start: 2021-08-19 | End: 2021-12-30

## 2021-08-19 NOTE — PROGRESS NOTES
Called, spoke with Pt. Two pt identifiers confirmed. Pt informed of lab results per Dr. Layla Tenorio. Pt informed of increase of Lantus and instructions if fasting levels stay below 80 to let Dr. Layla Tenorio know for an adjustment. Pt verbalized understanding of information discussed w/ no further questions at this time.

## 2021-08-19 NOTE — TELEPHONE ENCOUNTER
Future Appointments:  Future Appointments   Date Time Provider Dc Adler   2021 11:45 AM Kermit Gibbs MD [facility] BS AMB   2022  1:30 PM Cassie Smith MD [facility] BS AMB        Last Appointment With Me:  2021     Requested Prescriptions     Pending Prescriptions Disp Refills    losartan (COZAAR) 50 mg tablet 90 Tablet 1     Si Tablet daily.  montelukast (SINGULAIR) 10 mg tablet 90 Tablet 0     Sig: Take 1 Tablet by mouth daily.  TAKE 1 TABLET EVERY DAY

## 2021-09-30 RX ORDER — ATORVASTATIN CALCIUM 40 MG/1
TABLET, FILM COATED ORAL
Qty: 90 TABLET | Refills: 0 | Status: SHIPPED | OUTPATIENT
Start: 2021-09-30 | End: 2021-12-30

## 2021-10-21 ENCOUNTER — CLINICAL SUPPORT (OUTPATIENT)
Dept: INTERNAL MEDICINE CLINIC | Age: 66
End: 2021-10-21
Payer: MEDICARE

## 2021-10-21 VITALS — TEMPERATURE: 97.4 F

## 2021-10-21 DIAGNOSIS — Z23 NEEDS FLU SHOT: Primary | ICD-10-CM

## 2021-10-21 PROCEDURE — 90694 VACC AIIV4 NO PRSRV 0.5ML IM: CPT | Performed by: INTERNAL MEDICINE

## 2021-10-21 PROCEDURE — G0008 ADMIN INFLUENZA VIRUS VAC: HCPCS | Performed by: INTERNAL MEDICINE

## 2021-10-21 NOTE — PROGRESS NOTES
After obtaining consent, and per verbal order from Dr. Marylene Aldo, patient received influenza vaccine given by Hayes Biswas LPN in Right Deltoid. Influenza Vaccine 0.5 mL IM now. Patient was observed for 10 minutes post injection. Patient tolerated injection well. .iid  HIPAA verified by two patient identifiers. Health Maintenance Due   Topic    Flu Vaccine (1)    COVID-19 Vaccine (3 - Pfizer booster)     Chief Complaint   Patient presents with    Immunization/Injection     flu shot     Visit Vitals  Temp 97.4 °F (36.3 °C)       Pain Scale: /10  Pain Location:   1. Have you been to the ER, urgent care clinic since your last visit? Hospitalized since your last visit? No    2. Have you seen or consulted any other health care providers outside of the [address] [place] [address] since your last visit? Include any pap smears or colon screening.  No

## 2021-11-04 RX ORDER — MONTELUKAST SODIUM 10 MG/1
TABLET ORAL
Qty: 90 TABLET | Refills: 0 | Status: SHIPPED | OUTPATIENT
Start: 2021-11-04 | End: 2021-12-30

## 2021-11-23 NOTE — PROGRESS NOTES
HISTORY OF PRESENT ILLNESS  Ivelisse Webber is a 74 y.o. female. HPI     Last here 8/18/21. Pt is here to f/u on chronic conditions.     She notes that she has been having chewing problems-- is getting a lot of dental work done  Benefitter is 158/65  BP at home 110s/70s  Continues on losartan 50mg and norvasc 2.5m-- compliant     She has lasix to use prn  for mild swelling in her legs  She used twice after doing a lot of walking and riding in a car     She is diabetic  BS AM 130s in general when she checks it  She is taking lantus 36U qAm    She has not been taking novolog 8U with dinner +sliding scale (8U if >150, 10U >200, 12U if >250, 14U if >300)   Will metformin 500 BID  She was on this many years ago it was stopped around the time of her esophageal surgery due to difficulty swallowing pills but she had no adverse effects of this in the past  She also takes ASA 81mg daily      Reviewed labs    Will get labs today     Wt today is 173 lbs, down 2 lbs x lov  Discussed diet and wt/l     Pt follows with Dr. Ruddy Olsen (cardio) for CAD she is on Norvasc to medically managed chest pain not having any active symptoms has a history of a stent will see them annually   Last visit 5/26/21 with JOS Felton   Last ECHO 2016      Pt follows annually with Dr. Radu Jimenez (hemo/onc) for h/o adenoma of esophagus   Last visit was 7/3/18  Pt was discharged from his care  Will only f/u prn       Pt follows with Dr. Tanvi Whiting (pulm) for history of asthma annually  Last visit was  2/4/21  - reviewed notes: f/u on asthma, continue symbicort, use albuterol prn  Continues singulair daily, albuterol prn, and symbicort BID for breathing/asthma per pulm   Discussed scheduling f/u     She went to [facility] derm  Last visit 7/21  She had pre cancerous spot removed from her left calf in the past  Will f/u in a year due for follow-up now     Pt has not been evaluated for DANIELLE in the past   Recall she declines further evaluation for now       Continues lipitor 40mg daily for cholesterol     Follows with Dr Michelle Lebron (GI)  Continues dexilant and pepcid at night this controls her reflux symptoms well  Had vv 8/20  Recall has h/o esophageal cancer     Provided referral for plastic surgeon for her ear     Of note, her  has bladder cancer and she is caring for him at home.     Reviewed mammo 7/28/21: negative       ACP not on file. SDM is her . Reminded pt to bring in this lov      PREVENTIVE:    Colonoscopy: 8/7/18, Dr. Michelle Lebron, repeat 5 years  EGD: 4/16, Dr. Michelle Lebron, h/o esophageal cancer, polyp on recent EGD, biopsy nl  AAA: FMHx (grandfather)  Pap: Dr. Yamilet Ascencio  Mammogram:  7/28/21 negative  Dexa: 7/27/20 nl  Tdap: 9/14/2016  Pneumovax: 10/14/19   Dpwroww21: 11/14/2016  Zostavax: 11/18/2016, reaction on R arm  Shingrix: both completed 2/20  Flu shot: 10/21/21  Foot exam: 08/18/21  Microalbumin: 2/21  A1c: 7/19 6.9, 10/19 6.9  1/20 6.9 5/20 7.4 8/20 7.4 11/20 7.5 2/21 7.2 5/21 7.1 8/21 7.9  Eye exam: VEI 1/21, plans on scheduling f/u  Lipids: 2/21 LDL 61  Hep C screen: 11/15, negative  Covid: three doses (aMry Del Rosario)    Patient Active Problem List    Diagnosis Date Noted    Recurrent depression ([facility] [address] 12/29/2017    Diabetes mellitus without complication ([facility] [address] 54/85/5226    ASHD (arteriosclerotic heart disease) 06/18/2014    Bradycardia 06/18/2014    Hypercholesteremia 02/18/2013    HTN (hypertension) 02/18/2013    Asthma 02/18/2013    Thyroid nodule 02/18/2013    Incisional hernia 02/10/2011    Esophageal cancer ([facility] [address] 02/10/2011     Current Outpatient Medications   Medication Sig Dispense Refill    metFORMIN ER (GLUCOPHAGE XR) 500 mg tablet Take 1 Tablet by mouth daily (with dinner). 180 Tablet 0    montelukast (SINGULAIR) 10 mg tablet TAKE 1 TABLET EVERY DAY 90 Tablet 0    atorvastatin (LIPITOR) 40 mg tablet TAKE 1 TABLET EVERY DAY 90 Tablet 0    losartan (COZAAR) 50 mg tablet Take 1 Tablet by mouth daily.  90 Tablet 1    furosemide (LASIX) 20 mg tablet Every day prn 90 Tablet 1    glucose blood VI test strips (Accu-Chek Corrine Plus test strp) strip Check glucose twice daily 100 Strip 3    insulin glargine (Lantus Solostar U-100 Insulin) 100 unit/mL (3 mL) inpn INJECT  32  TO  34 UNITS SUBCUTANEOUSLY EVERY DAY (Patient taking differently: INJECT  36 UNITS SUBCUTANEOUSLY EVERY DAY) 30 mL 2    Insulin Needles, Disposable, (Pen Needle) 31 gauge x 5/16\" ndle Use with insulin twice daily; dx E11.9 1 Package 11    amLODIPine (NORVASC) 2.5 mg tablet Take 1 Tab by mouth daily. In place of Ranexa as not covered 90 Tab 3    MAGNESIUM PO Take  by mouth daily. Indications: leg cramps      insulin aspart U-100 (NOVOLOG) 100 unit/mL (3 mL) inpn Inject 10 units subq plus SSI; dx E11.9 15 Adjustable Dose Pre-filled Pen Syringe 0    glucose blood VI test strips (Accu-Chek Corrine Plus test strp) strip CHECK BLOOD SUGAR TWICE DAILY 100 Strip 3    albuterol (PROVENTIL HFA, VENTOLIN HFA, PROAIR HFA) 90 mcg/actuation inhaler Take 2 Puffs by inhalation every four (4) hours as needed for Wheezing. 1 Inhaler 3    Blood-Glucose Meter (ACCU-CHEK CORRINE PLUS METER) misc Check glucose twice daily. 1 Each 1    cholecalciferol, vitamin D3, (VITAMIN D3) 2,000 unit tab Take  by mouth daily.  SYMBICORT 160-4.5 mcg/actuation HFA inhaler Take 2 Puffs by inhalation two (2) times a day.  polyethylene glycol (MIRALAX) 17 gram packet Take 17 g by mouth daily.  cetirizine (ZYRTEC) 10 mg tablet Take 10 mg by mouth daily.  melatonin 5 mg Tab Take 5 mg by mouth nightly.  Dexlansoprazole (DEXILANT) 60 mg CpDM Take 60 mg by mouth daily.  aspirin 81 mg tablet Take 81 mg by mouth.  pyridoxine (VITAMIN B-6) 100 mg tablet Take 100 mg by mouth every morning.        Past Surgical History:   Procedure Laterality Date    COLONOSCOPY N/A 8/7/2018    COLONOSCOPY performed by Elis Colunga MD at [facility] ENDOSCOPY   [facility] SCR[facility]; HI RISK IND  8/7/2018         HX ENDOSCOPY  4/2016    HX GI  2010    gastroesophagectomy    HX HEART CATHETERIZATION  03/2017    stent x 1    HX HEENT  1957    tonsils as child    HX HERNIA REPAIR  04/26/2012    abdominal and umbilical    HX LAP CHOLECYSTECTOMY  1995    HX ORTHOPAEDIC  02/20/2015    right shoulder manipulation    HX OTHER SURGICAL Bilateral      shoulder surgery for frozen surgery    HX OTHER SURGICAL  6/24/2015    mammogram     HX PELVIC LAPAROSCOPY  1989    HX TUBAL LIGATION  1983    lap btl    HX VASCULAR ACCESS      port a cath and removal    MN EGD BALLOON DILATION ESOPHAGUS <30 MM DIAM  4/20/2010         MN EXTRAC ERUPTED TOOTH/EXPOSED ROOT        Lab Results   Component Value Date/Time    WBC 5.9 08/18/2021 01:39 PM    HGB 13.4 08/18/2021 01:39 PM    Hemoglobin (POC) 11.9 01/28/2010 12:02 PM    HCT 40.8 08/18/2021 01:39 PM    Hematocrit (POC) 35 01/28/2010 12:02 PM    PLATELET 864 92/98/5690 01:39 PM    MCV 94.4 08/18/2021 01:39 PM     Lab Results   Component Value Date/Time    Cholesterol, total 128 02/16/2021 12:20 PM    HDL Cholesterol 48 02/16/2021 12:20 PM    LDL, calculated 61 02/16/2021 12:20 PM    LDL, calculated 84 05/18/2020 08:49 AM    Triglyceride 101 02/16/2021 12:20 PM     Lab Results   Component Value Date/Time    GFR est non-AA >60 08/18/2021 01:39 PM    GFRNA, POC >60 01/06/2012 10:42 AM    GFR est AA >60 08/18/2021 01:39 PM    GFRAA, POC >60 01/06/2012 10:42 AM    Creatinine 0.91 08/18/2021 01:39 PM    Creatinine (POC) 0.9 01/06/2012 10:42 AM    BUN 11 08/18/2021 01:39 PM    BUN (POC) 14 01/28/2010 12:02 PM    Sodium 136 08/18/2021 01:39 PM    Sodium (POC) 141 01/28/2010 12:02 PM    Potassium 4.7 08/18/2021 01:39 PM    Potassium (POC) 4.0 01/28/2010 12:02 PM    Chloride 103 08/18/2021 01:39 PM    Chloride (POC) 103 01/28/2010 12:02 PM    CO2 27 08/18/2021 01:39 PM    Magnesium 1.9 02/08/2010 03:00 AM        Review of Systems   Respiratory: Negative for shortness of breath. Cardiovascular: Negative for chest pain. Physical Exam  Constitutional:       General: She is not in acute distress. Appearance: Normal appearance. She is not ill-appearing, toxic-appearing or diaphoretic. HENT:      Head: Normocephalic and atraumatic. Right Ear: External ear normal.      Left Ear: External ear normal.   Eyes:      General:         Right eye: No discharge. Left eye: No discharge. Conjunctiva/sclera: Conjunctivae normal.      Pupils: Pupils are equal, round, and reactive to light. Cardiovascular:      Rate and Rhythm: Normal rate and regular rhythm. Heart sounds: No murmur heard. No friction rub. No gallop. Pulmonary:      Effort: No respiratory distress. Breath sounds: Normal breath sounds. No wheezing or rales. Chest:      Chest wall: No tenderness. Musculoskeletal:         General: Normal range of motion. Cervical back: Normal range of motion and neck supple. Right lower leg: Edema present. Left lower leg: Edema present. Comments: Mild swelling   Skin:     General: Skin is warm and dry. Neurological:      Mental Status: She is alert and oriented to person, place, and time. Mental status is at baseline. Coordination: Coordination normal.      Gait: Gait normal.   Psychiatric:         Mood and Affect: Mood normal.         Behavior: Behavior normal.         ASSESSMENT and PLAN    ICD-10-CM ICD-9-CM    1. Diabetes mellitus without complication (HCC)      V25.5 250.00 MICROALBUMIN, UR, RAND W/ MICROALB/CREAT RATIO   Last A1c was above goal    She is currently on Lantus 36 units    She has not been taking her NovoLog with dinner    We will add Metformin 500 mg 2 of these per day   LIPID PANEL      METABOLIC PANEL, COMPREHENSIVE      HEMOGLOBIN A1C WITH EAG      TSH 3RD GENERATION   2.  Primary hypertension  I10 401.9 MICROALBUMIN, UR, RAND W/ MICROALB/CREAT RATIO      LIPID PANEL   Controlled on losartan and Norvasc continue no change dose   METABOLIC PANEL, COMPREHENSIVE      HEMOGLOBIN A1C WITH EAG      TSH 3RD GENERATION   3. Recurrent depression (HCC)  F33.9 296.30 MICROALBUMIN, UR, RAND W/ MICROALB/CREAT RATIO      LIPID PANEL   Controlled with Prozac continue   METABOLIC PANEL, COMPREHENSIVE      HEMOGLOBIN A1C WITH EAG      TSH 3RD GENERATION   4. Malignant neoplasm of esophagus, unspecified location (HCC)  C15.9 150.9 MICROALBUMIN, UR, RAND W/ MICROALB/CREAT RATIO   History of in remission    Has had difficulty with reflux ever since on Dexilant for this follows with GI as needed       LIPID PANEL      METABOLIC PANEL, COMPREHENSIVE      HEMOGLOBIN A1C WITH EAG      TSH 3RD GENERATION   5. Hypercholesteremia  E78.00 272.0 MICROALBUMIN, UR, RAND W/ MICROALB/CREAT RATIO      LIPID PANEL   Controlled Lipitor monitor lipids   METABOLIC PANEL, COMPREHENSIVE      HEMOGLOBIN A1C WITH EAG      TSH 3RD GENERATION   6. ASHD (arteriosclerotic heart disease)  I25.10 414.00 MICROALBUMIN, UR, RAND W/ MICROALB/CREAT RATIO      LIPID PANEL      METABOLIC PANEL, COMPREHENSIVE   Up-to-date with cardiology on Norvasc to prevent chest pain   HEMOGLOBIN A1C WITH EAG      TSH 3RD GENERATION   7. Bradycardia  R00.1 427.89 MICROALBUMIN, UR, RAND W/ MICROALB/CREAT RATIO      LIPID PANEL   Follows with cardiology stable   METABOLIC PANEL, COMPREHENSIVE      HEMOGLOBIN A1C WITH EAG      TSH 3RD GENERATION   Asthma will be seen pulmonary later this winter stable on Symbicort and Singulair uses albuterol as needed    Scribed by Dima Maynard of [facility], as dictated by Dr. Anastacia Parisi. Current diagnosis and concerns discussed with pt at length. Pt understands risks and benefits or current treatment plan and medications, and accepts the treatment and medication with any possible risks. Pt asks appropriate questions, which were answered. Pt was instructed to call with any concerns or problems.       I have reviewed the note documented by the scribe. The services provided are my own.   The documentation is accurate

## 2021-11-29 ENCOUNTER — OFFICE VISIT (OUTPATIENT)
Dept: INTERNAL MEDICINE CLINIC | Age: 66
End: 2021-11-29
Payer: MEDICARE

## 2021-11-29 VITALS
OXYGEN SATURATION: 98 % | HEART RATE: 63 BPM | SYSTOLIC BLOOD PRESSURE: 138 MMHG | BODY MASS INDEX: 30.65 KG/M2 | DIASTOLIC BLOOD PRESSURE: 67 MMHG | WEIGHT: 173 LBS | RESPIRATION RATE: 16 BRPM | TEMPERATURE: 97.9 F | HEIGHT: 63 IN

## 2021-11-29 DIAGNOSIS — I25.10 ASHD (ARTERIOSCLEROTIC HEART DISEASE): ICD-10-CM

## 2021-11-29 DIAGNOSIS — F33.9 RECURRENT DEPRESSION (HCC): ICD-10-CM

## 2021-11-29 DIAGNOSIS — R00.1 BRADYCARDIA: ICD-10-CM

## 2021-11-29 DIAGNOSIS — C15.9 MALIGNANT NEOPLASM OF ESOPHAGUS, UNSPECIFIED LOCATION (HCC): ICD-10-CM

## 2021-11-29 DIAGNOSIS — I10 PRIMARY HYPERTENSION: ICD-10-CM

## 2021-11-29 DIAGNOSIS — E78.00 HYPERCHOLESTEREMIA: ICD-10-CM

## 2021-11-29 DIAGNOSIS — E11.9 DIABETES MELLITUS WITHOUT COMPLICATION (HCC): Primary | ICD-10-CM

## 2021-11-29 PROCEDURE — G8752 SYS BP LESS 140: HCPCS | Performed by: INTERNAL MEDICINE

## 2021-11-29 PROCEDURE — 99214 OFFICE O/P EST MOD 30 MIN: CPT | Performed by: INTERNAL MEDICINE

## 2021-11-29 PROCEDURE — G9717 DOC PT DX DEP/BP F/U NT REQ: HCPCS | Performed by: INTERNAL MEDICINE

## 2021-11-29 PROCEDURE — G8417 CALC BMI ABV UP PARAM F/U: HCPCS | Performed by: INTERNAL MEDICINE

## 2021-11-29 PROCEDURE — 1101F PT FALLS ASSESS-DOCD LE1/YR: CPT | Performed by: INTERNAL MEDICINE

## 2021-11-29 PROCEDURE — 2022F DILAT RTA XM EVC RTNOPTHY: CPT | Performed by: INTERNAL MEDICINE

## 2021-11-29 PROCEDURE — G8754 DIAS BP LESS 90: HCPCS | Performed by: INTERNAL MEDICINE

## 2021-11-29 PROCEDURE — 3017F COLORECTAL CA SCREEN DOC REV: CPT | Performed by: INTERNAL MEDICINE

## 2021-11-29 PROCEDURE — G8536 NO DOC ELDER MAL SCRN: HCPCS | Performed by: INTERNAL MEDICINE

## 2021-11-29 PROCEDURE — G8427 DOCREV CUR MEDS BY ELIG CLIN: HCPCS | Performed by: INTERNAL MEDICINE

## 2021-11-29 PROCEDURE — G9899 SCRN MAM PERF RSLTS DOC: HCPCS | Performed by: INTERNAL MEDICINE

## 2021-11-29 PROCEDURE — G8399 PT W/DXA RESULTS DOCUMENT: HCPCS | Performed by: INTERNAL MEDICINE

## 2021-11-29 PROCEDURE — 1090F PRES/ABSN URINE INCON ASSESS: CPT | Performed by: INTERNAL MEDICINE

## 2021-11-29 RX ORDER — METFORMIN HYDROCHLORIDE 500 MG/1
500 TABLET, EXTENDED RELEASE ORAL
Qty: 180 TABLET | Refills: 0 | Status: SHIPPED | OUTPATIENT
Start: 2021-11-29 | End: 2021-12-03 | Stop reason: SDUPTHER

## 2021-11-29 RX ORDER — INSULIN GLARGINE 100 [IU]/ML
INJECTION, SOLUTION SUBCUTANEOUS
Qty: 30 ML | Refills: 2 | Status: SHIPPED | OUTPATIENT
Start: 2021-11-29 | End: 2022-10-29

## 2021-11-30 LAB
ALBUMIN SERPL-MCNC: 4 G/DL (ref 3.5–5)
ALBUMIN/GLOB SERPL: 1.3 {RATIO} (ref 1.1–2.2)
ALP SERPL-CCNC: 74 U/L (ref 45–117)
ALT SERPL-CCNC: 38 U/L (ref 12–78)
ANION GAP SERPL CALC-SCNC: 3 MMOL/L (ref 5–15)
AST SERPL-CCNC: 29 U/L (ref 15–37)
BILIRUB SERPL-MCNC: 0.6 MG/DL (ref 0.2–1)
BUN SERPL-MCNC: 12 MG/DL (ref 6–20)
BUN/CREAT SERPL: 14 (ref 12–20)
CALCIUM SERPL-MCNC: 8.8 MG/DL (ref 8.5–10.1)
CHLORIDE SERPL-SCNC: 105 MMOL/L (ref 97–108)
CHOLEST SERPL-MCNC: 152 MG/DL
CO2 SERPL-SCNC: 30 MMOL/L (ref 21–32)
CREAT SERPL-MCNC: 0.83 MG/DL (ref 0.55–1.02)
CREAT UR-MCNC: 191 MG/DL
EST. AVERAGE GLUCOSE BLD GHB EST-MCNC: 166 MG/DL
GLOBULIN SER CALC-MCNC: 3 G/DL (ref 2–4)
GLUCOSE SERPL-MCNC: 120 MG/DL (ref 65–100)
HBA1C MFR BLD: 7.4 % (ref 4–5.6)
HDLC SERPL-MCNC: 52 MG/DL
HDLC SERPL: 2.9 {RATIO} (ref 0–5)
LDLC SERPL CALC-MCNC: 81.8 MG/DL (ref 0–100)
MICROALBUMIN UR-MCNC: 2.34 MG/DL
MICROALBUMIN/CREAT UR-RTO: 12 MG/G (ref 0–30)
POTASSIUM SERPL-SCNC: 4.2 MMOL/L (ref 3.5–5.1)
PROT SERPL-MCNC: 7 G/DL (ref 6.4–8.2)
SODIUM SERPL-SCNC: 138 MMOL/L (ref 136–145)
TRIGL SERPL-MCNC: 91 MG/DL (ref ?–150)
TSH SERPL DL<=0.05 MIU/L-ACNC: 1.41 UIU/ML (ref 0.36–3.74)
VLDLC SERPL CALC-MCNC: 18.2 MG/DL

## 2021-12-03 RX ORDER — METFORMIN HYDROCHLORIDE 500 MG/1
1000 TABLET, EXTENDED RELEASE ORAL
Qty: 180 TABLET | Refills: 0 | Status: SHIPPED | OUTPATIENT
Start: 2021-12-03 | End: 2022-01-13 | Stop reason: SDUPTHER

## 2021-12-30 RX ORDER — LOSARTAN POTASSIUM 50 MG/1
TABLET ORAL
Qty: 90 TABLET | Refills: 1 | Status: SHIPPED | OUTPATIENT
Start: 2021-12-30 | End: 2022-07-11

## 2021-12-30 RX ORDER — MONTELUKAST SODIUM 10 MG/1
TABLET ORAL
Qty: 90 TABLET | Refills: 0 | Status: SHIPPED | OUTPATIENT
Start: 2021-12-30 | End: 2022-07-08 | Stop reason: SDUPTHER

## 2021-12-30 RX ORDER — ATORVASTATIN CALCIUM 40 MG/1
TABLET, FILM COATED ORAL
Qty: 90 TABLET | Refills: 0 | Status: SHIPPED | OUTPATIENT
Start: 2021-12-30 | End: 2022-03-31 | Stop reason: SDUPTHER

## 2022-01-04 NOTE — PROGRESS NOTES
"Anesthesia Transfer of Care Note    Patient: Topher Cr    Procedure(s) Performed: Procedure(s) (LRB):  ARTHROSCOPY, KNEE, WITH MENISCECTOMY (Left)  CHONDROPLASTY, KNEE (Left)  SYNOVECTOMY, KNEE (Left)    Patient location: PACU    Anesthesia Type: general    Transport from OR: Transported from OR on 6-10 L/min O2 by face mask with adequate spontaneous ventilation    Post pain: adequate analgesia    Post assessment: no apparent anesthetic complications    Post vital signs: stable    Level of consciousness: awake and alert    Nausea/Vomiting: no nausea/vomiting    Complications: none    Transfer of care protocol was followed      Last vitals:   Visit Vitals  BP (!) 145/86   Pulse 96   Temp 36.1 °C (97 °F) (Oral)   Resp (!) 21   Ht 5' 10" (1.778 m)   Wt (!) 150.6 kg (332 lb)   SpO2 98%   BMI 47.64 kg/m²     " Please call patient back about results  A1c worse, lets bump up lantus to 36units, if fastings improve and start decreasing below 80 would reduce lantus back to 34  Continue novolog 8 U --be sure to use SSI  Rest fine

## 2022-01-13 RX ORDER — METFORMIN HYDROCHLORIDE 500 MG/1
1000 TABLET, EXTENDED RELEASE ORAL
Qty: 180 TABLET | Refills: 1 | Status: SHIPPED | OUTPATIENT
Start: 2022-01-13 | End: 2022-10-05 | Stop reason: SDUPTHER

## 2022-01-13 NOTE — TELEPHONE ENCOUNTER
PCP: Ventura Pradhan MD    Last appt: 11/29/2021  Future Appointments   Date Time Provider Dc Adler   2/28/2022 11:30 AM Ventura Pradhan MD [facility] BS AMB   5/26/2022  1:30 PM Kimberlyn Belle MD [facility] BS AMB       Requested Prescriptions      No prescriptions requested or ordered in this encounter       Prior labs and Blood pressures:  BP Readings from Last 3 Encounters:   11/29/21 138/67   08/18/21 127/69   05/26/21 132/68     Lab Results   Component Value Date/Time    Sodium 138 11/29/2021 12:02 PM    Potassium 4.2 11/29/2021 12:02 PM    Chloride 105 11/29/2021 12:02 PM    CO2 30 11/29/2021 12:02 PM    Anion gap 3 (L) 11/29/2021 12:02 PM    Glucose 120 (H) 11/29/2021 12:02 PM    BUN 12 11/29/2021 12:02 PM    Creatinine 0.83 11/29/2021 12:02 PM    BUN/Creatinine ratio 14 11/29/2021 12:02 PM    GFR est AA >60 11/29/2021 12:02 PM    GFR est non-AA >60 11/29/2021 12:02 PM    Calcium 8.8 11/29/2021 12:02 PM     Lab Results   Component Value Date/Time    Hemoglobin A1c 7.4 (H) 11/29/2021 12:02 PM    Hemoglobin A1c (POC) 6.8 03/24/2017 01:51 PM     Lab Results   Component Value Date/Time    Cholesterol, total 152 11/29/2021 12:02 PM    HDL Cholesterol 52 11/29/2021 12:02 PM    LDL, calculated 81.8 11/29/2021 12:02 PM    VLDL, calculated 18.2 11/29/2021 12:02 PM    Triglyceride 91 11/29/2021 12:02 PM    CHOL/HDL Ratio 2.9 11/29/2021 12:02 PM     No results found for: KOTA Troy    Lab Results   Component Value Date/Time    TSH 1.41 11/29/2021 12:02 PM

## 2022-02-25 NOTE — PROGRESS NOTES
HISTORY OF PRESENT ILLNESS  Fernando Wyman is a 69 y.o. female. HPI     Last here 11/29/21.  Pt is here to f/u on chronic conditions.         Has a history of hypertension  BP today is 130/73  BP at home 120s/70s  Continues on losartan 50mg and norvasc 2.5m     She has lasix to use prn  for mild swelling in her legs  She used twice after doing a lot of walking and riding in a car not needing this much     She is diabetic  BS 90s in AM low 110s, denies lows under 60  She is taking lantus 36U qAm and taking metformin 500 BID    No longer taking novolog 8U with dinner +sliding scale (8U if >150, 10U >200, 12U if >250, 14U if >300)       She also takes ASA 81mg daily      Reviewed labs      Wt today is 171 lbs, down 2 lbs x lov   Discussed diet and wt/l     Pt follows with Dr. Arcenio Stanford (cardio) for CAD she is on Norvasc to medically managed chest pain not having any active symptoms has a history of a stent will see them annually   Last visit 5/26/21 with JOS Felton   Last ECHO 2016  Will f/u 5/22      Pt follows annually with Dr. Yifan Ayala (hemo/onc) for h/o adenoma of esophagus   Last visit was 7/3/18  Pt was discharged from his care  Will only f/u prn       Pt follows with Dr. Moe Chavira (pulm) for history of asthma annually  Last visit was  2/22  -reports everything was fine no change in medication she will get PFTs next year   continues singulair daily, albuterol prn, and symbicort BID for breathing/asthma per pulm        She went to [facility]  She had pre cancerous spot removed from her left calf in the past  Will f/u in a year      Pt has not been evaluated for DANIELLE in the past   Recall she declines further evaluation for now       Continues lipitor 40mg daily for cholesterol     Follows with Dr Zarina Thompson (GI)  Continues dexilant and pepcid at night this controls her reflux symptoms well  Had vv 8/20 will follow-up as needed  Recall has h/o esophageal cancer     Provided referral for plastic surgeon for her ear previously    Pt lives with her , daughter, and granddaughter    Pt is functionally independent     No safety equipment      No memory concerns   Knows the month, date, year, location   Can recall 3/3 objects      Of note, her  has bladder cancer and she is caring for him at home.       ACP not on file. SDM is her . Reminded pt to bring in this lov      PREVENTIVE:    Colonoscopy: 8/7/18, Dr. Faisal Duong, repeat 5 years  EGD: 4/16, Dr. Faisal Duong, h/o esophageal cancer, polyp on recent EGD, biopsy nl  AAA: FMHx (grandfather)  Pap: Dr. Tanisha Gardner, due fall 2022  Mammogram:  7/28/21 negative  Dexa: 7/27/20 nl  Tdap: 9/14/2016  Pneumovax: 10/14/19   [place] Khat: 11/14/2016  Zostavax: 11/18/2016, reaction on R arm  Shingrix: both completed 2/20  Flu shot: 10/21/21  Foot exam: 02/28/22  Microalbumin: 11/21  A1c:  8/20 7.4 11/20 7.5 2/21 7.2 5/21 7.1 8/21 7.9 11/21 7.4  Eye exam: VEI 2/22  Lipids: 11/21 LDL 81  Hep C screen: 11/15, negative  Covid: three doses (Francie Martinez)    Patient Active Problem List    Diagnosis Date Noted    Recurrent depression ([facility] 12/29/2017    Diabetes mellitus without complication ([facility] 82/98/7455    ASHD (arteriosclerotic heart disease) 06/18/2014    Bradycardia 06/18/2014    Hypercholesteremia 02/18/2013    HTN (hypertension) 02/18/2013    Asthma 02/18/2013    Thyroid nodule 02/18/2013    Incisional hernia 02/10/2011    Esophageal cancer ([facility] 02/10/2011     Current Outpatient Medications   Medication Sig Dispense Refill    metFORMIN ER (GLUCOPHAGE XR) 500 mg tablet Take 2 Tablets by mouth daily (with dinner).  180 Tablet 1    montelukast (SINGULAIR) 10 mg tablet TAKE 1 TABLET EVERY DAY 90 Tablet 0    atorvastatin (LIPITOR) 40 mg tablet TAKE 1 TABLET EVERY DAY 90 Tablet 0    losartan (COZAAR) 50 mg tablet TAKE 1 TABLET EVERY DAY 90 Tablet 1    insulin glargine (Lantus Solostar U-100 Insulin) 100 unit/mL (3 mL) inpn INJECT  36 UNITS SUBCUTANEOUSLY EVERY DAY 30 mL 2    furosemide (LASIX) 20 mg tablet Every day prn 90 Tablet 1    glucose blood VI test strips (Accu-Chek Corrine Plus test strp) strip Check glucose twice daily 100 Strip 3    Insulin Needles, Disposable, (Pen Needle) 31 gauge x 5/16\" ndle Use with insulin twice daily; dx E11.9 1 Package 11    amLODIPine (NORVASC) 2.5 mg tablet Take 1 Tab by mouth daily. In place of Ranexa as not covered 90 Tab 3    MAGNESIUM PO Take  by mouth daily. Indications: leg cramps      glucose blood VI test strips (Accu-Chek Corrine Plus test strp) strip CHECK BLOOD SUGAR TWICE DAILY 100 Strip 3    albuterol (PROVENTIL HFA, VENTOLIN HFA, PROAIR HFA) 90 mcg/actuation inhaler Take 2 Puffs by inhalation every four (4) hours as needed for Wheezing. 1 Inhaler 3    Blood-Glucose Meter (ACCU-CHEK CORRINE PLUS METER) misc Check glucose twice daily. 1 Each 1    cholecalciferol, vitamin D3, (VITAMIN D3) 2,000 unit tab Take  by mouth daily.  SYMBICORT 160-4.5 mcg/actuation HFA inhaler Take 2 Puffs by inhalation two (2) times a day.  polyethylene glycol (MIRALAX) 17 gram packet Take 17 g by mouth daily.  cetirizine (ZYRTEC) 10 mg tablet Take 10 mg by mouth daily.  melatonin 5 mg Tab Take 5 mg by mouth nightly.  Dexlansoprazole (DEXILANT) 60 mg CpDM Take 60 mg by mouth daily.  aspirin 81 mg tablet Take 81 mg by mouth.  pyridoxine (VITAMIN B-6) 100 mg tablet Take 100 mg by mouth every morning.       insulin aspart U-100 (NOVOLOG) 100 unit/mL (3 mL) inpn Inject 10 units subq plus SSI; dx E11.9 (Patient not taking: Reported on 2/28/2022) 15 Adjustable Dose Pre-filled Pen Syringe 0     Past Surgical History:   Procedure Laterality Date    COLONOSCOPY N/A 8/7/2018    COLONOSCOPY performed by Jamie Ricketts MD at  [facility]; HI RISK IND  8/7/2018         HX ENDOSCOPY  4/2016    HX GI  2010    gastroesophagectomy    HX HEART CATHETERIZATION  03/2017    stent x 1    HX MONTEZ  1957    tonsils as child    HX HERNIA REPAIR  04/26/2012    abdominal and umbilical    HX LAP CHOLECYSTECTOMY  1995    HX ORTHOPAEDIC  02/20/2015    right shoulder manipulation    HX OTHER SURGICAL Bilateral      shoulder surgery for frozen surgery    HX OTHER SURGICAL  6/24/2015    mammogram     HX PELVIC LAPAROSCOPY  1989    HX TUBAL LIGATION  1983    lap btl    HX VASCULAR ACCESS      port a cath and removal    PA EGD BALLOON DILATION ESOPHAGUS <30 MM DIAM  4/20/2010         PA EXTRAC ERUPTED TOOTH/EXPOSED ROOT        Lab Results   Component Value Date/Time    WBC 5.9 08/18/2021 01:39 PM    HGB 13.4 08/18/2021 01:39 PM    Hemoglobin (POC) 11.9 01/28/2010 12:02 PM    HCT 40.8 08/18/2021 01:39 PM    Hematocrit (POC) 35 01/28/2010 12:02 PM    PLATELET 115 37/33/8301 01:39 PM    MCV 94.4 08/18/2021 01:39 PM     Lab Results   Component Value Date/Time    Cholesterol, total 152 11/29/2021 12:02 PM    HDL Cholesterol 52 11/29/2021 12:02 PM    LDL, calculated 81.8 11/29/2021 12:02 PM    Triglyceride 91 11/29/2021 12:02 PM    CHOL/HDL Ratio 2.9 11/29/2021 12:02 PM     Lab Results   Component Value Date/Time    GFR est non-AA >60 11/29/2021 12:02 PM    GFRNA, POC >60 01/06/2012 10:42 AM    GFR est AA >60 11/29/2021 12:02 PM    GFRAA, POC >60 01/06/2012 10:42 AM    Creatinine 0.83 11/29/2021 12:02 PM    Creatinine (POC) 0.9 01/06/2012 10:42 AM    BUN 12 11/29/2021 12:02 PM    BUN (POC) 14 01/28/2010 12:02 PM    Sodium 138 11/29/2021 12:02 PM    Sodium (POC) 141 01/28/2010 12:02 PM    Potassium 4.2 11/29/2021 12:02 PM    Potassium (POC) 4.0 01/28/2010 12:02 PM    Chloride 105 11/29/2021 12:02 PM    Chloride (POC) 103 01/28/2010 12:02 PM    CO2 30 11/29/2021 12:02 PM    Magnesium 1.9 02/08/2010 03:00 AM        Review of Systems   Respiratory: Negative for shortness of breath. Cardiovascular: Negative for chest pain. Physical Exam  Constitutional:       General: She is not in acute distress. Appearance: Normal appearance. She is not ill-appearing, toxic-appearing or diaphoretic. HENT:      Head: Normocephalic and atraumatic. Right Ear: External ear normal.      Left Ear: External ear normal.   Eyes:      General:         Right eye: No discharge. Left eye: No discharge. Conjunctiva/sclera: Conjunctivae normal.      Pupils: Pupils are equal, round, and reactive to light. Cardiovascular:      Rate and Rhythm: Normal rate and regular rhythm. Heart sounds: No murmur heard. No friction rub. No gallop. Pulmonary:      Effort: No respiratory distress. Breath sounds: Normal breath sounds. No wheezing or rales. Chest:      Chest wall: No tenderness. Musculoskeletal:         General: Normal range of motion. Cervical back: Normal range of motion and neck supple. Skin:     General: Skin is warm and dry. Comments: Callus on foot   Neurological:      Mental Status: She is alert and oriented to person, place, and time. Mental status is at baseline. Coordination: Coordination normal.      Gait: Gait normal.      Comments: Sensory exam of the foot is normal, tested with the monofilament. Good pulses, no lesions or ulcers, good peripheral pulses. Psychiatric:         Mood and Affect: Mood normal.         Behavior: Behavior normal.         ASSESSMENT and PLAN    ICD-10-CM ICD-9-CM    1. Primary hypertension      V05 810.0 METABOLIC PANEL, BASIC   Controlled on losartan and Norvasc continue no change dose   HEMOGLOBIN A1C WITH EAG   2. Medicare annual wellness visit, subsequent  F55.07 F14.1 METABOLIC PANEL, BASIC      HEMOGLOBIN A1C WITH EAG   3. Malignant neoplasm of esophagus, unspecified location ([facility]  H27.2 747.4 METABOLIC PANEL, BASIC   In remission has been discharged from care from oncology   HEMOGLOBIN A1C WITH EAG   4.  Chronic obstructive pulmonary disease, unspecified COPD type ([facility]  D88.0 249 METABOLIC PANEL, BASIC   Up-to-date with pulmonary Dr. Porter Persons was just there this month continues on Symbicort breathing is stable get PFTs next year    We will get notes for review   HEMOGLOBIN A1C WITH EAG   5. Recurrent depression (HCC)  I34.3 829.10 METABOLIC PANEL, BASIC   Controlled without medication   HEMOGLOBIN A1C WITH EAG   6. Diabetes mellitus without complication (HCC)  F79.1 250.00 HM DIABETES FOOT EXAM   A1c shows adequate control improving recently home readings good    No hypoglycemia    Continue Lantus 36 units    Continue Metformin twice daily    Check labs today    Annual eye exam   METABOLIC PANEL, BASIC      HEMOGLOBIN A1C WITH EAG   7. Hypercholesteremia  Z57.17 691.8 METABOLIC PANEL, BASIC   Controlled Lipitor   HEMOGLOBIN A1C WITH EAG   8. Mild intermittent asthma without complication  I84.23 223.85 METABOLIC PANEL, BASIC   See above up-to-date with pulmonary   HEMOGLOBIN A1C WITH EAG   9. ASHD (arteriosclerotic heart disease)  M44.97 222.93 METABOLIC PANEL, BASIC   Medically managed no active signs or symptoms of disease will see cardiology in May   HEMOGLOBIN A1C WITH EAG   GERD controlled on Dexilant    Scribed by Dm Quigley of [address] [place] [address] [address], as dictated by Dr. Reyes Byrnes. Current diagnosis and concerns discussed with pt at length. Pt understands risks and benefits or current treatment plan and medications, and accepts the treatment and medication with any possible risks. Pt asks appropriate questions, which were answered. Pt was instructed to call with any concerns or problems. I have reviewed the note documented by the scribe. The services provided are my own.   The documentation is accurate

## 2022-02-28 ENCOUNTER — OFFICE VISIT (OUTPATIENT)
Dept: INTERNAL MEDICINE CLINIC | Age: 67
End: 2022-02-28
Payer: MEDICARE

## 2022-02-28 VITALS
RESPIRATION RATE: 16 BRPM | WEIGHT: 171 LBS | DIASTOLIC BLOOD PRESSURE: 73 MMHG | OXYGEN SATURATION: 96 % | SYSTOLIC BLOOD PRESSURE: 130 MMHG | BODY MASS INDEX: 30.3 KG/M2 | HEART RATE: 70 BPM | TEMPERATURE: 97.2 F | HEIGHT: 63 IN

## 2022-02-28 DIAGNOSIS — E11.9 DIABETES MELLITUS WITHOUT COMPLICATION (HCC): ICD-10-CM

## 2022-02-28 DIAGNOSIS — Z82.49 FAMILY HISTORY OF ABDOMINAL AORTIC ANEURYSM (AAA): ICD-10-CM

## 2022-02-28 DIAGNOSIS — I10 PRIMARY HYPERTENSION: Primary | ICD-10-CM

## 2022-02-28 DIAGNOSIS — I25.10 ASHD (ARTERIOSCLEROTIC HEART DISEASE): ICD-10-CM

## 2022-02-28 DIAGNOSIS — J44.9 CHRONIC OBSTRUCTIVE PULMONARY DISEASE, UNSPECIFIED COPD TYPE (HCC): ICD-10-CM

## 2022-02-28 DIAGNOSIS — C15.9 MALIGNANT NEOPLASM OF ESOPHAGUS, UNSPECIFIED LOCATION (HCC): ICD-10-CM

## 2022-02-28 DIAGNOSIS — F33.9 RECURRENT DEPRESSION (HCC): ICD-10-CM

## 2022-02-28 DIAGNOSIS — Z00.00 MEDICARE ANNUAL WELLNESS VISIT, SUBSEQUENT: ICD-10-CM

## 2022-02-28 DIAGNOSIS — J45.20 MILD INTERMITTENT ASTHMA WITHOUT COMPLICATION: ICD-10-CM

## 2022-02-28 DIAGNOSIS — E78.00 HYPERCHOLESTEREMIA: ICD-10-CM

## 2022-02-28 LAB
ANION GAP SERPL CALC-SCNC: 2 MMOL/L (ref 5–15)
BUN SERPL-MCNC: 11 MG/DL (ref 6–20)
BUN/CREAT SERPL: 13 (ref 12–20)
CALCIUM SERPL-MCNC: 8.1 MG/DL (ref 8.5–10.1)
CHLORIDE SERPL-SCNC: 100 MMOL/L (ref 97–108)
CO2 SERPL-SCNC: 30 MMOL/L (ref 21–32)
CREAT SERPL-MCNC: 0.88 MG/DL (ref 0.55–1.02)
EST. AVERAGE GLUCOSE BLD GHB EST-MCNC: 157 MG/DL
GLUCOSE SERPL-MCNC: 155 MG/DL (ref 65–100)
HBA1C MFR BLD: 7.1 % (ref 4–5.6)
POTASSIUM SERPL-SCNC: 4.4 MMOL/L (ref 3.5–5.1)
SODIUM SERPL-SCNC: 132 MMOL/L (ref 136–145)

## 2022-02-28 PROCEDURE — G8752 SYS BP LESS 140: HCPCS | Performed by: INTERNAL MEDICINE

## 2022-02-28 PROCEDURE — 1090F PRES/ABSN URINE INCON ASSESS: CPT | Performed by: INTERNAL MEDICINE

## 2022-02-28 PROCEDURE — G8754 DIAS BP LESS 90: HCPCS | Performed by: INTERNAL MEDICINE

## 2022-02-28 PROCEDURE — G9717 DOC PT DX DEP/BP F/U NT REQ: HCPCS | Performed by: INTERNAL MEDICINE

## 2022-02-28 PROCEDURE — G8399 PT W/DXA RESULTS DOCUMENT: HCPCS | Performed by: INTERNAL MEDICINE

## 2022-02-28 PROCEDURE — G8536 NO DOC ELDER MAL SCRN: HCPCS | Performed by: INTERNAL MEDICINE

## 2022-02-28 PROCEDURE — G8417 CALC BMI ABV UP PARAM F/U: HCPCS | Performed by: INTERNAL MEDICINE

## 2022-02-28 PROCEDURE — 2022F DILAT RTA XM EVC RTNOPTHY: CPT | Performed by: INTERNAL MEDICINE

## 2022-02-28 PROCEDURE — 99214 OFFICE O/P EST MOD 30 MIN: CPT | Performed by: INTERNAL MEDICINE

## 2022-02-28 PROCEDURE — G0439 PPPS, SUBSEQ VISIT: HCPCS | Performed by: INTERNAL MEDICINE

## 2022-02-28 PROCEDURE — G8427 DOCREV CUR MEDS BY ELIG CLIN: HCPCS | Performed by: INTERNAL MEDICINE

## 2022-02-28 PROCEDURE — 1101F PT FALLS ASSESS-DOCD LE1/YR: CPT | Performed by: INTERNAL MEDICINE

## 2022-02-28 PROCEDURE — G9899 SCRN MAM PERF RSLTS DOC: HCPCS | Performed by: INTERNAL MEDICINE

## 2022-02-28 PROCEDURE — 3017F COLORECTAL CA SCREEN DOC REV: CPT | Performed by: INTERNAL MEDICINE

## 2022-02-28 PROCEDURE — 3046F HEMOGLOBIN A1C LEVEL >9.0%: CPT | Performed by: INTERNAL MEDICINE

## 2022-02-28 NOTE — PATIENT INSTRUCTIONS

## 2022-02-28 NOTE — PROGRESS NOTES
1. \"Have you been to the ER, urgent care clinic since your last visit? Hospitalized since your last visit? \" Yes When: 12/4/21    2. \"Have you seen or consulted any other health care providers outside of the [address] [place] [address] since your last visit? \" No     3. For patients aged 39-70: Has the patient had a colonoscopy / FIT/ Cologuard? Yes - Care Gap present. Most recent result on file      If the patient is female:    4. For patients aged 41-77: Has the patient had a mammogram within the past 2 years? Yes - Care Gap present. Most recent result on file      5. For patients aged 21-65: Has the patient had a pap smear?  NA - based on age or sex

## 2022-02-28 NOTE — PROGRESS NOTES
This is the Subsequent Medicare Annual Wellness Exam, performed 12 months or more after the Initial AWV or the last Subsequent AWV    I have reviewed the patient's medical history in detail and updated the computerized patient record. Assessment/Plan   Education and counseling provided:  Are appropriate based on today's review and evaluation    1. Medicare annual wellness visit, subsequent       Depression Risk Factor Screening     3 most recent PHQ Screens 2/28/2022   Little interest or pleasure in doing things Not at all   Feeling down, depressed, irritable, or hopeless Not at all   Total Score PHQ 2 0       Alcohol & Drug Abuse Risk Screen    Do you average more than 1 drink per night or more than 7 drinks a week:  No    On any one occasion in the past three months have you have had more than 3 drinks containing alcohol:  No          Functional Ability and Level of Safety    Hearing: Hearing is good. Activities of Daily Living: The home contains: no safety equipment. Patient does total self care      Ambulation: with no difficulty     Fall Risk:  Fall Risk Assessment, last 12 mths 2/28/2022   Able to walk?  Yes   Fall in past 12 months? 0   Do you feel unsteady? -   Are you worried about falling -      Abuse Screen:  Patient is not abused   lives w , safe  lucia and daughter and CHICHI there, safe    Cognitive Screening    Has your family/caregiver stated any concerns about your memory: no     Cognitive Screening: Normal - Mini Cog Test     No memory concerns   Pt knows the month, day and year   Can recall 3/3 objects     Health Maintenance Due     Health Maintenance Due   Topic Date Due    Eye Exam Retinal or Dilated  01/29/2022    Foot Exam Q1  02/16/2022       Patient Care Team   Patient Care Team:  Jaimie Dawkins MD as PCP - General (Internal Medicine)  Jaimie Dawkins MD as PCP - [address] [place] [address] Dr [facility]  Sonu Jackson MD (Cardiology)  Peter Ramirez MD as Consulting Provider (Endocrinology)  Arline Morin MD as Physician (Cardiology)  Jael Potts MD (Gastroenterology)  Gabriela Landers MD (Internal Medicine)  Froylan Coughlin OD (Optometry)  Grnat Jovel, RN as Ambulatory Care Manager  Radha Choi MD (Hematology and Oncology)  Gabriela Landers MD (Internal Medicine)  Moises Gabriel MD (Obstetrics & Gynecology)  Chantal Zheng MD (Obstetrics & Gynecology)  Chantal Zheng MD (Obstetrics & Gynecology)  Jose Duncan RD (Optometry)   Rona Bullock [facility]    updated    History     Patient Active Problem List   Diagnosis Code    Incisional hernia K43.2    Esophageal cancer ([address] C15.9    Hypercholesteremia E78.00    HTN (hypertension) I10    Asthma J45.909    Thyroid nodule E04.1    ASHD (arteriosclerotic heart disease) I25.10    Bradycardia R00.1    Diabetes mellitus without complication ([address] G81.9    Recurrent depression ([address] F33.9     Past Medical History:   Diagnosis Date    Arrhythmia     bradycardia    Bloating     CAD (coronary artery disease)     Cancer ([address] 2010    esophageal/GE junction    Chest pain     Chest pain     Chronic obstructive pulmonary disease (HCC)     Chronic pain     left shoulder    Congestion of throat     Cough     Depression     & anxiety    Diabetes ([address]     IDDM    Dyspepsia and other specified disorders of function of stomach     Fatigue     GERD (gastroesophageal reflux disease)     Headache(784.0)     Hypercholesterolemia     Hypertension     Multinodular goiter     Nausea & vomiting     Stool color black     Stress     Weakness       Past Surgical History:   Procedure Laterality Date    COLONOSCOPY N/A 8/7/2018    COLONOSCOPY performed by Cheyenne Sampson MD at [address] [place] [address]; HI RISK IND  8/7/2018         HX ENDOSCOPY  4/2016    HX GI  2010    gastroesophagectomy    HX HEART CATHETERIZATION  03/2017    stent x 1    HX HEENT  1957    tonsils as child    HX HERNIA REPAIR  04/26/2012    abdominal and umbilical    HX LAP CHOLECYSTECTOMY  1995    HX ORTHOPAEDIC  02/20/2015    right shoulder manipulation    HX OTHER SURGICAL Bilateral      shoulder surgery for frozen surgery    HX OTHER SURGICAL  6/24/2015    mammogram     HX PELVIC LAPAROSCOPY  1989    HX TUBAL LIGATION  1983    lap btl    HX VASCULAR ACCESS      port a cath and removal    AR EGD BALLOON DILATION ESOPHAGUS <30 MM DIAM  4/20/2010         AR EXTRAC ERUPTED TOOTH/EXPOSED ROOT       Current Outpatient Medications   Medication Sig Dispense Refill    metFORMIN ER (GLUCOPHAGE XR) 500 mg tablet Take 2 Tablets by mouth daily (with dinner). 180 Tablet 1    montelukast (SINGULAIR) 10 mg tablet TAKE 1 TABLET EVERY DAY 90 Tablet 0    atorvastatin (LIPITOR) 40 mg tablet TAKE 1 TABLET EVERY DAY 90 Tablet 0    losartan (COZAAR) 50 mg tablet TAKE 1 TABLET EVERY DAY 90 Tablet 1    insulin glargine (Lantus Solostar U-100 Insulin) 100 unit/mL (3 mL) inpn INJECT  36 UNITS SUBCUTANEOUSLY EVERY DAY 30 mL 2    furosemide (LASIX) 20 mg tablet Every day prn 90 Tablet 1    glucose blood VI test strips (Accu-Chek Corrine Plus test strp) strip Check glucose twice daily 100 Strip 3    Insulin Needles, Disposable, (Pen Needle) 31 gauge x 5/16\" ndle Use with insulin twice daily; dx E11.9 1 Package 11    amLODIPine (NORVASC) 2.5 mg tablet Take 1 Tab by mouth daily. In place of Ranexa as not covered 90 Tab 3    MAGNESIUM PO Take  by mouth daily. Indications: leg cramps      glucose blood VI test strips (Accu-Chek Corrine Plus test strp) strip CHECK BLOOD SUGAR TWICE DAILY 100 Strip 3    albuterol (PROVENTIL HFA, VENTOLIN HFA, PROAIR HFA) 90 mcg/actuation inhaler Take 2 Puffs by inhalation every four (4) hours as needed for Wheezing. 1 Inhaler 3    Blood-Glucose Meter (ACCU-CHEK CORRINE PLUS METER) misc Check glucose twice daily.  1 Each 1    cholecalciferol, vitamin D3, (VITAMIN D3) 2,000 unit tab Take  by mouth daily.  SYMBICORT 160-4.5 mcg/actuation HFA inhaler Take 2 Puffs by inhalation two (2) times a day.  polyethylene glycol (MIRALAX) 17 gram packet Take 17 g by mouth daily.  cetirizine (ZYRTEC) 10 mg tablet Take 10 mg by mouth daily.  melatonin 5 mg Tab Take 5 mg by mouth nightly.  Dexlansoprazole (DEXILANT) 60 mg CpDM Take 60 mg by mouth daily.  aspirin 81 mg tablet Take 81 mg by mouth.  pyridoxine (VITAMIN B-6) 100 mg tablet Take 100 mg by mouth every morning.  insulin aspart U-100 (NOVOLOG) 100 unit/mL (3 mL) inpn Inject 10 units subq plus SSI; dx E11.9 (Patient not taking: Reported on 2/28/2022) 15 Adjustable Dose Pre-filled Pen Syringe 0     Allergies   Allergen Reactions    Adhesive Other (comments)     redness       Family History   Problem Relation Age of Onset    Diabetes Father     Cancer Father         intestinal    Heart Disease Father     Hypertension Father     Heart Disease Brother     Diabetes Brother     Diabetes Mother     Lung Disease Mother     Heart Disease Mother     Hypertension Mother     Diabetes Maternal Grandmother     Diabetes Maternal Grandfather     Elevated Lipids Maternal Aunt     Thyroid Disease Neg Hx      Social History     Tobacco Use    Smoking status: Never Smoker    Smokeless tobacco: Never Used   Substance Use Topics    Alcohol use: Yes     Alcohol/week: 0.8 standard drinks     Types: 1 Glasses of wine per week     Comment: rarely glass of wine   ACP not on file. SDM is her .   Reminded pt to bring in this lov        Colonoscopy: 8/7/18, Dr. Gauri Cross, repeat 5 years  AAA: FMHx (grandfather), calcification on aorta in 2013 CT  Pap: Dr. Patricia Ray, due fall 2022  Mammogram:  7/28/21 negative annual  Dexa: 7/27/20 nl every 2 years due this July    Tdap: 9/14/2016  Pneumovax: 10/14/19   [id number]: 11/14/2016  Zostavax: 11/18/2016, reaction on R arm  Shingrix: both completed 2/20  Flu shot: 10/21/21      Eye exam: VEI 2/22 annual      A1c:   11/21 7.4 due now  Lipids: 11/21 LDL 81 annual    Hep C screen: 11/15, negative  Covid: three doses (pfizer)    Medication reconciliation completed by MA and reviewed by me. Medical/surgical/social/family history reviewed and updated by me. Patient provided AVS and preventative screening table. Patient verbalized understanding of all information discussed.       Emily Zimmerman MD

## 2022-03-01 ENCOUNTER — TRANSCRIBE ORDER (OUTPATIENT)
Dept: SCHEDULING | Age: 67
End: 2022-03-01

## 2022-03-01 DIAGNOSIS — I10 PRIMARY HYPERTENSION: Primary | ICD-10-CM

## 2022-03-01 DIAGNOSIS — E87.1 LOW SODIUM LEVELS: ICD-10-CM

## 2022-03-01 DIAGNOSIS — Z12.31 VISIT FOR SCREENING MAMMOGRAM: Primary | ICD-10-CM

## 2022-03-01 NOTE — PROGRESS NOTES
Please call patient back about results  Mild low sodium on labs, normal last check  Have her repeat bmp, a1c 1 week prior to f/U

## 2022-03-01 NOTE — PROGRESS NOTES
Called, spoke with Pt. Two pt identifiers confirmed. Pt informed of lab results and recommendations per Dr. Anthony Moss. Pt asked for lab slip to be sent to her. Pt informed I will mail her lab slip. Pt verbalized understanding of information discussed w/ no further questions at this time.

## 2022-03-11 RX ORDER — BLOOD SUGAR DIAGNOSTIC
STRIP MISCELLANEOUS
Qty: 100 STRIP | Refills: 3 | Status: SHIPPED | OUTPATIENT
Start: 2022-03-11 | End: 2022-09-06 | Stop reason: SDUPTHER

## 2022-03-11 NOTE — TELEPHONE ENCOUNTER
Future Appointments:  Future Appointments   Date Time Provider Dc Adler   3/16/2022  9:00 AM [facility]   5/26/2022  1:30 PM Serena Bradshaw MD [facility] BS AMB   6/8/2022 11:15 AM Opal Montes MD [facility] BS AMB   7/29/2022 10:20 AM [facility] REG        Last Appointment With Me:  2/28/2022     Requested Prescriptions     Pending Prescriptions Disp Refills    glucose blood VI test strips (Accu-Chek Corrine Plus test strp) strip 100 Strip 3     Sig: Check glucose twice daily

## 2022-03-16 ENCOUNTER — HOSPITAL ENCOUNTER (OUTPATIENT)
Dept: ULTRASOUND IMAGING | Age: 67
Discharge: HOME | End: 2022-03-16
Attending: INTERNAL MEDICINE
Payer: MEDICARE

## 2022-03-16 DIAGNOSIS — Z82.49 FAMILY HISTORY OF ABDOMINAL AORTIC ANEURYSM (AAA): ICD-10-CM

## 2022-03-16 PROCEDURE — 76706 US ABDL AORTA SCREEN AAA: CPT

## 2022-03-16 PROCEDURE — 76775 US EXAM ABDO BACK WALL LIM: CPT

## 2022-03-19 PROBLEM — F33.9 RECURRENT DEPRESSION (HCC): Status: ACTIVE | Noted: 2017-12-29

## 2022-03-31 DIAGNOSIS — E78.00 HYPERCHOLESTEREMIA: Primary | ICD-10-CM

## 2022-03-31 RX ORDER — ATORVASTATIN CALCIUM 40 MG/1
40 TABLET, FILM COATED ORAL DAILY
Qty: 90 TABLET | Refills: 0 | Status: SHIPPED | OUTPATIENT
Start: 2022-03-31 | End: 2022-06-02 | Stop reason: SDUPTHER

## 2022-03-31 NOTE — TELEPHONE ENCOUNTER
PCP: Ethel Gamez MD    Last appt: 2/28/2022  Future Appointments   Date Time Provider Dc Adler   5/26/2022  1:30 PM Clarence Moseley MD [facility] BS AMB   6/8/2022 11:15 AM Ethel Gamez MD [facility] BS AMB   7/29/2022 10:20 AM [address]       Requested Prescriptions     Pending Prescriptions Disp Refills    atorvastatin (LIPITOR) 40 mg tablet 90 Tablet 0     Sig: Take 1 Tablet by mouth daily.

## 2022-04-01 RX ORDER — AMLODIPINE BESYLATE 2.5 MG/1
2.5 TABLET ORAL DAILY
Qty: 90 TABLET | Refills: 3 | Status: SHIPPED | OUTPATIENT
Start: 2022-04-01 | End: 2022-09-06

## 2022-05-20 ENCOUNTER — DOCUMENTATION ONLY (OUTPATIENT)
Dept: INTERNAL MEDICINE CLINIC | Age: 67
End: 2022-05-20

## 2022-05-20 NOTE — PROGRESS NOTES
This addendum has been created to correct a medical record clerical error, wherein an erroneous  was selected for the administered flu vaccine.

## 2022-05-25 ENCOUNTER — LAB ONLY (OUTPATIENT)
Dept: INTERNAL MEDICINE CLINIC | Age: 67
End: 2022-05-25

## 2022-05-25 DIAGNOSIS — E87.1 LOW SODIUM LEVELS: ICD-10-CM

## 2022-05-25 LAB
ANION GAP SERPL CALC-SCNC: 6 MMOL/L (ref 5–15)
BUN SERPL-MCNC: 8 MG/DL (ref 6–20)
BUN/CREAT SERPL: 11 (ref 12–20)
CALCIUM SERPL-MCNC: 8.2 MG/DL (ref 8.5–10.1)
CHLORIDE SERPL-SCNC: 103 MMOL/L (ref 97–108)
CO2 SERPL-SCNC: 29 MMOL/L (ref 21–32)
CREAT SERPL-MCNC: 0.71 MG/DL (ref 0.55–1.02)
GLUCOSE SERPL-MCNC: 115 MG/DL (ref 65–100)
POTASSIUM SERPL-SCNC: 4.3 MMOL/L (ref 3.5–5.1)
SODIUM SERPL-SCNC: 138 MMOL/L (ref 136–145)

## 2022-05-28 LAB
CALCIUM SERPL-MCNC: 8.2 MG/DL (ref 8.5–10.1)
MAGNESIUM SERPL-MCNC: 2.1 MG/DL (ref 1.6–2.4)
PTH-INTACT SERPL-MCNC: 56.8 PG/ML (ref 18.4–88)

## 2022-06-02 DIAGNOSIS — E78.00 HYPERCHOLESTEREMIA: ICD-10-CM

## 2022-06-03 RX ORDER — ATORVASTATIN CALCIUM 40 MG/1
40 TABLET, FILM COATED ORAL DAILY
Qty: 90 TABLET | Refills: 0 | Status: SHIPPED | OUTPATIENT
Start: 2022-06-03 | End: 2022-09-25

## 2022-06-06 NOTE — PROGRESS NOTES
HISTORY OF PRESENT ILLNESS  Coby Gutierrez is a 69 y.o. female. HPI     Last here 2/28/22.  Pt is here to f/u on chronic conditions.       Has a history of hypertension  BP today is 130/74  Continues on losartan 50mg and norvasc 2.5m     She has lasix to use prn  for mild swelling in her legs  She used twice after doing a lot of walking and riding in a car; not needing this much     She is diabetic  Home monitoring BS readings are 98, low 100s, admits to two hypoglyemic episode <70   She is taking lantus 36U qAm and taking metformin 500 BID     No longer taking novolog 8U with dinner +sliding scale (8U if >150, 10U >200, 12U if >250, 14U if >300)      She also takes ASA 81mg daily      Reviewed labs    Ordered labs today     Reviewed US retroperitoneum 3/16/22:  No evidence of abdominal aortic aneurysm.     Wt was 171 lbs lov   Discussed diet and wt/l     Pt follows with Dr. Dima Leal (cardio) for CAD she is on Norvasc to medically managed chest pain not having any active symptoms has a history of a stent will see them annually   Last visit 5/26/21 with JOS Felton   Last ECHO 2016  F/u scheduled 6/22  No EKG performed per pt   No changes in medication per pt       Pt follows annually with Dr. Alfonso Dennis (hemo/onc) for h/o adenoma of esophagus   Last visit was 7/3/18  Pt was discharged from his care      Pt follows with Dr. Seda Mata (pul) for history of asthma annually  Last visit was  2/22  -reports everything was fine no change in medication she will get PFTs next year   continues singulair daily, albuterol prn, and symbicort BID for breathing/asthma per pulm      She went to [address]  She had pre cancerous spot removed from her left calf in the past  Next f/u in 09/22     Pt has not been evaluated for DANIELLE in the past   Recall she declines further evaluation for now       Continues lipitor 40mg daily for cholesterol    Pt is supplementing with Calcium 600 mg qd  Instructed to increase from once a day to BID    Follows with Dr Pardeep Valenzuela (GI)  Continues dexilant and pepcid at night this controls her reflux symptoms well  Had vv 8/20 will follow-up as needed  Recall has h/o esophageal cancer     Provided referral for plastic surgeon for her ear previously     Of note, her  has bladder cancer and she is caring for him at home.       ACP not on file. SDM is her . Reminded pt to bring in this lov      PREVENTIVE:    Colonoscopy: 8/7/18, Dr. Pardeep Valenzuela, repeat 5 years  EGD: 4/16, Dr. Pardeep Valenzuela, h/o esophageal cancer, polyp on recent EGD, biopsy nl  AAA: FMHx (grandfather), 03/22 negative   Pap: Dr. Alejandre, due fall 2022  Mammogram:  7/28/21 negative scheduled 7/29/22  Dexa: 7/27/20 nl  Tdap: 9/14/2016  Pneumovax: 10/14/19   Dhywtoi86: 11/14/2016  Zostavax: 11/18/2016, reaction on R arm  Shingrix: both completed 2/20  Flu shot: 10/21/21  Foot exam: 02/28/22  Microalbumin: 11/21  A1c: 5/21 7.1 8/21 7.9 11/21 7.4 2/22 7.1, 6/22 poc 6.4  Eye exam: VEI 2/22  Lipids: 11/21 LDL 81  Hep C screen: 11/15, negative  Covid: four doses (pfizer)       Patient Active Problem List    Diagnosis Date Noted    Recurrent depression ([facility] 12/29/2017    Diabetes mellitus without complication ([facility] 00/53/3234    ASHD (arteriosclerotic heart disease) 06/18/2014    Bradycardia 06/18/2014    Hypercholesteremia 02/18/2013    HTN (hypertension) 02/18/2013    Asthma 02/18/2013    Thyroid nodule 02/18/2013    Incisional hernia 02/10/2011    Esophageal cancer ([facility] 02/10/2011     Current Outpatient Medications   Medication Sig Dispense Refill    atorvastatin (LIPITOR) 40 mg tablet Take 1 Tablet by mouth daily. 90 Tablet 0    amLODIPine (NORVASC) 2.5 mg tablet Take 1 Tablet by mouth daily.  In place of Ranexa as not covered 90 Tablet 3    glucose blood VI test strips (Accu-Chek Corrine Plus test strp) strip Check glucose twice daily 100 Strip 3    metFORMIN ER (GLUCOPHAGE XR) 500 mg tablet Take 2 Tablets by mouth daily (with dinner). 180 Tablet 1    montelukast (SINGULAIR) 10 mg tablet TAKE 1 TABLET EVERY DAY 90 Tablet 0    losartan (COZAAR) 50 mg tablet TAKE 1 TABLET EVERY DAY 90 Tablet 1    insulin glargine (Lantus Solostar U-100 Insulin) 100 unit/mL (3 mL) inpn INJECT  36 UNITS SUBCUTANEOUSLY EVERY DAY 30 mL 2    furosemide (LASIX) 20 mg tablet Every day prn 90 Tablet 1    Insulin Needles, Disposable, (Pen Needle) 31 gauge x 5/16\" ndle Use with insulin twice daily; dx E11.9 1 Package 11    MAGNESIUM PO Take  by mouth daily. Indications: leg cramps      insulin aspart U-100 (NOVOLOG) 100 unit/mL (3 mL) inpn Inject 10 units subq plus SSI; dx E11.9 (Patient not taking: Reported on 2/28/2022) 15 Adjustable Dose Pre-filled Pen Syringe 0    glucose blood VI test strips (Accu-Chek Corrine Plus test strp) strip CHECK BLOOD SUGAR TWICE DAILY 100 Strip 3    albuterol (PROVENTIL HFA, VENTOLIN HFA, PROAIR HFA) 90 mcg/actuation inhaler Take 2 Puffs by inhalation every four (4) hours as needed for Wheezing. 1 Inhaler 3    Blood-Glucose Meter (ACCU-CHEK CORRINE PLUS METER) misc Check glucose twice daily. 1 Each 1    cholecalciferol, vitamin D3, (VITAMIN D3) 2,000 unit tab Take  by mouth daily.  SYMBICORT 160-4.5 mcg/actuation HFA inhaler Take 2 Puffs by inhalation two (2) times a day.  polyethylene glycol (MIRALAX) 17 gram packet Take 17 g by mouth daily.  cetirizine (ZYRTEC) 10 mg tablet Take 10 mg by mouth daily.  melatonin 5 mg Tab Take 5 mg by mouth nightly.  Dexlansoprazole (DEXILANT) 60 mg CpDM Take 60 mg by mouth daily.  aspirin 81 mg tablet Take 81 mg by mouth.  pyridoxine (VITAMIN B-6) 100 mg tablet Take 100 mg by mouth every morning.        Past Surgical History:   Procedure Laterality Date    COLONOSCOPY N/A 8/7/2018    COLONOSCOPY performed by Baljinder Flaherty MD at [facility]; HI RISK IND  8/7/2018         HX ENDOSCOPY  4/2016    HX GI  2010 gastroesophagectomy    HX HEART CATHETERIZATION  03/2017    stent x 1    HX HEENT  1957    tonsils as child    HX HERNIA REPAIR  04/26/2012    abdominal and umbilical    HX LAP CHOLECYSTECTOMY  1995    HX ORTHOPAEDIC  02/20/2015    right shoulder manipulation    HX OTHER SURGICAL Bilateral      shoulder surgery for frozen surgery    HX OTHER SURGICAL  6/24/2015    mammogram     HX PELVIC LAPAROSCOPY  1989    HX TUBAL LIGATION  1983    lap btl    HX VASCULAR ACCESS      port a cath and removal    OR EGD BALLOON DILATION ESOPHAGUS <30 MM DIAM  4/20/2010         OR EXTRAC ERUPTED TOOTH/EXPOSED ROOT        Lab Results   Component Value Date/Time    WBC 5.9 08/18/2021 01:39 PM    HGB 13.4 08/18/2021 01:39 PM    Hemoglobin (POC) 11.9 01/28/2010 12:02 PM    HCT 40.8 08/18/2021 01:39 PM    Hematocrit (POC) 35 01/28/2010 12:02 PM    PLATELET 203 11/07/8221 01:39 PM    MCV 94.4 08/18/2021 01:39 PM     Lab Results   Component Value Date/Time    Cholesterol, total 152 11/29/2021 12:02 PM    HDL Cholesterol 52 11/29/2021 12:02 PM    LDL, calculated 81.8 11/29/2021 12:02 PM    Triglyceride 91 11/29/2021 12:02 PM    CHOL/HDL Ratio 2.9 11/29/2021 12:02 PM     Lab Results   Component Value Date/Time    GFR est non-AA >60 05/25/2022 08:19 AM    GFRNA, POC >60 01/06/2012 10:42 AM    GFR est AA >60 05/25/2022 08:19 AM    GFRAA, POC >60 01/06/2012 10:42 AM    Creatinine 0.71 05/25/2022 08:19 AM    Creatinine (POC) 0.9 01/06/2012 10:42 AM    BUN 8 05/25/2022 08:19 AM    BUN (POC) 14 01/28/2010 12:02 PM    Sodium 138 05/25/2022 08:19 AM    Sodium (POC) 141 01/28/2010 12:02 PM    Potassium 4.3 05/25/2022 08:19 AM    Potassium (POC) 4.0 01/28/2010 12:02 PM    Chloride 103 05/25/2022 08:19 AM    Chloride (POC) 103 01/28/2010 12:02 PM    CO2 29 05/25/2022 08:19 AM    Magnesium 2.1 05/25/2022 08:19 AM    PTH, Intact 56.8 05/25/2022 08:19 AM        Review of Systems   Respiratory: Negative for shortness of breath.     Cardiovascular: Negative for chest pain. Physical Exam  Constitutional:       General: She is not in acute distress. Appearance: She is well-developed. She is not diaphoretic. HENT:      Head: Normocephalic and atraumatic. Eyes:      Conjunctiva/sclera: Conjunctivae normal.   Cardiovascular:      Rate and Rhythm: Normal rate and regular rhythm. Heart sounds: Normal heart sounds. No murmur heard. No friction rub. No gallop. Pulmonary:      Effort: Pulmonary effort is normal. No respiratory distress. Breath sounds: Normal breath sounds. No wheezing or rales. Chest:      Chest wall: No tenderness. Musculoskeletal:         General: No tenderness or deformity. Normal range of motion. Cervical back: Normal range of motion and neck supple. Right lower leg: Edema present. Left lower leg: Edema present. Comments: 1+ pitting edema bilaterally    Lymphadenopathy:      Cervical: No cervical adenopathy. Skin:     General: Skin is warm and dry. Coloration: Skin is not pale. Findings: No erythema or rash. Neurological:      Mental Status: She is alert and oriented to person, place, and time. Coordination: Coordination normal.   Psychiatric:         Behavior: Behavior normal.         ASSESSMENT and PLAN    ICD-10-CM ICD-9-CM    1. Diabetes mellitus without complication (HCC)  J87.7 628.33 METABOLIC PANEL, COMPREHENSIVE      HEMOGLOBIN A1C WITH EAG      CBC W/O DIFF   Doing well with Lantus 36 units    Metformin twice daily    Continue at goal   TSH 3RD GENERATION      MICROALBUMIN, UR, RAND W/ MICROALB/CREAT RATIO      LIPID PANEL      AMB POC HEMOGLOBIN A1C   2. Primary hypertension  B56 387.0 METABOLIC PANEL, COMPREHENSIVE      HEMOGLOBIN A1C WITH EAG      CBC W/O DIFF   Controlled on losartan and Norvasc   TSH 3RD GENERATION      MICROALBUMIN, UR, RAND W/ MICROALB/CREAT RATIO      LIPID PANEL   3.  Rec[facility] ([facility]  X37.5 117.27 METABOLIC PANEL, COMPREHENSIVE HEMOGLOBIN A1C WITH EAG      CBC W/O DIFF   Controlled without medication   TSH 3RD GENERATION      MICROALBUMIN, UR, RAND W/ MICROALB/CREAT RATIO      LIPID PANEL   4. Malignant neoplasm of esophagus, unspecified location (HCC)  E26.9 141.9 METABOLIC PANEL, COMPREHENSIVE      HEMOGLOBIN A1C WITH EAG      CBC W/O DIFF   Has been discharged from care from hematology in remission follows with gastroenterology Dr. Candida Middleton as needed   TSH 3RD GENERATION      MICROALBUMIN, UR, RAND W/ MICROALB/CREAT RATIO      LIPID PANEL   5. ASHD (arteriosclerotic heart disease)  D55.88 385.11 METABOLIC PANEL, COMPREHENSIVE      HEMOGLOBIN A1C WITH EAG   Medically managed up-to-date with cardiology was just there in May   CBC W/O DIFF      TSH 3RD GENERATION      MICROALBUMIN, UR, RAND W/ MICROALB/CREAT RATIO      LIPID PANEL   6. Hypercholesteremia  W01.19 104.8 METABOLIC PANEL, COMPREHENSIVE      HEMOGLOBIN A1C WITH EAG   Controlled Lipitor   CBC W/O DIFF      TSH 3RD GENERATION      MICROALBUMIN, UR, RAND W/ MICROALB/CREAT RATIO      LIPID PANEL   7. Mild intermittent asthma without complication  B32.32 767.25 METABOLIC PANEL, COMPREHENSIVE      HEMOGLOBIN A1C WITH EAG      CBC W/O DIFF   Stable on Symbicort twice daily will be getting PFTs next February with pulmonary    Continues on Singulair as well   TSH 3RD GENERATION      MICROALBUMIN, UR, RAND W/ MICROALB/CREAT RATIO      LIPID PANEL   8. Hypocalcemia  T12.79 490.18 METABOLIC PANEL, COMPREHENSIVE      HEMOGLOBIN A1C WITH EAG      CBC W/O DIFF   Patient taking calcium once daily will increase to twice daily dosing    She likely has malabsorption from past esophageal surgery   TSH 3RD GENERATION      MICROALBUMIN, UR, RAND W/ MICROALB/CREAT RATIO      LIPID PANEL      Depression screen reviewed and negative      Current diagnosis and concerns discussed with pt at length.  Pt understands risks and benefits or current treatment plan and medications, and accepts the treatment and medication with any possible risks. Pt asks appropriate questions, which were answered. Pt was instructed to call with any concerns or problems. I have reviewed the note documented by the scribe. The services provided are my own.   The documentation is accurate

## 2022-06-08 ENCOUNTER — OFFICE VISIT (OUTPATIENT)
Dept: INTERNAL MEDICINE CLINIC | Age: 67
End: 2022-06-08
Payer: MEDICARE

## 2022-06-08 VITALS
HEIGHT: 63 IN | SYSTOLIC BLOOD PRESSURE: 130 MMHG | TEMPERATURE: 98.1 F | RESPIRATION RATE: 16 BRPM | OXYGEN SATURATION: 98 % | HEART RATE: 57 BPM | DIASTOLIC BLOOD PRESSURE: 74 MMHG | BODY MASS INDEX: 29.77 KG/M2 | WEIGHT: 168 LBS

## 2022-06-08 DIAGNOSIS — F33.9 RECURRENT DEPRESSION (HCC): ICD-10-CM

## 2022-06-08 DIAGNOSIS — J45.20 MILD INTERMITTENT ASTHMA WITHOUT COMPLICATION: ICD-10-CM

## 2022-06-08 DIAGNOSIS — E11.9 DIABETES MELLITUS WITHOUT COMPLICATION (HCC): Primary | ICD-10-CM

## 2022-06-08 DIAGNOSIS — I10 PRIMARY HYPERTENSION: ICD-10-CM

## 2022-06-08 DIAGNOSIS — E83.51 HYPOCALCEMIA: ICD-10-CM

## 2022-06-08 DIAGNOSIS — E78.00 HYPERCHOLESTEREMIA: ICD-10-CM

## 2022-06-08 DIAGNOSIS — C15.9 MALIGNANT NEOPLASM OF ESOPHAGUS, UNSPECIFIED LOCATION (HCC): ICD-10-CM

## 2022-06-08 DIAGNOSIS — I25.10 ASHD (ARTERIOSCLEROTIC HEART DISEASE): ICD-10-CM

## 2022-06-08 LAB — HBA1C MFR BLD HPLC: 6.4 %

## 2022-06-08 PROCEDURE — G8536 NO DOC ELDER MAL SCRN: HCPCS | Performed by: INTERNAL MEDICINE

## 2022-06-08 PROCEDURE — 3017F COLORECTAL CA SCREEN DOC REV: CPT | Performed by: INTERNAL MEDICINE

## 2022-06-08 PROCEDURE — G8417 CALC BMI ABV UP PARAM F/U: HCPCS | Performed by: INTERNAL MEDICINE

## 2022-06-08 PROCEDURE — G9717 DOC PT DX DEP/BP F/U NT REQ: HCPCS | Performed by: INTERNAL MEDICINE

## 2022-06-08 PROCEDURE — G8399 PT W/DXA RESULTS DOCUMENT: HCPCS | Performed by: INTERNAL MEDICINE

## 2022-06-08 PROCEDURE — G9899 SCRN MAM PERF RSLTS DOC: HCPCS | Performed by: INTERNAL MEDICINE

## 2022-06-08 PROCEDURE — 99214 OFFICE O/P EST MOD 30 MIN: CPT | Performed by: INTERNAL MEDICINE

## 2022-06-08 PROCEDURE — 83036 HEMOGLOBIN GLYCOSYLATED A1C: CPT | Performed by: INTERNAL MEDICINE

## 2022-06-08 PROCEDURE — G8752 SYS BP LESS 140: HCPCS | Performed by: INTERNAL MEDICINE

## 2022-06-08 PROCEDURE — 3044F HG A1C LEVEL LT 7.0%: CPT | Performed by: INTERNAL MEDICINE

## 2022-06-08 PROCEDURE — 1090F PRES/ABSN URINE INCON ASSESS: CPT | Performed by: INTERNAL MEDICINE

## 2022-06-08 PROCEDURE — 2022F DILAT RTA XM EVC RTNOPTHY: CPT | Performed by: INTERNAL MEDICINE

## 2022-06-08 PROCEDURE — G8427 DOCREV CUR MEDS BY ELIG CLIN: HCPCS | Performed by: INTERNAL MEDICINE

## 2022-06-08 PROCEDURE — 1101F PT FALLS ASSESS-DOCD LE1/YR: CPT | Performed by: INTERNAL MEDICINE

## 2022-06-08 PROCEDURE — G8754 DIAS BP LESS 90: HCPCS | Performed by: INTERNAL MEDICINE

## 2022-06-08 NOTE — LETTER
6/8/2022 4:19 PM    Ms. Jessica Ugarte  [address] [address]    Dear Care Provider    Please fax us the most recent office notes so that we may update the patient's records for continuity of care. Our fax number: [phone].     Patient:   Jessica Ugarte  1955        Sincerely,      Claudetta Dikes, MD

## 2022-06-08 NOTE — LETTER
6/8/2022 3:54 PM    Ms. Lanny Kern  [address] [address]    Dear Care Provider    Please fax us the most recent office notes so that we may update the patient's records for continuity of care. Our fax number: [phone].     Patient:   Lanny Kern  1955          Sincerely,      Lincoln Rees MD

## 2022-07-08 RX ORDER — MONTELUKAST SODIUM 10 MG/1
10 TABLET ORAL DAILY
Qty: 90 TABLET | Refills: 0 | Status: SHIPPED | OUTPATIENT
Start: 2022-07-08 | End: 2022-09-21

## 2022-07-08 NOTE — TELEPHONE ENCOUNTER
Future Appointments:  Future Appointments   Date Time Provider Dc Castilloi   2022 10:20 AM [facility]   2022  1:45 PM Keshia Mcgarry MD [facility] BS AMB        Last Appointment With Me:  2022     Requested Prescriptions     Pending Prescriptions Disp Refills    montelukast (SINGULAIR) 10 mg tablet 90 Tablet 0     Si Tablet daily.

## 2022-07-10 ENCOUNTER — OFFICE VISIT (OUTPATIENT)
Dept: URGENT CARE | Age: 67
End: 2022-07-10
Payer: MEDICARE

## 2022-07-10 VITALS
HEART RATE: 56 BPM | TEMPERATURE: 98.3 F | DIASTOLIC BLOOD PRESSURE: 70 MMHG | WEIGHT: 163 LBS | BODY MASS INDEX: 28.88 KG/M2 | RESPIRATION RATE: 16 BRPM | HEIGHT: 63 IN | OXYGEN SATURATION: 97 % | SYSTOLIC BLOOD PRESSURE: 149 MMHG

## 2022-07-10 DIAGNOSIS — J44.1 COPD EXACERBATION (HCC): Primary | ICD-10-CM

## 2022-07-10 LAB — SARS-COV-2 PCR, POC: NEGATIVE

## 2022-07-10 PROCEDURE — 87635 SARS-COV-2 COVID-19 AMP PRB: CPT | Performed by: INTERNAL MEDICINE

## 2022-07-10 PROCEDURE — G9717 DOC PT DX DEP/BP F/U NT REQ: HCPCS | Performed by: INTERNAL MEDICINE

## 2022-07-10 PROCEDURE — G9899 SCRN MAM PERF RSLTS DOC: HCPCS | Performed by: INTERNAL MEDICINE

## 2022-07-10 PROCEDURE — 1090F PRES/ABSN URINE INCON ASSESS: CPT | Performed by: INTERNAL MEDICINE

## 2022-07-10 PROCEDURE — G8536 NO DOC ELDER MAL SCRN: HCPCS | Performed by: INTERNAL MEDICINE

## 2022-07-10 PROCEDURE — G8399 PT W/DXA RESULTS DOCUMENT: HCPCS | Performed by: INTERNAL MEDICINE

## 2022-07-10 PROCEDURE — G8754 DIAS BP LESS 90: HCPCS | Performed by: INTERNAL MEDICINE

## 2022-07-10 PROCEDURE — 99204 OFFICE O/P NEW MOD 45 MIN: CPT | Performed by: INTERNAL MEDICINE

## 2022-07-10 PROCEDURE — 1123F ACP DISCUSS/DSCN MKR DOCD: CPT | Performed by: INTERNAL MEDICINE

## 2022-07-10 PROCEDURE — 1101F PT FALLS ASSESS-DOCD LE1/YR: CPT | Performed by: INTERNAL MEDICINE

## 2022-07-10 PROCEDURE — G8427 DOCREV CUR MEDS BY ELIG CLIN: HCPCS | Performed by: INTERNAL MEDICINE

## 2022-07-10 PROCEDURE — G8417 CALC BMI ABV UP PARAM F/U: HCPCS | Performed by: INTERNAL MEDICINE

## 2022-07-10 PROCEDURE — G8753 SYS BP > OR = 140: HCPCS | Performed by: INTERNAL MEDICINE

## 2022-07-10 PROCEDURE — 3017F COLORECTAL CA SCREEN DOC REV: CPT | Performed by: INTERNAL MEDICINE

## 2022-07-10 RX ORDER — LEVOFLOXACIN 500 MG/1
500 TABLET, FILM COATED ORAL DAILY
Qty: 7 TABLET | Refills: 0 | Status: SHIPPED | OUTPATIENT
Start: 2022-07-10

## 2022-07-10 RX ORDER — PREDNISONE 20 MG/1
40 TABLET ORAL
Qty: 10 TABLET | Refills: 0 | Status: SHIPPED | OUTPATIENT
Start: 2022-07-10

## 2022-07-10 NOTE — PROGRESS NOTES
HISTORY OF PRESENT ILLNESS  Zoraida Rahman is a 69 y.o. female. Patient presents with:  Cough: Pt here today with complaint of cough for the past few days. Reports nasal congestion, nausea, sore throat. States that cough is keeping her up night. Using rescue inhaler without relief. Pt presents today concerned for COVID infection for past 10 days. Had cold last week for a few days with congestion and cough that resolved last weekend, however returned this past Friday with refractory productive cough, wheezing, SOB, and nausea. no known exposure to confirmed POSITIVE person. Denies fever, CP, loss of sense of taste and smell, sore throat, myalgias, V/D/Abd pain, & HA. Vaccination status: FULLY VACC & BOOSTED. Cough  Associated symptoms include shortness of breath, wheezing and nausea. Pertinent negatives include no chest pain, no chills, no headaches, no sore throat, no myalgias and no vomiting. Patient Active Problem List   Diagnosis Code    Incisional hernia K43.2    Esophageal cancer (HCC) C15.9    Hypercholesteremia E78.00    HTN (hypertension) I10    Asthma J45.909    Thyroid nodule E04.1    ASHD (arteriosclerotic heart disease) I25.10    Bradycardia R00.1    Diabetes mellitus without complication (HCC) Q05.2    Recurrent depression ([address] F33.9     Patient Active Problem List    Diagnosis Date Noted    Recurrent depression ([address] 12/29/2017    Diabetes mellitus without complication ([address] 63/36/7348    ASHD (arteriosclerotic heart disease) 06/18/2014    Bradycardia 06/18/2014    Hypercholesteremia 02/18/2013    HTN (hypertension) 02/18/2013    Asthma 02/18/2013    Thyroid nodule 02/18/2013    Incisional hernia 02/10/2011    Esophageal cancer ([facility] 02/10/2011     Current Outpatient Medications   Medication Sig Dispense Refill    predniSONE (DELTASONE) 20 mg tablet Take 2 Tablets by mouth daily (with breakfast).  10 Tablet 0    levoFLOXacin (Levaquin) 500 mg tablet Take 1 Tablet by mouth daily. 7 Tablet 0    montelukast (SINGULAIR) 10 mg tablet Take 1 Tablet by mouth daily. 90 Tablet 0    atorvastatin (LIPITOR) 40 mg tablet Take 1 Tablet by mouth daily. 90 Tablet 0    amLODIPine (NORVASC) 2.5 mg tablet Take 1 Tablet by mouth daily. In place of Ranexa as not covered 90 Tablet 3    glucose blood VI test strips (Accu-Chek Corrine Plus test strp) strip Check glucose twice daily 100 Strip 3    metFORMIN ER (GLUCOPHAGE XR) 500 mg tablet Take 2 Tablets by mouth daily (with dinner). 180 Tablet 1    losartan (COZAAR) 50 mg tablet TAKE 1 TABLET EVERY DAY 90 Tablet 1    insulin glargine (Lantus Solostar U-100 Insulin) 100 unit/mL (3 mL) inpn INJECT  36 UNITS SUBCUTANEOUSLY EVERY DAY 30 mL 2    furosemide (LASIX) 20 mg tablet Every day prn (Patient not taking: Reported on 6/8/2022) 90 Tablet 1    Insulin Needles, Disposable, (Pen Needle) 31 gauge x 5/16\" ndle Use with insulin twice daily; dx E11.9 1 Package 11    MAGNESIUM PO Take  by mouth daily. Indications: leg cramps      glucose blood VI test strips (Accu-Chek Corrine Plus test strp) strip CHECK BLOOD SUGAR TWICE DAILY 100 Strip 3    albuterol (PROVENTIL HFA, VENTOLIN HFA, PROAIR HFA) 90 mcg/actuation inhaler Take 2 Puffs by inhalation every four (4) hours as needed for Wheezing. 1 Inhaler 3    Blood-Glucose Meter (ACCU-CHEK CORRINE PLUS METER) misc Check glucose twice daily. 1 Each 1    cholecalciferol, vitamin D3, (VITAMIN D3) 2,000 unit tab Take  by mouth daily.  SYMBICORT 160-4.5 mcg/actuation HFA inhaler Take 2 Puffs by inhalation two (2) times a day.  polyethylene glycol (MIRALAX) 17 gram packet Take 17 g by mouth daily.  cetirizine (ZYRTEC) 10 mg tablet Take 10 mg by mouth daily.  melatonin 5 mg Tab Take 5 mg by mouth nightly.  Dexlansoprazole (DEXILANT) 60 mg CpDM Take 60 mg by mouth daily.  aspirin 81 mg tablet Take 81 mg by mouth.       pyridoxine (VITAMIN B-6) 100 mg tablet Take 100 mg by mouth every morning.        Allergies   Allergen Reactions    Adhesive Other (comments)     redness     Past Medical History:   Diagnosis Date    Arrhythmia     bradycardia    Bloating     CAD (coronary artery disease)     Cancer ([facility] 2010    esophageal/GE junction    Chest pain     Chest pain     Chronic obstructive pulmonary disease (HCC)     Chronic pain     left shoulder    Congestion of throat     Cough     Depression     & anxiety    Diabetes (HCC)     IDDM    Dyspepsia and other specified disorders of function of stomach     Fatigue     GERD (gastroesophageal reflux disease)     Headache(784.0)     Hypercholesterolemia     Hypertension     Multinodular goiter     Nausea & vomiting     Stool color black     Stress     Weakness      Past Surgical History:   Procedure Laterality Date    COLONOSCOPY N/A 8/7/2018    COLONOSCOPY performed by Ramona Scott MD at [address]; HI RISK IND  8/7/2018         HX ENDOSCOPY  4/2016    HX GI  2010    gastroesophagectomy    HX HEART CATHETERIZATION  03/2017    stent x 1    HX HEENT  1957    tonsils as child    HX HERNIA REPAIR  04/26/2012    abdominal and umbilical    HX LAP CHOLECYSTECTOMY  1995    HX ORTHOPAEDIC  02/20/2015    right shoulder manipulation    HX OTHER SURGICAL Bilateral      shoulder surgery for frozen surgery    HX OTHER SURGICAL  6/24/2015    mammogram     HX PELVIC LAPAROSCOPY  1989    HX TUBAL LIGATION  1983    lap btl    HX VASCULAR ACCESS      port a cath and removal    UT EGD BALLOON DILATION ESOPHAGUS <30 MM DIAM  4/20/2010         UT EXTRAC ERUPTED TOOTH/EXPOSED ROOT       Family History   Problem Relation Age of Onset    Diabetes Father     Cancer Father         intestinal    Heart Disease Father     Hypertension Father     Heart Disease Brother     Diabetes Brother     Diabetes Mother     Lung Disease Mother     Heart Disease Mother     Hypertension Mother    Terry [place] Diabetes Maternal Grandmother     Diabetes Maternal Grandfather     Elevated Lipids Maternal Aunt     Thyroid Disease Neg Hx      Social History     Tobacco Use    Smoking status: Never Smoker    Smokeless tobacco: Never Used   Substance Use Topics    Alcohol use: Yes     Alcohol/week: 0.8 standard drinks     Types: 1 Glasses of wine per week     Comment: rarely glass of wine        Review of Systems   Constitutional: Negative for chills, fever and malaise/fatigue. HENT: Positive for congestion. Negative for sore throat. Respiratory: Positive for cough, sputum production, shortness of breath and wheezing. Cardiovascular: Negative for chest pain. Gastrointestinal: Positive for nausea. Negative for diarrhea and vomiting. Musculoskeletal: Negative for myalgias. Neurological: Negative for headaches. All other systems reviewed and are negative. Physical Exam  Vitals and nursing note reviewed. Constitutional:       General: She is not in acute distress. Appearance: She is well-developed. She is not diaphoretic. HENT:      Head: Normocephalic and atraumatic. Right Ear: External ear normal. Tympanic membrane is injected. Left Ear: External ear normal. There is impacted cerumen. Nose:      Right Turbinates: Not swollen or pale. Left Turbinates: Not swollen or pale. Right Sinus: No maxillary sinus tenderness or frontal sinus tenderness. Left Sinus: No maxillary sinus tenderness or frontal sinus tenderness. Mouth/Throat:      Pharynx: No pharyngeal swelling or posterior oropharyngeal erythema. Tonsils: No tonsillar exudate. 0 on the right. 0 on the left. Cardiovascular:      Rate and Rhythm: Normal rate. Pulmonary:      Effort: Pulmonary effort is normal. No respiratory distress. Breath sounds: Examination of the right-upper field reveals decreased breath sounds and rhonchi.  Examination of the left-upper field reveals decreased breath sounds and rhonchi. Examination of the right-lower field reveals decreased breath sounds and rhonchi. Examination of the left-lower field reveals decreased breath sounds and rhonchi. Decreased breath sounds and rhonchi present. Comments: Faint rhonchi with mild dyspnea noted    Abdominal:      General: There is no distension. Neurological:      Mental Status: She is alert and oriented to person, place, and time. Psychiatric:         Behavior: Behavior normal.         Judgment: Judgment normal.         ASSESSMENT and PLAN  CLINICAL IMPRESSION:     ICD-10-CM ICD-9-CM    1. COPD exacerbation (HCC)  J44.1 491.21 POCT COVID-19, SARS-COV-2, PCR      XR CHEST PA LAT      predniSONE (DELTASONE) 20 mg tablet      levoFLOXacin (Levaquin) 500 mg tablet         Plan:  F/U with PCP/ER, INI or symptoms worsen. May report to ER for SOB or CP. Advised to self-isolate for 5-10 days following potential exposure/symptom onset and at least 24 hours following fever. Symptomatic treatment advised otherwise with use of OTC/Rx meds. Results for orders placed or performed in visit on 07/10/22   POCT COVID-19, SARS-COV-2, PCR   Result Value Ref Range    SARS-COV-2 PCR, POC Negative Negative             The patients condition was discussed with the patient and they understand. The patient is to follow up with PCP. If signs and symptoms become worse the pt is to go to the ER. The patient is to take medications as prescribed.

## 2022-07-10 NOTE — PATIENT INSTRUCTIONS
Follow Up with PCP in 3-5 days if no improvement/routine care. Call to be seen sooner or return Here/ER, if no improvement with treatments advised/prescribed or symptoms/pain worsen (develop fever, pain worsens significantly, or develop NEW symptoms). Chronic Obstructive Pulmonary Disease (COPD) Flare-Ups: Care Instructions  Overview     Chronic obstructive pulmonary disease (COPD) is a lung disease that makes it hard to breathe. It is caused by damage to the lungs over many years, usually from smoking. Chronic bronchitis and emphysema are two lung problems that are types of COPD:  · Chronic bronchitis: The airways that carry air to the lungs (bronchial tubes) get inflamed and make a lot of mucus. This can narrow or block the airways. It can also make you cough. · Emphysema: The tiny air sacs (alveoli) at the end of the airways in the lungs are damaged. When the air sacs are damaged or destroyed, the inner walls break down, and the sacs become larger. These larger air sacs move less oxygen into the blood. This causes difficulty breathing or shortness of breath that gets worse over time. After air sacs are destroyed, they cannot be replaced. Many people with COPD have attacks called flare-ups or exacerbations. This is when your usual symptoms quickly get worse and stay worse. If you notice any problems or new symptoms, get medical treatment right away. Follow-up care is a key part of your treatment and safety. Be sure to make and go to all appointments, and call your doctor if you are having problems. It's also a good idea to know your test results and keep a list of the medicines you take. How can you care for yourself at home? · Be safe with medicines. Take your medicines exactly as prescribed. Call your doctor if you think you are having a problem with your medicine. You may be taking medicines such as:  ? Bronchodilators. These help open your airways and make breathing easier. ? Corticosteroids. These reduce airway inflammation. They may be given as pills, in a vein, or in an inhaled form. You may go home with pills in addition to an inhaler that you already use. · A spacer may help you get more inhaled medicine to your lungs. Ask your doctor or pharmacist if a spacer is right for you. If it is, ask how to use it properly. · If your doctor prescribed antibiotics, take them as directed. Do not stop taking them just because you feel better. You need to take the full course of antibiotics. · If your doctor prescribed oxygen, use the flow rate your doctor has recommended. Do not change it without talking to your doctor first.  · Do not smoke. Smoking makes COPD worse. If you need help quitting, talk to your doctor about stop-smoking programs and medicines. These can increase your chances of quitting for good. When should you call for help? Call 911 anytime you think you may need emergency care. For example, call if:    · You have severe trouble breathing. Call your doctor now or seek immediate medical care if:    · You have new or worse trouble breathing.     · Your coughing or wheezing gets worse.     · You cough up dark brown or bloody mucus (sputum).     · You have a new or higher fever.     · You have severe chest pain, or chest pain is quickly getting worse. Watch closely for changes in your health, and be sure to contact your doctor if:    · You notice more mucus or a change in the color of your mucus.     · You need to use your antibiotic or steroid pills.     · You do not get better as expected. Where can you learn more? Go to http://www.gray.com/  Enter D989 in the search box to learn more about \"Chronic Obstructive Pulmonary Disease (COPD) Flare-Ups: Care Instructions. \"  Current as of: July 6, 2021               Content Version: 13.2  © 5393-9664 [employer].    Care instructions adapted under license by [employer] (which disclaims liability or warranty for this information). If you have questions about a medical condition or this instruction, always ask your healthcare professional. [place] [address] any warranty or liability for your use of this information.

## 2022-07-11 RX ORDER — LOSARTAN POTASSIUM 50 MG/1
TABLET ORAL
Qty: 90 TABLET | Refills: 1 | Status: SHIPPED | OUTPATIENT
Start: 2022-07-11

## 2022-07-22 RX ORDER — FUROSEMIDE 20 MG/1
TABLET ORAL
Qty: 90 TABLET | Refills: 1 | Status: SHIPPED | OUTPATIENT
Start: 2022-07-22

## 2022-07-29 ENCOUNTER — HOSPITAL ENCOUNTER (OUTPATIENT)
Dept: MAMMOGRAPHY | Age: 67
Discharge: HOME | End: 2022-07-29
Attending: INTERNAL MEDICINE
Payer: MEDICARE

## 2022-07-29 DIAGNOSIS — Z12.31 VISIT FOR SCREENING MAMMOGRAM: ICD-10-CM

## 2022-07-29 PROCEDURE — 77067 SCR MAMMO BI INCL CAD: CPT

## 2022-08-30 NOTE — PROGRESS NOTES
HISTORY OF PRESENT ILLNESS  Isa Jimenez is a 69 y.o. female. HPI  Last here 6/8/22. Pt is here to f/u on chronic conditions. Has a history of hypertension  BP today is 160/71, will repeat 149/65  BP at home: 130s/70s  Continues on losartan 50mg and norvasc 2.5mg   Blood pressure elevated will increase Norvasc to 5 mg     She has lasix to use prn for mild swelling in her legs  Not needing this much     She is diabetic  BS 80s, has had lows around 60 in the evening before dinner, but this is infrequent  She is taking lantus 36U qAm, will decrease to 30U due to wt/l and improved A1C  Discussed she can increase to 32U if morning BS is > 140  Taking metformin 500 BID  No longer taking novolog 8U with dinner + sliding scale (8U if >150, 10U >200, 12U if >250, 14U if >300),     She also takes ASA 81mg daily      Reviewed labs    Ordered labs today     Wt today is 160 lbs, down 10 lbs since lov  Of note, pt has had dental work and has had trouble eating  Discussed diet and wt/l     Pt follows with Dr. Cecil Marinelli (cardio) for CAD   She is on Norvasc to medically managed chest pain   not having any active symptoms has a history of a stent  Last visit 6/22, no med changes, no new tests  Last ECHO 2016  No EKG performed per pt   No changes in medication per pt       Pt follows annually with Dr. Sigrid Cutler (hemo/onc) for h/o adenoma of esophagus   Last visit was 7/3/18  Pt was discharged from his care      Pt follows with Dr. Tri Damon (pulm) for history of asthma annually  Last visit was  2/22  -reports everything was fine no change in medication she will get PFTs next year   Continues singulair daily, albuterol prn, and symbicort BID for breathing/asthma per pulm   She presented to urgent care for COPD exacerbation and dry cough upon coming back from [facility]  Was rx'd prenisone, this was since resolved. Discussed this could have been COVID originally. Reviewed CXR 7/10/22: IMPRESSION  No acute findings.     A small hiatal hernia was incidentally found on CXR, pt is concerned about this. Reassured her this is nothing to worry about. She saw NP Ava Pinto [facility] derm)  Last visit 8/31/22  She had pre cancerous spot removed from her left calf in the past     Follows with Dr Damon Shaw (GI)  Continues dexilant and pepcid at night this controls her reflux symptoms well  Had vv 8/20 will follow-up as needed  Recall has h/o esophageal cancer     Provided referral for plastic surgeon for her ear previously    Pt has not been evaluated for DANIELLE in the past   Recall she declines further evaluation for now       Continues lipitor 40mg daily for cholesterol     Pt is supplementing with Calcium 600 mg qd  Instructed to increase from once a day to BID     Of note, her  has bladder cancer and she is caring for him at home. ACP not on file. SDM is her .   Reminded pt to bring in this lov    Reviewed mammo 7/29/22: negative      PREVENTIVE:    Colonoscopy: 8/7/18, Dr. Damon Shaw, repeat 5 years  EGD: 4/16, Dr. Damon Shaw, h/o esophageal cancer, polyp on recent EGD, biopsy nl  AAA: FMHx (grandfather), 03/22 negative   Pap: Dr. Tom Cm, 9/20, scheduled 9/22  Mammogram: 7/29/22 negative  Dexa: 7/27/20 nl  Tdap: 9/14/2016  Pneumovax: 10/14/19   [id number]: 11/14/2016  Zostavax: 11/18/2016, reaction on R arm  Shingrix: both completed 2/20  Flu shot: 10/21/21  Foot exam: 9/6/22  Microalbumin: 8/22  A1c:  2/22 7.1, 6/22 poc 6.4 8/22 6.3  Eye exam: VEI 2/22  Lipids: 8/22 LDL 40  Hep C screen: 11/15, negative  Covid: four doses ([place] Numbers)      Patient Active Problem List    Diagnosis Date Noted    Recurrent depression ([address] 12/29/2017    Diabetes mellitus without complication ([facility] 39/94/9702    ASHD (arteriosclerotic heart disease) 06/18/2014    Bradycardia 06/18/2014    Hypercholesteremia 02/18/2013    HTN (hypertension) 02/18/2013    Asthma 02/18/2013    Thyroid nodule 02/18/2013    Incisional hernia 02/10/2011    Esophageal cancer ([facility] [address] 02/10/2011     Current Outpatient Medications   Medication Sig Dispense Refill    furosemide (LASIX) 20 mg tablet TAKE 1 TABLET BY MOUTH EVERY DAY AS NEEDED 90 Tablet 1    losartan (COZAAR) 50 mg tablet TAKE 1 TABLET EVERY DAY 90 Tablet 1    predniSONE (DELTASONE) 20 mg tablet Take 2 Tablets by mouth daily (with breakfast). 10 Tablet 0    levoFLOXacin (Levaquin) 500 mg tablet Take 1 Tablet by mouth daily. 7 Tablet 0    montelukast (SINGULAIR) 10 mg tablet Take 1 Tablet by mouth daily. 90 Tablet 0    atorvastatin (LIPITOR) 40 mg tablet Take 1 Tablet by mouth daily. 90 Tablet 0    amLODIPine (NORVASC) 2.5 mg tablet Take 1 Tablet by mouth daily. In place of [place] as not covered 90 Tablet 3    glucose blood VI test strips (Accu-Chek Corrine Plus test strp) strip Check glucose twice daily 100 Strip 3    metFORMIN ER (GLUCOPHAGE XR) 500 mg tablet Take 2 Tablets by mouth daily (with dinner). 180 Tablet 1    insulin glargine (Lantus Solostar U-100 Insulin) 100 unit/mL (3 mL) inpn INJECT  36 UNITS SUBCUTANEOUSLY EVERY DAY 30 mL 2    Insulin Needles, Disposable, (Pen Needle) 31 gauge x 5/16\" ndle Use with insulin twice daily; dx E11.9 1 Package 11    MAGNESIUM PO Take  by mouth daily. Indications: leg cramps      glucose blood VI test strips (Accu-Chek Corrine Plus test strp) strip CHECK BLOOD SUGAR TWICE DAILY 100 Strip 3    albuterol (PROVENTIL HFA, VENTOLIN HFA, PROAIR HFA) 90 mcg/actuation inhaler Take 2 Puffs by inhalation every four (4) hours as needed for Wheezing. 1 Inhaler 3    Blood-Glucose Meter (ACCU-CHEK CORRINE PLUS METER) misc Check glucose twice daily. 1 Each 1    cholecalciferol, vitamin D3, (VITAMIN D3) 2,000 unit tab Take  by mouth daily. SYMBICORT 160-4.5 mcg/actuation HFA inhaler Take 2 Puffs by inhalation two (2) times a day. polyethylene glycol (MIRALAX) 17 gram packet Take 17 g by mouth daily. cetirizine (ZYRTEC) 10 mg tablet Take 10 mg by mouth daily.       melatonin 5 mg Tab Take 5 mg by mouth nightly. Dexlansoprazole (DEXILANT) 60 mg CpDM Take 60 mg by mouth daily. aspirin 81 mg tablet Take 81 mg by mouth.      pyridoxine (VITAMIN B-6) 100 mg tablet Take 100 mg by mouth every morning.        Past Surgical History:   Procedure Laterality Date    COLONOSCOPY N/A 8/7/2018    COLONOSCOPY performed by Sherron May MD at [address]; HI RISK IND  8/7/2018         HX ENDOSCOPY  4/2016    HX GI  2010    gastroesophagectomy    HX HEART CATHETERIZATION  03/2017    stent x 1    HX HEENT  1957    tonsils as child    HX HERNIA REPAIR  04/26/2012    abdominal and umbilical    HX LAP CHOLECYSTECTOMY  1995    HX ORTHOPAEDIC  02/20/2015    right shoulder manipulation    HX OTHER SURGICAL Bilateral      shoulder surgery for frozen surgery    HX OTHER SURGICAL  6/24/2015    mammogram     HX PELVIC LAPAROSCOPY  1989    HX TUBAL LIGATION  1983    lap btl    HX VASCULAR ACCESS      port a cath and removal    SC EGD BALLOON DILATION ESOPHAGUS <30 MM DIAM  4/20/2010         SC EXTRAC ERUPTED TOOTH/EXPOSED ROOT        Lab Results   Component Value Date/Time    WBC 5.9 08/18/2021 01:39 PM    HGB 13.4 08/18/2021 01:39 PM    Hemoglobin (POC) 11.9 01/28/2010 12:02 PM    HCT 40.8 08/18/2021 01:39 PM    Hematocrit (POC) 35 01/28/2010 12:02 PM    PLATELET 019 18/34/6626 01:39 PM    MCV 94.4 08/18/2021 01:39 PM     Lab Results   Component Value Date/Time    Cholesterol, total 152 11/29/2021 12:02 PM    HDL Cholesterol 52 11/29/2021 12:02 PM    LDL, calculated 81.8 11/29/2021 12:02 PM    Triglyceride 91 11/29/2021 12:02 PM    CHOL/HDL Ratio 2.9 11/29/2021 12:02 PM     Lab Results   Component Value Date/Time    GFR est non-AA >60 05/25/2022 08:19 AM    GFRNA, POC >60 01/06/2012 10:42 AM    GFR est AA >60 05/25/2022 08:19 AM    GFRAA, POC >60 01/06/2012 10:42 AM    Creatinine 0.71 05/25/2022 08:19 AM    Creatinine (POC) 0.9 01/06/2012 10:42 AM    BUN 8 05/25/2022 08:19 AM    BUN (POC) 14 01/28/2010 12:02 PM    Sodium 138 05/25/2022 08:19 AM    Sodium (POC) 141 01/28/2010 12:02 PM    Potassium 4.3 05/25/2022 08:19 AM    Potassium (POC) 4.0 01/28/2010 12:02 PM    Chloride 103 05/25/2022 08:19 AM    Chloride (POC) 103 01/28/2010 12:02 PM    CO2 29 05/25/2022 08:19 AM    Magnesium 2.1 05/25/2022 08:19 AM    PTH, Intact 56.8 05/25/2022 08:19 AM        Review of Systems   Respiratory:  Negative for shortness of breath. Cardiovascular:  Negative for chest pain. Physical Exam  Constitutional:       General: She is not in acute distress. Appearance: She is not ill-appearing, toxic-appearing or diaphoretic. HENT:      Head: Normocephalic and atraumatic. Right Ear: External ear normal.      Left Ear: External ear normal.   Eyes:      General:         Right eye: No discharge. Left eye: No discharge. Conjunctiva/sclera: Conjunctivae normal.      Pupils: Pupils are equal, round, and reactive to light. Cardiovascular:      Rate and Rhythm: Normal rate and regular rhythm. Heart sounds: No murmur heard. No friction rub. No gallop. Pulmonary:      Effort: No respiratory distress. Breath sounds: Normal breath sounds. No wheezing or rales. Chest:      Chest wall: No tenderness. Musculoskeletal:         General: Normal range of motion. Cervical back: Normal.      Right lower leg: Edema (trace) present. Left lower leg: Edema (trace) present. Feet:      Comments: Sensory exam of the foot is normal, tested with the monofilament. Good pulses, no lesions or ulcers, good peripheral pulses. Skin:     General: Skin is warm and dry. Neurological:      Mental Status: She is oriented to person, place, and time. Mental status is at baseline. Coordination: Coordination normal.      Gait: Gait normal.   Psychiatric:         Mood and Affect: Mood normal.         Behavior: Behavior normal.       ASSESSMENT and PLAN    ICD-10-CM ICD-9-CM    1.  Diabetes mellitus without complication ([facility])  C74.2 098.76 METABOLIC PANEL, BASIC      HEMOGLOBIN A1C WITH EAG   Very well controlled diabetes A1c on the lower side putting her at risk for hypoglycemia    Will decrease Lantus to 30 units    Continue metformin twice daily    Check A1c prior to follow-up    Discussed insulin titration if fasting sugar start climbing significantly at home    DIABETES FOOT EXAM      2. Primary hypertension  K62 949.7 METABOLIC PANEL, BASIC      HEMOGLOBIN A1C WITH EAG   Initial blood pressure elevated repeat improved but still above goal currently on losartan 50 mg and Norvasc 2.5 mg    Increase Norvasc to 5 mgIncrease norvasc to 5mg    3. Malignant neoplasm of esophagus, unspecified location ([facility])  S38.7 390.9 METABOLIC PANEL, BASIC      HEMOGLOBIN A1C WITH EAG   Remote history of in remission continues on Dexilant and Pepcid for reflux   4. Hypercholesteremia  T49.20 037.8 METABOLIC PANEL, BASIC   Controlled on Lipitor monitor lipids   HEMOGLOBIN A1C WITH EAG      5. Recurrent depression ([facility])  U21.6 585.74 METABOLIC PANEL, BASIC      HEMOGLOBIN A1C WITH EAG   Controlled without medication   6. Mild intermittent asthma without complication  J36.79 624.45 METABOLIC PANEL, BASIC      HEMOGLOBIN A1C WITH EAG   Stable with Symbicort and Singulair did have a URI earlier this summer that may have been COVID only had 1 negative COVID test she did need to take steroids briefly but breathings is back to baseline     Depression screen reviewed and negative. Scribed by Espinoza Pedroza, as dictated by Dr. Grayson Alexander. Current diagnosis and concerns discussed with pt at length. Pt understands risks and benefits or current treatment plan and medications, and accepts the treatment and medication with any possible risks. Pt asks appropriate questions, which were answered. Pt was instructed to call with any concerns or problems. I have reviewed the note documented by the scribe.  The services provided are my own. The documentation is accurate.

## 2022-08-31 ENCOUNTER — APPOINTMENT (OUTPATIENT)
Dept: INTERNAL MEDICINE CLINIC | Age: 67
End: 2022-08-31

## 2022-08-31 DIAGNOSIS — I10 PRIMARY HYPERTENSION: ICD-10-CM

## 2022-08-31 DIAGNOSIS — E83.51 HYPOCALCEMIA: ICD-10-CM

## 2022-08-31 DIAGNOSIS — C15.9 MALIGNANT NEOPLASM OF ESOPHAGUS, UNSPECIFIED LOCATION (HCC): ICD-10-CM

## 2022-08-31 DIAGNOSIS — F33.9 RECURRENT DEPRESSION (HCC): ICD-10-CM

## 2022-08-31 DIAGNOSIS — E11.9 DIABETES MELLITUS WITHOUT COMPLICATION (HCC): ICD-10-CM

## 2022-08-31 DIAGNOSIS — E78.00 HYPERCHOLESTEREMIA: ICD-10-CM

## 2022-08-31 DIAGNOSIS — J45.20 MILD INTERMITTENT ASTHMA WITHOUT COMPLICATION: ICD-10-CM

## 2022-08-31 DIAGNOSIS — I25.10 ASHD (ARTERIOSCLEROTIC HEART DISEASE): ICD-10-CM

## 2022-08-31 LAB
ALBUMIN SERPL-MCNC: 4 G/DL (ref 3.5–5)
ALBUMIN/GLOB SERPL: 1.4 {RATIO} (ref 1.1–2.2)
ALP SERPL-CCNC: 58 U/L (ref 45–117)
ALT SERPL-CCNC: 39 U/L (ref 12–78)
ANION GAP SERPL CALC-SCNC: 7 MMOL/L (ref 5–15)
AST SERPL-CCNC: 23 U/L (ref 15–37)
BILIRUB SERPL-MCNC: 0.5 MG/DL (ref 0.2–1)
BUN SERPL-MCNC: 10 MG/DL (ref 6–20)
BUN/CREAT SERPL: 14 (ref 12–20)
CALCIUM SERPL-MCNC: 8.9 MG/DL (ref 8.5–10.1)
CHLORIDE SERPL-SCNC: 103 MMOL/L (ref 97–108)
CHOLEST SERPL-MCNC: 117 MG/DL
CO2 SERPL-SCNC: 30 MMOL/L (ref 21–32)
CREAT SERPL-MCNC: 0.73 MG/DL (ref 0.55–1.02)
CREAT UR-MCNC: 173 MG/DL
ERYTHROCYTE [DISTWIDTH] IN BLOOD BY AUTOMATED COUNT: 15.6 % (ref 11.5–14.5)
EST. AVERAGE GLUCOSE BLD GHB EST-MCNC: 134 MG/DL
GLOBULIN SER CALC-MCNC: 2.8 G/DL (ref 2–4)
GLUCOSE SERPL-MCNC: 110 MG/DL (ref 65–100)
HBA1C MFR BLD: 6.3 % (ref 4–5.6)
HCT VFR BLD AUTO: 37.6 % (ref 35–47)
HDLC SERPL-MCNC: 62 MG/DL
HDLC SERPL: 1.9 {RATIO} (ref 0–5)
HGB BLD-MCNC: 12.1 G/DL (ref 11.5–16)
LDLC SERPL CALC-MCNC: 40.6 MG/DL (ref 0–100)
MCH RBC QN AUTO: 31.3 PG (ref 26–34)
MCHC RBC AUTO-ENTMCNC: 32.2 G/DL (ref 30–36.5)
MCV RBC AUTO: 97.4 FL (ref 80–99)
MICROALBUMIN UR-MCNC: 1.8 MG/DL
MICROALBUMIN/CREAT UR-RTO: 10 MG/G (ref 0–30)
NRBC # BLD: 0 K/UL (ref 0–0.01)
NRBC BLD-RTO: 0 PER 100 WBC
PLATELET # BLD AUTO: 257 K/UL (ref 150–400)
PMV BLD AUTO: 10.2 FL (ref 8.9–12.9)
POTASSIUM SERPL-SCNC: 5 MMOL/L (ref 3.5–5.1)
PROT SERPL-MCNC: 6.8 G/DL (ref 6.4–8.2)
RBC # BLD AUTO: 3.86 M/UL (ref 3.8–5.2)
SODIUM SERPL-SCNC: 140 MMOL/L (ref 136–145)
TRIGL SERPL-MCNC: 72 MG/DL (ref ?–150)
TSH SERPL DL<=0.05 MIU/L-ACNC: 2.88 UIU/ML (ref 0.36–3.74)
VLDLC SERPL CALC-MCNC: 14.4 MG/DL
WBC # BLD AUTO: 6.7 K/UL (ref 3.6–11)

## 2022-09-06 ENCOUNTER — OFFICE VISIT (OUTPATIENT)
Dept: INTERNAL MEDICINE CLINIC | Age: 67
End: 2022-09-06
Payer: MEDICARE

## 2022-09-06 VITALS
RESPIRATION RATE: 16 BRPM | TEMPERATURE: 97.4 F | HEIGHT: 63 IN | BODY MASS INDEX: 28.39 KG/M2 | DIASTOLIC BLOOD PRESSURE: 65 MMHG | HEART RATE: 65 BPM | OXYGEN SATURATION: 98 % | SYSTOLIC BLOOD PRESSURE: 149 MMHG | WEIGHT: 160.2 LBS

## 2022-09-06 DIAGNOSIS — I10 PRIMARY HYPERTENSION: ICD-10-CM

## 2022-09-06 DIAGNOSIS — E11.9 DIABETES MELLITUS WITHOUT COMPLICATION (HCC): Primary | ICD-10-CM

## 2022-09-06 DIAGNOSIS — F33.9 RECURRENT DEPRESSION (HCC): ICD-10-CM

## 2022-09-06 DIAGNOSIS — J45.20 MILD INTERMITTENT ASTHMA WITHOUT COMPLICATION: ICD-10-CM

## 2022-09-06 DIAGNOSIS — E78.00 HYPERCHOLESTEREMIA: ICD-10-CM

## 2022-09-06 DIAGNOSIS — C15.9 MALIGNANT NEOPLASM OF ESOPHAGUS, UNSPECIFIED LOCATION (HCC): ICD-10-CM

## 2022-09-06 PROCEDURE — 3017F COLORECTAL CA SCREEN DOC REV: CPT | Performed by: INTERNAL MEDICINE

## 2022-09-06 PROCEDURE — 3044F HG A1C LEVEL LT 7.0%: CPT | Performed by: INTERNAL MEDICINE

## 2022-09-06 PROCEDURE — G8417 CALC BMI ABV UP PARAM F/U: HCPCS | Performed by: INTERNAL MEDICINE

## 2022-09-06 PROCEDURE — 2022F DILAT RTA XM EVC RTNOPTHY: CPT | Performed by: INTERNAL MEDICINE

## 2022-09-06 PROCEDURE — G8427 DOCREV CUR MEDS BY ELIG CLIN: HCPCS | Performed by: INTERNAL MEDICINE

## 2022-09-06 PROCEDURE — 1101F PT FALLS ASSESS-DOCD LE1/YR: CPT | Performed by: INTERNAL MEDICINE

## 2022-09-06 PROCEDURE — G8753 SYS BP > OR = 140: HCPCS | Performed by: INTERNAL MEDICINE

## 2022-09-06 PROCEDURE — G8754 DIAS BP LESS 90: HCPCS | Performed by: INTERNAL MEDICINE

## 2022-09-06 PROCEDURE — 99214 OFFICE O/P EST MOD 30 MIN: CPT | Performed by: INTERNAL MEDICINE

## 2022-09-06 PROCEDURE — 1090F PRES/ABSN URINE INCON ASSESS: CPT | Performed by: INTERNAL MEDICINE

## 2022-09-06 PROCEDURE — G8536 NO DOC ELDER MAL SCRN: HCPCS | Performed by: INTERNAL MEDICINE

## 2022-09-06 PROCEDURE — G8399 PT W/DXA RESULTS DOCUMENT: HCPCS | Performed by: INTERNAL MEDICINE

## 2022-09-06 PROCEDURE — G9899 SCRN MAM PERF RSLTS DOC: HCPCS | Performed by: INTERNAL MEDICINE

## 2022-09-06 PROCEDURE — G9717 DOC PT DX DEP/BP F/U NT REQ: HCPCS | Performed by: INTERNAL MEDICINE

## 2022-09-06 RX ORDER — BLOOD SUGAR DIAGNOSTIC
STRIP MISCELLANEOUS
Qty: 100 STRIP | Refills: 3 | Status: SHIPPED | OUTPATIENT
Start: 2022-09-06

## 2022-09-06 RX ORDER — AMLODIPINE BESYLATE 5 MG/1
5 TABLET ORAL DAILY
Qty: 30 TABLET | Refills: 0 | Status: SHIPPED | OUTPATIENT
Start: 2022-09-06 | End: 2022-09-21 | Stop reason: SDUPTHER

## 2022-09-21 ENCOUNTER — CLINICAL SUPPORT (OUTPATIENT)
Dept: INTERNAL MEDICINE CLINIC | Age: 67
End: 2022-09-21

## 2022-09-21 VITALS
SYSTOLIC BLOOD PRESSURE: 134 MMHG | DIASTOLIC BLOOD PRESSURE: 68 MMHG | OXYGEN SATURATION: 98 % | RESPIRATION RATE: 16 BRPM | HEART RATE: 53 BPM | TEMPERATURE: 98.1 F

## 2022-09-21 DIAGNOSIS — Z01.30 BP CHECK: Primary | ICD-10-CM

## 2022-09-21 RX ORDER — MONTELUKAST SODIUM 10 MG/1
TABLET ORAL
Qty: 90 TABLET | Refills: 0 | Status: SHIPPED | OUTPATIENT
Start: 2022-09-21

## 2022-09-21 RX ORDER — AMLODIPINE BESYLATE 5 MG/1
5 TABLET ORAL DAILY
Qty: 90 TABLET | Refills: 1 | Status: SHIPPED | OUTPATIENT
Start: 2022-09-21 | End: 2022-09-28

## 2022-09-21 NOTE — TELEPHONE ENCOUNTER
Future Appointments:  Future Appointments   Date Time Provider Dc Castilloi   12/6/2022  2:00 PM Jerome Dale MD [facility] BS AMB        Last Appointment With Me:  9/6/2022     Requested Prescriptions     Pending Prescriptions Disp Refills    amLODIPine (NORVASC) 5 mg tablet 90 Tablet 1     Sig: Take 1 Tablet by mouth daily.

## 2022-09-21 NOTE — PROGRESS NOTES
Pt presents in clinic for BP check per Dr. Sterling Seguar. BP taken--see VS.  Visit Vitals  /68 (BP 1 Location: Left upper arm, BP Patient Position: Sitting)   Pulse (!) 53   Temp 98.1 °F (36.7 °C) (Temporal)   Resp 16   SpO2 98%       Pt informed Dr. Sterling Segura will be notified. Pt informed if Dr. Sterling Segura makes any adjustments, pt will be contacted. Pt verbalized understanding of information discussed w/ no further questions at this time.

## 2022-09-24 DIAGNOSIS — E78.00 HYPERCHOLESTEREMIA: ICD-10-CM

## 2022-09-25 RX ORDER — ATORVASTATIN CALCIUM 40 MG/1
TABLET, FILM COATED ORAL
Qty: 90 TABLET | Refills: 0 | Status: SHIPPED | OUTPATIENT
Start: 2022-09-25

## 2022-10-19 ENCOUNTER — CLINICAL SUPPORT (OUTPATIENT)
Dept: INTERNAL MEDICINE CLINIC | Age: 67
End: 2022-10-19
Payer: MEDICARE

## 2022-10-19 VITALS
TEMPERATURE: 97.8 F | SYSTOLIC BLOOD PRESSURE: 126 MMHG | RESPIRATION RATE: 16 BRPM | OXYGEN SATURATION: 96 % | DIASTOLIC BLOOD PRESSURE: 67 MMHG | HEART RATE: 78 BPM

## 2022-10-19 DIAGNOSIS — Z23 NEEDS FLU SHOT: Primary | ICD-10-CM

## 2022-10-19 PROCEDURE — G0008 ADMIN INFLUENZA VIRUS VAC: HCPCS | Performed by: INTERNAL MEDICINE

## 2022-10-19 PROCEDURE — 90694 VACC AIIV4 NO PRSRV 0.5ML IM: CPT | Performed by: INTERNAL MEDICINE

## 2022-10-19 NOTE — PROGRESS NOTES
Identified pt with two pt identifiers(name and ). Reviewed record in preparation for visit and have obtained necessary documentation. Chief Complaint   Patient presents with    Immunization/Injection     FLU        Vitals:    10/19/22 0931   BP: 126/67   Pulse: 78   Resp: 16   Temp: 97.8 °F (36.6 °C)   TempSrc: Temporal   SpO2: 96%     After obtaining consent, and per verbal order from Dr. Willow Mendoza, patient received influenza vaccine given by Aisha Fernandez in LEFT Deltoid. Influenza Vaccine 0.5 mL IM now. Patient was observed for 10 minutes post injection. Patient tolerated injection well.       Suly Simmons, 63 [address] [facility]. [address], [address] [address] Zelphia Buerger, [address]  W: [phone]  F: [phone]

## 2022-10-29 RX ORDER — INSULIN GLARGINE 100 [IU]/ML
INJECTION, SOLUTION SUBCUTANEOUS
Qty: 45 ML | Refills: 0 | Status: SHIPPED | OUTPATIENT
Start: 2022-10-29

## 2022-11-18 DIAGNOSIS — E11.9 DIABETES MELLITUS WITHOUT COMPLICATION (HCC): ICD-10-CM

## 2022-11-18 RX ORDER — PEN NEEDLE, DIABETIC 30 GX3/16"
NEEDLE, DISPOSABLE MISCELLANEOUS
Qty: 1 EACH | Refills: 5 | Status: SHIPPED | OUTPATIENT
Start: 2022-11-18

## 2022-11-18 NOTE — TELEPHONE ENCOUNTER
Future Appointments:  Future Appointments   Date Time Provider Dc Nayely   12/6/2022  2:00 PM Meredith Menchaca MD [facility] BS AMB        Last Appointment With Me:  9/6/2022     Requested Prescriptions     Pending Prescriptions Disp Refills    Insulin Needles, Disposable, (Pen Needle) 31 gauge x 5/16\" ndle       Sig: Use with insulin twice daily; dx E11.9

## 2022-11-30 ENCOUNTER — APPOINTMENT (OUTPATIENT)
Dept: INTERNAL MEDICINE CLINIC | Age: 67
End: 2022-11-30

## 2022-11-30 DIAGNOSIS — F33.9 RECURRENT DEPRESSION (HCC): ICD-10-CM

## 2022-11-30 DIAGNOSIS — J45.20 MILD INTERMITTENT ASTHMA WITHOUT COMPLICATION: ICD-10-CM

## 2022-11-30 DIAGNOSIS — I10 PRIMARY HYPERTENSION: ICD-10-CM

## 2022-11-30 DIAGNOSIS — E78.00 HYPERCHOLESTEREMIA: ICD-10-CM

## 2022-11-30 DIAGNOSIS — E11.9 DIABETES MELLITUS WITHOUT COMPLICATION (HCC): ICD-10-CM

## 2022-11-30 DIAGNOSIS — C15.9 MALIGNANT NEOPLASM OF ESOPHAGUS, UNSPECIFIED LOCATION (HCC): ICD-10-CM

## 2022-12-01 LAB
ANION GAP SERPL CALC-SCNC: 4 MMOL/L (ref 5–15)
BUN SERPL-MCNC: 10 MG/DL (ref 6–20)
BUN/CREAT SERPL: 16 (ref 12–20)
CALCIUM SERPL-MCNC: 8.4 MG/DL (ref 8.5–10.1)
CHLORIDE SERPL-SCNC: 108 MMOL/L (ref 97–108)
CO2 SERPL-SCNC: 30 MMOL/L (ref 21–32)
CREAT SERPL-MCNC: 0.63 MG/DL (ref 0.55–1.02)
EST. AVERAGE GLUCOSE BLD GHB EST-MCNC: 126 MG/DL
GLUCOSE SERPL-MCNC: 83 MG/DL (ref 65–100)
HBA1C MFR BLD: 6 % (ref 4–5.6)
POTASSIUM SERPL-SCNC: 4.3 MMOL/L (ref 3.5–5.1)
SODIUM SERPL-SCNC: 142 MMOL/L (ref 136–145)

## 2022-12-02 NOTE — PROGRESS NOTES
HISTORY OF PRESENT ILLNESS  Mikael Fischer is a 69 y.o. female. HPI  Last here 9/6/22. Pt is here to f/u on chronic conditions.      Has a history of hypertension  BP today is 133/62  BP at home low 130s/50s-60s  Continues on losartan 50mg and norvasc 5 mg (increased from 2.5 mg)      She has lasix to use prn for mild swelling in her legs  Not needing this much minimal swelling on exam     She is diabetic  BS AM 80s-90s  Reports one low reading of 70  She is taking lantus 30 U qAm (decreased from 36U)  Will decrease to 25 U due to low A1c to prevent hypoglycemia  Taking metformin 500 BID  No longer taking novolog 8U with dinner + sliding scale (8U if >150, 10U >200, 12U if >250, 14U if >300),     She also takes ASA 81mg daily     Wt today is 162 lbs, up 2 lbs since lov  Of note, pt has had dental work and has had trouble eating reports eating less in general  Discussed diet and wt/l    Reviewed labs    Ordered labs today      Pt follows with Dr. Jayde Randall (cardio) for CAD   She is on Norvasc to medically manage chest pain   not having any active symptoms has a history of a stent  Last visit 6/22, no med changes, no new tests  Will f/U in 1 year   Last ECHO 2016  No EKG performed per pt   No changes in medication per pt       Pt follows annually with Dr. Bogdan Rodrigues (hemo/onc) for h/o adenoma of esophagus   Last visit was 7/3/18  Pt was discharged from his care      Pt follows with Dr. Belem Holt (pulm) for history of asthma annually   Last visit was 2/22 - reports everything was fine no change in medication   she will get PFTs at next appt 2/23   Continues singulair daily, albuterol prn, and symbicort BID for breathing/asthma per pulm   Has not needed albuterol recently      She saw NP Sarah Bowen ([facility])  Last visit 8/4/22  She had pre cancerous spot removed from her left calf in the past     Follows with Dr Lachelle Waterman (GI)  Continues dexilant and pepcid at night this controls her reflux symptoms well  Had vv 8/20 will follow-up as needed  Recall has h/o esophageal cancer     Provided referral for plastic surgeon for her ear previously     Pt has not been evaluated for DANIELLE in the past   Recall she declines further evaluation for now       Continues lipitor 40mg daily for cholesterol     Pt is supplementing with Calcium 500 mg daily  She was supposed to be taking BID, discussed this again i     Of note, her  has bladder cancer and she is caring for him at home. ACP not on file. SDM is her .   Reminded pt to bring in this lov      PREVENTIVE:    Colonoscopy: 18, Dr. Claudeen Fridge, repeat 5 years, due , ordered   EGD: , Dr. Claudeen Fridge, h/o esophageal cancer, polyp on recent EGD, biopsy nl  AAA: FMHx (grandfather),  negative   Pap: Dr. Julia Scales,   Mammogram: 22 negative  Dexa: 20 nl, due   Tdap: 2016  Pneumovax: 10/14/19   Ksupazm00: 2016  Zostavax: 2016, reaction on R arm  Shingrix: both completed   Flu shot: 2022   Foot exam: 22  Microalbumin:   A1c:   7.1,  poc 6.4  6.3  6.0  Eye exam: VEI   Lipids:  LDL 40  Hep C screen: 11/15, negative  Covid: four doses (Ton Zayasgar), scheduled for 5th dose 22     Patient Active Problem List    Diagnosis Date Noted    Recurrent depression ([facility] 2017    Diabetes mellitus without complication ([facility]     ASHD (arteriosclerotic heart disease) 2014    Bradycardia 2014    Hypercholesteremia 2013    HTN (hypertension) 2013    Asthma 2013    Thyroid nodule 2013    Incisional hernia 02/10/2011    Esophageal cancer ([facility] 02/10/2011     Current Outpatient Medications   Medication Sig Dispense Refill    Insulin Needles, Disposable, (Pen Needle) 31 gauge x 5/16\" ndle Use with insulin twice daily; dx E11.9 1 Each 5    Lantus Solostar U-100 Insulin 100 unit/mL (3 mL) inpn INJECT  36 UNITS SUBCUTANEOUSLY EVERY DAY 45 mL 0    metFORMIN ER (GLUCOPHAGE XR) 500 mg tablet Take 2 Tablets by mouth daily (with dinner). 180 Tablet 0    amLODIPine (NORVASC) 5 mg tablet TAKE 1 TABLET BY MOUTH EVERY DAY 90 Tablet 0    atorvastatin (LIPITOR) 40 mg tablet TAKE 1 TABLET EVERY DAY 90 Tablet 0    montelukast (SINGULAIR) 10 mg tablet TAKE 1 TABLET EVERY DAY 90 Tablet 0    glucose blood VI test strips (Accu-Chek Corrine Plus test strp) strip Check glucose twice daily DX: E11.9 100 Strip 3    furosemide (LASIX) 20 mg tablet TAKE 1 TABLET BY MOUTH EVERY DAY AS NEEDED 90 Tablet 1    losartan (COZAAR) 50 mg tablet TAKE 1 TABLET EVERY DAY 90 Tablet 1    predniSONE (DELTASONE) 20 mg tablet Take 2 Tablets by mouth daily (with breakfast). 10 Tablet 0    levoFLOXacin (Levaquin) 500 mg tablet Take 1 Tablet by mouth daily. 7 Tablet 0    MAGNESIUM PO Take  by mouth daily. Indications: leg cramps      glucose blood VI test strips (Accu-Chek Corrine Plus test strp) strip CHECK BLOOD SUGAR TWICE DAILY 100 Strip 3    albuterol (PROVENTIL HFA, VENTOLIN HFA, PROAIR HFA) 90 mcg/actuation inhaler Take 2 Puffs by inhalation every four (4) hours as needed for Wheezing. 1 Inhaler 3    Blood-Glucose Meter (ACCU-CHEK CORRINE PLUS METER) misc Check glucose twice daily. 1 Each 1    cholecalciferol, vitamin D3, (VITAMIN D3) 2,000 unit tab Take  by mouth daily. SYMBICORT 160-4.5 mcg/actuation HFA inhaler Take 2 Puffs by inhalation two (2) times a day. polyethylene glycol (MIRALAX) 17 gram packet Take 17 g by mouth daily. cetirizine (ZYRTEC) 10 mg tablet Take 10 mg by mouth daily. melatonin 5 mg Tab Take 5 mg by mouth nightly. Dexlansoprazole (DEXILANT) 60 mg CpDM Take 60 mg by mouth daily. aspirin 81 mg tablet Take 81 mg by mouth.      pyridoxine (VITAMIN B-6) 100 mg tablet Take 100 mg by mouth every morning.        Past Surgical History:   Procedure Laterality Date    COLONOSCOPY N/A 8/7/2018    COLONOSCOPY performed by Ebenezer Sparrow MD at [facility]; HI RISK IND  8/7/2018         HX ENDOSCOPY  4/2016    HX GI  2010    gastroesophagectomy    HX HEART CATHETERIZATION  03/2017    stent x 1    HX HEENT  1957    tonsils as child    HX HERNIA REPAIR  04/26/2012    abdominal and umbilical    HX LAP CHOLECYSTECTOMY  1995    HX ORTHOPAEDIC  02/20/2015    right shoulder manipulation    HX OTHER SURGICAL Bilateral      shoulder surgery for frozen surgery    HX OTHER SURGICAL  6/24/2015    mammogram     HX PELVIC LAPAROSCOPY  1989    HX TUBAL LIGATION  1983    lap btl    HX VASCULAR ACCESS      port a cath and removal    GA EGD BALLOON DILATION ESOPHAGUS <30 MM DIAM  4/20/2010         GA EXTRAC ERUPTED TOOTH/EXPOSED ROOT        Lab Results   Component Value Date/Time    WBC 6.7 08/31/2022 08:54 AM    HGB 12.1 08/31/2022 08:54 AM    Hemoglobin (POC) 11.9 01/28/2010 12:02 PM    HCT 37.6 08/31/2022 08:54 AM    Hematocrit (POC) 35 01/28/2010 12:02 PM    PLATELET 411 07/73/8752 08:54 AM    MCV 97.4 08/31/2022 08:54 AM     Lab Results   Component Value Date/Time    Cholesterol, total 117 08/31/2022 08:54 AM    HDL Cholesterol 62 08/31/2022 08:54 AM    LDL, calculated 40.6 08/31/2022 08:54 AM    Triglyceride 72 08/31/2022 08:54 AM    CHOL/HDL Ratio 1.9 08/31/2022 08:54 AM     Lab Results   Component Value Date/Time    GFR est non-AA >60 08/31/2022 08:54 AM    GFRNA, POC >60 01/06/2012 10:42 AM    GFR est AA >60 08/31/2022 08:54 AM    GFRAA, POC >60 01/06/2012 10:42 AM    Creatinine 0.63 11/30/2022 08:42 AM    Creatinine (POC) 0.9 01/06/2012 10:42 AM    BUN 10 11/30/2022 08:42 AM    BUN (POC) 14 01/28/2010 12:02 PM    Sodium 142 11/30/2022 08:42 AM    Sodium (POC) 141 01/28/2010 12:02 PM    Potassium 4.3 11/30/2022 08:42 AM    Potassium (POC) 4.0 01/28/2010 12:02 PM    Chloride 108 11/30/2022 08:42 AM    Chloride (POC) 103 01/28/2010 12:02 PM    CO2 30 11/30/2022 08:42 AM    Magnesium 2.1 05/25/2022 08:19 AM    PTH, Intact 56.8 05/25/2022 08:19 AM        Review of Systems Respiratory:  Negative for shortness of breath. Cardiovascular:  Negative for chest pain. Physical Exam  Constitutional:       General: She is not in acute distress. Appearance: She is not ill-appearing, toxic-appearing or diaphoretic. HENT:      Head: Normocephalic and atraumatic. Right Ear: External ear normal.      Left Ear: External ear normal.   Eyes:      General:         Right eye: No discharge. Left eye: No discharge. Conjunctiva/sclera: Conjunctivae normal.      Pupils: Pupils are equal, round, and reactive to light. Cardiovascular:      Rate and Rhythm: Normal rate and regular rhythm. Heart sounds: No murmur heard. No friction rub. No gallop. Pulmonary:      Effort: No respiratory distress. Breath sounds: Normal breath sounds. No wheezing or rales. Chest:      Chest wall: No tenderness. Musculoskeletal:         General: Normal range of motion. Cervical back: Normal.      Right lower leg: Edema (mild) present. Left lower leg: Edema (mild) present. Skin:     General: Skin is warm and dry. Neurological:      Mental Status: She is oriented to person, place, and time. Mental status is at baseline. Coordination: Coordination normal.      Gait: Gait normal.   Psychiatric:         Mood and Affect: Mood normal.         Behavior: Behavior normal.       ASSESSMENT and PLAN    ICD-10-CM ICD-9-CM    1. Primary hypertension  I10 401.9 VITAMIN P00      METABOLIC PANEL, COMPREHENSIVE   Well-controlled on current regimen of losartan 50 mg and Norvasc 5 mg continue no change to dose monitor metabolic panel repeat prior to follow-up   HEMOGLOBIN A1C WITH EAG      TSH 3RD GENERATION      2.  Diabetes mellitus without complication (HCC)  V92.0 250.00 VITAMIN X40      METABOLIC PANEL, COMPREHENSIVE   Diabetes well controlled A1c at goal and actually running on the lower side we will go ahead and decrease Lantus to 25 units    Continue metformin twice daily HEMOGLOBIN A1C WITH EAG      TSH 3RD GENERATION      3. Recurrent depression ([facility]  F33.9 296.30 VITAMIN X08      METABOLIC PANEL, COMPREHENSIVE   Issue in the past no need for medication   HEMOGLOBIN A1C WITH EAG      TSH 3RD GENERATION      4. Malignant ne[facility] of esophagus, unspecified [facility]  C15.9 150.9 VITAMIN Z68      METABOLIC PANEL, COMPREHENSIVE   Follows with gastroenterology Ezra Tijerina as needed chronically on Dexilant Pepcid postsurgery from this   HEMOGLOBIN A1C WITH EAG      TSH 3RD GENERATION      5. Hypercholesteremia  E78.00 272.0 VITAMIN K64      METABOLIC PANEL, COMPREHENSIVE   Controlled Lipitor   HEMOGLOBIN A1C WITH EAG      TSH 3RD GENERATION      6. Mild intermittent asthma without complication  D80.72 103.28 VITAMIN A82      METABOLIC PANEL, COMPREHENSIVE   Controlled with Symbicort twice daily and Singulair will be seeing her pulmonologist in February   HEMOGLOBIN A1C WITH EAG      TSH 3RD GENERATION      7. ASHD (arteriosclerotic heart disease)  I25.10 414.00 VITAMIN T10      METABOLIC PANEL, COMPREHENSIVE   Stable no active signs or symptoms of CAD up-to-date with cardiology   HEMOGLOBIN A1C WITH EAG      TSH 3RD GENERATION      8. Hypocalcemia  E83.51 275.41 VITAMIN N57      METABOLIC PANEL, COMPREHENSIVE   Calcium running on the lower side of normal likely due to absorption issues related to past surgeries    Discussed increasing calcium supplement to twice daily dosing   HEMOGLOBIN A1C WITH EAG      TSH 3RD GENERATION      9. B12 deficiency  E53.8 266.2 VITAMIN I00      METABOLIC PANEL, COMPREHENSIVE   Check level prior to follow-up   HEMOGLOBIN A1C WITH EAG      TSH 3RD GENERATION        Depression screen reviewed and negative. Scribed by Lenin Ventura, as dictated by Dr. Carole Nicholas. Current diagnosis and concerns discussed with pt at length.  Pt understands risks and benefits or current treatment plan and medications, and accepts the treatment and medication with any possible risks. Pt asks appropriate questions, which were answered. Pt was instructed to call with any concerns or problems. I have reviewed the note documented by the scribe. The services provided are my own. The documentation is accurate.

## 2022-12-06 ENCOUNTER — OFFICE VISIT (OUTPATIENT)
Dept: INTERNAL MEDICINE CLINIC | Age: 67
End: 2022-12-06
Payer: MEDICARE

## 2022-12-06 VITALS
BODY MASS INDEX: 28.84 KG/M2 | WEIGHT: 162.8 LBS | SYSTOLIC BLOOD PRESSURE: 133 MMHG | HEIGHT: 63 IN | TEMPERATURE: 97.7 F | HEART RATE: 65 BPM | DIASTOLIC BLOOD PRESSURE: 62 MMHG | OXYGEN SATURATION: 97 % | RESPIRATION RATE: 16 BRPM

## 2022-12-06 DIAGNOSIS — C15.9 MALIGNANT NEOPLASM OF ESOPHAGUS, UNSPECIFIED LOCATION (HCC): ICD-10-CM

## 2022-12-06 DIAGNOSIS — F33.9 RECURRENT DEPRESSION (HCC): ICD-10-CM

## 2022-12-06 DIAGNOSIS — E11.9 DIABETES MELLITUS WITHOUT COMPLICATION (HCC): ICD-10-CM

## 2022-12-06 DIAGNOSIS — E83.51 HYPOCALCEMIA: ICD-10-CM

## 2022-12-06 DIAGNOSIS — I25.10 ASHD (ARTERIOSCLEROTIC HEART DISEASE): ICD-10-CM

## 2022-12-06 DIAGNOSIS — E78.00 HYPERCHOLESTEREMIA: ICD-10-CM

## 2022-12-06 DIAGNOSIS — J45.20 MILD INTERMITTENT ASTHMA WITHOUT COMPLICATION: ICD-10-CM

## 2022-12-06 DIAGNOSIS — E53.8 B12 DEFICIENCY: ICD-10-CM

## 2022-12-06 DIAGNOSIS — I10 PRIMARY HYPERTENSION: Primary | ICD-10-CM

## 2022-12-06 PROCEDURE — G8754 DIAS BP LESS 90: HCPCS | Performed by: INTERNAL MEDICINE

## 2022-12-06 PROCEDURE — G8536 NO DOC ELDER MAL SCRN: HCPCS | Performed by: INTERNAL MEDICINE

## 2022-12-06 PROCEDURE — G8417 CALC BMI ABV UP PARAM F/U: HCPCS | Performed by: INTERNAL MEDICINE

## 2022-12-06 PROCEDURE — G8399 PT W/DXA RESULTS DOCUMENT: HCPCS | Performed by: INTERNAL MEDICINE

## 2022-12-06 PROCEDURE — 99214 OFFICE O/P EST MOD 30 MIN: CPT | Performed by: INTERNAL MEDICINE

## 2022-12-06 PROCEDURE — G9899 SCRN MAM PERF RSLTS DOC: HCPCS | Performed by: INTERNAL MEDICINE

## 2022-12-06 PROCEDURE — G8752 SYS BP LESS 140: HCPCS | Performed by: INTERNAL MEDICINE

## 2022-12-06 PROCEDURE — 1101F PT FALLS ASSESS-DOCD LE1/YR: CPT | Performed by: INTERNAL MEDICINE

## 2022-12-06 PROCEDURE — 3017F COLORECTAL CA SCREEN DOC REV: CPT | Performed by: INTERNAL MEDICINE

## 2022-12-06 PROCEDURE — 3044F HG A1C LEVEL LT 7.0%: CPT | Performed by: INTERNAL MEDICINE

## 2022-12-06 PROCEDURE — 2022F DILAT RTA XM EVC RTNOPTHY: CPT | Performed by: INTERNAL MEDICINE

## 2022-12-06 PROCEDURE — G8427 DOCREV CUR MEDS BY ELIG CLIN: HCPCS | Performed by: INTERNAL MEDICINE

## 2022-12-06 PROCEDURE — 1090F PRES/ABSN URINE INCON ASSESS: CPT | Performed by: INTERNAL MEDICINE

## 2022-12-06 PROCEDURE — G9717 DOC PT DX DEP/BP F/U NT REQ: HCPCS | Performed by: INTERNAL MEDICINE

## 2022-12-06 RX ORDER — IBUPROFEN 200 MG
CAPSULE ORAL DAILY
COMMUNITY

## 2022-12-06 NOTE — PROGRESS NOTES
1. \"Have you been to the ER, urgent care clinic since your last visit? Hospitalized since your last visit? \" No    2. \"Have you seen or consulted any other health care providers outside of the [address] [place] [address] since your last visit? \" No     3. For patients aged 39-70: Has the patient had a colonoscopy / FIT/ Cologuard? Yes - no Care Gap present      If the patient is female:    4. For patients aged 41-77: Has the patient had a mammogram within the past 2 years? Yes - no Care Gap present      5. For patients aged 21-65: Has the patient had a pap smear?  Yes - no Care Gap present

## 2022-12-07 RX ORDER — METFORMIN HYDROCHLORIDE 500 MG/1
TABLET, EXTENDED RELEASE ORAL
Qty: 180 TABLET | Refills: 0 | Status: SHIPPED | OUTPATIENT
Start: 2022-12-07

## 2023-03-01 ENCOUNTER — APPOINTMENT (OUTPATIENT)
Dept: INTERNAL MEDICINE CLINIC | Age: 68
End: 2023-03-01

## 2023-03-01 DIAGNOSIS — F33.9 RECURRENT DEPRESSION (HCC): ICD-10-CM

## 2023-03-01 DIAGNOSIS — E83.51 HYPOCALCEMIA: ICD-10-CM

## 2023-03-01 DIAGNOSIS — E78.00 HYPERCHOLESTEREMIA: ICD-10-CM

## 2023-03-01 DIAGNOSIS — J45.20 MILD INTERMITTENT ASTHMA WITHOUT COMPLICATION: ICD-10-CM

## 2023-03-01 DIAGNOSIS — C15.9 MALIGNANT NEOPLASM OF ESOPHAGUS, UNSPECIFIED LOCATION (HCC): ICD-10-CM

## 2023-03-01 DIAGNOSIS — I10 PRIMARY HYPERTENSION: ICD-10-CM

## 2023-03-01 DIAGNOSIS — E53.8 B12 DEFICIENCY: ICD-10-CM

## 2023-03-01 DIAGNOSIS — E11.9 DIABETES MELLITUS WITHOUT COMPLICATION (HCC): ICD-10-CM

## 2023-03-01 DIAGNOSIS — I25.10 ASHD (ARTERIOSCLEROTIC HEART DISEASE): ICD-10-CM

## 2023-03-01 LAB
COMMENT, HOLDF: NORMAL
SAMPLES BEING HELD,HOLD: NORMAL

## 2023-03-02 LAB
ALBUMIN SERPL-MCNC: 4 G/DL (ref 3.5–5)
ALBUMIN/GLOB SERPL: 1.4 (ref 1.1–2.2)
ALP SERPL-CCNC: 55 U/L (ref 45–117)
ALT SERPL-CCNC: 37 U/L (ref 12–78)
ANION GAP SERPL CALC-SCNC: 6 MMOL/L (ref 5–15)
AST SERPL-CCNC: 21 U/L (ref 15–37)
BILIRUB SERPL-MCNC: 0.3 MG/DL (ref 0.2–1)
BUN SERPL-MCNC: 11 MG/DL (ref 6–20)
BUN/CREAT SERPL: 15 (ref 12–20)
CALCIUM SERPL-MCNC: 9.1 MG/DL (ref 8.5–10.1)
CHLORIDE SERPL-SCNC: 104 MMOL/L (ref 97–108)
CO2 SERPL-SCNC: 30 MMOL/L (ref 21–32)
CREAT SERPL-MCNC: 0.73 MG/DL (ref 0.55–1.02)
EST. AVERAGE GLUCOSE BLD GHB EST-MCNC: 143 MG/DL
GLOBULIN SER CALC-MCNC: 2.9 G/DL (ref 2–4)
GLUCOSE SERPL-MCNC: 111 MG/DL (ref 65–100)
HBA1C MFR BLD: 6.6 % (ref 4–5.6)
POTASSIUM SERPL-SCNC: 4.4 MMOL/L (ref 3.5–5.1)
PROT SERPL-MCNC: 6.9 G/DL (ref 6.4–8.2)
SODIUM SERPL-SCNC: 140 MMOL/L (ref 136–145)
TSH SERPL DL<=0.05 MIU/L-ACNC: 2.57 UIU/ML (ref 0.36–3.74)
VIT B12 SERPL-MCNC: 189 PG/ML (ref 193–986)

## 2023-03-06 PROBLEM — J44.9 CHRONIC OBSTRUCTIVE PULMONARY DISEASE, UNSPECIFIED COPD TYPE (HCC): Status: ACTIVE | Noted: 2023-03-06

## 2023-03-06 NOTE — PROGRESS NOTES
HISTORY OF PRESENT ILLNESS  Army Juarez is a 77 y.o. female. HPI  Last here 12/6/22. Pt is here to f/u on chronic conditions.      Has a history of hypertension  BP today is 129/63  BP at home 120s-130s/50s-60s  Continues on losartan 50mg and norvasc 5 mg     She has lasix to use prn for mild swelling in her legs  Not needing this much minimal swelling on exam     She is diabetic  BS AM 80s-90s,   Reports having only 1 episode of hypoglycemia  She is taking lantus 25 U qAm (decreased from 30 U to prevent hypoglycemia)  Taking metformin 500 BID  No longer taking novolog 8U with dinner + sliding scale (8U if >150, 10U >200, 12U if >250, 14U if >300), has not needed it     She also takes ASA 81mg daily     Wt today is 164 lbs, up 2 lbs since lov   Of note, pt has had dental work and has had trouble eating reports eating less in general  Discussed diet and wt/l     Reviewed labs    Ordered labs today   Discussed low B12 on last labs  Will give B12 injection today and have pt start daily B12 supplement 2000 mcg daily     Pt follows with Dr. Mae Gutierrez (cardio) for CAD   She is on Norvasc to medically manage chest pain   Not having any active symptoms has a history of a stent  Last visit 6/22, no med changes, no new tests  Will f/U in 1 year   Last ECHO 2016  No EKG performed per pt   No changes in medication per pt       Pt follows annually with Dr. Zaina Betancourt (hemo/onc) for h/o adenoma of esophagus   Last visit was 7/3/18  Pt was discharged from his care      Pt follows with Dr. Ulises Vanessa (pulm) for history of asthma annually   Last visit was 2/23, no med changes, no PFT's     Continues singulair daily, albuterol prn, and symbicort BID for breathing/asthma per pulm   Has not needed albuterol recently      She saw NP Neftaly Butcher ([address] derm)  Last visit 8/4/22  She had pre cancerous spot removed from her left calf in the past     Follows with Dr Sabrina Bosch (GI)  Continues dexilant and pepcid at night this controls her reflux symptoms well  Had vv 8/20   Will f/U w/ NP in 4/23   Discussed she is due for repeat colo and likely repeat EGD, she states she will schedule these   Recall has h/o esophageal cancer     Provided referral for plastic surgeon for her ear previously     Pt has not been evaluated for DANIELLE in the past   Recall she declines further evaluation for now       Continues lipitor 40mg daily for cholesterol     Pt is supplementing with Calcium 500 mg BID     Of note, her  has bladder cancer and she is caring for him at home. Pt lives w/ her , daughter, granddaughter  Has grab bar in the shower     Pt is functionally independent   Does own laundry  Does own driving  Does own bills and meds    No memory concerns   Knows the month, date, year, location   Can recall 3/3 objects        ACP not on file. SDM is her .   Reminded pt to bring in this lov      PREVENTIVE:    Colonoscopy: 8/7/18, Dr. Kassidy Lobo, repeat 5 years, due 8/23, ordered  EGD: 4/16, Dr. Kassidy Lobo, h/o esophageal cancer, polyp on recent EGD, biopsy nl, due ordered   AAA: FMHx (grandfather), 03/22 negative   Pap: Dr. Mame Kim, 9/22  Mammogram: 7/29/22 negative  Dexa: 7/27/20 nl, due 7/23  Tdap: 9/14/2016  Pneumovax: 10/14/19   Goopofm79: 11/14/2016  Zostavax: 11/18/2016, reaction on R arm  Shingrix: both completed 2/20  Flu shot: Fall 2022   Foot exam: 03/08/23  Microalbumin: 8/22  A1c: 2/22 7.1, 6/22 poc 6.4 8/22 6.3 11/22 6.0 3/23 6.6   Eye exam: Dr. Wai Acevedo 2/23   Lipids: 8/22 LDL 40  Hep C screen: 11/15, negative  Covid: 5 doses (Giselle Hoke), including bivalent      Patient Active Problem List    Diagnosis Date Noted    Recurrent depression ([facility] 12/29/2017    Diabetes mellitus without complication ([facility] 20/86/7105    ASHD (arteriosclerotic heart disease) 06/18/2014    Bradycardia 06/18/2014    Hypercholesteremia 02/18/2013    HTN (hypertension) 02/18/2013    Asthma 02/18/2013    Thyroid nodule 02/18/2013    Incisional hernia 02/10/2011 [facility] 02/10/2011     Current Outpatient Medications   Medication Sig Dispense Refill    losartan (COZAAR) 50 mg tablet Take 1 Tablet by mouth daily. 90 Tablet 0    atorvastatin (LIPITOR) 40 mg tablet Take 1 Tablet by mouth daily. 90 Tablet 0    amLODIPine (NORVASC) 5 mg tablet Take 1 Tablet by mouth daily. 90 Tablet 0    metFORMIN ER (GLUCOPHAGE XR) 500 mg tablet TAKE 2 TABLETS EVERY DAY WITH DINNER 180 Tablet 0    calcium citrate 200 mg (950 mg) tablet Take  by mouth daily. Insulin Needles, Disposable, (Pen Needle) 31 gauge x 5/16\" ndle Use with insulin twice daily; dx E11.9 1 Each 5    Lantus Solostar U-100 Insulin 100 unit/mL (3 mL) inpn INJECT  36 UNITS SUBCUTANEOUSLY EVERY DAY (Patient taking differently: INJECT  30 UNITS SUBCUTANEOUSLY EVERY DAY) 45 mL 0    montelukast (SINGULAIR) 10 mg tablet TAKE 1 TABLET EVERY DAY 90 Tablet 0    glucose blood VI test strips (Accu-Chek Corrine Plus test strp) strip Check glucose twice daily DX: E11.9 100 Strip 3    furosemide (LASIX) 20 mg tablet TAKE 1 TABLET BY MOUTH EVERY DAY AS NEEDED 90 Tablet 1    MAGNESIUM PO Take  by mouth daily. Indications: leg cramps (Patient not taking: Reported on 12/6/2022)      glucose blood VI test strips (Accu-Chek Corrine Plus test strp) strip CHECK BLOOD SUGAR TWICE DAILY 100 Strip 3    albuterol (PROVENTIL HFA, VENTOLIN HFA, PROAIR HFA) 90 mcg/actuation inhaler Take 2 Puffs by inhalation every four (4) hours as needed for Wheezing. 1 Inhaler 3    Blood-Glucose Meter (ACCU-CHEK CORRINE PLUS METER) misc Check glucose twice daily. 1 Each 1    cholecalciferol, vitamin D3, 50 mcg (2,000 unit) tab Take  by mouth daily. SYMBICORT 160-4.5 mcg/actuation HFA inhaler Take 2 Puffs by inhalation two (2) times a day. polyethylene glycol (MIRALAX) 17 gram packet Take 17 g by mouth daily. cetirizine (ZYRTEC) 10 mg tablet Take 10 mg by mouth daily. melatonin 5 mg Tab Take 5 mg by mouth nightly.       dexlansoprazole (DEXILANT) 60 mg CpDB capsule (delayed release) Take 60 mg by mouth daily. aspirin 81 mg tablet Take 81 mg by mouth.      pyridoxine, vitamin B6, (VITAMIN B-6) 100 mg tablet Take 100 mg by mouth every morning.        Past Surgical History:   Procedure Laterality Date    COLONOSCOPY N/A 8/7/2018    COLONOSCOPY performed by Abbie Nayak MD at [address]; HI RISK IND  8/7/2018         HX ENDOSCOPY  4/2016    HX GI  2010    gastroesophagectomy    HX HEART CATHETERIZATION  03/2017    stent x 1    HX HEENT  1957    tonsils as child    HX HERNIA REPAIR  04/26/2012    abdominal and umbilical    HX LAP CHOLECYSTECTOMY  1995    HX ORTHOPAEDIC  02/20/2015    right shoulder manipulation    HX OTHER SURGICAL Bilateral      shoulder surgery for frozen surgery    HX OTHER SURGICAL  6/24/2015    mammogram     HX PELVIC LAPAROSCOPY  1989    HX TUBAL LIGATION  1983    lap btl    HX VASCULAR ACCESS      port a cath and removal    MT EGD BALLOON DILATION ESOPHAGUS <30 MM DIAM  4/20/2010         MT EXTRAC ERUPTED TOOTH/EXPOSED ROOT        Lab Results   Component Value Date/Time    WBC 6.7 08/31/2022 08:54 AM    HGB 12.1 08/31/2022 08:54 AM    Hemoglobin (POC) 11.9 01/28/2010 12:02 PM    HCT 37.6 08/31/2022 08:54 AM    Hematocrit (POC) 35 01/28/2010 12:02 PM    PLATELET 536 63/38/5346 08:54 AM    MCV 97.4 08/31/2022 08:54 AM     Lab Results   Component Value Date/Time    Cholesterol, total 117 08/31/2022 08:54 AM    HDL Cholesterol 62 08/31/2022 08:54 AM    LDL, calculated 40.6 08/31/2022 08:54 AM    Triglyceride 72 08/31/2022 08:54 AM    CHOL/HDL Ratio 1.9 08/31/2022 08:54 AM     Lab Results   Component Value Date/Time    GFR est non-AA >60 08/31/2022 08:54 AM    GFRNA, POC >60 01/06/2012 10:42 AM    GFR est AA >60 08/31/2022 08:54 AM    GFRAA, POC >60 01/06/2012 10:42 AM    Creatinine 0.73 03/01/2023 08:28 AM    Creatinine (POC) 0.9 01/06/2012 10:42 AM    BUN 11 03/01/2023 08:28 AM    BUN (POC) 14 01/28/2010 12:02 PM    Sodium 140 03/01/2023 08:28 AM    Sodium (POC) 141 01/28/2010 12:02 PM    Potassium 4.4 03/01/2023 08:28 AM    Potassium (POC) 4.0 01/28/2010 12:02 PM    Chloride 104 03/01/2023 08:28 AM    Chloride (POC) 103 01/28/2010 12:02 PM    CO2 30 03/01/2023 08:28 AM    Magnesium 2.1 05/25/2022 08:19 AM    PTH, Intact 56.8 05/25/2022 08:19 AM        Review of Systems   Respiratory:  Negative for shortness of breath. Cardiovascular:  Negative for chest pain. Physical Exam  Constitutional:       General: She is not in acute distress. Appearance: She is not ill-appearing, toxic-appearing or diaphoretic. HENT:      Head: Normocephalic and atraumatic. Right Ear: External ear normal.      Left Ear: External ear normal.   Eyes:      General:         Right eye: No discharge. Left eye: No discharge. Conjunctiva/sclera: Conjunctivae normal.      Pupils: Pupils are equal, round, and reactive to light. Cardiovascular:      Rate and Rhythm: Normal rate and regular rhythm. Heart sounds: Murmur (slight) heard. No friction rub. No gallop. Pulmonary:      Effort: No respiratory distress. Breath sounds: Normal breath sounds. No wheezing or rales. Chest:      Chest wall: No tenderness. Musculoskeletal:         General: Normal range of motion. Cervical back: Normal.      Right lower leg: Edema (mild 1+) present. Left lower leg: Edema (mild 1+) present. Feet:      Comments: Sensory exam of the foot is normal, tested with the monofilament. Good pulses, no lesions or ulcers, good peripheral pulses. Skin:     General: Skin is warm and dry. Neurological:      Mental Status: She is oriented to person, place, and time. Mental status is at baseline. Coordination: Coordination normal.      Gait: Gait normal.   Psychiatric:         Mood and Affect: Mood normal.         Behavior: Behavior normal.       ASSESSMENT and PLAN    ICD-10-CM ICD-9-CM    1. Primary hypertension  I10 401.9 VITAMIN P36      METABOLIC PANEL, COMPREHENSIVE      HEMOGLOBIN A1C WITH EAG   Well-controlled on losartan Norvasc continue no change in dose monitor K creatinine sodium levels   MICROALBUMIN, UR, RAND W/ MICROALB/CREAT RATIO      LIPID PANEL      CBC W/O DIFF      2. Medicare annual wellness visit, subsequent  Z00.00 V70.0 VITAMIN Q01      METABOLIC PANEL, COMPREHENSIVE      HEMOGLOBIN A1C WITH EAG      MICROALBUMIN, UR, RAND W/ MICROALB/CREAT RATIO      LIPID PANEL      CBC W/O DIFF      3. Chronic obstructive pulmonary disease, unspecified COPD type ([facility]  J44.9 496 VITAMIN K30      METABOLIC PANEL, COMPREHENSIVE      HEMOGLOBIN A1C WITH EAG   Follows with pulmonary routinely was just there in February stable on Symbicort twice daily and Singulair daily albuterol as needed not using this frequently   MICROALBUMIN, UR, RAND W/ MICROALB/CREAT RATIO      LIPID PANEL      CBC W/O DIFF      4. Malignant neoplasm of esophagus, unspecified location ([facility]  C15.9 150.9 VITAMIN V55      METABOLIC PANEL, COMPREHENSIVE      HEMOGLOBIN A1C WITH EAG   Patient doing well on Dexilant she still gets reflux symptoms sporadically she is due for repeat endoscopy she has an appointment to see her GI doctor in April hopefully will be getting endoscopy and colonoscopy this summer   MICROALBUMIN, UR, RAND W/ MICROALB/CREAT RATIO      LIPID PANEL      CBC W/O DIFF      5. Recurrent depression (HCC)  F33.9 296.30 VITAMIN Q95      METABOLIC PANEL, COMPREHENSIVE      HEMOGLOBIN A1C WITH EAG   Controlled not requiring any medication doing well   MICROALBUMIN, UR, RAND W/ MICROALB/CREAT RATIO      LIPID PANEL      CBC W/O DIFF      6.  Diabetes mellitus without complication (HCC)  X62.8 250.00 VITAMIN S84      METABOLIC PANEL, COMPREHENSIVE      HEMOGLOBIN A1C WITH EAG   Blood sugars are at goal A1c is where we want it we decreased her insulin last time to avoid hypoglycemia    Currently on Lantus 25 units    Metformin 500 mg twice daily    Has not needed any NovoLog   MICROALBUMIN, UR, RAND W/ MICROALB/CREAT RATIO      LIPID PANEL      CBC W/O DIFF      7. ASHD (arteriosclerotic heart disease)  I25.10 414.00 VITAMIN R26      METABOLIC PANEL, COMPREHENSIVE      HEMOGLOBIN A1C WITH EAG   Medically managed follows with cardiology will be seeing them again in June no active signs or symptoms of CAD   MICROALBUMIN, UR, RAND W/ MICROALB/CREAT RATIO      LIPID PANEL      CBC W/O DIFF      8. Mixed hyperlipidemia  E78.2 272.2 VITAMIN I81      METABOLIC PANEL, COMPREHENSIVE      HEMOGLOBIN A1C WITH EAG   Controlled Lipitor check lipids prior to follow-up   MICROALBUMIN, UR, RAND W/ MICROALB/CREAT RATIO      LIPID PANEL      CBC W/O DIFF      9. Localized edema  R60.0 782.3 VITAMIN C41      METABOLIC PANEL, COMPREHENSIVE      HEMOGLOBIN A1C WITH EAG      MICROALBUMIN, UR, RAND W/ MICROALB/CREAT RATIO   Stable uses Lasix as needed   LIPID PANEL      CBC W/O DIFF      10. Mild intermittent asthma without complication  U11.35 563.47 VITAMIN Y87      METABOLIC PANEL, COMPREHENSIVE      HEMOGLOBIN A1C WITH EAG   Stable on Symbicort see above   MICROALBUMIN, UR, RAND W/ MICROALB/CREAT RATIO      LIPID PANEL      CBC W/O DIFF      11. B12 deficiency  E53.8 266.2 VITAMIN U18      METABOLIC PANEL, COMPREHENSIVE      HEMOGLOBIN A1C WITH EAG   Significantly low B12 on last labs we will do a B12 injection today we will have her start oral B12 2000 mcg daily and check levels prior to follow-up   MICROALBUMIN, UR, RAND W/ MICROALB/CREAT RATIO      LIPID PANEL      CBC W/O DIFF      Depression screen reviewed and negative. Scribed by Jakob Mendez, as dictated by Dr. Pako Casanova. Current diagnosis and concerns discussed with pt at length. Pt understands risks and benefits or current treatment plan and medications, and accepts the treatment and medication with any possible risks.  Pt asks appropriate questions, which were answered. Pt was instructed to call with any concerns or problems. I have reviewed the note documented by the scribe. The services provided are my own. The documentation is accurate.

## 2023-03-06 NOTE — PATIENT INSTRUCTIONS
Medicare Wellness Visit, Female     The best way to live healthy is to have a lifestyle where you eat a well-balanced diet, exercise regularly, limit alcohol use, and quit all forms of tobacco/nicotine, if applicable. Regular preventive services are another way to keep healthy. Preventive services (vaccines, screening tests, monitoring & exams) can help personalize your care plan, which helps you manage your own care. Screening tests can find health problems at the earliest stages, when they are easiest to treat. [facility] follows the current, evidence-based guidelines published by the [facility] ([facility]) when recommending preventive services for our patients. Because we follow these guidelines, sometimes recommendations change over time as research supports it. (For example, mammograms used to be recommended annually. Even though Medicare will still pay for an annual mammogram, the newer guidelines recommend a mammogram every two years for women of average risk). Of course, you and your doctor may decide to screen more often for some diseases, based on your risk and your co-morbidities (chronic disease you are already diagnosed with). Preventive services for you include:  - Medicare offers their members a free annual wellness visit, which is time for you and your primary care provider to discuss and plan for your preventive service needs.  Take advantage of this benefit every year!    -Over the age of 72 should receive the recommended pneumonia vaccines.    -All adults should have a flu vaccine yearly.  -All adults should have a tetanus vaccine every 10 years.   -Over the age 48 should receive the shingles vaccines.        -All adults should be screened once for Hepatitis C.  -All adults age 38-68 who are overweight should have a diabetes screening test once every three years.   -Other screening tests and preventive services for persons with diabetes include: an eye exam to screen for diabetic retinopathy, a kidney function test, a foot exam, and stricter control over your cholesterol.   -Cardiovascular screening for adults with routine risk involves an electrocardiogram (ECG) at intervals determined by your doctor.     -Colorectal cancer screenings should be done for adults age 39-70 with no increased risk factors for colorectal cancer. There are a number of acceptable methods of screening for this type of cancer. Each test has its own benefits and drawbacks. Discuss with your doctor what is most appropriate for you during your annual wellness visit. The different tests include: colonoscopy (considered the best screening method), a fecal occult blood test, a fecal DNA test, and sigmoidoscopy.    -Lung cancer screening is recommended annually with a low dose CT scan for adults between age 54 and 68, who have smoked at least 30 pack years (equivalent of 1 pack per day for 30 days), and who is a current smoker or quit less than 15 years ago.    -A bone mass density test is recommended when a woman turns 65 to screen for osteoporosis. This test is only recommended one time, as a screening. Some providers will use this same test as a disease monitoring tool if you already have osteoporosis. -Breast cancer screenings are recommended every other year for women of normal risk, age 54-69.    -Cervical cancer screenings for women over age 72 are only recommended with certain risk factors.      Here is a list of your current Health Maintenance items (your personalized list of preventive services) with a due date:  Health Maintenance Due   Topic Date Due    COVID-19 Vaccine (5 - Booster for [employer] series) 07/15/2022    Annual Well Visit  03/01/2023         Start b12 over the counter b12 2,000mcg daily in 1 week

## 2023-03-06 NOTE — PROGRESS NOTES
This is the Subsequent Medicare Annual Wellness Exam, performed 12 months or more after the Initial AWV or the last Subsequent AWV    I have reviewed the patient's medical history in detail and updated the computerized patient record. Assessment/Plan   Education and counseling provided:  Are appropriate based on today's review and evaluation    1. Medicare annual wellness visit, subsequent     Depression Risk Factor Screening     3 most recent PHQ Screens 3/8/2023   Little interest or pleasure in doing things Not at all   Feeling down, depressed, irritable, or hopeless Not at all   Total Score PHQ 2 0       Alcohol & Drug Abuse Risk Screen    Do you average more than 1 drink per night or more than 7 drinks a week:  No    On any one occasion in the past three months have you have had more than 3 drinks containing alcohol:  No  2-3 per year        Functional Ability and Level of Safety    Hearing: Hearing is good. Activities of Daily Living: The home contains: grab bars  Patient does total self care      Ambulation: with mild difficulty     Fall Risk:  Fall Risk Assessment, last 12 mths 3/8/2023   Able to walk? Yes   Fall in past 12 months? 0   Do you feel unsteady?  0   Are you worried about falling 0      Abuse Screen:  Patient is not abused     Lives w   and daughter and gdauter   Cognitive Screening    Has your family/caregiver stated any concerns about your memory: no     Cognitive Screening: Normal - Mini Cog Test    No memory concerns   Pt knows the month, day and year   Can recall 3/3 objects      Health Maintenance Due     Health Maintenance Due   Topic Date Due    COVID-19 Vaccine (5 - Booster for [employer] series) 07/15/2022    Medicare Yearly Exam  03/01/2023       Patient Care Team   Patient Care Team:  Pritesh Osborn MD as PCP - General (Internal Medicine Physician)  Pritesh Osborn MD as PCP - [facility] Empaneled Provider  Lorene May MD (Cardiovascular Disease Physician)  Yohana Garnder MD as Consulting Provider (Endocrinology Physician)  Anthony Mariscal MD as Physician (Cardiovascular Disease Physician)  Fredrick Babinski., MD (Gastroenterology)  Norma Alford MD (Internal Medicine Physician)  Conor Oleary OD (Optometry)  Markie Morris, RN as Ambulatory Care Manager  Effie Kamara MD (Hematology and Oncology)  Norma Alford MD (Internal Medicine Physician)  Lynsey Barrera MD (Obstetrics & Gynecology)  Ondina Brown MD (Obstetrics & Gynecology)  Ondina Brown MD (Obstetrics & Gynecology)  Melissa Pal RD (Optometry)  [facility] dr Yaneli Dye         History     Patient Active Problem List   Diagnosis Code    Incisional hernia K43.2    Esophageal cancer ([facility] [address] C15.9    Hypercholesteremia E78.00    HTN (hypertension) I10    Asthma J45.909    Thyroid nodule E04.1    ASHD (arteriosclerotic heart disease) I25.10    Bradycardia R00.1    Diabetes mellitus without complication (HCC) W61.9    Recurrent depression ([facility] [address] F33.9     Past Medical History:   Diagnosis Date    Arrhythmia     bradycardia    Bloating     CAD (coronary artery disease)     Cancer ([facility] [address] 2010    esophageal/GE junction    Chest pain     Chest pain     Chronic obstructive pulmonary disease (HCC)     Chronic pain     left shoulder    Congestion of throat     Cough     Depression     & anxiety    Diabetes ([facility] [address]     IDDM    Dyspepsia and other specified disorders of function of stomach     Fatigue     GERD (gastroesophageal reflux disease)     Headache(784.0)     Hypercholesterolemia     Hypertension     Menopause     Multinodular goiter     Nausea & vomiting     Stool color black     Stress     Weakness       Past Surgical History:   Procedure Laterality Date    COLONOSCOPY N/A 8/7/2018    COLONOSCOPY performed by Zen Kaba MD at [address]; HI RISK IND  8/7/2018         HX ENDOSCOPY  4/2016    HX GI  2010 gastroesophagectomy    HX HEART CATHETERIZATION  03/2017    stent x 1    HX HEENT  1957    tonsils as child    HX HERNIA REPAIR  04/26/2012    abdominal and umbilical    HX LAP CHOLECYSTECTOMY  1995    HX ORTHOPAEDIC  02/20/2015    right shoulder manipulation    HX OTHER SURGICAL Bilateral      shoulder surgery for frozen surgery    HX OTHER SURGICAL  6/24/2015    mammogram     HX PELVIC LAPAROSCOPY  1989    HX TUBAL LIGATION  1983    lap btl    HX VASCULAR ACCESS      port a cath and removal    AL EGD BALLOON DILATION ESOPHAGUS <30 MM DIAM  4/20/2010         AL EXTRAC ERUPTED TOOTH/EXPOSED ROOT       Current Outpatient Medications   Medication Sig Dispense Refill    losartan (COZAAR) 50 mg tablet Take 1 Tablet by mouth daily. 90 Tablet 0    atorvastatin (LIPITOR) 40 mg tablet Take 1 Tablet by mouth daily. 90 Tablet 0    amLODIPine (NORVASC) 5 mg tablet Take 1 Tablet by mouth daily. 90 Tablet 0    metFORMIN ER (GLUCOPHAGE XR) 500 mg tablet TAKE 2 TABLETS EVERY DAY WITH DINNER 180 Tablet 0    calcium citrate 200 mg (950 mg) tablet Take  by mouth daily. Insulin Needles, Disposable, (Pen Needle) 31 gauge x 5/16\" ndle Use with insulin twice daily; dx E11.9 1 Each 5    Lantus Solostar U-100 Insulin 100 unit/mL (3 mL) inpn INJECT  36 UNITS SUBCUTANEOUSLY EVERY DAY (Patient taking differently: INJECT  30 UNITS SUBCUTANEOUSLY EVERY DAY) 45 mL 0    montelukast (SINGULAIR) 10 mg tablet TAKE 1 TABLET EVERY DAY 90 Tablet 0    glucose blood VI test strips (Accu-Chek Corrine Plus test strp) strip Check glucose twice daily DX: E11.9 100 Strip 3    furosemide (LASIX) 20 mg tablet TAKE 1 TABLET BY MOUTH EVERY DAY AS NEEDED 90 Tablet 1    MAGNESIUM PO Take  by mouth daily.  Indications: leg cramps (Patient not taking: Reported on 12/6/2022)      glucose blood VI test strips (Accu-Chek Corrine Plus test strp) strip CHECK BLOOD SUGAR TWICE DAILY 100 Strip 3    albuterol (PROVENTIL HFA, VENTOLIN HFA, PROAIR HFA) 90 mcg/actuation inhaler Take 2 Puffs by inhalation every four (4) hours as needed for Wheezing. 1 Inhaler 3    Blood-Glucose Meter (ACCU-CHEK SUSANNA PLUS METER) misc Check glucose twice daily. 1 Each 1    cholecalciferol, vitamin D3, 50 mcg (2,000 unit) tab Take  by mouth daily. SYMBICORT 160-4.5 mcg/actuation HFA inhaler Take 2 Puffs by inhalation two (2) times a day. polyethylene glycol (MIRALAX) 17 gram packet Take 17 g by mouth daily. cetirizine (ZYRTEC) 10 mg tablet Take 10 mg by mouth daily. melatonin 5 mg Tab Take 5 mg by mouth nightly. dexlansoprazole (DEXILANT) 60 mg CpDB capsule (delayed release) Take 60 mg by mouth daily. aspirin 81 mg tablet Take 81 mg by mouth.      pyridoxine, vitamin B6, (VITAMIN B-6) 100 mg tablet Take 100 mg by mouth every morning. Allergies   Allergen Reactions    Adhesive Other (comments)     redness       Family History   Problem Relation Age of Onset    Diabetes Father     Cancer Father         intestinal    Heart Disease Father     Hypertension Father     Heart Disease Brother     Diabetes Brother     Diabetes Mother     Lung Disease Mother     Heart Disease Mother     Hypertension Mother     Diabetes Maternal Grandmother     Diabetes Maternal Grandfather     Elevated Lipids Maternal Aunt     Thyroid Disease Neg Hx      Social History     Tobacco Use    Smoking status: Never    Smokeless tobacco: Never   Substance Use Topics    Alcohol use: Yes     Alcohol/week: 0.8 standard drinks     Types: 1 Glasses of wine per week     Comment: rarely glass of wine     ACP not on file. SDM is her .   Reminded pt to bring in this lov    Colonoscopy: 8/7/18, Dr. Brenda Whiting, repeat 5 years, due 8/23, ordered   EGD: 4/16, Dr. Brenda Whiting, h/o esophageal cancer, polyp on recent EGD, biopsy nl--due  AAA: FMHx (grandfather), 03/22 negative   Pap: Dr. Servando Lundborg, 9/22 q2 yrs  Mammogram: 7/29/22 negative annual   Dexa: 7/27/20 nl, due 7/23    Tdap: 9/14/2016  Pneumovax: 10/14/19 [id number]: 11/14/2016  Zostavax: 11/18/2016, reaction on R arm  Shingrix: both completed 2/20  Flu shot: Fall 2022     A1c:   3/23 6.6  q3 mo    Eye exam: Dr. Jacquelin Reeves 2/23  annual     Lipids: 8/22 LDL 40 annual     Hep C screen: 11/15, negative  Covid: 4 doses (Rendall Merry), scheduled for 5th dose 12/12/22     Medication reconciliation completed by MA and reviewed by me. Medical/surgical/social/family history reviewed and updated by me. Patient provided AVS and preventative screening table. Patient verbalized understanding of all information discussed.      Janusz Flores MD

## 2023-03-08 ENCOUNTER — OFFICE VISIT (OUTPATIENT)
Dept: INTERNAL MEDICINE CLINIC | Age: 68
End: 2023-03-08

## 2023-03-08 VITALS
SYSTOLIC BLOOD PRESSURE: 129 MMHG | RESPIRATION RATE: 16 BRPM | OXYGEN SATURATION: 100 % | HEART RATE: 54 BPM | TEMPERATURE: 98.1 F | BODY MASS INDEX: 29.06 KG/M2 | HEIGHT: 63 IN | DIASTOLIC BLOOD PRESSURE: 63 MMHG | WEIGHT: 164 LBS

## 2023-03-08 DIAGNOSIS — I10 PRIMARY HYPERTENSION: Primary | ICD-10-CM

## 2023-03-08 DIAGNOSIS — I25.10 ASHD (ARTERIOSCLEROTIC HEART DISEASE): ICD-10-CM

## 2023-03-08 DIAGNOSIS — J44.9 CHRONIC OBSTRUCTIVE PULMONARY DISEASE, UNSPECIFIED COPD TYPE (HCC): ICD-10-CM

## 2023-03-08 DIAGNOSIS — R60.0 LOCALIZED EDEMA: ICD-10-CM

## 2023-03-08 DIAGNOSIS — C15.9 MALIGNANT NEOPLASM OF ESOPHAGUS, UNSPECIFIED LOCATION (HCC): ICD-10-CM

## 2023-03-08 DIAGNOSIS — J45.20 MILD INTERMITTENT ASTHMA WITHOUT COMPLICATION: ICD-10-CM

## 2023-03-08 DIAGNOSIS — E78.2 MIXED HYPERLIPIDEMIA: ICD-10-CM

## 2023-03-08 DIAGNOSIS — Z00.00 MEDICARE ANNUAL WELLNESS VISIT, SUBSEQUENT: ICD-10-CM

## 2023-03-08 DIAGNOSIS — E53.8 B12 DEFICIENCY: ICD-10-CM

## 2023-03-08 DIAGNOSIS — E11.9 DIABETES MELLITUS WITHOUT COMPLICATION (HCC): ICD-10-CM

## 2023-03-08 DIAGNOSIS — F33.9 RECURRENT DEPRESSION (HCC): ICD-10-CM

## 2023-03-08 RX ORDER — CYANOCOBALAMIN 1000 UG/ML
1000 INJECTION, SOLUTION INTRAMUSCULAR; SUBCUTANEOUS ONCE
Status: COMPLETED | OUTPATIENT
Start: 2023-03-08 | End: 2023-03-08

## 2023-03-08 RX ADMIN — CYANOCOBALAMIN 1000 MCG: 1000 INJECTION, SOLUTION INTRAMUSCULAR; SUBCUTANEOUS at 10:22

## 2023-03-08 NOTE — PROGRESS NOTES
1. \"Have you been to the ER, urgent care clinic since your last visit? Hospitalized since your last visit? \" No    2. \"Have you seen or consulted any other health care providers outside of the [address] [place] [address] since your last visit? \" No     3. For patients aged 39-70: Has the patient had a colonoscopy / FIT/ Cologuard? Yes - Care Gap present. Most recent result on file      If the patient is female:    4. For patients aged 41-77: Has the patient had a mammogram within the past 2 years? Yes - Care Gap present. Most recent result on file      5. For patients aged 21-65: Has the patient had a pap smear?  NA - based on age or sex

## 2023-03-30 RX ORDER — AMLODIPINE BESYLATE 5 MG/1
5 TABLET ORAL DAILY
Qty: 90 TABLET | Refills: 0 | Status: SHIPPED | OUTPATIENT
Start: 2023-03-30

## 2023-03-30 NOTE — TELEPHONE ENCOUNTER
Future Appointments:  Future Appointments   Date Time Provider Dc Castilloi   6/13/2023  9:00 AM Liset Bernal MD [facility] BS AMB        Last Appointment With Me:  3/8/2023     Requested Prescriptions     Pending Prescriptions Disp Refills    amLODIPine (NORVASC) 5 mg tablet 90 Tablet 0     Sig: Take 1 Tablet by mouth daily.

## 2023-04-09 ENCOUNTER — OFFICE VISIT (OUTPATIENT)
Dept: URGENT CARE | Age: 68
End: 2023-04-09

## 2023-04-09 PROBLEM — K56.609 SBO (SMALL BOWEL OBSTRUCTION) (HCC): Status: ACTIVE | Noted: 2023-04-09

## 2023-04-20 ENCOUNTER — PATIENT OUTREACH (OUTPATIENT)
Dept: CASE MANAGEMENT | Age: 68
End: 2023-04-20

## 2023-04-20 NOTE — PROGRESS NOTES
Care Transitions Follow Up Call    Patient Current Location: [facility] Transition Nurse (CTN) contacted the patient by telephone to follow up after admission on 4/9/2023. Addressed changes since last contact: none  Follow up appointment completed? no.   Was follow up appointment scheduled within 7 days of discharge? no.     CTN reviewed medical action plan and red flags with patient and discussed any barriers to care and/or understanding of plan of care after discharge. Discussed appropriate site of care based on symptoms and resources available to patient including: PCP and Specialist. The patient agrees to contact the PCP office for questions related to their healthcare. Patients top risk factors for readmission:  NA    Interventions to address risk factors:  JAYNA [address] Dr follow up appointment(s):   Future Appointments   Date Time Provider Dc Adler   4/28/2023  8:00 AM Abbey Lacy MD [facility] BS AMB   6/13/2023  9:00 AM Abbey Lacy MD [facility] BS AMB     Non-[facility] follow up appointment(s): JAYNA    CTN provided contact information for future needs. Plan for follow-up call in 10-14 days based on severity of symptoms and risk factors. Plan for next call: self management-tolerating po       Goals Addressed                   This Visit's Progress     Prevent complications post hospitalization.           04/13/23  Will attend fu appt with Dr. Berkley Watts scheduled 4/18 to arrange colonoscopy  Will continue to tolerate PO    04/20/23  Attended fu appt with GI  Upper/Lower Endoscopy scheduled for 9/2023 (first available)  Will attend VV with PCP scheduled 4/28  Tolerating PO  Having reg BMs

## 2023-04-25 NOTE — PROGRESS NOTES
HISTORY OF PRESENT ILLNESS  Romain Mcdonald is a 77 y.o. female. HPI    This is an established visit completed with telemedicine was completed with video assist. The patient acknowledges and agrees to this method of visitation. Last here 3/8/23. Pt is here to f/u on chronic conditions. She was in the hospital 4/9/23 - 4/12/23 for SBO. Ms. Shakira Stevens is a 77y.o. year old female, she is seen today for Transition of Care services following a hospital discharge for SBO on 4/9/23 - 4/12/23. Our office Nurse Navigator performed an outreach to Ms. Alicja Garcia on 4/13/23 (within 2 business days of discharge) to complete medication reconciliation and a telephonic assessment of her condition. No medication changes  They could not place an NG tub due to her anatomy     Reviewed hospital course:  Hospital Course:  Patient was managed conservatively with bowel rest and improved as expected. On the day of discharge, patient was able to tolerate a diet. Reviewed KUB 4/10/23:  Impression:     No high-grade/complete small bowel obstruction. Reviewed CT abd pelv 4/9/23:  Impression:       1. Small bowel obstruction, likely related to adhesive disease. A transition   point is in the left lower quadrant. 2. Status post gastric pull-through. There is a hiatal hernia containing the   tail the pancreas and a portion of the transverse colon. 3. Status post cholecystectomy. Reviewed pCXR  4/9/23:  Impression:     1. Enlarged cardiac silhouette, otherwise no acute disease      States she has been eating normally, having normal BM's every day. Denies nausea, vomiting, fever, chills.      Discussed watching for nausea, vomiting, abdominal pain and going to clear liquids first.      Has a history of hypertension  BP at home 126/69  HR 54  Continues on losartan 50mg and norvasc 5 mg     She has lasix to use prn for mild swelling in her legs  Not needing this much minimal swelling on exam     She is diabetic  BS AM upper 90s 107, once 79  She is taking lantus 25 U qAm -- this was held in the hospital since she was not eating  She started back on 15U w/ sliding scale, but now is back to normal 25U  Taking metformin 500 BID  No longer taking novolog 8U with dinner + sliding scale (8U if >150, 10U >200, 12U if >250, 14U if >300), has not needed it     She also takes ASA 81mg daily     Wt today is 160 lbs, down 4 lbs since lov   Of note, pt has had dental work and has had trouble eating reports eating less in general  Discussed diet and wt/l     Reviewed labs    Ordered labs today     Taking daily B12 supplement 2000 mcg daily     Pt follows with Dr. Santiago Degree (cardio) for CAD   She is on Norvasc to medically manage chest pain   Not having any active symptoms, has a history of a stent  Last visit 6/22, no med changes, no new tests  Scheduled 6/15/23   Last ECHO 2016  No EKG performed per pt   No changes in medication per pt       Pt follows annually with Dr. Dionicio Samuel (hemo/onc) for h/o adenoma of esophagus   Last visit was 7/3/18  Pt was discharged from his care      Pt follows with Dr. Augustin Williamson (pulm) for history of asthma annually   Last visit was 2/23, no med changes, no PFT's   Continues on symbicort BID, singulair daily, albuterol prn for breathing/asthma per pulm   Has not needed albuterol recently      She saw NP Dayna Rodriguez ([address])  Last visit 8/4/22  She had pre cancerous spot removed from her left calf in the past     Follows with Dr Sharath Roth (GI)  Continues dexilant and pepcid at night this controls her reflux symptoms well  Last there w/ NP 4/18/23, post her hospitalization no med changes, she scheduled EGD and colo in 9/23     Recall has h/o esophageal cancer     Provided referral for plastic surgeon for her ear previously     Pt has not been evaluated for DANIELLE in the past   Recall she declines further evaluation for now       Continues lipitor 40mg daily for cholesterol     Pt is supplementing with Calcium 600 mg BID  Discussed continuing with this as Ca levels were low on last labs     Of note, her  has bladder cancer and she is caring for him at home. Pt lives w/ her , daughter, granddaughter       ACP not on file. SDM is her . Reminded pt to bring in this lov      PREVENTIVE:    Colonoscopy: 8/7/18, Dr. Ondina Turner, repeat 5 years, scheduled 9/23   EGD: 4/16, Dr. Ondina Turner, h/o esophageal cancer, polyp on recent EGD, biopsy nl, scheduled 9/23   AAA: FMHx (grandfather), 03/22 negative   Pap: Dr. Marizol Lozano, 9/22  Mammogram: 7/29/22 negative  Dexa: 7/27/20 nl, due 7/23  Tdap: 9/14/2016  Pneumovax: 10/14/19   Uefrhfl14: 11/14/2016  Zostavax: 11/18/2016, reaction on R arm  Shingrix: both completed 2/20  Flu shot: Fall 2022   Foot exam: 03/08/23  Microalbumin: 8/22  A1c: 2/22 7.1, 6/22 poc 6.4 8/22 6.3 11/22 6.0 3/23 6.6   Eye exam:  [facility] 2/23   Lipids: 8/22 LDL 40  Hep C screen: 11/15, negative  Covid: 5 doses (Nishant Yarbrough), including bivalent       Patient Active Problem List   Diagnosis Code    Incisional hernia K43.2    Esophageal cancer ([facility] C15.9    Hypercholesteremia E78.00    HTN (hypertension) I10    Asthma J45.909    Thyroid nodule E04.1    ASHD (arteriosclerotic heart disease) I25.10    Bradycardia R00.1    Diabetes mellitus without complication (HCC) L89.8    Recurrent depression (HCC) F33.9    Chronic obstructive pulmonary disease, unspecified COPD type ([address] J44.9    SBO (small bowel obstruction) ([address] K56.609     Current Outpatient Medications   Medication Sig Dispense Refill    montelukast (SINGULAIR) 10 mg tablet Take 1 Tablet by mouth daily. 90 Tablet 0    amLODIPine (NORVASC) 5 mg tablet Take 1 Tablet by mouth daily. 90 Tablet 0    losartan (COZAAR) 50 mg tablet Take 1 Tablet by mouth daily. (Patient not taking: Reported on 4/9/2023) 90 Tablet 0    atorvastatin (LIPITOR) 40 mg tablet Take 1 Tablet by mouth daily.  90 Tablet 0    metFORMIN ER (GLUCOPHAGE XR) 500 mg tablet TAKE 2 TABLETS EVERY DAY WITH DINNER 180 Tablet 0    calcium citrate 200 mg (950 mg) tablet Take  by mouth daily. Insulin Needles, Disposable, (Pen Needle) 31 gauge x 5/16\" ndle Use with insulin twice daily; dx E11.9 1 Each 5    Lantus Solostar U-100 Insulin 100 unit/mL (3 mL) inpn INJECT  36 UNITS SUBCUTANEOUSLY EVERY DAY (Patient taking differently: INJECT  30 UNITS SUBCUTANEOUSLY EVERY DAY) 45 mL 0    glucose blood VI test strips (Accu-Chek Corrine Plus test strp) strip Check glucose twice daily DX: E11.9 100 Strip 3    furosemide (LASIX) 20 mg tablet TAKE 1 TABLET BY MOUTH EVERY DAY AS NEEDED (Patient not taking: Reported on 4/9/2023) 90 Tablet 1    MAGNESIUM PO Take  by mouth daily. Indications: leg cramps (Patient not taking: Reported on 12/6/2022)      glucose blood VI test strips (Accu-Chek Corrine Plus test strp) strip CHECK BLOOD SUGAR TWICE DAILY 100 Strip 3    albuterol (PROVENTIL HFA, VENTOLIN HFA, PROAIR HFA) 90 mcg/actuation inhaler Take 2 Puffs by inhalation every four (4) hours as needed for Wheezing. 1 Inhaler 3    Blood-Glucose Meter (ACCU-CHEK CORRINE PLUS METER) misc Check glucose twice daily. 1 Each 1    cholecalciferol, vitamin D3, 50 mcg (2,000 unit) tab Take  by mouth daily. SYMBICORT 160-4.5 mcg/actuation HFA inhaler Take 2 Puffs by inhalation two (2) times a day. polyethylene glycol (MIRALAX) 17 gram packet Take 1 Packet by mouth daily. cetirizine (ZYRTEC) 10 mg tablet Take 1 Tablet by mouth daily. melatonin 5 mg Tab Take 1 Tablet by mouth nightly. dexlansoprazole (DEXILANT) 60 mg CpDB capsule (delayed release) Take 1 Capsule by mouth daily. aspirin 81 mg tablet Take 1 Tablet by mouth.      pyridoxine, vitamin B6, (VITAMIN B-6) 100 mg tablet Take 1 Tablet by mouth every morning.        Past Surgical History:   Procedure Laterality Date    COLONOSCOPY N/A 8/7/2018    COLONOSCOPY performed by Ranjana Hollingsworth MD at [address]; HI RISK IND  8/7/2018         HX ENDOSCOPY  4/2016    HX GI  2010    gastroesophagectomy    HX HEART CATHETERIZATION  03/2017    stent x 1    HX HEENT  1957    tonsils as child    HX HERNIA REPAIR  04/26/2012    abdominal and umbilical    HX LAP CHOLECYSTECTOMY  1995    HX ORTHOPAEDIC  02/20/2015    right shoulder manipulation    HX OTHER SURGICAL Bilateral      shoulder surgery for frozen surgery    HX OTHER SURGICAL  6/24/2015    mammogram     HX PELVIC LAPAROSCOPY  1989    HX TUBAL LIGATION  1983    lap btl    HX VASCULAR ACCESS      port a cath and removal    MA EGD BALLOON DILATION ESOPHAGUS <30 MM DIAM  4/20/2010         MA EXTRAC ERUPTED TOOTH/EXPOSED ROOT         Lab Results   Component Value Date    WBC 5.4 04/12/2023    HGB 10.8 (L) 04/12/2023    HCT 33.7 (L) 04/12/2023    MCV 98.0 04/12/2023     04/12/2023     Lab Results   Component Value Date/Time    Cholesterol, total 117 08/31/2022 08:54 AM    HDL Cholesterol 62 08/31/2022 08:54 AM    LDL, calculated 40.6 08/31/2022 08:54 AM    VLDL, calculated 14.4 08/31/2022 08:54 AM    Triglyceride 72 08/31/2022 08:54 AM    CHOL/HDL Ratio 1.9 08/31/2022 08:54 AM     Lab Results   Component Value Date/Time    GFR est AA >60 08/31/2022 08:54 AM    GFR est non-AA >60 08/31/2022 08:54 AM    Creatinine (POC) 0.9 01/06/2012 10:42 AM    Creatinine 0.58 04/12/2023 03:55 AM    BUN 6 04/12/2023 03:55 AM    BUN (POC) 14 01/28/2010 12:02 PM    Sodium (POC) 141 01/28/2010 12:02 PM    Sodium 136 04/12/2023 03:55 AM    Potassium 3.7 04/12/2023 03:55 AM    Potassium (POC) 4.0 01/28/2010 12:02 PM    Chloride (POC) 103 01/28/2010 12:02 PM    Chloride 112 (H) 04/12/2023 03:55 AM    CO2 21 04/12/2023 03:55 AM       Review of Systems   Respiratory:  Negative for shortness of breath. Cardiovascular:  Negative for chest pain. Physical Exam  Constitutional:       General: She is not in acute distress. Appearance: Normal appearance. She is not ill-appearing, toxic-appearing or diaphoretic.    HENT: Head: Normocephalic and atraumatic. Eyes:      General:         Right eye: No discharge. Left eye: No discharge. Conjunctiva/sclera: Conjunctivae normal.   Pulmonary:      Effort: Pulmonary effort is normal.   Musculoskeletal:      Cervical back: Normal range of motion. Neurological:      General: No focal deficit present. Mental Status: She is alert and oriented to person, place, and time. Psychiatric:         Mood and Affect: Mood normal.         Behavior: Behavior normal.         ASSESSMENT and PLAN    ICD-10-CM ICD-9-CM    1. SBO (small bowel obstruction) ([facility]  K56.609 560.9    Patient admitted with bowel obstruction conservatively managed    Felt related to adhesions from prior surgeries    Colonoscopy and endoscopy is now scheduled for September she had follow-up with gastroenterology no additional changes to medications    Since she is been home no further nausea or vomiting having regular bowel movements no fevers or chills discussed signs and symptoms to watch out for overall doing quite well and stable   2. Diabetes mellitus without complication (HCC)  P35.5 250.00    Last A1c at goal we will repeat labs at follow-up    Home readings good she is now back on her Lantus 25 units and metformin twice daily not having hypoglycemia tolerating a regular diet no change additional changes today   3. Malignant neoplasm of esophagus, unspecified location [facility])  C15.9 150.9    We will be getting repeat endoscopy in September saw gastroenterology   4. Primary hypertension  I10 401.9    Controlled on losartan and Norvasc doing well since coming home metabolic panel stable aside from calcium see below   5. Hypercholesteremia  E78.00 272.0    Controlled Lipitor continue   6. Chronic obstructive pulmonary disease, unspecified COPD type ([facility]  J44.9 496    Stable no recent flares doing well with Symbicort and Singulair   7.  Mild intermittent asthma without complication  U34.47 132.45    See above 8. Anemia, unspecified type  D64.9 285.9    Patient with mild anemia upon discharge from her hospitalization normal blood count at admission    Suspect this is related to dilution from IV fluids and multiple blood draws    We will repeat CBC at follow-up   9. Hypocalcemia  E83.51 275.41    Persistent low calcium she is taking a calcium supplement twice per day continue Will repeat labs at follow-up   10. ASHD (arteriosclerotic heart disease)  I25.10 414.00    Medically managed no active signs or symptoms of CAD has follow-up next month with cardiology       Depression screen reviewed and negative. Scribed by Catracho Cardenas, as dictated by Dr. Javier Gallego. Current diagnosis and concerns discussed with pt at length. Pt understands risks and benefits or current treatment plan and medications, and accepts the treatment and medication with any possible risks. Pt asks appropriate questions, which were answered. Pt was instructed to call with any concerns or problems. I have reviewed the note documented by the scribe. The services provided are my own. The documentation is accurate. Ryan Hernández, was evaluated through a synchronous (real-time) audio-video encounter. The patient (or guardian if applicable) is aware that this is a billable service, which includes applicable co-pays. This Virtual Visit was conducted with patient's (and/or legal guardian's) consent. The visit was conducted pursuant to the emergency declaration under the [facility], [facility] authority and the [employer] General Act. Patient identification was verified, and a caregiver was present when appropriate.   The patient was located at: Home: [address]  The provider was located at: Home: [place]

## 2023-04-28 ENCOUNTER — VIRTUAL VISIT (OUTPATIENT)
Dept: INTERNAL MEDICINE CLINIC | Age: 68
End: 2023-04-28

## 2023-04-28 DIAGNOSIS — I10 PRIMARY HYPERTENSION: ICD-10-CM

## 2023-04-28 DIAGNOSIS — J44.9 CHRONIC OBSTRUCTIVE PULMONARY DISEASE, UNSPECIFIED COPD TYPE (HCC): ICD-10-CM

## 2023-04-28 DIAGNOSIS — D64.9 ANEMIA, UNSPECIFIED TYPE: ICD-10-CM

## 2023-04-28 DIAGNOSIS — E78.00 HYPERCHOLESTEREMIA: ICD-10-CM

## 2023-04-28 DIAGNOSIS — E11.9 DIABETES MELLITUS WITHOUT COMPLICATION (HCC): ICD-10-CM

## 2023-04-28 DIAGNOSIS — I25.10 ASHD (ARTERIOSCLEROTIC HEART DISEASE): ICD-10-CM

## 2023-04-28 DIAGNOSIS — J45.20 MILD INTERMITTENT ASTHMA WITHOUT COMPLICATION: ICD-10-CM

## 2023-04-28 DIAGNOSIS — K56.609 SBO (SMALL BOWEL OBSTRUCTION) (HCC): Primary | ICD-10-CM

## 2023-04-28 DIAGNOSIS — E83.51 HYPOCALCEMIA: ICD-10-CM

## 2023-04-28 DIAGNOSIS — C15.9 MALIGNANT NEOPLASM OF ESOPHAGUS, UNSPECIFIED LOCATION (HCC): Chronic | ICD-10-CM

## 2023-04-28 NOTE — PROGRESS NOTES
1. \"Have you been to the ER, urgent care clinic since your last visit? Hospitalized since your last visit? \" [id number]     2. \"Have you seen or consulted any other health care providers outside of the [address] [place] [address] since your last visit? \" No     3. For patients aged 39-70: Has the patient had a colonoscopy / FIT/ Cologuard? Yes - Care Gap present. Most recent result on file      If the patient is female:    4. For patients aged 41-77: Has the patient had a mammogram within the past 2 years? Yes - Care Gap present. Most recent result on file      5. For patients aged 21-65: Has the patient had a pap smear?  NA - based on age or sex

## 2023-05-04 ENCOUNTER — PATIENT OUTREACH (OUTPATIENT)
Dept: CASE MANAGEMENT | Age: 68
End: 2023-05-04

## 2023-05-23 RX ORDER — METFORMIN HYDROCHLORIDE 500 MG/1
TABLET, EXTENDED RELEASE ORAL
Qty: 180 TABLET | Refills: 0 | Status: SHIPPED | OUTPATIENT
Start: 2023-05-23

## 2023-05-30 RX ORDER — ATORVASTATIN CALCIUM 40 MG/1
TABLET, FILM COATED ORAL
Qty: 90 TABLET | Refills: 0 | Status: SHIPPED | OUTPATIENT
Start: 2023-05-30

## 2023-06-07 ENCOUNTER — NURSE ONLY (OUTPATIENT)
Age: 68
End: 2023-06-07

## 2023-06-07 DIAGNOSIS — E11.9 TYPE 2 DIABETES MELLITUS WITHOUT COMPLICATION, UNSPECIFIED WHETHER LONG TERM INSULIN USE (HCC): Primary | ICD-10-CM

## 2023-06-07 DIAGNOSIS — I10 ESSENTIAL (PRIMARY) HYPERTENSION: ICD-10-CM

## 2023-06-07 DIAGNOSIS — E78.00 PURE HYPERCHOLESTEROLEMIA, UNSPECIFIED: ICD-10-CM

## 2023-06-07 DIAGNOSIS — E78.2 MIXED HYPERLIPIDEMIA: ICD-10-CM

## 2023-06-07 DIAGNOSIS — E53.8 B12 DEFICIENCY: ICD-10-CM

## 2023-06-07 DIAGNOSIS — D64.9 ANEMIA, UNSPECIFIED TYPE: ICD-10-CM

## 2023-06-07 DIAGNOSIS — E11.9 TYPE 2 DIABETES MELLITUS WITHOUT COMPLICATION, UNSPECIFIED WHETHER LONG TERM INSULIN USE (HCC): ICD-10-CM

## 2023-06-07 LAB
ALBUMIN SERPL-MCNC: 4 G/DL (ref 3.5–5)
ALBUMIN/GLOB SERPL: 1.4 (ref 1.1–2.2)
ALP SERPL-CCNC: 58 U/L (ref 45–117)
ALT SERPL-CCNC: 32 U/L (ref 12–78)
ANION GAP SERPL CALC-SCNC: 2 MMOL/L (ref 5–15)
AST SERPL-CCNC: 21 U/L (ref 15–37)
BILIRUB SERPL-MCNC: 0.4 MG/DL (ref 0.2–1)
BUN SERPL-MCNC: 13 MG/DL (ref 6–20)
BUN/CREAT SERPL: 18 (ref 12–20)
CALCIUM SERPL-MCNC: 9 MG/DL (ref 8.5–10.1)
CHLORIDE SERPL-SCNC: 106 MMOL/L (ref 97–108)
CHOLEST SERPL-MCNC: 131 MG/DL
CO2 SERPL-SCNC: 30 MMOL/L (ref 21–32)
CREAT SERPL-MCNC: 0.74 MG/DL (ref 0.55–1.02)
CREAT UR-MCNC: 159 MG/DL
ERYTHROCYTE [DISTWIDTH] IN BLOOD BY AUTOMATED COUNT: 13.8 % (ref 11.5–14.5)
EST. AVERAGE GLUCOSE BLD GHB EST-MCNC: 137 MG/DL
GLOBULIN SER CALC-MCNC: 2.9 G/DL (ref 2–4)
GLUCOSE SERPL-MCNC: 103 MG/DL (ref 65–100)
HBA1C MFR BLD: 6.4 % (ref 4–5.6)
HCT VFR BLD AUTO: 39.3 % (ref 35–47)
HDLC SERPL-MCNC: 51 MG/DL
HDLC SERPL: 2.6 (ref 0–5)
HGB BLD-MCNC: 12.2 G/DL (ref 11.5–16)
LDLC SERPL CALC-MCNC: 64.4 MG/DL (ref 0–100)
MCH RBC QN AUTO: 30.3 PG (ref 26–34)
MCHC RBC AUTO-ENTMCNC: 31 G/DL (ref 30–36.5)
MCV RBC AUTO: 97.5 FL (ref 80–99)
MICROALBUMIN UR-MCNC: 2.17 MG/DL
MICROALBUMIN/CREAT UR-RTO: 14 MG/G (ref 0–30)
NRBC # BLD: 0 K/UL (ref 0–0.01)
NRBC BLD-RTO: 0 PER 100 WBC
PLATELET # BLD AUTO: 253 K/UL (ref 150–400)
PMV BLD AUTO: 10.1 FL (ref 8.9–12.9)
POTASSIUM SERPL-SCNC: 4.9 MMOL/L (ref 3.5–5.1)
PROT SERPL-MCNC: 6.9 G/DL (ref 6.4–8.2)
RBC # BLD AUTO: 4.03 M/UL (ref 3.8–5.2)
SODIUM SERPL-SCNC: 138 MMOL/L (ref 136–145)
TRIGL SERPL-MCNC: 78 MG/DL
VIT B12 SERPL-MCNC: >2000 PG/ML (ref 193–986)
VLDLC SERPL CALC-MCNC: 15.6 MG/DL
WBC # BLD AUTO: 6.2 K/UL (ref 3.6–11)

## 2023-07-08 RX ORDER — LOSARTAN POTASSIUM 50 MG/1
TABLET ORAL
Qty: 90 TABLET | Refills: 0 | Status: SHIPPED | OUTPATIENT
Start: 2023-07-08

## 2023-07-23 RX ORDER — MONTELUKAST SODIUM 10 MG/1
TABLET ORAL
Qty: 90 TABLET | Refills: 0 | Status: SHIPPED | OUTPATIENT
Start: 2023-07-23

## 2023-08-23 ENCOUNTER — HOSPITAL ENCOUNTER (OUTPATIENT)
Facility: HOSPITAL | Age: 68
Discharge: HOME | End: 2023-08-26
Attending: INTERNAL MEDICINE
Payer: MEDICARE

## 2023-08-23 DIAGNOSIS — R22.1 NECK MASS: ICD-10-CM

## 2023-08-23 DIAGNOSIS — Z12.39 BREAST SCREENING: ICD-10-CM

## 2023-08-23 DIAGNOSIS — Z78.0 POST-MENOPAUSAL: ICD-10-CM

## 2023-08-23 PROCEDURE — 70491 CT SOFT TISSUE NECK W/DYE: CPT

## 2023-08-23 PROCEDURE — 77067 SCR MAMMO BI INCL CAD: CPT

## 2023-08-23 PROCEDURE — 77080 DXA BONE DENSITY AXIAL: CPT

## 2023-08-23 PROCEDURE — 6360000004 HC RX CONTRAST MEDICATION: Performed by: INTERNAL MEDICINE

## 2023-08-23 RX ORDER — AMLODIPINE BESYLATE 5 MG/1
TABLET ORAL
Qty: 90 TABLET | Refills: 0 | Status: SHIPPED | OUTPATIENT
Start: 2023-08-23

## 2023-08-23 RX ADMIN — IOPAMIDOL 100 ML: 755 INJECTION, SOLUTION INTRAVENOUS at 09:37

## 2023-08-24 DIAGNOSIS — R22.1 NECK MASS: Primary | ICD-10-CM

## 2023-09-05 ENCOUNTER — NURSE ONLY (OUTPATIENT)
Age: 68
End: 2023-09-05

## 2023-09-05 DIAGNOSIS — I10 PRIMARY HYPERTENSION: ICD-10-CM

## 2023-09-05 DIAGNOSIS — E11.9 DIABETES MELLITUS WITHOUT COMPLICATION (HCC): ICD-10-CM

## 2023-09-05 LAB
ANION GAP SERPL CALC-SCNC: 9 MMOL/L (ref 5–15)
BUN SERPL-MCNC: 16 MG/DL (ref 6–20)
BUN/CREAT SERPL: 19 (ref 12–20)
CALCIUM SERPL-MCNC: 8.9 MG/DL (ref 8.5–10.1)
CHLORIDE SERPL-SCNC: 103 MMOL/L (ref 97–108)
CO2 SERPL-SCNC: 27 MMOL/L (ref 21–32)
CREAT SERPL-MCNC: 0.85 MG/DL (ref 0.55–1.02)
GLUCOSE SERPL-MCNC: 126 MG/DL (ref 65–100)
POTASSIUM SERPL-SCNC: 4.3 MMOL/L (ref 3.5–5.1)
SODIUM SERPL-SCNC: 139 MMOL/L (ref 136–145)
TSH SERPL DL<=0.05 MIU/L-ACNC: 1.73 UIU/ML (ref 0.36–3.74)

## 2023-09-05 ASSESSMENT — ENCOUNTER SYMPTOMS: SHORTNESS OF BREATH: 0

## 2023-09-05 NOTE — PROGRESS NOTES
HISTORY OF PRESENT ILLNESS  Dinesh Rojas is a 77 y.o. female. HPI     Last here 6/13/23. Pt is here to f/u on chronic conditions. Has a history of hypertension  BP today is 117/65  BP at home controlled  Continues on losartan 50mg and norvasc 5 mg     She has lasix to use prn for mild swelling in her legs  Not needing this much      She is diabetic  BS  this week 120s  She is taking lantus 25 U qAm   Will decrease to 23U as blood sugars are overly well controlled  Taking metformin 500 BID  No longer taking novolog 8U with dinner + sliding scale (8U if >150, 10U >200, 12U if >250, 14U if >300), has not needed it     She also takes ASA 81mg daily     Wt today is 164 lbs, up 4 lbs since lov   Discussed diet and wt/l     Reviewed labs    Ordered pre-labs     Lov Pt is concerned about nodules on her neck   Recall she has a hx of thyroid nodules and esophageal cancer, has had imaging in the past  Reviewed CT soft tissue neck:  IMPRESSION:  1. Multinodular goiter with slight leftward deviation of the trachea, but no  significant narrowing. Dominant mixed solid and cystic nodule in the right  thyroid lobe measuring 3.5 x 3.0 x 4.4 cm. Allowing for differences in  technique, this nodule appears to have increased in size since 2015. Ultrasound-guided biopsy should be considered if not previously performed. 2. No cervical lymphadenopathy. 3. Partially visualized postsurgical changes of esophagectomy and gastric  pull-through.       Due to enlarged thyroid nodule we referred her back to surgeon she will see Dr. Екатерина Loredo on Friday of this week for further evaluation         pt follows with Dr. Abel Banegas (cardio) for CAD   She is on Norvasc 5mg to medically manage chest pain   Not having any active symptoms, has a history of a stent  Last visit 6/23, had a better visit no med changes, EKG  Last ECHO 2016      Pt previously follows annually with Dr. Juan A Boss (hemo/onc) for h/o adenoma of esophagus   Last visit was

## 2023-09-06 LAB
EST. AVERAGE GLUCOSE BLD GHB EST-MCNC: 137 MG/DL
HBA1C MFR BLD: 6.4 % (ref 4–5.6)

## 2023-09-11 ENCOUNTER — OFFICE VISIT (OUTPATIENT)
Age: 68
End: 2023-09-11
Payer: MEDICARE

## 2023-09-11 VITALS
HEIGHT: 63 IN | SYSTOLIC BLOOD PRESSURE: 117 MMHG | RESPIRATION RATE: 16 BRPM | TEMPERATURE: 97.1 F | BODY MASS INDEX: 29.13 KG/M2 | DIASTOLIC BLOOD PRESSURE: 65 MMHG | HEART RATE: 60 BPM | WEIGHT: 164.4 LBS | OXYGEN SATURATION: 97 %

## 2023-09-11 DIAGNOSIS — E04.1 THYROID NODULE: ICD-10-CM

## 2023-09-11 DIAGNOSIS — E78.00 HYPERCHOLESTEREMIA: ICD-10-CM

## 2023-09-11 DIAGNOSIS — J45.20 MILD INTERMITTENT ASTHMA WITHOUT COMPLICATION: ICD-10-CM

## 2023-09-11 DIAGNOSIS — E11.9 DIABETES MELLITUS WITHOUT COMPLICATION (HCC): Primary | ICD-10-CM

## 2023-09-11 DIAGNOSIS — C15.9 MALIGNANT NEOPLASM OF ESOPHAGUS, UNSPECIFIED LOCATION (HCC): ICD-10-CM

## 2023-09-11 DIAGNOSIS — I25.10 ASHD (ARTERIOSCLEROTIC HEART DISEASE): ICD-10-CM

## 2023-09-11 DIAGNOSIS — I10 PRIMARY HYPERTENSION: ICD-10-CM

## 2023-09-11 DIAGNOSIS — J44.9 CHRONIC OBSTRUCTIVE PULMONARY DISEASE, UNSPECIFIED COPD TYPE (HCC): ICD-10-CM

## 2023-09-11 DIAGNOSIS — F33.9 RECURRENT DEPRESSION (HCC): ICD-10-CM

## 2023-09-11 PROCEDURE — 1123F ACP DISCUSS/DSCN MKR DOCD: CPT | Performed by: INTERNAL MEDICINE

## 2023-09-11 PROCEDURE — 1036F TOBACCO NON-USER: CPT | Performed by: INTERNAL MEDICINE

## 2023-09-11 PROCEDURE — 1090F PRES/ABSN URINE INCON ASSESS: CPT | Performed by: INTERNAL MEDICINE

## 2023-09-11 PROCEDURE — 99214 OFFICE O/P EST MOD 30 MIN: CPT | Performed by: INTERNAL MEDICINE

## 2023-09-11 PROCEDURE — G8427 DOCREV CUR MEDS BY ELIG CLIN: HCPCS | Performed by: INTERNAL MEDICINE

## 2023-09-11 PROCEDURE — G8419 CALC BMI OUT NRM PARAM NOF/U: HCPCS | Performed by: INTERNAL MEDICINE

## 2023-09-11 PROCEDURE — 3074F SYST BP LT 130 MM HG: CPT | Performed by: INTERNAL MEDICINE

## 2023-09-11 PROCEDURE — 3078F DIAST BP <80 MM HG: CPT | Performed by: INTERNAL MEDICINE

## 2023-09-11 PROCEDURE — 2022F DILAT RTA XM EVC RTNOPTHY: CPT | Performed by: INTERNAL MEDICINE

## 2023-09-11 PROCEDURE — 3017F COLORECTAL CA SCREEN DOC REV: CPT | Performed by: INTERNAL MEDICINE

## 2023-09-11 PROCEDURE — 3044F HG A1C LEVEL LT 7.0%: CPT | Performed by: INTERNAL MEDICINE

## 2023-09-11 PROCEDURE — G8399 PT W/DXA RESULTS DOCUMENT: HCPCS | Performed by: INTERNAL MEDICINE

## 2023-09-11 PROCEDURE — 3023F SPIROM DOC REV: CPT | Performed by: INTERNAL MEDICINE

## 2023-09-11 ASSESSMENT — PATIENT HEALTH QUESTIONNAIRE - PHQ9
2. FEELING DOWN, DEPRESSED OR HOPELESS: 0
3. TROUBLE FALLING OR STAYING ASLEEP: 0
8. MOVING OR SPEAKING SO SLOWLY THAT OTHER PEOPLE COULD HAVE NOTICED. OR THE OPPOSITE, BEING SO FIGETY OR RESTLESS THAT YOU HAVE BEEN MOVING AROUND A LOT MORE THAN USUAL: 0
SUM OF ALL RESPONSES TO PHQ QUESTIONS 1-9: 0
7. TROUBLE CONCENTRATING ON THINGS, SUCH AS READING THE NEWSPAPER OR WATCHING TELEVISION: 0
5. POOR APPETITE OR OVEREATING: 0
4. FEELING TIRED OR HAVING LITTLE ENERGY: 0
10. IF YOU CHECKED OFF ANY PROBLEMS, HOW DIFFICULT HAVE THESE PROBLEMS MADE IT FOR YOU TO DO YOUR WORK, TAKE CARE OF THINGS AT HOME, OR GET ALONG WITH OTHER PEOPLE: 0
1. LITTLE INTEREST OR PLEASURE IN DOING THINGS: 0
SUM OF ALL RESPONSES TO PHQ QUESTIONS 1-9: 0
SUM OF ALL RESPONSES TO PHQ QUESTIONS 1-9: 0
SUM OF ALL RESPONSES TO PHQ9 QUESTIONS 1 & 2: 0
9. THOUGHTS THAT YOU WOULD BE BETTER OFF DEAD, OR OF HURTING YOURSELF: 0
6. FEELING BAD ABOUT YOURSELF - OR THAT YOU ARE A FAILURE OR HAVE LET YOURSELF OR YOUR FAMILY DOWN: 0
SUM OF ALL RESPONSES TO PHQ QUESTIONS 1-9: 0

## 2023-09-13 ENCOUNTER — ANESTHESIA (OUTPATIENT)
Facility: HOSPITAL | Age: 68
End: 2023-09-13
Payer: MEDICARE

## 2023-09-13 ENCOUNTER — HOSPITAL ENCOUNTER (OUTPATIENT)
Facility: HOSPITAL | Age: 68
Setting detail: OUTPATIENT SURGERY
Discharge: HOME | End: 2023-09-13
Attending: INTERNAL MEDICINE | Admitting: INTERNAL MEDICINE
Payer: MEDICARE

## 2023-09-13 ENCOUNTER — ANESTHESIA EVENT (OUTPATIENT)
Facility: HOSPITAL | Age: 68
End: 2023-09-13
Payer: MEDICARE

## 2023-09-13 VITALS
DIASTOLIC BLOOD PRESSURE: 60 MMHG | BODY MASS INDEX: 30.53 KG/M2 | HEART RATE: 85 BPM | SYSTOLIC BLOOD PRESSURE: 93 MMHG | OXYGEN SATURATION: 97 % | HEIGHT: 62 IN | WEIGHT: 165.9 LBS | TEMPERATURE: 97.4 F | RESPIRATION RATE: 22 BRPM

## 2023-09-13 LAB
GLUCOSE BLD STRIP.AUTO-MCNC: 94 MG/DL (ref 65–117)
SERVICE CMNT-IMP: NORMAL

## 2023-09-13 PROCEDURE — 2580000003 HC RX 258: Performed by: INTERNAL MEDICINE

## 2023-09-13 PROCEDURE — 3600007512: Performed by: INTERNAL MEDICINE

## 2023-09-13 PROCEDURE — 2500000003 HC RX 250 WO HCPCS: Performed by: NURSE ANESTHETIST, CERTIFIED REGISTERED

## 2023-09-13 PROCEDURE — 3700000001 HC ADD 15 MINUTES (ANESTHESIA): Performed by: INTERNAL MEDICINE

## 2023-09-13 PROCEDURE — 2709999900 HC NON-CHARGEABLE SUPPLY: Performed by: INTERNAL MEDICINE

## 2023-09-13 PROCEDURE — 88305 TISSUE EXAM BY PATHOLOGIST: CPT

## 2023-09-13 PROCEDURE — 6360000002 HC RX W HCPCS: Performed by: NURSE ANESTHETIST, CERTIFIED REGISTERED

## 2023-09-13 PROCEDURE — 7100000010 HC PHASE II RECOVERY - FIRST 15 MIN: Performed by: INTERNAL MEDICINE

## 2023-09-13 PROCEDURE — 88342 IMHCHEM/IMCYTCHM 1ST ANTB: CPT

## 2023-09-13 PROCEDURE — 3700000000 HC ANESTHESIA ATTENDED CARE: Performed by: INTERNAL MEDICINE

## 2023-09-13 PROCEDURE — 82962 GLUCOSE BLOOD TEST: CPT

## 2023-09-13 PROCEDURE — 3600007502: Performed by: INTERNAL MEDICINE

## 2023-09-13 RX ORDER — SODIUM CHLORIDE 9 MG/ML
INJECTION, SOLUTION INTRAVENOUS CONTINUOUS PRN
Status: COMPLETED | OUTPATIENT
Start: 2023-09-13 | End: 2023-09-13

## 2023-09-13 RX ORDER — FERROUS SULFATE 137(45) MG
142 TABLET, EXTENDED RELEASE ORAL DAILY
COMMUNITY

## 2023-09-13 RX ORDER — ASPIRIN 81 MG/1
81 TABLET, CHEWABLE ORAL DAILY
COMMUNITY

## 2023-09-13 RX ORDER — SODIUM CHLORIDE 0.9 % (FLUSH) 0.9 %
5-40 SYRINGE (ML) INJECTION PRN
Status: DISCONTINUED | OUTPATIENT
Start: 2023-09-13 | End: 2023-09-13 | Stop reason: HOSPADM

## 2023-09-13 RX ORDER — SODIUM CHLORIDE 9 MG/ML
25 INJECTION, SOLUTION INTRAVENOUS PRN
Status: DISCONTINUED | OUTPATIENT
Start: 2023-09-13 | End: 2023-09-13 | Stop reason: HOSPADM

## 2023-09-13 RX ORDER — LIDOCAINE HYDROCHLORIDE 20 MG/ML
INJECTION, SOLUTION EPIDURAL; INFILTRATION; INTRACAUDAL; PERINEURAL PRN
Status: DISCONTINUED | OUTPATIENT
Start: 2023-09-13 | End: 2023-09-13 | Stop reason: SDUPTHER

## 2023-09-13 RX ORDER — SODIUM CHLORIDE 0.9 % (FLUSH) 0.9 %
5-40 SYRINGE (ML) INJECTION EVERY 12 HOURS SCHEDULED
Status: DISCONTINUED | OUTPATIENT
Start: 2023-09-13 | End: 2023-09-13 | Stop reason: HOSPADM

## 2023-09-13 RX ADMIN — PROPOFOL 350 MG: 10 INJECTION, EMULSION INTRAVENOUS at 09:54

## 2023-09-13 RX ADMIN — SODIUM CHLORIDE: 9 INJECTION, SOLUTION INTRAVENOUS at 09:28

## 2023-09-13 RX ADMIN — LIDOCAINE HYDROCHLORIDE 40 MG: 20 INJECTION, SOLUTION EPIDURAL; INFILTRATION; INTRACAUDAL; PERINEURAL at 09:39

## 2023-09-13 ASSESSMENT — PAIN - FUNCTIONAL ASSESSMENT: PAIN_FUNCTIONAL_ASSESSMENT: NONE - DENIES PAIN

## 2023-09-13 NOTE — DISCHARGE INSTRUCTIONS
Rachel Wylie MD  [facility], [address] [facility], [address] [place]  [phone]  [email or web address]    El Garcia  [id number]  1955    EGD DISCHARGE INSTRUCTIONS    Discomfort:  Sore throat- throat lozenges or warm salt water gargle  redness at IV site- apply warm compress to area; if redness or soreness persist- contact your physician  Gaseous discomfort- walking, belching will help relieve any discomfort  You may not operate a vehicle for 12 hours  You may not engage in an occupation involving machinery or appliances for rest of today  You may not drink alcoholic beverages for at least 12 hours  Avoid making any critical decisions for at least 24 hour  DIET  You may have anything by mouth. You may eat and drink immediately. You may resume your regular diet - however -  remember your colon is empty and a heavy meal will produce gas. Avoid these foods:  vegetables, fried / greasy foods, carbonated drinks      ACTIVITY  You may resume your normal daily activities   Spend the remainder of the day resting -  avoid any strenuous activity. CALL M.D. ANY SIGN OF   Increasing pain, nausea, vomiting  Abdominal distension (swelling)  New increased bleeding (oral or rectal)  Fever (chills)  Pain in chest area  Bloody discharge from nose or mouth  Shortness of breath    Follow-up Instructions:   Call Dr. Rachel Wylie for any questions or problems. Telephone # [phone]  Dr. Geri Rhoades office will notify you of the biopsy results available  Within 7 to 10 days. We will call you or send a letter   Continue same medications. ENDOSCOPY FINDINGS:   Your endoscopy showed the surgical area of the esophagus looks good. There is a small polyp in the stomach similar to before. Biopsies were taken.       DISCHARGE SUMMARY from Nurse    The following personal items collected during your admission are returned to you:   Dental Appliance:

## 2023-09-13 NOTE — OP NOTE
[address]                  Colonoscopy Operative Report    9/13/2023      Dinesh Rojas  [id number]  1955    Procedure Type:   Colonoscopy --screening     Indications:    Personal history of colon polyps (screening only)     Pre-operative Diagnosis: see indication above    Post-operative Diagnosis:  See findings below    :  Preston Han MD    Referring Provider: Nicole Worthington MD      Sedation:  MAC anesthesia Propofol    Pre-Procedural Exam:      Airway: clear,  No airway problems anticipated  Heart: RRR, without gallops or rubs  Lungs: clear bilaterally without wheezes, crackles, or rhonchi  Abdomen: soft, nontender, nondistended, bowel sounds present  Mental Status: awake, alert and oriented to person, place and time     Procedure Details:  After informed consent was obtained with all risks and benefits of procedure explained and preoperative exam completed, the patient was taken to the endoscopy suite and placed in the left lateral decubitus position. Upon sequential sedation as per above, a digital rectal exam was performed . The Olympus videocolonoscope  was inserted in the rectum and carefully advanced to the cecum, which was identified by the ileocecal valve and appendiceal orifice. The cecum was identified by the ileocecal valve and appendiceal orifice. The quality of preparation was adequate. The colonoscope was slowly withdrawn with careful evaluation between folds. Retroflexion in the rectum was completed demonstrating internal hemorrhoids. Findings:   Rectum: Grade 1 internal hemorrhoid(s); Sigmoid:     -Diverticulosis  Descending Colon:     -Diverticulosis  Transverse Colon: normal  Ascending Colon: normal  Cecum: normal  Terminal Ileum: not intubated      Specimen Removed:  none    Complications: None. EBL:  None.     Impression:    normal colonic mucosa throughout  diverticulosis,  Moderate in degree, involving the descending colon

## 2023-09-13 NOTE — PROGRESS NOTES
ARRIVAL INFORMATION:  Verified patient name and date of birth, scheduled procedure, and informed consent. : Mark Osborn () contact number: [phone]  Physician and staff can share information with the . Belongings with patient include:  Clothing,Dentures upper    GI FOCUSED ASSESSMENT:  Neuro: Awake, alert, oriented x4  Respiratory: even and unlabored   GI: soft and non-distended  EKG Rhythm: normal sinus rhythm    Education:Reviewed general discharge instructions and  information. The risks and benefits of the bite block have been explained to patient. Patient verbalizes understanding.

## 2023-09-13 NOTE — ANESTHESIA PRE PROCEDURE
Department of Anesthesiology  Preprocedure Note       Name:  Thao Thorpe   Age:  77 y.o.  :  1955                                          MRN:  [id number]         Date:  2023      Surgeon: Anna Rock):  Livia Woodall MD    Procedure: Procedure(s):  COLONOSCOPY WITH BIOPSY WITH POLYPECTOMY, EGD WITH BIOPY  EGD BIOPSY    Medications prior to admission:   Prior to Admission medications    Medication Sig Start Date End Date Taking?  Authorizing Provider   aspirin 81 MG chewable tablet Take 1 tablet by mouth daily   Yes Historical Provider, MD   ferrous sulfate (SLOW FE) 142 (45 Fe) MG extended release tablet Take 142 mg by mouth daily   Yes Historical Provider, MD   amLODIPine (NORVASC) 5 MG tablet TAKE 1 TABLET EVERY DAY 23   Irma Gregg MD   montelukast (SINGULAIR) 10 MG tablet TAKE 1 TABLET EVERY DAY 23   Irma Gregg MD   losartan (COZAAR) 50 MG tablet TAKE 1 TABLET EVERY DAY 23   Irma Gregg MD   insulin glargine (LANTUS SOLOSTAR) 100 UNIT/ML injection pen 25units sq nightly  Patient taking differently: 23 units sq qam 23   Irma Gregg MD   atorvastatin (LIPITOR) 40 MG tablet TAKE 1 TABLET EVERY DAY 23   Irma Gregg MD   metFORMIN (GLUCOPHAGE-XR) 500 MG extended release tablet TAKE 2 TABLETS EVERY DAY WITH DINNER 23   Irma Gregg MD   albuterol sulfate HFA (PROVENTIL;VENTOLIN;PROAIR) 108 (90 Base) MCG/ACT inhaler Inhale 2 puffs into the lungs every 4 hours as needed 20   Ar Automatic Reconciliation   budesonide-formoterol (SYMBICORT) 160-4.5 MCG/ACT AERO Inhale 2 puffs into the lungs 2 times daily 17   Ar Automatic Reconciliation   calcium citrate (CALCITRATE) 950 (200 Ca) MG tablet Take by mouth daily    Ar Automatic Reconciliation   cetirizine (ZYRTEC) 10 MG tablet Take 1 tablet by mouth daily    Ar Automatic Reconciliation   Cholecalciferol 50 MCG (2000 UT) TABS Take by mouth daily    Ar Automatic

## 2023-09-13 NOTE — OP NOTE
[facility]                   Endoscopic Gastroduodenoscopy Procedure Note      9/13/2023  Marita Osullivan  1955  [id number]    Procedure: Endoscopic Gastroduodenoscopy with biopsy    Indication: Dyphagia/odynophagia, GERD, Hx of esophageal cancer      Pre-operative Diagnosis: see indication above    Post-operative Diagnosis: see findings below    : TYLER King MD    Referring Provider:  Rubens Clinton MD      Anesthesia/Sedation:  MAC anesthesia Propofol    Airway assessment: No airway problems anticipated    Pre-Procedural Exam:      Airway: clear, no airway problems anticipated  Heart: RRR, without gallops or rubs  Lungs: clear bilaterally without wheezes, crackles, or rhonchi  Abdomen: soft, nontender, nondistended, bowel sounds present  Mental Status: awake, alert and oriented to person, place and time       Procedure Details     After infomed consent was obtained for the procedure, with all risks and benefits of procedure explained the patient was taken to the endoscopy suite and placed in the left lateral decubitus position. Following sequential administration of sedation as per above, the endoscope was inserted into the mouth and advanced under direct vision to second portion of the duodenum. A careful inspection was made as the gastroscope was withdrawn, including a retroflexed view of the proximal stomach; findings and interventions are described below. Findings:   Esophagus:  Normal mucosa to esophagogastric anastomosis at 18-19 cm. Stomach: Gastric pull up into chest. Diffuse mild erythema. 7 mm polyp in antrum, biopsied  Duodenum: normal    Therapies:  biopsy of stomach antrum    Specimens:  Gastric antral polyp biopsies           Complications:   None; patient tolerated the procedure well. EBL:  None. Impression:      1/ S/p partial esophagectomy  2. Small gastric antral polyp  3.  Normal

## 2023-09-13 NOTE — PROGRESS NOTES
TRANSFER - IN REPORT:    Verbal report received from [address] sean Lopez  being received from [facility]   for routine progression of patient care      Report consisted of patient's Situation, Background, Assessment and   Recommendations(SBAR). Information from the following report(s) Nurse Handoff Report was reviewed with the receiving nurse. Opportunity for questions and clarification was provided. Assessment completed upon patient's arrival to unit and care assumed.

## 2023-09-13 NOTE — H&P
Jesus Garza MD  [facility], [facility], [address] [place]  [phone]  [email or web address]      Gastroenterology Outpatient History and Physical    Patient: Zari Santos    Physician: Rob Stearns MD    Vital Signs: There were no vitals taken for this visit. Allergies: Allergies   Allergen Reactions    Adhesive Tape Other (See Comments)     redness       Chief Complaint: difficulty swallowing and hx of colonic polyps    History of Present Illness:   History of Esophageal adenocarcinoma    S/p resection. Doing well. Esophagitis, reflux (K21.00)  Impression: Chronic reflux with dysphagia and high risk for dysphagia/reflux/significant symptoms due to surgical history. Pt has been doing well on dexilant for the last few years. Dysphagia is getting a bit worse but mostly due to worsening dentition. Other symptoms are well controlled. Hx of stricture in 2014. Continued Dexilant 60 mg capsule, delayed release, 1 (one) Capsule DR banks, #90, 90 days starting 04/18/2023, Ref. Q 90 Days x1 Year, Mail Order #90, 90 days, Ref. Q 90 Days x1 Year. History of colonic polyps (Z86.010)  Impression: Some chronic constipation and doing well on miralax. Will set up for screening colonoscopy as she is due.     Current Plans    History:  Past Medical History:   Diagnosis Date    Arrhythmia     bradycardia    Asthma     Bloating     CAD (coronary artery disease)     1 stent    Cancer ([address]) 2010    esophageal/GE junction    Chest pain     Chest pain     Chronic obstructive pulmonary disease (HCC)     Chronic pain     left shoulder    Congestion of throat     Cough     Depression     & anxiety    Diabetes (HCC)     IDDM    Dyspepsia and other specified disorders of function of stomach     Fatigue     GERD (gastroesophageal reflux disease)     Headache(784.0)     Hypercholesterolemia     Hypertension     Menopause     Multinodular goiter     Nausea &

## 2023-09-13 NOTE — PROGRESS NOTES
Endoscopy Case End Note:    0945:  EGD Procedure scope was pre-cleaned, per protocol, at bedside by [facility].      1006: Colon Endoscope was pre-cleaned at bedside immediately following procedure by NIELS Faria.     1007:  Report received from anesthesia - Alba Armstrong. See anesthesia flowsheet for intra-procedure vital signs and events. 1007:  dentures returned to patient.     Abd soft, non-distended    Pt then taken to recovery area and SBAR report to receiving nurse

## 2023-09-13 NOTE — ANESTHESIA POSTPROCEDURE EVALUATION
Department of Anesthesiology  Postprocedure Note    Patient: Tim Tello  MRN: [id number]  YOB: 1955  Date of evaluation: 9/13/2023      Procedure Summary     Date: 09/13/23 Room / Location: [facility] ENDO 04 / [facility] ENDOSCOPY    Anesthesia Start: 3447 Anesthesia Stop: 1013    Procedures:       COLONOSCOPY WITH BIOPSY WITH POLYPECTOMY, EGD WITH BIOPY      EGD BIOPSY Diagnosis:       Esophageal adenocarcinoma ([address])      Gastroesophageal reflux disease with esophagitis, unspecified whether hemorrhage      History of colonic polyps      (Esophageal adenocarcinoma (HCC) [C15.9])      (Gastroesophageal reflux disease with esophagitis, unspecified whether hemorrhage [K21.00])      (History of colonic polyps [Z86.010])    Surgeons: Rodrigo Serrano MD Responsible Provider: Lyubov Byers MD    Anesthesia Type: MAC ASA Status: 3          Anesthesia Type: MAC    Sean Phase I: Sean Score: 10    Sean Phase II:        Anesthesia Post Evaluation    Patient location during evaluation: PACU  Patient participation: complete - patient participated  Level of consciousness: awake  Airway patency: patent  Nausea & Vomiting: no vomiting  Complications: no  Cardiovascular status: hemodynamically stable  Respiratory status: acceptable  Hydration status: euvolemic

## 2023-09-14 RX ORDER — FUROSEMIDE 20 MG/1
TABLET ORAL
Qty: 90 TABLET | Refills: 0 | Status: SHIPPED | OUTPATIENT
Start: 2023-09-14 | End: 2023-09-16 | Stop reason: SDUPTHER

## 2023-09-14 NOTE — TELEPHONE ENCOUNTER
PCP: Luis Fish MD    Last appt:   9/11/2023    Future Appointments   Date Time Provider [address]   9/15/2023  8:20 AM Elsi Samson MD [facility] BS AMB   12/12/2023 11:30 AM Anna Tillman MD [facility] BS AMB   3/11/2024 10:00 AM Anna Tillman MD [facility] BS AMB       Requested Prescriptions     Pending Prescriptions Disp Refills    furosemide (LASIX) 20 MG tablet 60 tablet 1     Sig: TAKE 1 TABLET BY MOUTH EVERY DAY AS NEEDED

## 2023-09-14 NOTE — TELEPHONE ENCOUNTER
Patient ryan estrada stating meds were sent to incorrect pharmacy.   Need Lasix 20 mg refill sent to   [facility] [address]

## 2023-09-15 ENCOUNTER — OFFICE VISIT (OUTPATIENT)
Age: 68
End: 2023-09-15
Payer: MEDICARE

## 2023-09-15 VITALS
OXYGEN SATURATION: 99 % | HEIGHT: 62 IN | WEIGHT: 167.4 LBS | TEMPERATURE: 98.2 F | SYSTOLIC BLOOD PRESSURE: 138 MMHG | BODY MASS INDEX: 30.8 KG/M2 | RESPIRATION RATE: 20 BRPM | HEART RATE: 57 BPM | DIASTOLIC BLOOD PRESSURE: 62 MMHG

## 2023-09-15 DIAGNOSIS — E04.1 RIGHT THYROID NODULE: Primary | ICD-10-CM

## 2023-09-15 PROCEDURE — 1123F ACP DISCUSS/DSCN MKR DOCD: CPT | Performed by: SURGERY

## 2023-09-15 PROCEDURE — 99204 OFFICE O/P NEW MOD 45 MIN: CPT | Performed by: SURGERY

## 2023-09-15 PROCEDURE — G8417 CALC BMI ABV UP PARAM F/U: HCPCS | Performed by: SURGERY

## 2023-09-15 PROCEDURE — 1090F PRES/ABSN URINE INCON ASSESS: CPT | Performed by: SURGERY

## 2023-09-15 PROCEDURE — 3075F SYST BP GE 130 - 139MM HG: CPT | Performed by: SURGERY

## 2023-09-15 PROCEDURE — G8399 PT W/DXA RESULTS DOCUMENT: HCPCS | Performed by: SURGERY

## 2023-09-15 PROCEDURE — 3017F COLORECTAL CA SCREEN DOC REV: CPT | Performed by: SURGERY

## 2023-09-15 PROCEDURE — 1036F TOBACCO NON-USER: CPT | Performed by: SURGERY

## 2023-09-15 PROCEDURE — 3078F DIAST BP <80 MM HG: CPT | Performed by: SURGERY

## 2023-09-15 PROCEDURE — G8427 DOCREV CUR MEDS BY ELIG CLIN: HCPCS | Performed by: SURGERY

## 2023-09-15 RX ORDER — BLOOD SUGAR DIAGNOSTIC
STRIP MISCELLANEOUS
COMMUNITY
End: 2023-09-15

## 2023-09-15 RX ORDER — PEN NEEDLE, DIABETIC 31 GX5/16"
NEEDLE, DISPOSABLE MISCELLANEOUS
COMMUNITY

## 2023-09-15 RX ORDER — BLOOD SUGAR DIAGNOSTIC
STRIP MISCELLANEOUS
COMMUNITY
Start: 2020-06-15

## 2023-09-15 RX ORDER — FUROSEMIDE 20 MG/1
TABLET ORAL
Qty: 90 TABLET | Refills: 0 | Status: CANCELLED | OUTPATIENT
Start: 2023-09-15

## 2023-09-15 ASSESSMENT — ENCOUNTER SYMPTOMS
CONSTIPATION: 0
VOMITING: 0
BLOOD IN STOOL: 0
WHEEZING: 0
STRIDOR: 0
DIARRHEA: 0
SHORTNESS OF BREATH: 0
EYE PAIN: 0
BACK PAIN: 0
SORE THROAT: 0
COUGH: 0
ABDOMINAL PAIN: 0
NAUSEA: 0

## 2023-09-15 ASSESSMENT — PATIENT HEALTH QUESTIONNAIRE - PHQ9
SUM OF ALL RESPONSES TO PHQ9 QUESTIONS 1 & 2: 0
SUM OF ALL RESPONSES TO PHQ QUESTIONS 1-9: 0
2. FEELING DOWN, DEPRESSED OR HOPELESS: 0
1. LITTLE INTEREST OR PLEASURE IN DOING THINGS: 0

## 2023-09-15 NOTE — TELEPHONE ENCOUNTER
PCP: Chris Regalado MD    Last appt: 9/11/2023  Future Appointments   Date Time Provider [address]   10/6/2023  2:00 PM Chao Cohen MD [facility] BS AMB   12/12/2023 11:30 AM Mar Denny MD [facility] BS AMB   3/11/2024 10:00 AM Mar Denny MD [facility] BS AMB       Requested Prescriptions     Pending Prescriptions Disp Refills    furosemide (LASIX) 20 MG tablet 90 tablet 0     Sig: TAKE 1 TABLET BY MOUTH EVERY DAY AS NEEDED     Signed Prescriptions Disp Refills    furosemide (LASIX) 20 MG tablet 90 tablet 0     Sig: TAKE 1 TABLET BY MOUTH EVERY DAY AS NEEDED     Authorizing Provider: Mar Denny

## 2023-09-15 NOTE — PROGRESS NOTES
Subjective:      Patient ID: Shanell Fairchild is a 77 y.o. female who comes in for consultation by Yue Bean MD for thyroid nodules      Chief Complaint   Patient presents with    Mass     Seen at the request of Dr Cheyenne Olivier for evaluation of possible neck mass       Mass  Pertinent negatives include no abdominal pain, arthralgias, chest pain, chills, congestion, coughing, diaphoresis, fatigue, fever, headaches, myalgias, nausea, numbness, sore throat, vomiting or weakness. She has had a known goiter for over 10 years. She was previously seen by Dr Ange Granda. The nodules were stable. Recently she noted a larger right sided nodule and her dentist and Yue Bean MD confirmed this. She had a CT (see below) noting enlargement of a mixed cystic/solid right thyroid nodule. She has been having some increasing cervical dysphagia. She has chronic hoarseness after esophagectomy (2010). Recent EGD/colonoscopy by Dr Jesus Sánchez were ok. She denies palpitations or neck pressure, hx radiation to the neck.        Past Medical History:   Diagnosis Date    Arrhythmia     bradycardia    Asthma     Bloating     CAD (coronary artery disease)     1 stent    Cancer ([address]) 2010    esophageal/GE junction    Chest pain     Chest pain     Chronic obstructive pulmonary disease (HCC)     Chronic pain     left shoulder    Congestion of throat     Cough     Depression     & anxiety    Diabetes (HCC)     IDDM    Dyspepsia and other specified disorders of function of stomach     Fatigue     GERD (gastroesophageal reflux disease)     Headache(784.0)     Hypercholesterolemia     Hypertension     Menopause     Multinodular goiter     Nausea & vomiting     Stool color black     Stress     Weakness      Past Surgical History:   Procedure Laterality Date    CARDIAC CATHETERIZATION  03/2017    stent x 1    CHOLECYSTECTOMY, LAPAROSCOPIC  1995    COLONOSCOPY N/A 8/7/2018    COLONOSCOPY performed by Roslyn Jenkins MD at

## 2023-09-16 RX ORDER — FUROSEMIDE 20 MG/1
TABLET ORAL
Qty: 90 TABLET | Refills: 0 | Status: SHIPPED | OUTPATIENT
Start: 2023-09-16

## 2023-10-06 ENCOUNTER — PROCEDURE VISIT (OUTPATIENT)
Age: 68
End: 2023-10-06

## 2023-10-06 VITALS
HEART RATE: 65 BPM | DIASTOLIC BLOOD PRESSURE: 73 MMHG | HEIGHT: 62 IN | BODY MASS INDEX: 29.81 KG/M2 | TEMPERATURE: 97.7 F | SYSTOLIC BLOOD PRESSURE: 125 MMHG | WEIGHT: 162 LBS | RESPIRATION RATE: 16 BRPM | OXYGEN SATURATION: 95 %

## 2023-10-06 DIAGNOSIS — E04.1 RIGHT THYROID NODULE: Primary | ICD-10-CM

## 2023-10-06 ASSESSMENT — PATIENT HEALTH QUESTIONNAIRE - PHQ9
SUM OF ALL RESPONSES TO PHQ QUESTIONS 1-9: 0
2. FEELING DOWN, DEPRESSED OR HOPELESS: 0
SUM OF ALL RESPONSES TO PHQ QUESTIONS 1-9: 0
SUM OF ALL RESPONSES TO PHQ9 QUESTIONS 1 & 2: 0
SUM OF ALL RESPONSES TO PHQ QUESTIONS 1-9: 0
SUM OF ALL RESPONSES TO PHQ QUESTIONS 1-9: 0
1. LITTLE INTEREST OR PLEASURE IN DOING THINGS: 0

## 2023-10-06 NOTE — PROGRESS NOTES
Procedure Note    Pre Procedure Diagnosis:  right thyroid nodule  Post Procedure Diagnosis: right thyroid nodule  Procedure:  1. Ultrasound guided FNA of right thyroid nodule    Surgeon:   Lisandro Rose MD FACS  Anesthesia:  none  EBL minimal  Specimen:  right thyroid nodule    Procedure:    After informed consent and time out, I utilized a [address] with a high frequency linear array transducer and two 22 and two 25 gauge needles to perform four passes through a solid hypoechoic 4.4 cm right thyroid nodule. Specimens were placed into specialized containers from [facility]. She tolerated it well.         Signed  Lisandro Rose MD FACS

## 2023-10-06 NOTE — PROGRESS NOTES
[unfilled]  OFFICE PROCEDURE PROGRESS NOTE        Chart reviewed for the following:   Leland NICOLE LPN, have reviewed the History, Physical and updated the Allergic reactions for Betsy Gannon, 1955     TIME OUT performed immediately prior to start of procedure:   Leland NICOLE LPN, have performed the following reviews on Betsy Gannon prior to the start of the procedure:            * Patient was identified by name and date of birth   * Agreement on procedure being performed was verified  * Risks and Benefits explained to the patient  * Procedure site verified and marked as necessary  * Patient was positioned for comfort  * Consent was signed and verified     Time: 1215      Date of procedure: 10/6/2023    Procedure performed by:  Apurva Arciniega MD    Provider assisted by: Scarlett Ramirez LPN    Patient assisted by: Scarlett Ramirez LPN    How tolerated by patient: tolerated the procedure well with no complications    Post Procedural Pain Scale: 0 - No Hurt    Comments:  Patient instructed that she can remove her Band-Aid today, no other procedure instructions needed. Told her to please call if she had any concerns or questions. She stated she understood. Ice was given for comfort if needed. Specimen sent to THE [facility].

## 2023-10-12 ENCOUNTER — TELEPHONE (OUTPATIENT)
Age: 68
End: 2023-10-12

## 2023-10-12 NOTE — TELEPHONE ENCOUNTER
Returned call and spoke with a different representative. Says that [facility]ignesh Alexandria is requesting additional information regarding Thyroid biopsy done on 10/06/2023. I am going to contact them directly as I have not received any communication from Teresa Forman, unsure of specifics.

## 2023-10-12 NOTE — TELEPHONE ENCOUNTER
Melodie Sánchez calling: Angel Meléndez in regards to    [employer], also is needing an authorization for Genetic Testing    Pending Reference #: [id number]  For Medical Records Fax: [phone]    Patient is pending for clinical review, also if the info is not received a determination will be made based on the information they already have per agent.        C/b  [phone]

## 2023-10-18 NOTE — TELEPHONE ENCOUNTER
Reached out to THE [facility] for clarification. Spoke with Dhruv Salazar. Leona that sample is in progress with no holds. She wonders if there may have been some confusion with [employer] vs [employer] with [employer], as  [employer]irma is the name of the test and they may have been confused thinking it is the company. Per Dhruv Salazar there is nothing further than I need to provide at this time. Closing.

## 2023-10-19 ENCOUNTER — NURSE ONLY (OUTPATIENT)
Age: 68
End: 2023-10-19
Payer: MEDICARE

## 2023-10-19 DIAGNOSIS — Z23 NEEDS FLU SHOT: Primary | ICD-10-CM

## 2023-10-19 PROCEDURE — PBSHW INFLUENZA, FLUAD, (AGE 65 Y+), IM, PF, 0.5 ML: Performed by: INTERNAL MEDICINE

## 2023-10-19 PROCEDURE — G0008 ADMIN INFLUENZA VIRUS VAC: HCPCS | Performed by: INTERNAL MEDICINE

## 2023-10-24 ENCOUNTER — TELEPHONE (OUTPATIENT)
Age: 68
End: 2023-10-24

## 2023-10-24 NOTE — TELEPHONE ENCOUNTER
Thyroid FNA    Afirma  [place] III Atypia of uncertain significance on cystopath    Afirma GSC  is suspicious with 50%    She is interested in total thyroidectomy with NIM tube under general anesthesia as an outpatient      My assistant will call to schedule the surgery and a preop visit        Nathaniel Bernstein MD FACS

## 2023-11-03 ENCOUNTER — TELEPHONE (OUTPATIENT)
Age: 68
End: 2023-11-03

## 2023-11-03 NOTE — TELEPHONE ENCOUNTER
Physician review for [employer] called in regards to no clinicals attached to file please fax    Fax [phone]

## 2023-11-06 ENCOUNTER — OFFICE VISIT (OUTPATIENT)
Age: 68
End: 2023-11-06
Payer: MEDICARE

## 2023-11-06 VITALS
HEART RATE: 53 BPM | RESPIRATION RATE: 20 BRPM | DIASTOLIC BLOOD PRESSURE: 73 MMHG | SYSTOLIC BLOOD PRESSURE: 137 MMHG | TEMPERATURE: 98.2 F | OXYGEN SATURATION: 97 % | WEIGHT: 159 LBS | HEIGHT: 62 IN | BODY MASS INDEX: 29.26 KG/M2

## 2023-11-06 DIAGNOSIS — E04.1 RIGHT THYROID NODULE: Primary | ICD-10-CM

## 2023-11-06 PROCEDURE — 99214 OFFICE O/P EST MOD 30 MIN: CPT | Performed by: SURGERY

## 2023-11-06 PROCEDURE — 3078F DIAST BP <80 MM HG: CPT | Performed by: SURGERY

## 2023-11-06 PROCEDURE — 1090F PRES/ABSN URINE INCON ASSESS: CPT | Performed by: SURGERY

## 2023-11-06 PROCEDURE — G8427 DOCREV CUR MEDS BY ELIG CLIN: HCPCS | Performed by: SURGERY

## 2023-11-06 PROCEDURE — G8484 FLU IMMUNIZE NO ADMIN: HCPCS | Performed by: SURGERY

## 2023-11-06 PROCEDURE — 3017F COLORECTAL CA SCREEN DOC REV: CPT | Performed by: SURGERY

## 2023-11-06 PROCEDURE — 1123F ACP DISCUSS/DSCN MKR DOCD: CPT | Performed by: SURGERY

## 2023-11-06 PROCEDURE — 1036F TOBACCO NON-USER: CPT | Performed by: SURGERY

## 2023-11-06 PROCEDURE — G8417 CALC BMI ABV UP PARAM F/U: HCPCS | Performed by: SURGERY

## 2023-11-06 PROCEDURE — G8399 PT W/DXA RESULTS DOCUMENT: HCPCS | Performed by: SURGERY

## 2023-11-06 PROCEDURE — 3075F SYST BP GE 130 - 139MM HG: CPT | Performed by: SURGERY

## 2023-11-06 ASSESSMENT — ENCOUNTER SYMPTOMS
SHORTNESS OF BREATH: 0
SORE THROAT: 0
DIARRHEA: 0
ABDOMINAL PAIN: 0
COUGH: 0
VOMITING: 0
STRIDOR: 0
NAUSEA: 0
BLOOD IN STOOL: 0
CONSTIPATION: 0
BACK PAIN: 0
EYE PAIN: 0
WHEEZING: 0

## 2023-11-06 ASSESSMENT — PATIENT HEALTH QUESTIONNAIRE - PHQ9
SUM OF ALL RESPONSES TO PHQ QUESTIONS 1-9: 0
SUM OF ALL RESPONSES TO PHQ QUESTIONS 1-9: 0
SUM OF ALL RESPONSES TO PHQ9 QUESTIONS 1 & 2: 0
2. FEELING DOWN, DEPRESSED OR HOPELESS: 0
SUM OF ALL RESPONSES TO PHQ QUESTIONS 1-9: 0
SUM OF ALL RESPONSES TO PHQ QUESTIONS 1-9: 0
1. LITTLE INTEREST OR PLEASURE IN DOING THINGS: 0

## 2023-11-06 NOTE — PROGRESS NOTES
Subjective:      Patient ID: Ayse Jung is a 77 y.o. female who comes in for follow up by Norm Hardy MD for new thyroid cancer      Chief Complaint   Patient presents with    Follow-up     F/U prior to thyroid surgery        Mass  Pertinent negatives include no abdominal pain, arthralgias, chest pain, chills, congestion, coughing, diaphoresis, fatigue, fever, headaches, myalgias, nausea, numbness, sore throat, vomiting or weakness. She has had a known goiter for over 10 years. She was previously seen by Dr Tennille Hernandez. The nodules were stable. Recently she noted a larger right sided nodule and her dentist and Norm Hardy MD confirmed this. She had a CT (see below) noting enlargement of a mixed cystic/solid right thyroid nodule. She has been having some increasing cervical dysphagia. She has chronic hoarseness after esophagectomy (2010). Recent EGD/colonoscopy by Dr Kashmir Card were ok. She denies palpitations or neck pressure, hx radiation to the neck.        Past Medical History:   Diagnosis Date    Arrhythmia     bradycardia    Asthma     CAD (coronary artery disease)     1 stent    Cancer ([address]) 2010    esophageal/GE junction    Chronic obstructive pulmonary disease (HCC)     Chronic pain     left shoulder    Diabetes (HCC)     IDDM    Dyspepsia and other specified disorders of function of stomach     GERD (gastroesophageal reflux disease)     Headache(784.0)     Hypercholesterolemia     Hypertension     Multinodular goiter 2023     Past Surgical History:   Procedure Laterality Date    CARDIAC CATHETERIZATION  03/2017    stent x 1    CHOLECYSTECTOMY, LAPAROSCOPIC  1995    COLONOSCOPY N/A 8/7/2018    COLONOSCOPY performed by Abdifatah Segura MD at [facility] ENDOSCOPY    COLONOSCOPY N/A 09/13/2023    COLONOSCOPY WITH BIOPSY WITH POLYPECTOMY, EGD WITH BIOPY performed by Abdifatah Segura MD at [facility] ENDOSCOPY    EGD BALLOON DILATION ESOPHAGUS <30 MM DIAM  4/20/2010

## 2023-11-06 NOTE — H&P (VIEW-ONLY)
Subjective:      Patient ID: Ayse Jung is a 77 y.o. female who comes in for follow up by Norm Hardy MD for new thyroid cancer      Chief Complaint   Patient presents with    Follow-up     F/U prior to thyroid surgery        Mass  Pertinent negatives include no abdominal pain, arthralgias, chest pain, chills, congestion, coughing, diaphoresis, fatigue, fever, headaches, myalgias, nausea, numbness, sore throat, vomiting or weakness. She has had a known goiter for over 10 years. She was previously seen by Dr Tennille Hernandez. The nodules were stable. Recently she noted a larger right sided nodule and her dentist and Norm Hardy MD confirmed this. She had a CT (see below) noting enlargement of a mixed cystic/solid right thyroid nodule. She has been having some increasing cervical dysphagia. She has chronic hoarseness after esophagectomy (2010). Recent EGD/colonoscopy by Dr Kashmir Card were ok. She denies palpitations or neck pressure, hx radiation to the neck.        Past Medical History:   Diagnosis Date    Arrhythmia     bradycardia    Asthma     CAD (coronary artery disease)     1 stent    Cancer ([address]) 2010    esophageal/GE junction    Chronic obstructive pulmonary disease (HCC)     Chronic pain     left shoulder    Diabetes (HCC)     IDDM    Dyspepsia and other specified disorders of function of stomach     GERD (gastroesophageal reflux disease)     Headache(784.0)     Hypercholesterolemia     Hypertension     Multinodular goiter 2023     Past Surgical History:   Procedure Laterality Date    CARDIAC CATHETERIZATION  03/2017    stent x 1    CHOLECYSTECTOMY, LAPAROSCOPIC  1995    COLONOSCOPY N/A 8/7/2018    COLONOSCOPY performed by Abdifatah Segura MD at [facility] ENDOSCOPY    COLONOSCOPY N/A 09/13/2023    COLONOSCOPY WITH BIOPSY WITH POLYPECTOMY, EGD WITH BIOPY performed by Abdifatah Segura MD at [facility] ENDOSCOPY    EGD BALLOON DILATION ESOPHAGUS <30 MM DIAM  4/20/2010

## 2023-11-06 NOTE — PROGRESS NOTES
Identified pt with two pt identifiers(name and ). Reviewed record in preparation for visit and have obtained necessary documentation. All patient medications has been reviewed. Chief Complaint   Patient presents with    Follow-up     F/U prior to thyroid surgery        Health Maintenance Due   Topic    Hepatitis B vaccine (1 of 3 - Risk 3-dose series)    Diabetic retinal exam     COVID-19 Vaccine ( season)    Diabetic foot exam        [unfilled]    4. Have you been to the ER, urgent care clinic since your last visit? Hospitalized since your last visit?no    5. Have you seen or consulted any other health care providers outside of the [address] [place] [address] Avenue since your last visit? Include any pap smears or colon screening. no      Patient is accompanied by spouse I have received verbal consent from Temo Olivo to discuss any/all medical information while they are present in the room.

## 2023-11-06 NOTE — PROGRESS NOTES
Yves Reynolds and Shena,    I'm happy to see her back if this turns out to be thyroid cancer. If this returns benign, I think she would benefit from being put on 1.6 mcg/kg/d = 112 mcg daily of levothyroxine as a starting dose to titrate to a TSH of 0.5-2.0. Just be in touch if you need anything with her.     Thanks,  [employer]

## 2023-11-07 NOTE — TELEPHONE ENCOUNTER
Refaxed all materials. [facility]ulk[facility] to see if Peer to Peer required. Authorization has been approved. Authorization number is [id number].

## 2023-11-08 ENCOUNTER — HOSPITAL ENCOUNTER (OUTPATIENT)
Facility: HOSPITAL | Age: 68
Discharge: HOME | End: 2023-11-09
Attending: SURGERY | Admitting: SURGERY
Payer: MEDICARE

## 2023-11-08 ENCOUNTER — ANESTHESIA (OUTPATIENT)
Facility: HOSPITAL | Age: 68
End: 2023-11-08
Payer: MEDICARE

## 2023-11-08 ENCOUNTER — ANESTHESIA EVENT (OUTPATIENT)
Facility: HOSPITAL | Age: 68
End: 2023-11-08
Payer: MEDICARE

## 2023-11-08 DIAGNOSIS — E04.1 THYROID NODULE: Primary | ICD-10-CM

## 2023-11-08 LAB
CALCIUM SERPL-MCNC: 7.9 MG/DL (ref 8.5–10.1)
CALCIUM SERPL-MCNC: 8.1 MG/DL (ref 8.5–10.1)
CALCIUM SERPL-MCNC: 8.4 MG/DL (ref 8.5–10.1)
EST. AVERAGE GLUCOSE BLD GHB EST-MCNC: 146 MG/DL
GLUCOSE BLD STRIP.AUTO-MCNC: 121 MG/DL (ref 65–117)
GLUCOSE BLD STRIP.AUTO-MCNC: 164 MG/DL (ref 65–117)
GLUCOSE BLD STRIP.AUTO-MCNC: 172 MG/DL (ref 65–117)
GLUCOSE BLD STRIP.AUTO-MCNC: 99 MG/DL (ref 65–117)
HBA1C MFR BLD: 6.7 % (ref 4–5.6)
SERVICE CMNT-IMP: ABNORMAL
SERVICE CMNT-IMP: NORMAL

## 2023-11-08 PROCEDURE — 3600000014 HC SURGERY LEVEL 4 ADDTL 15MIN: Performed by: SURGERY

## 2023-11-08 PROCEDURE — 36415 COLL VENOUS BLD VENIPUNCTURE: CPT

## 2023-11-08 PROCEDURE — 2709999900 HC NON-CHARGEABLE SUPPLY: Performed by: SURGERY

## 2023-11-08 PROCEDURE — 6360000002 HC RX W HCPCS: Performed by: SURGERY

## 2023-11-08 PROCEDURE — 2500000003 HC RX 250 WO HCPCS: Performed by: SURGERY

## 2023-11-08 PROCEDURE — 7100000001 HC PACU RECOVERY - ADDTL 15 MIN: Performed by: SURGERY

## 2023-11-08 PROCEDURE — 2580000003 HC RX 258: Performed by: SURGERY

## 2023-11-08 PROCEDURE — 88311 DECALCIFY TISSUE: CPT

## 2023-11-08 PROCEDURE — 6360000002 HC RX W HCPCS: Performed by: ANESTHESIOLOGY

## 2023-11-08 PROCEDURE — 6370000000 HC RX 637 (ALT 250 FOR IP): Performed by: SURGERY

## 2023-11-08 PROCEDURE — 94640 AIRWAY INHALATION TREATMENT: CPT

## 2023-11-08 PROCEDURE — 88307 TISSUE EXAM BY PATHOLOGIST: CPT

## 2023-11-08 PROCEDURE — 2500000003 HC RX 250 WO HCPCS: Performed by: ANESTHESIOLOGY

## 2023-11-08 PROCEDURE — 83036 HEMOGLOBIN GLYCOSYLATED A1C: CPT

## 2023-11-08 PROCEDURE — 3700000000 HC ANESTHESIA ATTENDED CARE: Performed by: SURGERY

## 2023-11-08 PROCEDURE — 2580000003 HC RX 258: Performed by: ANESTHESIOLOGY

## 2023-11-08 PROCEDURE — 82310 ASSAY OF CALCIUM: CPT

## 2023-11-08 PROCEDURE — 82962 GLUCOSE BLOOD TEST: CPT

## 2023-11-08 PROCEDURE — 7100000000 HC PACU RECOVERY - FIRST 15 MIN: Performed by: SURGERY

## 2023-11-08 PROCEDURE — 3700000001 HC ADD 15 MINUTES (ANESTHESIA): Performed by: SURGERY

## 2023-11-08 PROCEDURE — 3600000004 HC SURGERY LEVEL 4 BASE: Performed by: SURGERY

## 2023-11-08 PROCEDURE — 2580000003 HC RX 258: Performed by: STUDENT IN AN ORGANIZED HEALTH CARE EDUCATION/TRAINING PROGRAM

## 2023-11-08 RX ORDER — AMLODIPINE BESYLATE 5 MG/1
5 TABLET ORAL DAILY
Status: DISCONTINUED | OUTPATIENT
Start: 2023-11-08 | End: 2023-11-09 | Stop reason: HOSPADM

## 2023-11-08 RX ORDER — BUPIVACAINE HYDROCHLORIDE 5 MG/ML
INJECTION, SOLUTION EPIDURAL; INTRACAUDAL PRN
Status: DISCONTINUED | OUTPATIENT
Start: 2023-11-08 | End: 2023-11-08 | Stop reason: ALTCHOICE

## 2023-11-08 RX ORDER — SODIUM CHLORIDE 9 MG/ML
INJECTION, SOLUTION INTRAVENOUS PRN
Status: DISCONTINUED | OUTPATIENT
Start: 2023-11-08 | End: 2023-11-08 | Stop reason: HOSPADM

## 2023-11-08 RX ORDER — CALCIUM CARBONATE 500 MG/1
1000 TABLET, CHEWABLE ORAL 2 TIMES DAILY
Status: DISCONTINUED | OUTPATIENT
Start: 2023-11-08 | End: 2023-11-09 | Stop reason: HOSPADM

## 2023-11-08 RX ORDER — SODIUM CHLORIDE 0.9 % (FLUSH) 0.9 %
5-40 SYRINGE (ML) INJECTION PRN
Status: DISCONTINUED | OUTPATIENT
Start: 2023-11-08 | End: 2023-11-08 | Stop reason: HOSPADM

## 2023-11-08 RX ORDER — LANOLIN ALCOHOL/MO/W.PET/CERES
4.5 CREAM (GRAM) TOPICAL NIGHTLY
Status: DISCONTINUED | OUTPATIENT
Start: 2023-11-08 | End: 2023-11-09 | Stop reason: HOSPADM

## 2023-11-08 RX ORDER — SODIUM CHLORIDE 0.9 % (FLUSH) 0.9 %
5-40 SYRINGE (ML) INJECTION EVERY 12 HOURS SCHEDULED
Status: DISCONTINUED | OUTPATIENT
Start: 2023-11-08 | End: 2023-11-08 | Stop reason: HOSPADM

## 2023-11-08 RX ORDER — LEVOTHYROXINE SODIUM 112 UG/1
112 TABLET ORAL DAILY
Status: DISCONTINUED | OUTPATIENT
Start: 2023-11-09 | End: 2023-11-09 | Stop reason: HOSPADM

## 2023-11-08 RX ORDER — EPHEDRINE SULFATE/0.9% NACL/PF 50 MG/5 ML
SYRINGE (ML) INTRAVENOUS PRN
Status: DISCONTINUED | OUTPATIENT
Start: 2023-11-08 | End: 2023-11-08 | Stop reason: SDUPTHER

## 2023-11-08 RX ORDER — OXYCODONE HYDROCHLORIDE 5 MG/1
5 TABLET ORAL EVERY 4 HOURS PRN
Status: DISCONTINUED | OUTPATIENT
Start: 2023-11-08 | End: 2023-11-09 | Stop reason: HOSPADM

## 2023-11-08 RX ORDER — FENTANYL CITRATE 50 UG/ML
25 INJECTION, SOLUTION INTRAMUSCULAR; INTRAVENOUS EVERY 5 MIN PRN
Status: DISCONTINUED | OUTPATIENT
Start: 2023-11-08 | End: 2023-11-08 | Stop reason: HOSPADM

## 2023-11-08 RX ORDER — OXYCODONE HYDROCHLORIDE 5 MG/1
5 TABLET ORAL EVERY 6 HOURS PRN
Qty: 12 TABLET | Refills: 0 | Status: SHIPPED | OUTPATIENT
Start: 2023-11-08 | End: 2023-11-11

## 2023-11-08 RX ORDER — FENTANYL CITRATE 50 UG/ML
INJECTION, SOLUTION INTRAMUSCULAR; INTRAVENOUS PRN
Status: DISCONTINUED | OUTPATIENT
Start: 2023-11-08 | End: 2023-11-08 | Stop reason: SDUPTHER

## 2023-11-08 RX ORDER — HYDROMORPHONE HYDROCHLORIDE 1 MG/ML
0.5 INJECTION, SOLUTION INTRAMUSCULAR; INTRAVENOUS; SUBCUTANEOUS
Status: DISCONTINUED | OUTPATIENT
Start: 2023-11-08 | End: 2023-11-09 | Stop reason: HOSPADM

## 2023-11-08 RX ORDER — SODIUM CHLORIDE 0.9 % (FLUSH) 0.9 %
5-40 SYRINGE (ML) INJECTION PRN
Status: DISCONTINUED | OUTPATIENT
Start: 2023-11-08 | End: 2023-11-09 | Stop reason: HOSPADM

## 2023-11-08 RX ORDER — ATORVASTATIN CALCIUM 40 MG/1
40 TABLET, FILM COATED ORAL NIGHTLY
Status: DISCONTINUED | OUTPATIENT
Start: 2023-11-08 | End: 2023-11-09 | Stop reason: HOSPADM

## 2023-11-08 RX ORDER — INSULIN LISPRO 100 [IU]/ML
0-16 INJECTION, SOLUTION INTRAVENOUS; SUBCUTANEOUS
Status: DISCONTINUED | OUTPATIENT
Start: 2023-11-08 | End: 2023-11-09 | Stop reason: HOSPADM

## 2023-11-08 RX ORDER — SODIUM CHLORIDE 9 MG/ML
INJECTION, SOLUTION INTRAVENOUS PRN
Status: DISCONTINUED | OUTPATIENT
Start: 2023-11-08 | End: 2023-11-09 | Stop reason: HOSPADM

## 2023-11-08 RX ORDER — HYDROMORPHONE HYDROCHLORIDE 2 MG/ML
INJECTION, SOLUTION INTRAMUSCULAR; INTRAVENOUS; SUBCUTANEOUS PRN
Status: DISCONTINUED | OUTPATIENT
Start: 2023-11-08 | End: 2023-11-08 | Stop reason: SDUPTHER

## 2023-11-08 RX ORDER — ONDANSETRON 2 MG/ML
4 INJECTION INTRAMUSCULAR; INTRAVENOUS
Status: DISCONTINUED | OUTPATIENT
Start: 2023-11-08 | End: 2023-11-08 | Stop reason: HOSPADM

## 2023-11-08 RX ORDER — INSULIN LISPRO 100 [IU]/ML
0-4 INJECTION, SOLUTION INTRAVENOUS; SUBCUTANEOUS NIGHTLY
Status: DISCONTINUED | OUTPATIENT
Start: 2023-11-08 | End: 2023-11-09 | Stop reason: HOSPADM

## 2023-11-08 RX ORDER — METFORMIN HYDROCHLORIDE 500 MG/1
500 TABLET, EXTENDED RELEASE ORAL 2 TIMES DAILY
Status: DISCONTINUED | OUTPATIENT
Start: 2023-11-08 | End: 2023-11-09 | Stop reason: HOSPADM

## 2023-11-08 RX ORDER — SODIUM CHLORIDE, SODIUM LACTATE, POTASSIUM CHLORIDE, CALCIUM CHLORIDE 600; 310; 30; 20 MG/100ML; MG/100ML; MG/100ML; MG/100ML
INJECTION, SOLUTION INTRAVENOUS CONTINUOUS PRN
Status: DISCONTINUED | OUTPATIENT
Start: 2023-11-08 | End: 2023-11-08 | Stop reason: SDUPTHER

## 2023-11-08 RX ORDER — HYDROMORPHONE HYDROCHLORIDE 1 MG/ML
0.25 INJECTION, SOLUTION INTRAMUSCULAR; INTRAVENOUS; SUBCUTANEOUS
Status: DISCONTINUED | OUTPATIENT
Start: 2023-11-08 | End: 2023-11-09 | Stop reason: HOSPADM

## 2023-11-08 RX ORDER — SODIUM CHLORIDE 0.9 % (FLUSH) 0.9 %
5-40 SYRINGE (ML) INJECTION EVERY 12 HOURS SCHEDULED
Status: DISCONTINUED | OUTPATIENT
Start: 2023-11-08 | End: 2023-11-09 | Stop reason: HOSPADM

## 2023-11-08 RX ORDER — ACETAMINOPHEN 325 MG/1
650 TABLET ORAL EVERY 4 HOURS PRN
Status: DISCONTINUED | OUTPATIENT
Start: 2023-11-08 | End: 2023-11-09 | Stop reason: HOSPADM

## 2023-11-08 RX ORDER — LEVOTHYROXINE SODIUM 112 MCG
112 TABLET ORAL DAILY
Qty: 30 TABLET | Refills: 1 | Status: SHIPPED | OUTPATIENT
Start: 2023-11-08 | End: 2024-01-07

## 2023-11-08 RX ORDER — DEXAMETHASONE SODIUM PHOSPHATE 4 MG/ML
INJECTION, SOLUTION INTRA-ARTICULAR; INTRALESIONAL; INTRAMUSCULAR; INTRAVENOUS; SOFT TISSUE PRN
Status: DISCONTINUED | OUTPATIENT
Start: 2023-11-08 | End: 2023-11-08 | Stop reason: SDUPTHER

## 2023-11-08 RX ORDER — PHENYLEPHRINE HCL IN 0.9% NACL 0.4MG/10ML
SYRINGE (ML) INTRAVENOUS PRN
Status: DISCONTINUED | OUTPATIENT
Start: 2023-11-08 | End: 2023-11-08 | Stop reason: SDUPTHER

## 2023-11-08 RX ORDER — ONDANSETRON 4 MG/1
4 TABLET, ORALLY DISINTEGRATING ORAL EVERY 8 HOURS PRN
Status: DISCONTINUED | OUTPATIENT
Start: 2023-11-08 | End: 2023-11-09 | Stop reason: HOSPADM

## 2023-11-08 RX ORDER — DEXTROSE MONOHYDRATE 100 MG/ML
INJECTION, SOLUTION INTRAVENOUS CONTINUOUS PRN
Status: DISCONTINUED | OUTPATIENT
Start: 2023-11-08 | End: 2023-11-09 | Stop reason: HOSPADM

## 2023-11-08 RX ORDER — SUCCINYLCHOLINE CHLORIDE 20 MG/ML
INJECTION INTRAMUSCULAR; INTRAVENOUS PRN
Status: DISCONTINUED | OUTPATIENT
Start: 2023-11-08 | End: 2023-11-08 | Stop reason: SDUPTHER

## 2023-11-08 RX ORDER — SODIUM CHLORIDE, SODIUM LACTATE, POTASSIUM CHLORIDE, CALCIUM CHLORIDE 600; 310; 30; 20 MG/100ML; MG/100ML; MG/100ML; MG/100ML
INJECTION, SOLUTION INTRAVENOUS CONTINUOUS
Status: DISCONTINUED | OUTPATIENT
Start: 2023-11-08 | End: 2023-11-08 | Stop reason: HOSPADM

## 2023-11-08 RX ORDER — MONTELUKAST SODIUM 10 MG/1
10 TABLET ORAL DAILY
Status: DISCONTINUED | OUTPATIENT
Start: 2023-11-08 | End: 2023-11-09 | Stop reason: HOSPADM

## 2023-11-08 RX ORDER — DROPERIDOL 2.5 MG/ML
0.62 INJECTION, SOLUTION INTRAMUSCULAR; INTRAVENOUS
Status: DISCONTINUED | OUTPATIENT
Start: 2023-11-08 | End: 2023-11-08 | Stop reason: HOSPADM

## 2023-11-08 RX ORDER — PANTOPRAZOLE SODIUM 40 MG/1
40 TABLET, DELAYED RELEASE ORAL
Status: DISCONTINUED | OUTPATIENT
Start: 2023-11-09 | End: 2023-11-09 | Stop reason: HOSPADM

## 2023-11-08 RX ORDER — HYDROMORPHONE HYDROCHLORIDE 1 MG/ML
0.5 INJECTION, SOLUTION INTRAMUSCULAR; INTRAVENOUS; SUBCUTANEOUS EVERY 5 MIN PRN
Status: DISCONTINUED | OUTPATIENT
Start: 2023-11-08 | End: 2023-11-08 | Stop reason: HOSPADM

## 2023-11-08 RX ORDER — INSULIN GLARGINE 100 [IU]/ML
23 INJECTION, SOLUTION SUBCUTANEOUS EVERY MORNING
Status: DISCONTINUED | OUTPATIENT
Start: 2023-11-09 | End: 2023-11-09 | Stop reason: HOSPADM

## 2023-11-08 RX ORDER — ONDANSETRON 2 MG/ML
4 INJECTION INTRAMUSCULAR; INTRAVENOUS EVERY 6 HOURS PRN
Status: DISCONTINUED | OUTPATIENT
Start: 2023-11-08 | End: 2023-11-09 | Stop reason: HOSPADM

## 2023-11-08 RX ORDER — IBUPROFEN 200 MG
2 CAPSULE ORAL
Qty: 30 TABLET | Refills: 3 | Status: SHIPPED | OUTPATIENT
Start: 2023-11-08

## 2023-11-08 RX ORDER — ONDANSETRON 2 MG/ML
INJECTION INTRAMUSCULAR; INTRAVENOUS PRN
Status: DISCONTINUED | OUTPATIENT
Start: 2023-11-08 | End: 2023-11-08 | Stop reason: SDUPTHER

## 2023-11-08 RX ORDER — ALBUTEROL SULFATE 2.5 MG/3ML
2.5 SOLUTION RESPIRATORY (INHALATION) EVERY 4 HOURS PRN
Status: DISCONTINUED | OUTPATIENT
Start: 2023-11-08 | End: 2023-11-09 | Stop reason: HOSPADM

## 2023-11-08 RX ORDER — LIDOCAINE HYDROCHLORIDE 10 MG/ML
1 INJECTION, SOLUTION EPIDURAL; INFILTRATION; INTRACAUDAL; PERINEURAL
Status: DISCONTINUED | OUTPATIENT
Start: 2023-11-08 | End: 2023-11-08 | Stop reason: HOSPADM

## 2023-11-08 RX ORDER — MEPERIDINE HYDROCHLORIDE 25 MG/ML
12.5 INJECTION INTRAMUSCULAR; INTRAVENOUS; SUBCUTANEOUS EVERY 5 MIN PRN
Status: DISCONTINUED | OUTPATIENT
Start: 2023-11-08 | End: 2023-11-08 | Stop reason: HOSPADM

## 2023-11-08 RX ORDER — POLYETHYLENE GLYCOL 3350 17 G/17G
17 POWDER, FOR SOLUTION ORAL EVERY MORNING
Status: DISCONTINUED | OUTPATIENT
Start: 2023-11-09 | End: 2023-11-09 | Stop reason: HOSPADM

## 2023-11-08 RX ORDER — LIDOCAINE HYDROCHLORIDE 20 MG/ML
INJECTION, SOLUTION EPIDURAL; INFILTRATION; INTRACAUDAL; PERINEURAL PRN
Status: DISCONTINUED | OUTPATIENT
Start: 2023-11-08 | End: 2023-11-08 | Stop reason: SDUPTHER

## 2023-11-08 RX ORDER — DEXTROSE MONOHYDRATE, SODIUM CHLORIDE, AND POTASSIUM CHLORIDE 50; 1.49; 4.5 G/1000ML; G/1000ML; G/1000ML
INJECTION, SOLUTION INTRAVENOUS CONTINUOUS
Status: DISCONTINUED | OUTPATIENT
Start: 2023-11-08 | End: 2023-11-09 | Stop reason: HOSPADM

## 2023-11-08 RX ADMIN — FENTANYL CITRATE 50 MCG: 50 INJECTION, SOLUTION INTRAMUSCULAR; INTRAVENOUS at 08:08

## 2023-11-08 RX ADMIN — CALCIUM CARBONATE (ANTACID) CHEW TAB 500 MG 1000 MG: 500 CHEW TAB at 17:40

## 2023-11-08 RX ADMIN — ACETAMINOPHEN 650 MG: 325 TABLET ORAL at 21:30

## 2023-11-08 RX ADMIN — WATER 2000 MG: 1 INJECTION INTRAMUSCULAR; INTRAVENOUS; SUBCUTANEOUS at 08:16

## 2023-11-08 RX ADMIN — ONDANSETRON 4 MG: 2 INJECTION INTRAMUSCULAR; INTRAVENOUS at 09:23

## 2023-11-08 RX ADMIN — OXYCODONE 5 MG: 5 TABLET ORAL at 21:30

## 2023-11-08 RX ADMIN — OXYCODONE 5 MG: 5 TABLET ORAL at 17:33

## 2023-11-08 RX ADMIN — METFORMIN HYDROCHLORIDE 500 MG: 500 TABLET, EXTENDED RELEASE ORAL at 17:32

## 2023-11-08 RX ADMIN — ONDANSETRON 4 MG: 4 TABLET, ORALLY DISINTEGRATING ORAL at 17:33

## 2023-11-08 RX ADMIN — OXYCODONE 5 MG: 5 TABLET ORAL at 13:13

## 2023-11-08 RX ADMIN — ATORVASTATIN CALCIUM 40 MG: 40 TABLET, FILM COATED ORAL at 21:11

## 2023-11-08 RX ADMIN — PROPOFOL 100 MG: 10 INJECTION, EMULSION INTRAVENOUS at 08:01

## 2023-11-08 RX ADMIN — FENTANYL CITRATE 25 MCG: 50 INJECTION INTRAMUSCULAR; INTRAVENOUS at 12:45

## 2023-11-08 RX ADMIN — FENTANYL CITRATE 25 MCG: 50 INJECTION, SOLUTION INTRAMUSCULAR; INTRAVENOUS at 08:26

## 2023-11-08 RX ADMIN — HYDROMORPHONE HYDROCHLORIDE 0.5 MG: 2 INJECTION INTRAMUSCULAR; INTRAVENOUS; SUBCUTANEOUS at 07:56

## 2023-11-08 RX ADMIN — SODIUM CHLORIDE, POTASSIUM CHLORIDE, SODIUM LACTATE AND CALCIUM CHLORIDE: 600; 310; 30; 20 INJECTION, SOLUTION INTRAVENOUS at 07:12

## 2023-11-08 RX ADMIN — MELATONIN 4.5 MG: at 21:12

## 2023-11-08 RX ADMIN — CALCIUM CARBONATE (ANTACID) CHEW TAB 500 MG 1000 MG: 500 CHEW TAB at 21:12

## 2023-11-08 RX ADMIN — SUCCINYLCHOLINE CHLORIDE 120 MG: 20 INJECTION, SOLUTION INTRAMUSCULAR; INTRAVENOUS at 08:01

## 2023-11-08 RX ADMIN — HYDROMORPHONE HYDROCHLORIDE 0.5 MG: 2 INJECTION INTRAMUSCULAR; INTRAVENOUS; SUBCUTANEOUS at 08:01

## 2023-11-08 RX ADMIN — FENTANYL CITRATE 25 MCG: 50 INJECTION INTRAMUSCULAR; INTRAVENOUS at 11:22

## 2023-11-08 RX ADMIN — SODIUM CHLORIDE, PRESERVATIVE FREE 10 ML: 5 INJECTION INTRAVENOUS at 21:12

## 2023-11-08 RX ADMIN — PHENYLEPHRINE HYDROCHLORIDE 60 MCG/MIN: 10 INJECTION INTRAVENOUS at 08:18

## 2023-11-08 RX ADMIN — LIDOCAINE HYDROCHLORIDE 60 MG: 20 INJECTION, SOLUTION EPIDURAL; INFILTRATION; INTRACAUDAL; PERINEURAL at 08:01

## 2023-11-08 RX ADMIN — POTASSIUM CHLORIDE, DEXTROSE MONOHYDRATE AND SODIUM CHLORIDE: 150; 5; 450 INJECTION, SOLUTION INTRAVENOUS at 12:44

## 2023-11-08 RX ADMIN — ARFORMOTEROL TARTRATE: 15 SOLUTION RESPIRATORY (INHALATION) at 19:45

## 2023-11-08 RX ADMIN — FENTANYL CITRATE 25 MCG: 50 INJECTION, SOLUTION INTRAMUSCULAR; INTRAVENOUS at 09:32

## 2023-11-08 RX ADMIN — DEXAMETHASONE SODIUM PHOSPHATE 4 MG: 4 INJECTION INTRA-ARTICULAR; INTRALESIONAL; INTRAMUSCULAR; INTRAVENOUS; SOFT TISSUE at 09:23

## 2023-11-08 RX ADMIN — Medication 80 MCG: at 08:18

## 2023-11-08 RX ADMIN — SODIUM CHLORIDE, POTASSIUM CHLORIDE, SODIUM LACTATE AND CALCIUM CHLORIDE: 600; 310; 30; 20 INJECTION, SOLUTION INTRAVENOUS at 07:57

## 2023-11-08 RX ADMIN — Medication 10 MG: at 08:32

## 2023-11-08 ASSESSMENT — PAIN SCALES - GENERAL
PAINLEVEL_OUTOF10: 3
PAINLEVEL_OUTOF10: 0
PAINLEVEL_OUTOF10: 2
PAINLEVEL_OUTOF10: 4
PAINLEVEL_OUTOF10: 2
PAINLEVEL_OUTOF10: 4
PAINLEVEL_OUTOF10: 6

## 2023-11-08 ASSESSMENT — ENCOUNTER SYMPTOMS: SHORTNESS OF BREATH: 0

## 2023-11-08 ASSESSMENT — PAIN DESCRIPTION - ORIENTATION
ORIENTATION: MID
ORIENTATION: MID;ANTERIOR

## 2023-11-08 ASSESSMENT — PAIN DESCRIPTION - ONSET: ONSET: ON-GOING

## 2023-11-08 ASSESSMENT — PAIN DESCRIPTION - FREQUENCY: FREQUENCY: CONTINUOUS

## 2023-11-08 ASSESSMENT — PAIN DESCRIPTION - DESCRIPTORS
DESCRIPTORS: SORE
DESCRIPTORS: ACHING

## 2023-11-08 ASSESSMENT — PAIN DESCRIPTION - PAIN TYPE: TYPE: SURGICAL PAIN

## 2023-11-08 ASSESSMENT — PAIN DESCRIPTION - LOCATION
LOCATION: THROAT
LOCATION: NECK

## 2023-11-08 ASSESSMENT — PAIN - FUNCTIONAL ASSESSMENT
PAIN_FUNCTIONAL_ASSESSMENT: 0-10
PAIN_FUNCTIONAL_ASSESSMENT: ACTIVITIES ARE NOT PREVENTED

## 2023-11-08 ASSESSMENT — COPD QUESTIONNAIRES: CAT_SEVERITY: MILD

## 2023-11-08 NOTE — ADDENDUM NOTE
Addendum  created 11/08/23 1302 by Hannah Lewis MD    Flowsheet accepted, Intraprocedure Flowsheets edited, SmartForm saved

## 2023-11-08 NOTE — BRIEF OP NOTE
Brief Postoperative Note      Patient: Brenda Pitts  YOB: 1955  MRN: [id number]    Date of Procedure: 11/8/2023    Pre-Op Diagnosis Codes: * Thyroid nodule [E04.1]    Post-Op Diagnosis: Post-Op Diagnosis Codes:      * Thyroid nodule [E04.1]       Procedure(s):  TOTAL THYROIDECTOMY WITH POSSIBLE LIMITED NECK DISSECTION    Surgeon(s):  Fredi Batista MD    Assistant:  * No surgical staff found *    Anesthesia: General    Estimated Blood Loss (mL): Minimal    Complications: None    Specimens:   ID Type Source Tests Collected by Time Destination   1 : THYROID, STITCH RIGHT UPPER POLE Tissue Thyroid SURGICAL PATHOLOGY Fredi Batista MD 11/8/2023 4158        Implants:  * No implants in log *      Drains: * No LDAs found *    Findings: multiple nodules      Electronically signed by Mariah Barbour MD on 11/8/2023 at 10:11 AM

## 2023-11-08 NOTE — ANESTHESIA POSTPROCEDURE EVALUATION
Department of Anesthesiology  Postprocedure Note    Patient: Shanell Fairchild  MRN: [id number]  YOB: 1955  Date of evaluation: 11/8/2023      Procedure Summary     Date: 11/08/23 Room / Location: [facility] MAIN OR M9 / [facility] MA[facility] OR    Anesthesia Start: 0687 Anesthesia Stop: 7618    Procedure: TOTAL THYROIDECTOMY WITH POSSIBLE LIMITED NECK DISSECTION (Neck) Diagnosis:       Thyroid nodule      (Thyroid nodule [E04.1])    Providers: Olinda Brittle, MD Responsible Provider: Karren Bloch, MD    Anesthesia Type: General ASA Status: 2          Anesthesia Type: General    Sean Phase I: Sean Score: 8    Sean Phase II:        Anesthesia Post Evaluation    Patient location during evaluation: bedside  Patient participation: complete - patient participated  Level of consciousness: awake and alert  Airway patency: patent  Nausea & Vomiting: no nausea and no vomiting  Complications: no  Cardiovascular status: hemodynamically stable  Respiratory status: acceptable  Hydration status: stable  Multimodal analgesia pain management approach  Pain management: adequate

## 2023-11-08 NOTE — DISCHARGE INSTRUCTIONS
Discharge Instructions:  Thyroidectomy    Dr. Justina Joseph    Call for appointment for follow up in 1 week [phone]    Activity:    Walk regularly. You may resume driving in 24 hours unless still requiring narcotics for pain. Work:    You may return to work in 11 - 7 days to light activity. No lifting more than 10 pounds for one week. Diet:    You may resume normal diet after 24 hours. Anesthesia and narcotics may cause nausea and vomiting. If persistent please call the office. Call if you have numbness or tingling around lips or fingertips as this is a sign of low calcium. Wound Care: You have a special dressing called Dermabond. It is okay to shower and let the water run over the incision but do not scrub the area or soak in a tub. If you have a small amount of drainage you may place a dry bandage over the wound and change it daily. If you experience a lot of drainage, develop redness around the wound, or a fever over 101 F occurs please call the office. Medications:    Resume home medications as indicated on the Medical Reconciliation form. Aspirin, Coumadin, and Plavix can be restarted on post operative day 2 if you were taking them preoperatively. Pain medications:  Non steroidal antiinflammatories seem to work best for post surgical pain. Try these first as prescribed. A narcotic prescription will also be given for breakthrough pain. Over the counter stool softeners and laxatives may be used if needed. Do not hesitate to call with questions or concerns.

## 2023-11-08 NOTE — INTERVAL H&P NOTE
Update History & Physical    The patient's History and Physical of November 6, 2023 was reviewed with the patient and I examined the patient. There was no change. The surgical site was confirmed by the patient and me. Plan: The risks, benefits, expected outcome, and alternative to the recommended procedure have been discussed with the patient. Patient understands and wants to proceed with the procedure.      Electronically signed by Tarah Chavez MD on 11/8/2023 at 7:36 AM

## 2023-11-08 NOTE — PERIOP NOTE
MEPILEX TO SACRUM, NO REDNESS, TENDERNESS OR SIGN OF SKIN BREAKDOWN, HEALING YELLOW BRUISE RIGHT BREAT, SMALL PEA SIZED WOUND, CLOSED RIGHT LATERAL LOWER LEG. BED IN LOW POSITION, CALL BELL IN REACH. SIDE RAILS UP X 2.  VOIDED AT 0620 IN PRE-OP

## 2023-11-08 NOTE — ANESTHESIA PRE PROCEDURE
Department of Anesthesiology  Preprocedure Note       Name:  John Wellington   Age:  77 y.o.  :  1955                                          MRN:  [id number]         Date:  2023      Surgeon: Rubio Rodriguez):  Yuridia Xavier MD    Procedure: Procedure(s):  TOTAL THYROIDECTOMY WITH POSSIBLE LIMITED NECK DISSECTION    Medications prior to admission:   Prior to Admission medications    Medication Sig Start Date End Date Taking?  Authorizing Provider   furosemide (LASIX) 20 MG tablet TAKE 1 TABLET BY MOUTH EVERY DAY AS NEEDED 23   Jojo Jacome MD   Insulin Pen Needle (DROPLET PEN NEEDLES) 31G X 8 MM MISC Droplet Pen Needle 31 gauge x \"    Dmitry Hamilton MD   blood glucose test strips (EXACTECH TEST) strip Check glucose twice daily DX: E11.9 6/15/20   Dmitry Hamilton MD   aspirin 81 MG chewable tablet Take 1 tablet by mouth daily    Dmitry Hamilton MD   ferrous sulfate (SLOW FE) 142 (45 Fe) MG extended release tablet Take 142 mg by mouth daily    Dmitry Hamilton MD   amLODIPine (NORVASC) 5 MG tablet TAKE 1 TABLET EVERY DAY  Patient taking differently: Take 1 tablet by mouth daily 23   Jojo Jacome MD   montelukast (SINGULAIR) 10 MG tablet TAKE 1 TABLET EVERY DAY  Patient taking differently: Take 1 tablet by mouth nightly 23   Jojo Jacome MD   losartan (COZAAR) 50 MG tablet TAKE 1 TABLET EVERY DAY  Patient taking differently: Take 1 tablet by mouth daily 23   Jojo Jacome MD   insulin glargine (LANTUS SOLOSTAR) 100 UNIT/ML injection pen 25units sq nightly  Patient taking differently: Inject 23 Units into the skin daily 23 units sq qam 23   Jojo Jacome MD   atorvastatin (LIPITOR) 40 MG tablet TAKE 1 TABLET EVERY DAY  Patient taking differently: Take 1 tablet by mouth daily 23   Jojo Jacome MD   metFORMIN (GLUCOPHAGE-XR) 500 MG extended release tablet TAKE 2 TABLETS EVERY DAY WITH DINNER  Patient taking

## 2023-11-09 VITALS
SYSTOLIC BLOOD PRESSURE: 152 MMHG | OXYGEN SATURATION: 99 % | BODY MASS INDEX: 29.66 KG/M2 | TEMPERATURE: 98 F | DIASTOLIC BLOOD PRESSURE: 52 MMHG | HEART RATE: 59 BPM | RESPIRATION RATE: 17 BRPM | WEIGHT: 161.16 LBS | HEIGHT: 62 IN

## 2023-11-09 LAB
ANION GAP SERPL CALC-SCNC: 4 MMOL/L (ref 5–15)
BUN SERPL-MCNC: 14 MG/DL (ref 6–20)
BUN/CREAT SERPL: 19 (ref 12–20)
CALCIUM SERPL-MCNC: 7.9 MG/DL (ref 8.5–10.1)
CHLORIDE SERPL-SCNC: 103 MMOL/L (ref 97–108)
CO2 SERPL-SCNC: 29 MMOL/L (ref 21–32)
CREAT SERPL-MCNC: 0.75 MG/DL (ref 0.55–1.02)
GLUCOSE BLD STRIP.AUTO-MCNC: 154 MG/DL (ref 65–117)
GLUCOSE SERPL-MCNC: 135 MG/DL (ref 65–100)
POTASSIUM SERPL-SCNC: 4.8 MMOL/L (ref 3.5–5.1)
SERVICE CMNT-IMP: ABNORMAL
SODIUM SERPL-SCNC: 136 MMOL/L (ref 136–145)

## 2023-11-09 PROCEDURE — 80048 BASIC METABOLIC PNL TOTAL CA: CPT

## 2023-11-09 PROCEDURE — 6370000000 HC RX 637 (ALT 250 FOR IP): Performed by: SURGERY

## 2023-11-09 PROCEDURE — 99024 POSTOP FOLLOW-UP VISIT: CPT | Performed by: NURSE PRACTITIONER

## 2023-11-09 PROCEDURE — 36415 COLL VENOUS BLD VENIPUNCTURE: CPT

## 2023-11-09 PROCEDURE — 82962 GLUCOSE BLOOD TEST: CPT

## 2023-11-09 RX ORDER — CALCITRIOL 0.25 UG/1
0.25 CAPSULE, LIQUID FILLED ORAL DAILY
Status: DISCONTINUED | OUTPATIENT
Start: 2023-11-09 | End: 2023-11-09 | Stop reason: HOSPADM

## 2023-11-09 RX ORDER — CALCITRIOL 0.25 UG/1
0.25 CAPSULE, LIQUID FILLED ORAL 2 TIMES DAILY
Qty: 60 CAPSULE | Refills: 0 | Status: SHIPPED | OUTPATIENT
Start: 2023-11-09 | End: 2023-12-09

## 2023-11-09 RX ADMIN — CALCITRIOL CAPSULES 0.25 MCG 0.25 MCG: 0.25 CAPSULE ORAL at 07:23

## 2023-11-09 RX ADMIN — LEVOTHYROXINE SODIUM 112 MCG: 0.11 TABLET ORAL at 06:26

## 2023-11-09 RX ADMIN — INSULIN GLARGINE 23 UNITS: 100 INJECTION, SOLUTION SUBCUTANEOUS at 08:42

## 2023-11-09 RX ADMIN — CALCIUM CARBONATE (ANTACID) CHEW TAB 500 MG 1000 MG: 500 CHEW TAB at 08:41

## 2023-11-09 RX ADMIN — METFORMIN HYDROCHLORIDE 500 MG: 500 TABLET, EXTENDED RELEASE ORAL at 08:41

## 2023-11-09 RX ADMIN — PANTOPRAZOLE SODIUM 40 MG: 40 TABLET, DELAYED RELEASE ORAL at 07:23

## 2023-11-09 ASSESSMENT — PAIN SCALES - GENERAL: PAINLEVEL_OUTOF10: 0

## 2023-11-09 NOTE — PLAN OF CARE
Problem: Pain  Goal: Verbalizes/displays adequate comfort level or baseline comfort level  Outcome: Progressing  Flowsheets (Taken 11/8/2023 2125)  Verbalizes/displays adequate comfort level or baseline comfort level:   Encourage patient to monitor pain and request assistance   Assess pain using appropriate pain scale   Administer analgesics based on type and severity of pain and evaluate response   Implement non-pharmacological measures as appropriate and evaluate response     Problem: Discharge Planning  Goal: Discharge to home or other facility with appropriate resources  Outcome: Progressing  Flowsheets (Taken 11/8/2023 2123)  Discharge to home or other facility with appropriate resources:   Identify barriers to discharge with patient and caregiver   Arrange for needed discharge resources and transportation as appropriate   Identify discharge learning needs (meds, wound care, etc)     Problem: Safety - Adult  Goal: Free from fall injury  Outcome: Progressing  Flowsheets (Taken 11/8/2023 2119)  Free From Fall Injury: Instruct family/caregiver on patient safety     Problem: ABCDS Injury Assessment  Goal: Absence of physical injury  Outcome: Progressing

## 2023-11-09 NOTE — PROGRESS NOTES
End of Shift Note    Bedside shift change report given to Lauri Patel RN (oncoming nurse) by Kurtis Walden RN (offgoing nurse). Report included the following information SBAR, Kardex, OR Summary, Procedure Summary, Intake/Output, MAR, and Recent Results    Shift worked:  5380-4144     Shift summary and any significant changes:     Q6 Ca++ drawn, holding at 7.9    Pain managed w/ oral pain meds - 1x dose of 5 mg oxycodone w/ 650 mg tylenol. Incision CDI over shift, w/ ice pack applied. HOB >30 overnight, w SCDs on, IS at bedside.  Pt ambulating to bathroom, voiding well 1100 out over shift     Concerns for physician to address:       Zone phone for oncoming shift:   [phone]           Kurtis Walden RN

## 2023-11-09 NOTE — PROGRESS NOTES
Discharge instructions reviewed with the patient and her spouse at the bedside. IV removed. All questions answered at this time.

## 2023-11-09 NOTE — OP NOTE
[facility]  OPERATIVE REPORT    Name:  Marcela Shabazz  MR#:  [id number]  :  1955  ACCOUNT #:  [de-identified]  DATE OF SERVICE:  2023    PREOPERATIVE DIAGNOSIS:  Right thyroid nodule suspicious for malignancy. POSTOPERATIVE DIAGNOSIS:  Right thyroid nodule suspicious for malignancy. PROCEDURE PERFORMED:  Total thyroidectomy. SURGEON:  Chao Cohen MD    ASSISTANT:  Carlotta White    ANESTHESIA:  General with neural integrity monitoring tube. COMPLICATIONS:  None. SPECIMENS REMOVED:  Thyroid stitch at the right upper pole. IMPLANTS:  None. ESTIMATED BLOOD LOSS:  Minimal.    DRAINS:  None. FINDINGS:  Multiple nodules, very large right thyroid nodule and multiple firm nodules. BRIEF HISTORY:  The patient is a 33-year-old female who came in to see me in consultation for thyroid nodule. FNA came back as suspicious for malignancy on Afirma GSC with a 50% chance for malignancy. Options were discussed with partial thyroidectomy versus total thyroidectomy, and she elected to undergo total thyroidectomy, understood the risks and benefits and wished to proceed and these are noted in my office notes. PROCEDURE:  The patient was taken to the operating room and placed on the operating table in the supine position, underwent general anesthesia with neural integrity monitoring tube and then a roll was placed beneath the shoulders. Next the arms were tucked and padded to the side and neck was placed in mild hyperextension. After appropriate time-out, then we leads for the neural integrity tube on the chest wall. After appropriate time-out and antibiotics were given, 0.5% Marcaine with infiltrated in the skin and subcutaneous tissue in the lower neck. A 5.5 cm incision was made, subcutaneous tissue was gone through with electrocautery, down to the platysma and subplatysmal flaps were developed after the thyroid cartilage and inferior to the sternal notch.   We

## 2023-11-15 ENCOUNTER — OFFICE VISIT (OUTPATIENT)
Age: 68
End: 2023-11-15

## 2023-11-15 VITALS
WEIGHT: 159.4 LBS | OXYGEN SATURATION: 97 % | BODY MASS INDEX: 29.33 KG/M2 | DIASTOLIC BLOOD PRESSURE: 72 MMHG | SYSTOLIC BLOOD PRESSURE: 133 MMHG | RESPIRATION RATE: 16 BRPM | HEIGHT: 62 IN | TEMPERATURE: 98.2 F | HEART RATE: 55 BPM

## 2023-11-15 DIAGNOSIS — E83.51 HYPOCALCEMIA: ICD-10-CM

## 2023-11-15 DIAGNOSIS — E89.0 POSTOPERATIVE HYPOTHYROIDISM: Primary | ICD-10-CM

## 2023-11-15 PROCEDURE — 99024 POSTOP FOLLOW-UP VISIT: CPT | Performed by: SURGERY

## 2023-11-16 ENCOUNTER — TELEPHONE (OUTPATIENT)
Age: 68
End: 2023-11-16

## 2023-11-27 RX ORDER — LOSARTAN POTASSIUM 50 MG/1
TABLET ORAL
Qty: 90 TABLET | Refills: 1 | Status: SHIPPED | OUTPATIENT
Start: 2023-11-27

## 2023-11-27 RX ORDER — METFORMIN HYDROCHLORIDE 500 MG/1
TABLET, EXTENDED RELEASE ORAL
Qty: 180 TABLET | Refills: 0 | Status: SHIPPED | OUTPATIENT
Start: 2023-11-27

## 2023-11-27 RX ORDER — ATORVASTATIN CALCIUM 40 MG/1
TABLET, FILM COATED ORAL
Qty: 90 TABLET | Refills: 3 | Status: SHIPPED | OUTPATIENT
Start: 2023-11-27

## 2023-11-28 ENCOUNTER — TELEPHONE (OUTPATIENT)
Age: 68
End: 2023-11-28

## 2023-11-28 DIAGNOSIS — C73 PAPILLARY CARCINOMA OF THYROID (HCC): Primary | ICD-10-CM

## 2023-11-28 NOTE — TELEPHONE ENCOUNTER
Path demonstrated   Addendum Diagnosis   Thyroid gland, total thyroidectomy:        Papillary thyroid carcinoma, follicular variant, multifocal, 4.5 cm   in greatest dimension. Angioinvasion extrathyroidal extension are not identified. Margin evaluation is pending return of slides from outside   institution. One benign parathyroid gland identified. Background chronic lymphocytic thyroiditis and nodular hyperplasia. Pathologic stage: (m)pT3a pNX     THYROID GLAND    SPECIMEN       Procedure: Total thyroidectomy    TUMOR       Tumor Focality: Unifocal    Tumor Characteristics       Tumor Site: Right lobe, Left lobe, Isthmus       Tumor Size: 4.5 cm       Histologic Type: Papillary carcinoma, follicular variant,         encapsulated / well demarcated, with tumor capsular invasion       Angioinvasion (vascular invasion): Not identified       Lymphatic Invasion: Not identified       Extrathyroidal Extension: Not identified       Margin Status: Pending return of slides from outside institution      REGIONAL LYMPH NODES       Regional Lymph Node Status: Not applicable (no regional lymph nodes   submitted or found)      DISTANT METASTASIS       Distant Site(s) Involved: Not applicable      PATHOLOGIC STAGE CLASSIFICATION (pTNM, AJCC 8th Edition)       TNM Descriptors: m (multiple primary tumors)       pT Category: pT3a       pN Category: pN not assigned (cannot be determined based on available   pathological information)      ADDITIONAL FINDINGS       Additional Findings: Parathyroid gland, right, within normal limits. Mild chronic lymphocytic thyroiditis and nodular thyroid hyperplasia       Addendum Comment   This diagnosis was reached in consultation with Dr. Robin Benavidez   at [facility] Department of Pathology. The detailed report with   interpretation is available in the patient's chart.        Discussed path with her  Will add thyroglobulin panel to her labs in a few

## 2023-11-30 ENCOUNTER — TELEPHONE (OUTPATIENT)
Age: 68
End: 2023-11-30

## 2023-11-30 NOTE — TELEPHONE ENCOUNTER
----- Message from Kamilah Villagomez sent at 11/30/2023  3:15 PM EST -----  Regarding: RE: add on for tomorrow? Pt stated she couldn't make that appt.  ----- Message -----  From: Juana Mata MD  Sent: 11/30/2023   3:05 PM EST  To: #  Subject: add on for tomorrow? Please call her to see if she can take the opening for a new patient at 10:50am for thyroid cancer as referred by Dr. Chandrakant Sandoval. Thanks.

## 2023-12-04 ENCOUNTER — NURSE ONLY (OUTPATIENT)
Age: 68
End: 2023-12-04

## 2023-12-04 ENCOUNTER — OFFICE VISIT (OUTPATIENT)
Age: 68
End: 2023-12-04
Payer: MEDICARE

## 2023-12-04 VITALS
BODY MASS INDEX: 29 KG/M2 | WEIGHT: 157.6 LBS | SYSTOLIC BLOOD PRESSURE: 130 MMHG | HEIGHT: 62 IN | DIASTOLIC BLOOD PRESSURE: 62 MMHG | HEART RATE: 59 BPM

## 2023-12-04 DIAGNOSIS — E83.51 HYPOCALCEMIA: ICD-10-CM

## 2023-12-04 DIAGNOSIS — E11.9 DIABETES MELLITUS WITHOUT COMPLICATION (HCC): ICD-10-CM

## 2023-12-04 DIAGNOSIS — C73 THYROID CANCER (HCC): Primary | ICD-10-CM

## 2023-12-04 LAB
ALBUMIN SERPL-MCNC: 3.9 G/DL (ref 3.5–5)
ALBUMIN/GLOB SERPL: 1.3 (ref 1.1–2.2)
ALP SERPL-CCNC: 54 U/L (ref 45–117)
ALT SERPL-CCNC: 30 U/L (ref 12–78)
ANION GAP SERPL CALC-SCNC: 7 MMOL/L (ref 5–15)
AST SERPL-CCNC: 28 U/L (ref 15–37)
BILIRUB SERPL-MCNC: 0.3 MG/DL (ref 0.2–1)
BUN SERPL-MCNC: 13 MG/DL (ref 6–20)
BUN/CREAT SERPL: 17 (ref 12–20)
CALCIUM SERPL-MCNC: 8.9 MG/DL (ref 8.5–10.1)
CHLORIDE SERPL-SCNC: 103 MMOL/L (ref 97–108)
CO2 SERPL-SCNC: 28 MMOL/L (ref 21–32)
CREAT SERPL-MCNC: 0.77 MG/DL (ref 0.55–1.02)
EST. AVERAGE GLUCOSE BLD GHB EST-MCNC: 137 MG/DL
GLOBULIN SER CALC-MCNC: 3 G/DL (ref 2–4)
GLUCOSE SERPL-MCNC: 108 MG/DL (ref 65–100)
HBA1C MFR BLD: 6.4 % (ref 4–5.6)
POTASSIUM SERPL-SCNC: 4 MMOL/L (ref 3.5–5.1)
PROT SERPL-MCNC: 6.9 G/DL (ref 6.4–8.2)
SODIUM SERPL-SCNC: 138 MMOL/L (ref 136–145)

## 2023-12-04 PROCEDURE — 3078F DIAST BP <80 MM HG: CPT | Performed by: INTERNAL MEDICINE

## 2023-12-04 PROCEDURE — G8484 FLU IMMUNIZE NO ADMIN: HCPCS | Performed by: INTERNAL MEDICINE

## 2023-12-04 PROCEDURE — G8417 CALC BMI ABV UP PARAM F/U: HCPCS | Performed by: INTERNAL MEDICINE

## 2023-12-04 PROCEDURE — G8427 DOCREV CUR MEDS BY ELIG CLIN: HCPCS | Performed by: INTERNAL MEDICINE

## 2023-12-04 PROCEDURE — 3017F COLORECTAL CA SCREEN DOC REV: CPT | Performed by: INTERNAL MEDICINE

## 2023-12-04 PROCEDURE — 1090F PRES/ABSN URINE INCON ASSESS: CPT | Performed by: INTERNAL MEDICINE

## 2023-12-04 PROCEDURE — 1123F ACP DISCUSS/DSCN MKR DOCD: CPT | Performed by: INTERNAL MEDICINE

## 2023-12-04 PROCEDURE — G8399 PT W/DXA RESULTS DOCUMENT: HCPCS | Performed by: INTERNAL MEDICINE

## 2023-12-04 PROCEDURE — 3075F SYST BP GE 130 - 139MM HG: CPT | Performed by: INTERNAL MEDICINE

## 2023-12-04 PROCEDURE — 1036F TOBACCO NON-USER: CPT | Performed by: INTERNAL MEDICINE

## 2023-12-04 PROCEDURE — 99205 OFFICE O/P NEW HI 60 MIN: CPT | Performed by: INTERNAL MEDICINE

## 2023-12-04 RX ORDER — CALCIUM CARBONATE 500(1250)
500 TABLET ORAL DAILY
COMMUNITY

## 2023-12-04 NOTE — PATIENT INSTRUCTIONS
1) Have your labs for Dr. Madhav Albert drawn at the [facility] lab next to my office in [facility] 3rd floor [address] no later than Monday 12/18/23 to have labs back by 12/22/23 when you see him next. 2) I will await the results of your thyroglobulin (thyroid cancer blood test) to decide on whether or not we need to do radioactive iodine. If the level is less than 5, then we will hold on this for now. 3) For the first 2 years after your thyroid cancer surgery, we aim to suppress your TSH (thyroid test) to decrease your risk of cancer recurrence so my goal is 0.1-0.5 and if your value is over 0.5, then we'll plan on increasing your synthroid dose. 4) Please hold on any dental surgery until we make a decision about radioactive iodine.

## 2023-12-04 NOTE — PROGRESS NOTES
hours as needed    budesonide-formoterol (SYMBICORT) 160-4.5 MCG/ACT AERO Inhale 2 puffs into the lungs 2 times daily    cetirizine (ZYRTEC) 10 MG tablet Take 1 tablet by mouth daily    Cholecalciferol 50 MCG (2000 UT) TABS Take by mouth daily    dexlansoprazole (DEXILANT) 60 MG CPDR delayed release capsule Take 1 capsule by mouth daily    melatonin 5 MG TABS tablet Take 1 tablet by mouth nightly    polyethylene glycol (GLYCOLAX) 17 GM/SCOOP powder Take 17 g by mouth daily    pyridoxine (B-6) 100 MG tablet Take 1 tablet by mouth daily    calcium citrate (CALCITRATE) 950 (200 Ca) MG tablet Take 2 tablets by mouth Daily with supper     No current facility-administered medications for this visit. Allergies   Allergen Reactions    Adhesive Tape Other (See Comments)     redness       Family History   Problem Relation Age of Onset    Hypertension Mother     Heart Disease Mother     Lung Disease Mother         sarcoidosis    Diabetes Mother     Diabetes Father     Cancer Father         intestinal    Heart Disease Father     Hypertension Father     Diabetes Brother     Heart Disease Brother     Heart Disease Brother     Elevated Lipids Maternal Aunt     Diabetes Maternal Grandfather     Thyroid Disease Neg Hx      Social History     Socioeconomic History    Marital status:      Spouse name: Not on file    Number of children: Not on file    Years of education: Not on file    Highest education level: Not on file   Occupational History    Not on file   Tobacco Use    Smoking status: Never    Smokeless tobacco: Never   Vaping Use    Vaping Use: Never used   Substance and Sexual Activity    Alcohol use:  Yes     Alcohol/week: 0.8 standard drinks of alcohol     Types: 1 Standard drinks or equivalent per week     Comment: may have a drink once a year at most    Drug use: No    Sexual activity: Not on file   Other Topics Concern    Not on file   Social History Narrative    [place]ives in [place] with  and her 30 yo

## 2023-12-05 ASSESSMENT — ENCOUNTER SYMPTOMS: SHORTNESS OF BREATH: 0

## 2023-12-08 ENCOUNTER — TELEPHONE (OUTPATIENT)
Age: 68
End: 2023-12-08

## 2023-12-08 NOTE — TELEPHONE ENCOUNTER
----- Message from Odette Jones MD sent at 12/5/2023  2:10 PM EST -----  Regarding: FW:  Great.   Thanks for letting me know!  ----- Message -----  From: Shell Tomlin MD  Sent: 12/5/2023   2:07 PM EST  To: Odette Jones MD  Subject: Avinash Rolling:                                              Margins are negative     Yvrose Quivers  ----- Message -----  From: Riky Shen [facility] Pdfs  Sent: 11/15/2023   4:56 PM EST  To: Shell Tomlin MD

## 2023-12-11 RX ORDER — FUROSEMIDE 20 MG/1
TABLET ORAL
Qty: 90 TABLET | Refills: 0 | Status: SHIPPED | OUTPATIENT
Start: 2023-12-11

## 2023-12-12 ENCOUNTER — OFFICE VISIT (OUTPATIENT)
Age: 68
End: 2023-12-12
Payer: MEDICARE

## 2023-12-12 VITALS
HEART RATE: 54 BPM | RESPIRATION RATE: 22 BRPM | TEMPERATURE: 98 F | OXYGEN SATURATION: 99 % | BODY MASS INDEX: 28.52 KG/M2 | WEIGHT: 155 LBS | HEIGHT: 62 IN | SYSTOLIC BLOOD PRESSURE: 125 MMHG | DIASTOLIC BLOOD PRESSURE: 67 MMHG

## 2023-12-12 DIAGNOSIS — C73 THYROID CANCER (HCC): Primary | ICD-10-CM

## 2023-12-12 DIAGNOSIS — I25.10 ASHD (ARTERIOSCLEROTIC HEART DISEASE): ICD-10-CM

## 2023-12-12 DIAGNOSIS — E83.51 HYPOCALCEMIA: ICD-10-CM

## 2023-12-12 DIAGNOSIS — J44.9 CHRONIC OBSTRUCTIVE PULMONARY DISEASE, UNSPECIFIED COPD TYPE (HCC): ICD-10-CM

## 2023-12-12 DIAGNOSIS — E11.9 DIABETES MELLITUS WITHOUT COMPLICATION (HCC): ICD-10-CM

## 2023-12-12 DIAGNOSIS — C15.9 MALIGNANT NEOPLASM OF ESOPHAGUS, UNSPECIFIED LOCATION (HCC): ICD-10-CM

## 2023-12-12 DIAGNOSIS — E78.00 HYPERCHOLESTEREMIA: ICD-10-CM

## 2023-12-12 DIAGNOSIS — F33.9 RECURRENT DEPRESSION (HCC): ICD-10-CM

## 2023-12-12 DIAGNOSIS — I10 PRIMARY HYPERTENSION: ICD-10-CM

## 2023-12-12 DIAGNOSIS — E03.9 ACQUIRED HYPOTHYROIDISM: ICD-10-CM

## 2023-12-12 DIAGNOSIS — R01.1 CARDIAC MURMUR: ICD-10-CM

## 2023-12-12 DIAGNOSIS — J45.20 MILD INTERMITTENT ASTHMA WITHOUT COMPLICATION: ICD-10-CM

## 2023-12-12 PROCEDURE — G8484 FLU IMMUNIZE NO ADMIN: HCPCS | Performed by: INTERNAL MEDICINE

## 2023-12-12 PROCEDURE — G8417 CALC BMI ABV UP PARAM F/U: HCPCS | Performed by: INTERNAL MEDICINE

## 2023-12-12 PROCEDURE — G8427 DOCREV CUR MEDS BY ELIG CLIN: HCPCS | Performed by: INTERNAL MEDICINE

## 2023-12-12 PROCEDURE — 2022F DILAT RTA XM EVC RTNOPTHY: CPT | Performed by: INTERNAL MEDICINE

## 2023-12-12 PROCEDURE — 1036F TOBACCO NON-USER: CPT | Performed by: INTERNAL MEDICINE

## 2023-12-12 PROCEDURE — 3044F HG A1C LEVEL LT 7.0%: CPT | Performed by: INTERNAL MEDICINE

## 2023-12-12 PROCEDURE — 3074F SYST BP LT 130 MM HG: CPT | Performed by: INTERNAL MEDICINE

## 2023-12-12 PROCEDURE — 99214 OFFICE O/P EST MOD 30 MIN: CPT | Performed by: INTERNAL MEDICINE

## 2023-12-12 PROCEDURE — 1123F ACP DISCUSS/DSCN MKR DOCD: CPT | Performed by: INTERNAL MEDICINE

## 2023-12-12 PROCEDURE — 3023F SPIROM DOC REV: CPT | Performed by: INTERNAL MEDICINE

## 2023-12-12 PROCEDURE — 3017F COLORECTAL CA SCREEN DOC REV: CPT | Performed by: INTERNAL MEDICINE

## 2023-12-12 PROCEDURE — 3078F DIAST BP <80 MM HG: CPT | Performed by: INTERNAL MEDICINE

## 2023-12-12 PROCEDURE — G8399 PT W/DXA RESULTS DOCUMENT: HCPCS | Performed by: INTERNAL MEDICINE

## 2023-12-12 PROCEDURE — 1090F PRES/ABSN URINE INCON ASSESS: CPT | Performed by: INTERNAL MEDICINE

## 2023-12-12 SDOH — ECONOMIC STABILITY: FOOD INSECURITY: WITHIN THE PAST 12 MONTHS, THE FOOD YOU BOUGHT JUST DIDN'T LAST AND YOU DIDN'T HAVE MONEY TO GET MORE.: NEVER TRUE

## 2023-12-12 SDOH — ECONOMIC STABILITY: HOUSING INSECURITY
IN THE LAST 12 MONTHS, WAS THERE A TIME WHEN YOU DID NOT HAVE A STEADY PLACE TO SLEEP OR SLEPT IN A SHELTER (INCLUDING NOW)?: NO

## 2023-12-12 SDOH — ECONOMIC STABILITY: INCOME INSECURITY: HOW HARD IS IT FOR YOU TO PAY FOR THE VERY BASICS LIKE FOOD, HOUSING, MEDICAL CARE, AND HEATING?: NOT HARD AT ALL

## 2023-12-12 SDOH — ECONOMIC STABILITY: FOOD INSECURITY: WITHIN THE PAST 12 MONTHS, YOU WORRIED THAT YOUR FOOD WOULD RUN OUT BEFORE YOU GOT MONEY TO BUY MORE.: NEVER TRUE

## 2023-12-12 ASSESSMENT — PATIENT HEALTH QUESTIONNAIRE - PHQ9
9. THOUGHTS THAT YOU WOULD BE BETTER OFF DEAD, OR OF HURTING YOURSELF: 0
1. LITTLE INTEREST OR PLEASURE IN DOING THINGS: 0
2. FEELING DOWN, DEPRESSED OR HOPELESS: 0
4. FEELING TIRED OR HAVING LITTLE ENERGY: 0
10. IF YOU CHECKED OFF ANY PROBLEMS, HOW DIFFICULT HAVE THESE PROBLEMS MADE IT FOR YOU TO DO YOUR WORK, TAKE CARE OF THINGS AT HOME, OR GET ALONG WITH OTHER PEOPLE: 0
8. MOVING OR SPEAKING SO SLOWLY THAT OTHER PEOPLE COULD HAVE NOTICED. OR THE OPPOSITE, BEING SO FIGETY OR RESTLESS THAT YOU HAVE BEEN MOVING AROUND A LOT MORE THAN USUAL: 0
SUM OF ALL RESPONSES TO PHQ QUESTIONS 1-9: 0
SUM OF ALL RESPONSES TO PHQ9 QUESTIONS 1 & 2: 0
7. TROUBLE CONCENTRATING ON THINGS, SUCH AS READING THE NEWSPAPER OR WATCHING TELEVISION: 0
SUM OF ALL RESPONSES TO PHQ QUESTIONS 1-9: 0
3. TROUBLE FALLING OR STAYING ASLEEP: 0
6. FEELING BAD ABOUT YOURSELF - OR THAT YOU ARE A FAILURE OR HAVE LET YOURSELF OR YOUR FAMILY DOWN: 0
SUM OF ALL RESPONSES TO PHQ QUESTIONS 1-9: 0
SUM OF ALL RESPONSES TO PHQ QUESTIONS 1-9: 0
5. POOR APPETITE OR OVEREATING: 0

## 2023-12-18 DIAGNOSIS — E83.51 HYPOCALCEMIA: ICD-10-CM

## 2023-12-18 DIAGNOSIS — E89.0 POSTOPERATIVE HYPOTHYROIDISM: ICD-10-CM

## 2023-12-18 DIAGNOSIS — C73 PAPILLARY CARCINOMA OF THYROID (HCC): ICD-10-CM

## 2023-12-18 LAB
CALCIUM SERPL-MCNC: 8.7 MG/DL (ref 8.5–10.1)
CALCIUM SERPL-MCNC: 9.1 MG/DL (ref 8.5–10.1)
PTH-INTACT SERPL-MCNC: 21.9 PG/ML (ref 18.4–88)
T4 FREE SERPL-MCNC: 1.1 NG/DL (ref 0.8–1.5)
TSH SERPL DL<=0.05 MIU/L-ACNC: 1.07 UIU/ML (ref 0.36–3.74)

## 2023-12-20 RX ORDER — CALCITRIOL 0.25 UG/1
CAPSULE, LIQUID FILLED ORAL
Qty: 60 CAPSULE | Refills: 0 | OUTPATIENT
Start: 2023-12-20

## 2023-12-25 LAB
THYROGLOBULIN (ICMA): ABNORMAL NG/ML
THYROGLOBULIN (TG-RIA): <2 NG/ML
THYROGLOBULIN AB: 4.6 IU/ML

## 2023-12-31 RX ORDER — PEN NEEDLE, DIABETIC 31 GX5/16"
NEEDLE, DISPOSABLE MISCELLANEOUS
Qty: 200 EACH | Refills: 3 | Status: SHIPPED | OUTPATIENT
Start: 2023-12-31

## 2024-01-04 ENCOUNTER — HOSPITAL ENCOUNTER (OUTPATIENT)
Facility: HOSPITAL | Age: 69
Discharge: HOME | End: 2024-01-06
Attending: INTERNAL MEDICINE
Payer: MEDICARE

## 2024-01-04 VITALS — WEIGHT: 158.95 LBS | HEIGHT: 62 IN | BODY MASS INDEX: 29.25 KG/M2

## 2024-01-04 DIAGNOSIS — R01.1 CARDIAC MURMUR: ICD-10-CM

## 2024-01-04 PROCEDURE — 93306 TTE W/DOPPLER COMPLETE: CPT

## 2024-01-06 LAB
ECHO AO ROOT DIAM: 3.4 CM
ECHO AO ROOT INDEX: 1.97 CM/M2
ECHO AV AREA PEAK VELOCITY: 2.6 CM2
ECHO AV AREA/BSA PEAK VELOCITY: 1.5 CM2/M2
ECHO AV PEAK GRADIENT: 13 MMHG
ECHO AV PEAK VELOCITY: 1.8 M/S
ECHO AV VELOCITY RATIO: 0.83
ECHO BSA: 1.78 M2
ECHO EST RA PRESSURE: 3 MMHG
ECHO LA DIAMETER INDEX: 1.73 CM/M2
ECHO LA DIAMETER: 3 CM
ECHO LA TO AORTIC ROOT RATIO: 0.88
ECHO LA VOL A-L A2C: 69 ML (ref 22–52)
ECHO LA VOL A-L A4C: 97 ML (ref 22–52)
ECHO LA VOL MOD A2C: 64 ML (ref 22–52)
ECHO LA VOL MOD A4C: 90 ML (ref 22–52)
ECHO LA VOLUME AREA LENGTH: 82 ML
ECHO LA VOLUME INDEX A-L A2C: 40 ML/M2 (ref 16–34)
ECHO LA VOLUME INDEX A-L A4C: 56 ML/M2 (ref 16–34)
ECHO LA VOLUME INDEX AREA LENGTH: 47 ML/M2 (ref 16–34)
ECHO LA VOLUME INDEX MOD A2C: 37 ML/M2 (ref 16–34)
ECHO LA VOLUME INDEX MOD A4C: 52 ML/M2 (ref 16–34)
ECHO LV E' LATERAL VELOCITY: 7 CM/S
ECHO LV E' SEPTAL VELOCITY: 6 CM/S
ECHO LV FRACTIONAL SHORTENING: 32 % (ref 28–44)
ECHO LV INTERNAL DIMENSION DIASTOLE INDEX: 2.54 CM/M2
ECHO LV INTERNAL DIMENSION DIASTOLIC: 4.4 CM (ref 3.9–5.3)
ECHO LV INTERNAL DIMENSION SYSTOLIC INDEX: 1.73 CM/M2
ECHO LV INTERNAL DIMENSION SYSTOLIC: 3 CM
ECHO LV IVSD: 1.1 CM (ref 0.6–0.9)
ECHO LV MASS 2D: 168.9 G (ref 67–162)
ECHO LV MASS INDEX 2D: 97.6 G/M2 (ref 43–95)
ECHO LV POSTERIOR WALL DIASTOLIC: 1.1 CM (ref 0.6–0.9)
ECHO LV RELATIVE WALL THICKNESS RATIO: 0.5
ECHO LVOT AREA: 3.1 CM2
ECHO LVOT DIAM: 2 CM
ECHO LVOT PEAK GRADIENT: 9 MMHG
ECHO LVOT PEAK VELOCITY: 1.5 M/S
ECHO MV A VELOCITY: 0.92 M/S
ECHO MV E DECELERATION TIME (DT): 260.1 MS
ECHO MV E VELOCITY: 0.79 M/S
ECHO MV E/A RATIO: 0.86
ECHO MV E/E' LATERAL: 11.29
ECHO MV E/E' RATIO (AVERAGED): 12.23
ECHO RA VOLUME: 68 ML
ECHO RA VOLUME: 70 ML
ECHO RIGHT VENTRICULAR SYSTOLIC PRESSURE (RVSP): 30 MMHG
ECHO RV TAPSE: 1.7 CM (ref 1.7–?)
ECHO TV REGURGITANT MAX VELOCITY: 2.58 M/S
ECHO TV REGURGITANT PEAK GRADIENT: 27 MMHG

## 2024-01-18 RX ORDER — BLOOD SUGAR DIAGNOSTIC
STRIP MISCELLANEOUS
Qty: 200 STRIP | Refills: 3 | Status: SHIPPED | OUTPATIENT
Start: 2024-01-18

## 2024-01-19 RX ORDER — AMLODIPINE BESYLATE 5 MG/1
TABLET ORAL
Qty: 90 TABLET | Refills: 1 | Status: SHIPPED | OUTPATIENT
Start: 2024-01-19

## 2024-02-06 DIAGNOSIS — C73 THYROID CANCER (HCC): ICD-10-CM

## 2024-02-06 DIAGNOSIS — E83.51 HYPOCALCEMIA: ICD-10-CM

## 2024-02-06 LAB
ANION GAP SERPL CALC-SCNC: 5 MMOL/L (ref 5–15)
BUN SERPL-MCNC: 11 MG/DL (ref 6–20)
BUN/CREAT SERPL: 14 (ref 12–20)
CALCIUM SERPL-MCNC: 8.7 MG/DL (ref 8.5–10.1)
CHLORIDE SERPL-SCNC: 99 MMOL/L (ref 97–108)
CO2 SERPL-SCNC: 31 MMOL/L (ref 21–32)
CREAT SERPL-MCNC: 0.8 MG/DL (ref 0.55–1.02)
GLUCOSE SERPL-MCNC: 111 MG/DL (ref 65–100)
POTASSIUM SERPL-SCNC: 4 MMOL/L (ref 3.5–5.1)
SODIUM SERPL-SCNC: 135 MMOL/L (ref 136–145)
T4 FREE SERPL-MCNC: 1.3 NG/DL (ref 0.8–1.5)
TSH SERPL DL<=0.05 MIU/L-ACNC: 0.34 UIU/ML (ref 0.36–3.74)

## 2024-02-09 RX ORDER — METFORMIN HYDROCHLORIDE 500 MG/1
TABLET, EXTENDED RELEASE ORAL
Qty: 180 TABLET | Refills: 0 | Status: SHIPPED | OUTPATIENT
Start: 2024-02-09

## 2024-02-18 LAB
THYROGLOBULIN (ICMA): ABNORMAL NG/ML
THYROGLOBULIN (TG-RIA): <2 NG/ML
THYROGLOBULIN AB: 4.9 IU/ML

## 2024-03-06 ENCOUNTER — NURSE ONLY (OUTPATIENT)
Age: 69
End: 2024-03-06

## 2024-03-06 DIAGNOSIS — E78.00 HYPERCHOLESTEREMIA: ICD-10-CM

## 2024-03-06 DIAGNOSIS — E11.9 DIABETES MELLITUS WITHOUT COMPLICATION (HCC): ICD-10-CM

## 2024-03-06 DIAGNOSIS — E03.9 ACQUIRED HYPOTHYROIDISM: ICD-10-CM

## 2024-03-06 LAB
CREAT UR-MCNC: 133 MG/DL
MICROALBUMIN UR-MCNC: 1.33 MG/DL
MICROALBUMIN/CREAT UR-RTO: 10 MG/G (ref 0–30)

## 2024-03-06 RX ORDER — INSULIN GLARGINE 100 [IU]/ML
INJECTION, SOLUTION SUBCUTANEOUS
Qty: 45 ML | Refills: 0 | Status: SHIPPED | OUTPATIENT
Start: 2024-03-06

## 2024-03-07 LAB
ANION GAP SERPL CALC-SCNC: 4 MMOL/L (ref 5–15)
BUN SERPL-MCNC: 15 MG/DL (ref 6–20)
BUN/CREAT SERPL: 19 (ref 12–20)
CALCIUM SERPL-MCNC: 9.1 MG/DL (ref 8.5–10.1)
CHLORIDE SERPL-SCNC: 103 MMOL/L (ref 97–108)
CHOLEST SERPL-MCNC: 122 MG/DL
CO2 SERPL-SCNC: 29 MMOL/L (ref 21–32)
CREAT SERPL-MCNC: 0.78 MG/DL (ref 0.55–1.02)
EST. AVERAGE GLUCOSE BLD GHB EST-MCNC: 143 MG/DL
GLUCOSE SERPL-MCNC: 133 MG/DL (ref 65–100)
HBA1C MFR BLD: 6.6 % (ref 4–5.6)
HDLC SERPL-MCNC: 52 MG/DL
HDLC SERPL: 2.3 (ref 0–5)
LDLC SERPL CALC-MCNC: 56 MG/DL (ref 0–100)
POTASSIUM SERPL-SCNC: 4.4 MMOL/L (ref 3.5–5.1)
SODIUM SERPL-SCNC: 136 MMOL/L (ref 136–145)
TRIGL SERPL-MCNC: 70 MG/DL
VLDLC SERPL CALC-MCNC: 14 MG/DL

## 2024-03-09 PROBLEM — J45.50 SEVERE PERSISTENT ASTHMA, UNCOMPLICATED: Status: ACTIVE | Noted: 2024-03-09

## 2024-03-09 PROBLEM — E04.1 THYROID NODULE: Status: RESOLVED | Noted: 2023-11-08 | Resolved: 2024-03-09

## 2024-03-09 SDOH — HEALTH STABILITY: PHYSICAL HEALTH: ON AVERAGE, HOW MANY MINUTES DO YOU ENGAGE IN EXERCISE AT THIS LEVEL?: 0 MIN

## 2024-03-09 SDOH — HEALTH STABILITY: PHYSICAL HEALTH
ON AVERAGE, HOW MANY DAYS PER WEEK DO YOU ENGAGE IN MODERATE TO STRENUOUS EXERCISE (LIKE A BRISK WALK)?: PATIENT DECLINED

## 2024-03-09 ASSESSMENT — LIFESTYLE VARIABLES
HOW MANY STANDARD DRINKS CONTAINING ALCOHOL DO YOU HAVE ON A TYPICAL DAY: PATIENT DECLINED
HOW MANY STANDARD DRINKS CONTAINING ALCOHOL DO YOU HAVE ON A TYPICAL DAY: 98
HOW OFTEN DO YOU HAVE A DRINK CONTAINING ALCOHOL: MONTHLY OR LESS
HOW OFTEN DO YOU HAVE SIX OR MORE DRINKS ON ONE OCCASION: 1
HOW OFTEN DO YOU HAVE A DRINK CONTAINING ALCOHOL: 2

## 2024-03-09 ASSESSMENT — PATIENT HEALTH QUESTIONNAIRE - PHQ9
SUM OF ALL RESPONSES TO PHQ QUESTIONS 1-9: 0
SUM OF ALL RESPONSES TO PHQ QUESTIONS 1-9: 0
SUM OF ALL RESPONSES TO PHQ9 QUESTIONS 1 & 2: 0
SUM OF ALL RESPONSES TO PHQ QUESTIONS 1-9: 0
1. LITTLE INTEREST OR PLEASURE IN DOING THINGS: 0
SUM OF ALL RESPONSES TO PHQ QUESTIONS 1-9: 0
2. FEELING DOWN, DEPRESSED OR HOPELESS: 0

## 2024-03-09 NOTE — PATIENT INSTRUCTIONS
every 1-2 years to screen for glaucoma; cataracts, macular degeneration, and other eye disorders.  A preventive dental visit is recommended every 6 months.  Try to get at least 150 minutes of exercise per week or 10,000 steps per day on a pedometer .  Order or download the FREE \"Exercise & Physical Activity: Your Everyday Guide\" from [employer]. Call 1-567.834.3353 or search [employer] online.  You need 5482-7848 mg of calcium and 1144-3936 IU of vitamin D per day. It is possible to meet your calcium requirement with diet alone, but a vitamin D supplement is usually necessary to meet this goal.  When exposed to the sun, use a sunscreen that protects against both UVA and UVB radiation with an SPF of 30 or greater. Reapply every 2 to 3 hours or after sweating, drying off with a towel, or swimming.  Always wear a seat belt when traveling in a car. Always wear a helmet when riding a bicycle or motorcycle.

## 2024-03-11 ENCOUNTER — OFFICE VISIT (OUTPATIENT)
Age: 69
End: 2024-03-11
Payer: MEDICARE

## 2024-03-11 VITALS
BODY MASS INDEX: 29.4 KG/M2 | DIASTOLIC BLOOD PRESSURE: 66 MMHG | SYSTOLIC BLOOD PRESSURE: 137 MMHG | HEART RATE: 51 BPM | WEIGHT: 160.8 LBS

## 2024-03-11 DIAGNOSIS — C73 THYROID CANCER (HCC): ICD-10-CM

## 2024-03-11 DIAGNOSIS — I10 PRIMARY HYPERTENSION: Primary | ICD-10-CM

## 2024-03-11 DIAGNOSIS — F33.9 RECURRENT DEPRESSION (HCC): ICD-10-CM

## 2024-03-11 DIAGNOSIS — I25.10 ASHD (ARTERIOSCLEROTIC HEART DISEASE): ICD-10-CM

## 2024-03-11 DIAGNOSIS — E03.9 ACQUIRED HYPOTHYROIDISM: ICD-10-CM

## 2024-03-11 DIAGNOSIS — E78.00 HYPERCHOLESTEREMIA: ICD-10-CM

## 2024-03-11 DIAGNOSIS — E11.9 DIABETES MELLITUS WITHOUT COMPLICATION (HCC): ICD-10-CM

## 2024-03-11 DIAGNOSIS — C15.9 MALIGNANT NEOPLASM OF ESOPHAGUS, UNSPECIFIED LOCATION (HCC): ICD-10-CM

## 2024-03-11 DIAGNOSIS — Z00.00 MEDICARE ANNUAL WELLNESS VISIT, SUBSEQUENT: ICD-10-CM

## 2024-03-11 DIAGNOSIS — J45.20 MILD INTERMITTENT ASTHMA WITHOUT COMPLICATION: ICD-10-CM

## 2024-03-11 DIAGNOSIS — J44.9 CHRONIC OBSTRUCTIVE PULMONARY DISEASE, UNSPECIFIED COPD TYPE (HCC): ICD-10-CM

## 2024-03-11 DIAGNOSIS — J45.50 SEVERE PERSISTENT ASTHMA, UNCOMPLICATED: ICD-10-CM

## 2024-03-11 PROCEDURE — G8399 PT W/DXA RESULTS DOCUMENT: HCPCS | Performed by: INTERNAL MEDICINE

## 2024-03-11 PROCEDURE — 3017F COLORECTAL CA SCREEN DOC REV: CPT | Performed by: INTERNAL MEDICINE

## 2024-03-11 PROCEDURE — 3078F DIAST BP <80 MM HG: CPT | Performed by: INTERNAL MEDICINE

## 2024-03-11 PROCEDURE — 2022F DILAT RTA XM EVC RTNOPTHY: CPT | Performed by: INTERNAL MEDICINE

## 2024-03-11 PROCEDURE — G8417 CALC BMI ABV UP PARAM F/U: HCPCS | Performed by: INTERNAL MEDICINE

## 2024-03-11 PROCEDURE — 3075F SYST BP GE 130 - 139MM HG: CPT | Performed by: INTERNAL MEDICINE

## 2024-03-11 PROCEDURE — 1036F TOBACCO NON-USER: CPT | Performed by: INTERNAL MEDICINE

## 2024-03-11 PROCEDURE — 1123F ACP DISCUSS/DSCN MKR DOCD: CPT | Performed by: INTERNAL MEDICINE

## 2024-03-11 PROCEDURE — 3023F SPIROM DOC REV: CPT | Performed by: INTERNAL MEDICINE

## 2024-03-11 PROCEDURE — 1090F PRES/ABSN URINE INCON ASSESS: CPT | Performed by: INTERNAL MEDICINE

## 2024-03-11 PROCEDURE — 3044F HG A1C LEVEL LT 7.0%: CPT | Performed by: INTERNAL MEDICINE

## 2024-03-11 PROCEDURE — 99214 OFFICE O/P EST MOD 30 MIN: CPT | Performed by: INTERNAL MEDICINE

## 2024-03-11 PROCEDURE — G8427 DOCREV CUR MEDS BY ELIG CLIN: HCPCS | Performed by: INTERNAL MEDICINE

## 2024-03-11 PROCEDURE — G8484 FLU IMMUNIZE NO ADMIN: HCPCS | Performed by: INTERNAL MEDICINE

## 2024-03-11 PROCEDURE — G0439 PPPS, SUBSEQ VISIT: HCPCS | Performed by: INTERNAL MEDICINE

## 2024-03-11 NOTE — PROGRESS NOTES
\"Have you been to the ER, urgent care clinic since your last visit?  Hospitalized since your last visit?\"    NO    “Have you seen or consulted any other health care providers outside of [facility] since your last visit?”    NO           
Reconciliation, Ar   dexlansoprazole (DEXILANT) 60 MG CPDR delayed release capsule Take 1 capsule by mouth daily Yes Automatic Reconciliation, Ar   melatonin 5 MG TABS tablet Take 1 tablet by mouth nightly Yes Automatic Reconciliation, Ar   polyethylene glycol (GLYCOLAX) 17 GM/SCOOP powder Take 17 g by mouth daily Yes Automatic Reconciliation, Ar   pyridoxine (B-6) 100 MG tablet Take 1 tablet by mouth daily Yes Automatic Reconciliation, Ar       CareTeam (Including outside providers/suppliers regularly involved in providing care):   Patient Care Team:  Roxana Mike MD as PCP - General  [facility] Mike [facility]  Ten Ramos MD as Consulting Physician  Allison Gupta MD as Physician  Claudio Schroeder MD (General Surgery)     Reviewed and updated this visit:  Allergies  Meds  Problems        ACP not on file. SDM is her .  Reminded pt to bring in this lov         Colonoscopy: 9/23 Dr. Kidd, 5 yr repeat  EGD: 9/23 Dr Kidd  AAA: FMHx (grandfather), 3/22 negative   Pap: Dr. Bass, 9/22 q2yr  Mammogram: 8/23 negative  annual   Dexa: 8/23 nl q3-5 yr    Tdap: 9/14/2016  Pneumovax: 10/14/19   Lzfmdet07: 11/14/2016  Zostavax: 11/18/2016, reaction on R arm  Shingrix: both completed 2/20  Flu shot: 10/23    A1c: 3/24 6.6 q3mo    Eye exam: veannmarie morgan  2/24  annual     Lipids: 3/24 annual  Hep C screen: 11/15, negative    Covid: 6 doses (pfizer), most recent 10/23         Medication reconciliation completed by MA and reviewed by me.  Medical/surgical/social/family history reviewed and updated by me.  Patient provided AVS and preventative screening table.  Patient verbalized understanding of all information discussed.     
documentation is accurate     Depression screen reviewed and negative.  Scribed by Rah Soares of [facility], as dictated by Dr. Roxana Mike.    Current diagnosis and concerns discussed with pt at length. Pt understands risks and benefits or current treatment plan and medications, and accepts the treatment and medication with any possible risks. Pt asks appropriate questions, which were answered. Pt was instructed to call with any concerns or problems.    I have reviewed the note documented by the scribe. The services provided are my own. The documentation is accurate.

## 2024-05-04 NOTE — TELEPHONE ENCOUNTER
----- Message from Huong De Leon sent at 9/14/2023  9:26 AM EDT -----  Regarding: Med refill  Contact: [phone]  Need Lasix 20 mg refill sent to [facility] 147.9

## 2024-05-14 DIAGNOSIS — E83.51 HYPOCALCEMIA: ICD-10-CM

## 2024-05-14 DIAGNOSIS — C73 THYROID CANCER (HCC): ICD-10-CM

## 2024-05-14 LAB
T4 FREE SERPL-MCNC: 1.3 NG/DL (ref 0.8–1.5)
TSH SERPL DL<=0.05 MIU/L-ACNC: 0.4 UIU/ML (ref 0.36–3.74)

## 2024-05-19 DIAGNOSIS — E11.9 DIABETES MELLITUS WITHOUT COMPLICATION (HCC): Primary | ICD-10-CM

## 2024-05-20 RX ORDER — BLOOD SUGAR DIAGNOSTIC
STRIP MISCELLANEOUS
Qty: 200 STRIP | Refills: 3 | Status: SHIPPED | OUTPATIENT
Start: 2024-05-20

## 2024-05-22 RX ORDER — METFORMIN HYDROCHLORIDE 500 MG/1
TABLET, EXTENDED RELEASE ORAL
Qty: 180 TABLET | Refills: 3 | Status: SHIPPED | OUTPATIENT
Start: 2024-05-22

## 2024-05-22 RX ORDER — INSULIN GLARGINE 100 [IU]/ML
INJECTION, SOLUTION SUBCUTANEOUS
Qty: 45 ML | Refills: 0 | Status: SHIPPED | OUTPATIENT
Start: 2024-05-22

## 2024-05-22 RX ORDER — LOSARTAN POTASSIUM 50 MG/1
TABLET ORAL
Qty: 90 TABLET | Refills: 0 | Status: SHIPPED | OUTPATIENT
Start: 2024-05-22

## 2024-05-26 LAB
THYROGLOBULIN (ICMA): ABNORMAL NG/ML
THYROGLOBULIN (TG-RIA): 4.7 NG/ML
THYROGLOBULIN AB: 5.7 IU/ML

## 2024-05-31 ENCOUNTER — OFFICE VISIT (OUTPATIENT)
Age: 69
End: 2024-05-31
Payer: MEDICARE

## 2024-05-31 VITALS
DIASTOLIC BLOOD PRESSURE: 63 MMHG | SYSTOLIC BLOOD PRESSURE: 122 MMHG | HEART RATE: 63 BPM | WEIGHT: 166 LBS | BODY MASS INDEX: 30.55 KG/M2 | HEIGHT: 62 IN

## 2024-05-31 DIAGNOSIS — E83.51 HYPOCALCEMIA: ICD-10-CM

## 2024-05-31 DIAGNOSIS — C73 THYROID CANCER (HCC): Primary | ICD-10-CM

## 2024-05-31 PROCEDURE — 1090F PRES/ABSN URINE INCON ASSESS: CPT | Performed by: INTERNAL MEDICINE

## 2024-05-31 PROCEDURE — 1123F ACP DISCUSS/DSCN MKR DOCD: CPT | Performed by: INTERNAL MEDICINE

## 2024-05-31 PROCEDURE — G8427 DOCREV CUR MEDS BY ELIG CLIN: HCPCS | Performed by: INTERNAL MEDICINE

## 2024-05-31 PROCEDURE — 99215 OFFICE O/P EST HI 40 MIN: CPT | Performed by: INTERNAL MEDICINE

## 2024-05-31 PROCEDURE — 3074F SYST BP LT 130 MM HG: CPT | Performed by: INTERNAL MEDICINE

## 2024-05-31 PROCEDURE — 3078F DIAST BP <80 MM HG: CPT | Performed by: INTERNAL MEDICINE

## 2024-05-31 PROCEDURE — 3017F COLORECTAL CA SCREEN DOC REV: CPT | Performed by: INTERNAL MEDICINE

## 2024-05-31 PROCEDURE — 1036F TOBACCO NON-USER: CPT | Performed by: INTERNAL MEDICINE

## 2024-05-31 PROCEDURE — G8417 CALC BMI ABV UP PARAM F/U: HCPCS | Performed by: INTERNAL MEDICINE

## 2024-05-31 PROCEDURE — G8399 PT W/DXA RESULTS DOCUMENT: HCPCS | Performed by: INTERNAL MEDICINE

## 2024-05-31 RX ORDER — LEVOTHYROXINE SODIUM 137 UG/1
137 TABLET ORAL DAILY
Qty: 90 TABLET | Refills: 3 | Status: SHIPPED | OUTPATIENT
Start: 2024-05-31

## 2024-05-31 NOTE — PATIENT INSTRUCTIONS
1) TSH is a thyroid test.  Your level is 0.4 which is normal but I would like to try and keep this closer to 0.1-0.3 during your first 2 years after surgery.  The TSH goes opposite of your thyroid dose and suggests your dose of synthroid is not quite enough (assuming you have been getting synthroid from [facility] and not generic levothyroxine).  I will increase your dose to 137 mcg daily and change to brand levoxyl as this is cheaper and have sent a new prescription to [facility].  Tomorrow, take 2 of the 125 mcg tabs and then take 1 tab daily until the 137 mcg tabs arrive next week.    2) Your thyroglobulin (thyroid cancer blood test) was 4.7 and it had been <2.0 and the thyroglobulin ab was 5.7 up from 4.9 so this is why I want to increase the synthroid to help keep these levels down.    3) I placed the order for your repeat thyroid/neck ultrasound.  Please call the Patient Care team at [phone] to schedule this appointment at your earliest convenience and I'll contact you over [employer] with the results.      4) Please repeat your labs in 2 months.   I will send you a message through [employer] with your lab results.

## 2024-05-31 NOTE — PROGRESS NOTES
Chief Complaint   Patient presents with    Thyroid Problem     PCP and pharmacy confirmed     History of Present Illness: Deb Thompson is a 69 y.o. female here for follow up of thyroid.  Weight up 9 lbs since last visit in 12/23.  Compliant with synthroid 125 mcg daily (is pretty sure she is getting brand synthroid from [facility] and not levothyroxine but this cost $130 for a 90 day supply so we agreed to try brand levoxyl instead for cost).  Takes every morning at least 30-60 min before food and coffee and her 2 calcium tabs are at bedtime. No chest pain or palpitations or tremors.    Bowels tend to be on the slower side and takes miralax daily to help.  Nails are doing a little better but can break at times.  Some dry skin that is unchanged.  No new hair loss.  Tends to stay cold and not necessarily any better with the higher dose.  No new neck lumps.  Has continued to have issues with her teeth and next week the plan is to remove all her teeth and have upper and lower dentures made.  The ultimate plan will be to have implants.        Current Outpatient Medications   Medication Sig    insulin glargine (LANTUS SOLOSTAR) 100 UNIT/ML injection pen INJECT 36 UNITS UNDER THE SKIN EVERY DAY    metFORMIN (GLUCOPHAGE-XR) 500 MG extended release tablet TAKE 2 TABLETS EVERY DAY WITH DINNER    losartan (COZAAR) 50 MG tablet TAKE 1 TABLET EVERY DAY    blood glucose test strips (ACCU-CHEK CASPER PLUS) strip TEST BLOOD SUGAR TWICE DAILY    amLODIPine (NORVASC) 5 MG tablet TAKE 1 TABLET EVERY DAY    DROPLET PEN NEEDLES 31G X 8 MM MISC USE WITH INSULIN TWICE DAILY    SYNTHROID 125 MCG tablet Take 1 tablet by mouth Daily BRAND MEDICALLY NECESSARY--Delete 112 mcg dose from profile    montelukast (SINGULAIR) 10 MG tablet TAKE 1 TABLET EVERY DAY    furosemide (LASIX) 20 MG tablet TAKE 1 TABLET BY MOUTH EVERY DAY AS NEEDED    atorvastatin (LIPITOR) 40 MG tablet TAKE 1 TABLET EVERY DAY    calcium citrate (CALCITRATE) 950 (200 Ca) MG

## 2024-06-03 RX ORDER — LEVOTHYROXINE SODIUM 137 UG/1
137 TABLET ORAL DAILY
Qty: 90 TABLET | Refills: 3 | Status: SHIPPED | OUTPATIENT
Start: 2024-06-03

## 2024-06-05 ENCOUNTER — NURSE ONLY (OUTPATIENT)
Age: 69
End: 2024-06-05

## 2024-06-05 DIAGNOSIS — I25.10 ASHD (ARTERIOSCLEROTIC HEART DISEASE): ICD-10-CM

## 2024-06-05 DIAGNOSIS — E11.9 DIABETES MELLITUS WITHOUT COMPLICATION (HCC): ICD-10-CM

## 2024-06-05 DIAGNOSIS — I10 PRIMARY HYPERTENSION: ICD-10-CM

## 2024-06-05 LAB
ALBUMIN SERPL-MCNC: 3.8 G/DL (ref 3.5–5)
ALBUMIN/GLOB SERPL: 1.4 (ref 1.1–2.2)
ALP SERPL-CCNC: 60 U/L (ref 45–117)
ALT SERPL-CCNC: 38 U/L (ref 12–78)
ANION GAP SERPL CALC-SCNC: 4 MMOL/L (ref 5–15)
AST SERPL-CCNC: 32 U/L (ref 15–37)
BILIRUB SERPL-MCNC: 0.4 MG/DL (ref 0.2–1)
BUN SERPL-MCNC: 10 MG/DL (ref 6–20)
BUN/CREAT SERPL: 12 (ref 12–20)
CALCIUM SERPL-MCNC: 8.9 MG/DL (ref 8.5–10.1)
CHLORIDE SERPL-SCNC: 103 MMOL/L (ref 97–108)
CO2 SERPL-SCNC: 31 MMOL/L (ref 21–32)
CREAT SERPL-MCNC: 0.86 MG/DL (ref 0.55–1.02)
ERYTHROCYTE [DISTWIDTH] IN BLOOD BY AUTOMATED COUNT: 14.6 % (ref 11.5–14.5)
EST. AVERAGE GLUCOSE BLD GHB EST-MCNC: 151 MG/DL
GLOBULIN SER CALC-MCNC: 2.8 G/DL (ref 2–4)
GLUCOSE SERPL-MCNC: 152 MG/DL (ref 65–100)
HBA1C MFR BLD: 6.9 % (ref 4–5.6)
HCT VFR BLD AUTO: 36.7 % (ref 35–47)
HGB BLD-MCNC: 11.5 G/DL (ref 11.5–16)
MCH RBC QN AUTO: 30.7 PG (ref 26–34)
MCHC RBC AUTO-ENTMCNC: 31.3 G/DL (ref 30–36.5)
MCV RBC AUTO: 98.1 FL (ref 80–99)
NRBC # BLD: 0 K/UL (ref 0–0.01)
NRBC BLD-RTO: 0 PER 100 WBC
PLATELET # BLD AUTO: 269 K/UL (ref 150–400)
PMV BLD AUTO: 10 FL (ref 8.9–12.9)
POTASSIUM SERPL-SCNC: 4.4 MMOL/L (ref 3.5–5.1)
PROT SERPL-MCNC: 6.6 G/DL (ref 6.4–8.2)
RBC # BLD AUTO: 3.74 M/UL (ref 3.8–5.2)
SODIUM SERPL-SCNC: 138 MMOL/L (ref 136–145)
WBC # BLD AUTO: 5.9 K/UL (ref 3.6–11)

## 2024-06-11 ENCOUNTER — HOSPITAL ENCOUNTER (OUTPATIENT)
Facility: HOSPITAL | Age: 69
Discharge: HOME | End: 2024-06-14
Attending: INTERNAL MEDICINE
Payer: MEDICARE

## 2024-06-11 DIAGNOSIS — C73 THYROID CANCER (HCC): ICD-10-CM

## 2024-06-11 PROCEDURE — 76536 US EXAM OF HEAD AND NECK: CPT

## 2024-06-12 ENCOUNTER — OFFICE VISIT (OUTPATIENT)
Age: 69
End: 2024-06-12
Payer: MEDICARE

## 2024-06-12 VITALS
RESPIRATION RATE: 18 BRPM | OXYGEN SATURATION: 98 % | TEMPERATURE: 98 F | DIASTOLIC BLOOD PRESSURE: 65 MMHG | HEIGHT: 62 IN | SYSTOLIC BLOOD PRESSURE: 170 MMHG | WEIGHT: 162 LBS | HEART RATE: 98 BPM | BODY MASS INDEX: 29.81 KG/M2

## 2024-06-12 DIAGNOSIS — R35.0 URINARY FREQUENCY: ICD-10-CM

## 2024-06-12 DIAGNOSIS — E78.00 HYPERCHOLESTEREMIA: ICD-10-CM

## 2024-06-12 DIAGNOSIS — J45.50 SEVERE PERSISTENT ASTHMA, UNCOMPLICATED: ICD-10-CM

## 2024-06-12 DIAGNOSIS — F33.9 RECURRENT DEPRESSION (HCC): ICD-10-CM

## 2024-06-12 DIAGNOSIS — J44.9 CHRONIC OBSTRUCTIVE PULMONARY DISEASE, UNSPECIFIED COPD TYPE (HCC): ICD-10-CM

## 2024-06-12 DIAGNOSIS — E11.9 DIABETES MELLITUS WITHOUT COMPLICATION (HCC): ICD-10-CM

## 2024-06-12 DIAGNOSIS — I25.10 ASHD (ARTERIOSCLEROTIC HEART DISEASE): ICD-10-CM

## 2024-06-12 DIAGNOSIS — I10 PRIMARY HYPERTENSION: Primary | ICD-10-CM

## 2024-06-12 DIAGNOSIS — Z85.01 H/O MALIGNANT NEOPLASM OF ESOPHAGUS: ICD-10-CM

## 2024-06-12 DIAGNOSIS — Z87.19 H/O SMALL BOWEL OBSTRUCTION: ICD-10-CM

## 2024-06-12 DIAGNOSIS — E03.9 ACQUIRED HYPOTHYROIDISM: ICD-10-CM

## 2024-06-12 DIAGNOSIS — C73 THYROID CANCER (HCC): ICD-10-CM

## 2024-06-12 PROBLEM — K56.609 SBO (SMALL BOWEL OBSTRUCTION) (HCC): Status: RESOLVED | Noted: 2023-04-09 | Resolved: 2024-06-12

## 2024-06-12 PROCEDURE — 1036F TOBACCO NON-USER: CPT | Performed by: INTERNAL MEDICINE

## 2024-06-12 PROCEDURE — G8399 PT W/DXA RESULTS DOCUMENT: HCPCS | Performed by: INTERNAL MEDICINE

## 2024-06-12 PROCEDURE — 99214 OFFICE O/P EST MOD 30 MIN: CPT | Performed by: INTERNAL MEDICINE

## 2024-06-12 PROCEDURE — 1123F ACP DISCUSS/DSCN MKR DOCD: CPT | Performed by: INTERNAL MEDICINE

## 2024-06-12 PROCEDURE — G8427 DOCREV CUR MEDS BY ELIG CLIN: HCPCS | Performed by: INTERNAL MEDICINE

## 2024-06-12 PROCEDURE — 3077F SYST BP >= 140 MM HG: CPT | Performed by: INTERNAL MEDICINE

## 2024-06-12 PROCEDURE — 3023F SPIROM DOC REV: CPT | Performed by: INTERNAL MEDICINE

## 2024-06-12 PROCEDURE — 3044F HG A1C LEVEL LT 7.0%: CPT | Performed by: INTERNAL MEDICINE

## 2024-06-12 PROCEDURE — 3078F DIAST BP <80 MM HG: CPT | Performed by: INTERNAL MEDICINE

## 2024-06-12 PROCEDURE — 1090F PRES/ABSN URINE INCON ASSESS: CPT | Performed by: INTERNAL MEDICINE

## 2024-06-12 PROCEDURE — G8417 CALC BMI ABV UP PARAM F/U: HCPCS | Performed by: INTERNAL MEDICINE

## 2024-06-12 PROCEDURE — 3017F COLORECTAL CA SCREEN DOC REV: CPT | Performed by: INTERNAL MEDICINE

## 2024-06-12 PROCEDURE — 2022F DILAT RTA XM EVC RTNOPTHY: CPT | Performed by: INTERNAL MEDICINE

## 2024-06-12 RX ORDER — LANOLIN ALCOHOL/MO/W.PET/CERES
1000 CREAM (GRAM) TOPICAL DAILY
COMMUNITY

## 2024-06-12 ASSESSMENT — PATIENT HEALTH QUESTIONNAIRE - PHQ9
1. LITTLE INTEREST OR PLEASURE IN DOING THINGS: NOT AT ALL
6. FEELING BAD ABOUT YOURSELF - OR THAT YOU ARE A FAILURE OR HAVE LET YOURSELF OR YOUR FAMILY DOWN: NOT AT ALL
2. FEELING DOWN, DEPRESSED OR HOPELESS: NOT AT ALL
SUM OF ALL RESPONSES TO PHQ QUESTIONS 1-9: 0
4. FEELING TIRED OR HAVING LITTLE ENERGY: NOT AT ALL
7. TROUBLE CONCENTRATING ON THINGS, SUCH AS READING THE NEWSPAPER OR WATCHING TELEVISION: NOT AT ALL
10. IF YOU CHECKED OFF ANY PROBLEMS, HOW DIFFICULT HAVE THESE PROBLEMS MADE IT FOR YOU TO DO YOUR WORK, TAKE CARE OF THINGS AT HOME, OR GET ALONG WITH OTHER PEOPLE: NOT DIFFICULT AT ALL
SUM OF ALL RESPONSES TO PHQ QUESTIONS 1-9: 0
3. TROUBLE FALLING OR STAYING ASLEEP: NOT AT ALL
9. THOUGHTS THAT YOU WOULD BE BETTER OFF DEAD, OR OF HURTING YOURSELF: NOT AT ALL
5. POOR APPETITE OR OVEREATING: NOT AT ALL
SUM OF ALL RESPONSES TO PHQ9 QUESTIONS 1 & 2: 0
SUM OF ALL RESPONSES TO PHQ QUESTIONS 1-9: 0
8. MOVING OR SPEAKING SO SLOWLY THAT OTHER PEOPLE COULD HAVE NOTICED. OR THE OPPOSITE, BEING SO FIGETY OR RESTLESS THAT YOU HAVE BEEN MOVING AROUND A LOT MORE THAN USUAL: NOT AT ALL
SUM OF ALL RESPONSES TO PHQ QUESTIONS 1-9: 0

## 2024-06-12 NOTE — PROGRESS NOTES
\"Have you been to the ER, urgent care clinic since your last visit?  Hospitalized since your last visit?\"    NO    “Have you seen or consulted any other health care providers outside of [facility] since your last visit?”    NO            Click Here for Release of Records Request    
    Past Surgical History:   Procedure Laterality Date    CARDIAC CATHETERIZATION  03/2017    stent x 1    CHOLECYSTECTOMY, LAPAROSCOPIC  1995    COLONOSCOPY N/A 8/7/2018    COLONOSCOPY performed by Jt Kidd Jr., MD at [facility] ENDOSCOPY    COLONOSCOPY N/A 09/13/2023    COLONOSCOPY WITH BIOPSY WITH POLYPECTOMY, EGD WITH BIOPY performed by Jt Kidd Jr., MD at [facility] ENDOSCOPY    EGD BALLOON DILATION ESOPHAGUS <30 MM DIAM  4/20/2010         EXTRAC ERUPTED TOOTH/EXPOSED ROOT      HERNIA REPAIR  04/26/2012    abdominal and umbilical    ORTHOPEDIC SURGERY Bilateral 02/20/2015    shoulder manipulation    PARTIAL GASTRECTOMY  2010    PELVIC LAPAROSCOPY  1989    THYROIDECTOMY N/A 11/8/2023    TOTAL THYROIDECTOMY WITH POSSIBLE LIMITED NECK DISSECTION performed by Claudio Schroeder MD at [facility] MAIN OR    TONSILLECTOMY      TUBAL LIGATION  1983    lap btl    UPPER GASTROINTESTINAL ENDOSCOPY  4/2016    UPPER GASTROINTESTINAL ENDOSCOPY N/A 09/13/2023    EGD BIOPSY performed by Jt Kidd Jr., MD at [facility] ENDOSCOPY    VASCULAR SURGERY      port a cath and removal         Lab Results   Component Value Date    WBC 5.9 06/05/2024    HGB 11.5 06/05/2024    HCT 36.7 06/05/2024    MCV 98.1 06/05/2024     06/05/2024     Lab Results   Component Value Date    CHOL 122 03/06/2024    TRIG 70 03/06/2024    HDL 52 03/06/2024     Lab Results   Component Value Date    CREATININE 0.86 06/05/2024    BUN 10 06/05/2024     06/05/2024    K 4.4 06/05/2024     06/05/2024    CO2 31 06/05/2024       Review of Systems   Respiratory:  Negative for shortness of breath.    Cardiovascular:  Negative for chest pain.         Physical Exam  Constitutional:       General: She is not in acute distress.     Appearance: She is not ill-appearing, toxic-appearing or diaphoretic.   HENT:      Head: Normocephalic and atraumatic.   Eyes:      General:         Right eye: No discharge.         Left eye: No

## 2024-06-28 ENCOUNTER — NURSE ONLY (OUTPATIENT)
Age: 69
End: 2024-06-28

## 2024-06-28 VITALS
WEIGHT: 161.2 LBS | BODY MASS INDEX: 29.48 KG/M2 | DIASTOLIC BLOOD PRESSURE: 71 MMHG | HEART RATE: 52 BPM | SYSTOLIC BLOOD PRESSURE: 133 MMHG

## 2024-06-28 NOTE — PROGRESS NOTES
Chief Complaint   Patient presents with    Blood Pressure Check     Vitals:    06/28/24 1003   BP: 133/71   Pulse: 52

## 2024-08-02 DIAGNOSIS — C73 THYROID CANCER (HCC): ICD-10-CM

## 2024-08-03 LAB
T4 FREE SERPL-MCNC: 1.7 NG/DL (ref 0.8–1.5)
TSH SERPL DL<=0.05 MIU/L-ACNC: 0.04 UIU/ML (ref 0.36–3.74)

## 2024-08-14 LAB
THYROGLOBULIN (ICMA): ABNORMAL NG/ML
THYROGLOBULIN (TG-RIA): <2 NG/ML
THYROGLOBULIN AB: 3.7 IU/ML

## 2024-08-19 RX ORDER — AMLODIPINE BESYLATE 5 MG/1
TABLET ORAL
Qty: 90 TABLET | Refills: 0 | Status: SHIPPED | OUTPATIENT
Start: 2024-08-19

## 2024-08-23 ENCOUNTER — HOSPITAL ENCOUNTER (OUTPATIENT)
Facility: HOSPITAL | Age: 69
End: 2024-08-23
Payer: MEDICARE

## 2024-08-23 VITALS — WEIGHT: 161 LBS | HEIGHT: 62 IN | BODY MASS INDEX: 29.63 KG/M2

## 2024-08-23 DIAGNOSIS — Z12.31 SCREENING MAMMOGRAM FOR BREAST CANCER: ICD-10-CM

## 2024-08-23 PROCEDURE — 77063 BREAST TOMOSYNTHESIS BI: CPT

## 2024-09-05 ENCOUNTER — LAB (OUTPATIENT)
Age: 69
End: 2024-09-05

## 2024-09-05 DIAGNOSIS — I10 PRIMARY HYPERTENSION: ICD-10-CM

## 2024-09-05 DIAGNOSIS — E11.9 DIABETES MELLITUS WITHOUT COMPLICATION (HCC): ICD-10-CM

## 2024-09-06 LAB
ANION GAP SERPL CALC-SCNC: 5 MMOL/L (ref 2–12)
BUN SERPL-MCNC: 11 MG/DL (ref 6–20)
BUN/CREAT SERPL: 15 (ref 12–20)
CALCIUM SERPL-MCNC: 8.8 MG/DL (ref 8.5–10.1)
CHLORIDE SERPL-SCNC: 103 MMOL/L (ref 97–108)
CO2 SERPL-SCNC: 30 MMOL/L (ref 21–32)
CREAT SERPL-MCNC: 0.75 MG/DL (ref 0.55–1.02)
EST. AVERAGE GLUCOSE BLD GHB EST-MCNC: 137 MG/DL
GLUCOSE SERPL-MCNC: 130 MG/DL (ref 65–100)
HBA1C MFR BLD: 6.4 % (ref 4–5.6)
POTASSIUM SERPL-SCNC: 4.1 MMOL/L (ref 3.5–5.1)
SODIUM SERPL-SCNC: 138 MMOL/L (ref 136–145)

## 2024-09-11 ENCOUNTER — OFFICE VISIT (OUTPATIENT)
Age: 69
End: 2024-09-11
Payer: MEDICARE

## 2024-09-11 VITALS
OXYGEN SATURATION: 97 % | TEMPERATURE: 97.5 F | HEART RATE: 80 BPM | HEIGHT: 62 IN | WEIGHT: 163 LBS | RESPIRATION RATE: 18 BRPM | SYSTOLIC BLOOD PRESSURE: 136 MMHG | BODY MASS INDEX: 30 KG/M2 | DIASTOLIC BLOOD PRESSURE: 67 MMHG

## 2024-09-11 DIAGNOSIS — Z23 NEED FOR PROPHYLACTIC VACCINATION AGAINST STREPTOCOCCUS PNEUMONIAE (PNEUMOCOCCUS): ICD-10-CM

## 2024-09-11 DIAGNOSIS — J45.50 SEVERE PERSISTENT ASTHMA, UNCOMPLICATED: ICD-10-CM

## 2024-09-11 DIAGNOSIS — C73 THYROID CANCER (HCC): ICD-10-CM

## 2024-09-11 DIAGNOSIS — E03.9 ACQUIRED HYPOTHYROIDISM: ICD-10-CM

## 2024-09-11 DIAGNOSIS — I25.10 ASHD (ARTERIOSCLEROTIC HEART DISEASE): ICD-10-CM

## 2024-09-11 DIAGNOSIS — E78.00 HYPERCHOLESTEREMIA: ICD-10-CM

## 2024-09-11 DIAGNOSIS — I10 PRIMARY HYPERTENSION: Primary | ICD-10-CM

## 2024-09-11 DIAGNOSIS — J44.9 CHRONIC OBSTRUCTIVE PULMONARY DISEASE, UNSPECIFIED COPD TYPE (HCC): ICD-10-CM

## 2024-09-11 DIAGNOSIS — E11.9 DIABETES MELLITUS WITHOUT COMPLICATION (HCC): ICD-10-CM

## 2024-09-11 PROCEDURE — G8427 DOCREV CUR MEDS BY ELIG CLIN: HCPCS | Performed by: INTERNAL MEDICINE

## 2024-09-11 PROCEDURE — 2022F DILAT RTA XM EVC RTNOPTHY: CPT | Performed by: INTERNAL MEDICINE

## 2024-09-11 PROCEDURE — 3078F DIAST BP <80 MM HG: CPT | Performed by: INTERNAL MEDICINE

## 2024-09-11 PROCEDURE — 90677 PCV20 VACCINE IM: CPT | Performed by: INTERNAL MEDICINE

## 2024-09-11 PROCEDURE — 1123F ACP DISCUSS/DSCN MKR DOCD: CPT | Performed by: INTERNAL MEDICINE

## 2024-09-11 PROCEDURE — G8417 CALC BMI ABV UP PARAM F/U: HCPCS | Performed by: INTERNAL MEDICINE

## 2024-09-11 PROCEDURE — 1036F TOBACCO NON-USER: CPT | Performed by: INTERNAL MEDICINE

## 2024-09-11 PROCEDURE — 3044F HG A1C LEVEL LT 7.0%: CPT | Performed by: INTERNAL MEDICINE

## 2024-09-11 PROCEDURE — 3075F SYST BP GE 130 - 139MM HG: CPT | Performed by: INTERNAL MEDICINE

## 2024-09-11 PROCEDURE — 1090F PRES/ABSN URINE INCON ASSESS: CPT | Performed by: INTERNAL MEDICINE

## 2024-09-11 PROCEDURE — 99214 OFFICE O/P EST MOD 30 MIN: CPT | Performed by: INTERNAL MEDICINE

## 2024-09-11 PROCEDURE — 3023F SPIROM DOC REV: CPT | Performed by: INTERNAL MEDICINE

## 2024-09-11 PROCEDURE — G8399 PT W/DXA RESULTS DOCUMENT: HCPCS | Performed by: INTERNAL MEDICINE

## 2024-09-11 PROCEDURE — 3017F COLORECTAL CA SCREEN DOC REV: CPT | Performed by: INTERNAL MEDICINE

## 2024-09-11 PROCEDURE — PBSHW PNEUMOCOCCAL, PCV20, PREVNAR 20, (AGE 6W+), IM, PF: Performed by: INTERNAL MEDICINE

## 2024-09-11 RX ORDER — VIBEGRON 75 MG/1
1 TABLET, FILM COATED ORAL
COMMUNITY

## 2024-09-11 ASSESSMENT — PATIENT HEALTH QUESTIONNAIRE - PHQ9
SUM OF ALL RESPONSES TO PHQ QUESTIONS 1-9: 0
1. LITTLE INTEREST OR PLEASURE IN DOING THINGS: NOT AT ALL
SUM OF ALL RESPONSES TO PHQ QUESTIONS 1-9: 0
2. FEELING DOWN, DEPRESSED OR HOPELESS: NOT AT ALL
SUM OF ALL RESPONSES TO PHQ9 QUESTIONS 1 & 2: 0
SUM OF ALL RESPONSES TO PHQ QUESTIONS 1-9: 0
SUM OF ALL RESPONSES TO PHQ QUESTIONS 1-9: 0

## 2024-09-17 RX ORDER — LOSARTAN POTASSIUM 50 MG/1
TABLET ORAL
Qty: 90 TABLET | Refills: 1 | Status: SHIPPED | OUTPATIENT
Start: 2024-09-17

## 2024-10-16 ENCOUNTER — PATIENT MESSAGE (OUTPATIENT)
Age: 69
End: 2024-10-16

## 2024-10-16 DIAGNOSIS — E11.9 DIABETES MELLITUS WITHOUT COMPLICATION (HCC): ICD-10-CM

## 2024-10-16 RX ORDER — FUROSEMIDE 20 MG
TABLET ORAL
Qty: 90 TABLET | Refills: 0 | Status: SHIPPED | OUTPATIENT
Start: 2024-10-16

## 2024-10-16 RX ORDER — BLOOD-GLUCOSE METER
EACH MISCELLANEOUS
Qty: 1 KIT | Refills: 0 | Status: SHIPPED | OUTPATIENT
Start: 2024-10-16

## 2024-10-16 NOTE — TELEPHONE ENCOUNTER
PCP: Roxana Mike MD    Last appt: 9/11/2024  Future Appointments   Date Time Provider Department Center   12/2/2024  2:50 PM Ten Ramos MD [facility] BS AMB   12/11/2024  8:30 AM Roxana Mike MD [address] ECC DEP   3/12/2025  8:30 AM Roxana Mike MD [address] DEP       Requested Prescriptions     Pending Prescriptions Disp Refills    Blood Glucose Monitoring Suppl (ACCU-CHEK CASPER PLUS) w/Device KIT 1 kit 0     Sig: Using to test blood sugars daily, E11.9

## 2024-10-25 DIAGNOSIS — E11.9 TYPE 2 DIABETES MELLITUS WITHOUT COMPLICATION, UNSPECIFIED WHETHER LONG TERM INSULIN USE (HCC): Primary | ICD-10-CM

## 2024-10-25 NOTE — TELEPHONE ENCOUNTER
PCP: Roxana Mike MD    Last appt: 9/11/2024  Future Appointments   Date Time Provider Department Center   12/2/2024  2:50 PM Ten Ramos MD [facility] BS AMB   12/11/2024  8:30 AM Roxana Mike MD [facility] ECC DEP   3/12/2025  8:30 AM Roxana Mike MD [address] DEP       Requested Prescriptions     Pending Prescriptions Disp Refills    blood glucose monitor kit and supplies 1 kit 0     Sig: Using to test blood sugars daily E11.9    blood glucose monitor strips 100 strip 1     Sig: Using to test blood sugars daily E11.9    Lancets 33G MISC 1 each 0     Sig: Using to test blood sugars daily E11.9

## 2024-10-25 NOTE — TELEPHONE ENCOUNTER
Pharmacy tech Allison with [facility] called stating that glucose monitor and strips that were sent in are no longer being made.   Nahomi meter was discontinued and accu-chek guide strips will be discontinued soon, states best to send in generic script to fill with insurance preferred.

## 2024-10-26 RX ORDER — GLUCOSAMINE HCL/CHONDROITIN SU 500-400 MG
CAPSULE ORAL
Qty: 100 STRIP | Refills: 1 | Status: SHIPPED | OUTPATIENT
Start: 2024-10-26

## 2024-10-26 RX ORDER — LANCETS 33 GAUGE
EACH MISCELLANEOUS
Qty: 1 EACH | Refills: 0 | Status: SHIPPED | OUTPATIENT
Start: 2024-10-26

## 2024-10-30 NOTE — PROGRESS NOTES
Chief Complaint   Patient presents with    URI     x 2 week     1. Have you been to the ER, urgent care clinic since your last visit? Hospitalized since your last visit? No    2. Have you seen or consulted any other health care providers outside of the [address] [place] [address] since your last visit? Include any pap smears or colon screening.  No Pt notified.

## 2024-11-05 RX ORDER — AMLODIPINE BESYLATE 5 MG/1
TABLET ORAL
Qty: 90 TABLET | Refills: 0 | Status: SHIPPED | OUTPATIENT
Start: 2024-11-05

## 2024-11-06 DIAGNOSIS — E11.9 TYPE 2 DIABETES MELLITUS WITHOUT COMPLICATION, UNSPECIFIED WHETHER LONG TERM INSULIN USE (HCC): ICD-10-CM

## 2024-11-06 RX ORDER — LANCETS
EACH MISCELLANEOUS
Qty: 100 EACH | Refills: 2 | Status: SHIPPED | OUTPATIENT
Start: 2024-11-06

## 2024-11-06 RX ORDER — BLOOD SUGAR DIAGNOSTIC
STRIP MISCELLANEOUS
Qty: 100 STRIP | Refills: 5 | Status: SHIPPED | OUTPATIENT
Start: 2024-11-06

## 2024-11-14 DIAGNOSIS — E83.51 HYPOCALCEMIA: ICD-10-CM

## 2024-11-14 DIAGNOSIS — C73 THYROID CANCER (HCC): ICD-10-CM

## 2024-11-14 LAB
T4 FREE SERPL-MCNC: 1.2 NG/DL (ref 0.8–1.5)
TSH SERPL DL<=0.05 MIU/L-ACNC: 0.55 UIU/ML (ref 0.36–3.74)

## 2024-11-27 LAB
THYROGLOBULIN (ICMA): ABNORMAL NG/ML
THYROGLOBULIN (TG-RIA): <2 NG/ML
THYROGLOBULIN AB: 3.8 IU/ML

## 2024-12-02 ENCOUNTER — OFFICE VISIT (OUTPATIENT)
Age: 69
End: 2024-12-02
Payer: MEDICARE

## 2024-12-02 VITALS
HEART RATE: 58 BPM | WEIGHT: 160.8 LBS | SYSTOLIC BLOOD PRESSURE: 138 MMHG | DIASTOLIC BLOOD PRESSURE: 62 MMHG | HEIGHT: 62 IN | BODY MASS INDEX: 29.59 KG/M2

## 2024-12-02 DIAGNOSIS — C73 THYROID CANCER (HCC): Primary | ICD-10-CM

## 2024-12-02 PROCEDURE — 1159F MED LIST DOCD IN RCRD: CPT | Performed by: INTERNAL MEDICINE

## 2024-12-02 PROCEDURE — G8484 FLU IMMUNIZE NO ADMIN: HCPCS | Performed by: INTERNAL MEDICINE

## 2024-12-02 PROCEDURE — 3078F DIAST BP <80 MM HG: CPT | Performed by: INTERNAL MEDICINE

## 2024-12-02 PROCEDURE — 1160F RVW MEDS BY RX/DR IN RCRD: CPT | Performed by: INTERNAL MEDICINE

## 2024-12-02 PROCEDURE — G8427 DOCREV CUR MEDS BY ELIG CLIN: HCPCS | Performed by: INTERNAL MEDICINE

## 2024-12-02 PROCEDURE — G2211 COMPLEX E/M VISIT ADD ON: HCPCS | Performed by: INTERNAL MEDICINE

## 2024-12-02 PROCEDURE — 1126F AMNT PAIN NOTED NONE PRSNT: CPT | Performed by: INTERNAL MEDICINE

## 2024-12-02 PROCEDURE — 1090F PRES/ABSN URINE INCON ASSESS: CPT | Performed by: INTERNAL MEDICINE

## 2024-12-02 PROCEDURE — 3017F COLORECTAL CA SCREEN DOC REV: CPT | Performed by: INTERNAL MEDICINE

## 2024-12-02 PROCEDURE — 1123F ACP DISCUSS/DSCN MKR DOCD: CPT | Performed by: INTERNAL MEDICINE

## 2024-12-02 PROCEDURE — 3075F SYST BP GE 130 - 139MM HG: CPT | Performed by: INTERNAL MEDICINE

## 2024-12-02 PROCEDURE — G8417 CALC BMI ABV UP PARAM F/U: HCPCS | Performed by: INTERNAL MEDICINE

## 2024-12-02 PROCEDURE — 99214 OFFICE O/P EST MOD 30 MIN: CPT | Performed by: INTERNAL MEDICINE

## 2024-12-02 PROCEDURE — G8399 PT W/DXA RESULTS DOCUMENT: HCPCS | Performed by: INTERNAL MEDICINE

## 2024-12-02 PROCEDURE — 1036F TOBACCO NON-USER: CPT | Performed by: INTERNAL MEDICINE

## 2024-12-02 RX ORDER — LEVOTHYROXINE SODIUM 137 UG/1
TABLET ORAL
Qty: 90 TABLET | Refills: 3 | Status: SHIPPED | OUTPATIENT
Start: 2024-12-02

## 2024-12-02 RX ORDER — TROSPIUM CHLORIDE 20 MG/1
20 TABLET, FILM COATED ORAL 2 TIMES DAILY
COMMUNITY
Start: 2024-10-17

## 2024-12-02 NOTE — PROGRESS NOTES
see me to discuss whether GIRON is indicated.  Was started on brand synthroid 112 mcg daily after surgery and is taking this at 6am with just water and waits 60 min before food and coffee and all other pills.  We discussed that given her tumor size is > 4 cm, this can be an indication for GIRON but since she has had issues with dry mouth and will need upcoming dental surgery, I will hold on GIRON as long as her thyroglobulin is < 5.  TSH 1.07 and TG <2.0 and TG ab 4.6 in 12/23 so increased synthroid to 125 mcg daily and TSH 0.34, TG <2.0 and TG ab 4.9 in 2/24 so kept dose the same.   TSH 0.40, TG 4.7 and TG ab 5.7 in 5/24 so changed to brand levoxyl for cost and increased to 137 mcg daily to get her TSH in the 0.1-0.3 range to decrease chance of cancer recurrence and TSH 0.04, TG <2.0 and TG ab 3.7 in 8/24 so decreased to 6.5 tabs/week and TSH 0.55, TG < 2.0 and TG ab 3.8 in 11/24 so kept dose the same.  Neck ultrasound negative for recurrence in 6/24.   - cont levoxyl 137 mcg 1 tab 6 days a week and 1/2 tab on Wed  - check TSH, free T4 and comprehensive TG panel prior to next visit ([facility] lab)         Patient Instructions   1) TSH is a thyroid test.  Your level is 0.55 which is normal and at goal of 0.1-0.5.  The TSH goes opposite of your thyroid dose and suggests your dose of levoxyl is perfect so I will keep your dose the same at 6.5 tabs/week.     2) Your thyroglobulin (thyroid cancer blood test) is undetectable so you have no evidence of thyroid cancer 1 year out from your surgery.  I'm not concerned about the slightly high thyroglobulin ab level as this is trending down over time.        Return in about 6 months (around 6/2/2025).     Copy sent to:  Roxana Mike MD Dr. Mike MacDougall

## 2024-12-02 NOTE — PATIENT INSTRUCTIONS
1) TSH is a thyroid test.  Your level is 0.55 which is normal and at goal of 0.1-0.5.  The TSH goes opposite of your thyroid dose and suggests your dose of levoxyl is perfect so I will keep your dose the same at 6.5 tabs/week.     2) Your thyroglobulin (thyroid cancer blood test) is undetectable so you have no evidence of thyroid cancer 1 year out from your surgery.  I'm not concerned about the slightly high thyroglobulin ab level as this is trending down over time.

## 2024-12-04 ENCOUNTER — LAB (OUTPATIENT)
Age: 69
End: 2024-12-04

## 2024-12-04 DIAGNOSIS — I10 PRIMARY HYPERTENSION: ICD-10-CM

## 2024-12-04 DIAGNOSIS — E11.9 DIABETES MELLITUS WITHOUT COMPLICATION (HCC): ICD-10-CM

## 2024-12-04 RX ORDER — MONTELUKAST SODIUM 10 MG/1
TABLET ORAL
Qty: 90 TABLET | Refills: 3 | Status: SHIPPED | OUTPATIENT
Start: 2024-12-04

## 2024-12-05 LAB
ALBUMIN SERPL-MCNC: 4 G/DL (ref 3.5–5)
ALBUMIN/GLOB SERPL: 1.5 (ref 1.1–2.2)
ALP SERPL-CCNC: 54 U/L (ref 45–117)
ALT SERPL-CCNC: 34 U/L (ref 12–78)
ANION GAP SERPL CALC-SCNC: 5 MMOL/L (ref 2–12)
AST SERPL-CCNC: 25 U/L (ref 15–37)
BILIRUB SERPL-MCNC: 0.4 MG/DL (ref 0.2–1)
BUN SERPL-MCNC: 12 MG/DL (ref 6–20)
BUN/CREAT SERPL: 17 (ref 12–20)
CALCIUM SERPL-MCNC: 9.6 MG/DL (ref 8.5–10.1)
CHLORIDE SERPL-SCNC: 99 MMOL/L (ref 97–108)
CO2 SERPL-SCNC: 30 MMOL/L (ref 21–32)
CREAT SERPL-MCNC: 0.71 MG/DL (ref 0.55–1.02)
EST. AVERAGE GLUCOSE BLD GHB EST-MCNC: 148 MG/DL
GLOBULIN SER CALC-MCNC: 2.7 G/DL (ref 2–4)
GLUCOSE SERPL-MCNC: 101 MG/DL (ref 65–100)
HBA1C MFR BLD: 6.8 % (ref 4–5.6)
POTASSIUM SERPL-SCNC: 4.2 MMOL/L (ref 3.5–5.1)
PROT SERPL-MCNC: 6.7 G/DL (ref 6.4–8.2)
SODIUM SERPL-SCNC: 134 MMOL/L (ref 136–145)

## 2024-12-11 ENCOUNTER — OFFICE VISIT (OUTPATIENT)
Age: 69
End: 2024-12-11
Payer: MEDICARE

## 2024-12-11 VITALS
HEART RATE: 80 BPM | RESPIRATION RATE: 18 BRPM | BODY MASS INDEX: 29.52 KG/M2 | TEMPERATURE: 97.7 F | SYSTOLIC BLOOD PRESSURE: 128 MMHG | OXYGEN SATURATION: 99 % | WEIGHT: 160.4 LBS | DIASTOLIC BLOOD PRESSURE: 70 MMHG | HEIGHT: 62 IN

## 2024-12-11 DIAGNOSIS — E11.9 DIABETES MELLITUS WITHOUT COMPLICATION (HCC): ICD-10-CM

## 2024-12-11 DIAGNOSIS — Z85.850 H/O MALIGNANT NEOPLASM OF THYROID: ICD-10-CM

## 2024-12-11 DIAGNOSIS — Z85.01 H/O MALIGNANT NEOPLASM OF ESOPHAGUS: ICD-10-CM

## 2024-12-11 DIAGNOSIS — J44.9 CHRONIC OBSTRUCTIVE PULMONARY DISEASE, UNSPECIFIED COPD TYPE (HCC): ICD-10-CM

## 2024-12-11 DIAGNOSIS — E03.9 ACQUIRED HYPOTHYROIDISM: ICD-10-CM

## 2024-12-11 DIAGNOSIS — F33.9 RECURRENT DEPRESSION (HCC): ICD-10-CM

## 2024-12-11 DIAGNOSIS — E53.8 B12 DEFICIENCY: ICD-10-CM

## 2024-12-11 DIAGNOSIS — J45.50 SEVERE PERSISTENT ASTHMA, UNCOMPLICATED: ICD-10-CM

## 2024-12-11 DIAGNOSIS — I10 PRIMARY HYPERTENSION: Primary | ICD-10-CM

## 2024-12-11 DIAGNOSIS — E78.00 HYPERCHOLESTEREMIA: ICD-10-CM

## 2024-12-11 PROBLEM — C73 THYROID CANCER (HCC): Status: RESOLVED | Noted: 2023-12-12 | Resolved: 2024-12-11

## 2024-12-11 PROCEDURE — 1159F MED LIST DOCD IN RCRD: CPT | Performed by: INTERNAL MEDICINE

## 2024-12-11 PROCEDURE — 1036F TOBACCO NON-USER: CPT | Performed by: INTERNAL MEDICINE

## 2024-12-11 PROCEDURE — 99214 OFFICE O/P EST MOD 30 MIN: CPT | Performed by: INTERNAL MEDICINE

## 2024-12-11 PROCEDURE — G8482 FLU IMMUNIZE ORDER/ADMIN: HCPCS | Performed by: INTERNAL MEDICINE

## 2024-12-11 PROCEDURE — 1090F PRES/ABSN URINE INCON ASSESS: CPT | Performed by: INTERNAL MEDICINE

## 2024-12-11 PROCEDURE — 3074F SYST BP LT 130 MM HG: CPT | Performed by: INTERNAL MEDICINE

## 2024-12-11 PROCEDURE — 3078F DIAST BP <80 MM HG: CPT | Performed by: INTERNAL MEDICINE

## 2024-12-11 PROCEDURE — 1160F RVW MEDS BY RX/DR IN RCRD: CPT | Performed by: INTERNAL MEDICINE

## 2024-12-11 PROCEDURE — 3017F COLORECTAL CA SCREEN DOC REV: CPT | Performed by: INTERNAL MEDICINE

## 2024-12-11 PROCEDURE — 1123F ACP DISCUSS/DSCN MKR DOCD: CPT | Performed by: INTERNAL MEDICINE

## 2024-12-11 PROCEDURE — G8417 CALC BMI ABV UP PARAM F/U: HCPCS | Performed by: INTERNAL MEDICINE

## 2024-12-11 PROCEDURE — G8399 PT W/DXA RESULTS DOCUMENT: HCPCS | Performed by: INTERNAL MEDICINE

## 2024-12-11 PROCEDURE — 2022F DILAT RTA XM EVC RTNOPTHY: CPT | Performed by: INTERNAL MEDICINE

## 2024-12-11 PROCEDURE — 3044F HG A1C LEVEL LT 7.0%: CPT | Performed by: INTERNAL MEDICINE

## 2024-12-11 PROCEDURE — 3023F SPIROM DOC REV: CPT | Performed by: INTERNAL MEDICINE

## 2024-12-11 PROCEDURE — G8427 DOCREV CUR MEDS BY ELIG CLIN: HCPCS | Performed by: INTERNAL MEDICINE

## 2024-12-11 SDOH — ECONOMIC STABILITY: FOOD INSECURITY: WITHIN THE PAST 12 MONTHS, THE FOOD YOU BOUGHT JUST DIDN'T LAST AND YOU DIDN'T HAVE MONEY TO GET MORE.: NEVER TRUE

## 2024-12-11 SDOH — ECONOMIC STABILITY: INCOME INSECURITY: HOW HARD IS IT FOR YOU TO PAY FOR THE VERY BASICS LIKE FOOD, HOUSING, MEDICAL CARE, AND HEATING?: NOT HARD AT ALL

## 2024-12-11 SDOH — ECONOMIC STABILITY: FOOD INSECURITY: WITHIN THE PAST 12 MONTHS, YOU WORRIED THAT YOUR FOOD WOULD RUN OUT BEFORE YOU GOT MONEY TO BUY MORE.: NEVER TRUE

## 2024-12-11 ASSESSMENT — PATIENT HEALTH QUESTIONNAIRE - PHQ9
6. FEELING BAD ABOUT YOURSELF - OR THAT YOU ARE A FAILURE OR HAVE LET YOURSELF OR YOUR FAMILY DOWN: NOT AT ALL
1. LITTLE INTEREST OR PLEASURE IN DOING THINGS: NOT AT ALL
2. FEELING DOWN, DEPRESSED OR HOPELESS: NOT AT ALL
3. TROUBLE FALLING OR STAYING ASLEEP: NOT AT ALL
10. IF YOU CHECKED OFF ANY PROBLEMS, HOW DIFFICULT HAVE THESE PROBLEMS MADE IT FOR YOU TO DO YOUR WORK, TAKE CARE OF THINGS AT HOME, OR GET ALONG WITH OTHER PEOPLE: SOMEWHAT DIFFICULT
SUM OF ALL RESPONSES TO PHQ9 QUESTIONS 1 & 2: 0
SUM OF ALL RESPONSES TO PHQ QUESTIONS 1-9: 3
SUM OF ALL RESPONSES TO PHQ QUESTIONS 1-9: 3
7. TROUBLE CONCENTRATING ON THINGS, SUCH AS READING THE NEWSPAPER OR WATCHING TELEVISION: SEVERAL DAYS
SUM OF ALL RESPONSES TO PHQ QUESTIONS 1-9: 3
5. POOR APPETITE OR OVEREATING: SEVERAL DAYS
4. FEELING TIRED OR HAVING LITTLE ENERGY: SEVERAL DAYS
8. MOVING OR SPEAKING SO SLOWLY THAT OTHER PEOPLE COULD HAVE NOTICED. OR THE OPPOSITE, BEING SO FIGETY OR RESTLESS THAT YOU HAVE BEEN MOVING AROUND A LOT MORE THAN USUAL: NOT AT ALL
9. THOUGHTS THAT YOU WOULD BE BETTER OFF DEAD, OR OF HURTING YOURSELF: NOT AT ALL
SUM OF ALL RESPONSES TO PHQ QUESTIONS 1-9: 3

## 2024-12-11 NOTE — PROGRESS NOTES
\"Have you been to the ER, urgent care clinic since your last visit?  Hospitalized since your last visit?\"    NO    “Have you seen or consulted any other health care providers outside our system since your last visit?”    NO           
done in 8/23 and this showed \"an enlarged multinodular thyroid gland. The dominant nodule in the right measures 3.5 x 3.0 x 4.4 cm (series 2 image 67 and series 601 image 48), and appears to be mixed solid and cystic with small areas of calcification. This nodule appears to have increased in size allowing for differences in technique. The dominant nodule in the left appears to be predominantly solid and measures 2.4 x 2.2 x 2.3 cm, which appears to be stable allowing for differences in technique. Multiple other smaller nodules, including some calcified, throughout the thyroid gland are also noted. There is slight leftward deviation of the trachea without significant narrowing.\" After this returned, made an appointment with Dr. Schroeder in 9/23 and they agreed to biopsy this nodule in 10/23 and this came back as [place] III AUS and was sent to [facility] and nodule came back as suspicious for cancer so decision was made for total thyroidectomy and this was done on 11/8/23.  Her pathology just returned last week as Papillary thyroid carcinoma, follicular variant, multifocal, 4.5 cm in greatest dimension.  Angioinvasion and extrathyroidal extension are not identified.  Margin evaluation is pending return of slides from outside institution. Dr. Schroeder recommended she come back to see me to discuss whether GIRON is indicated.  Was started on brand synthroid 112 mcg daily after surgery and is taking this at 6am with just water and waits 60 min before food and coffee and all other pills.  We discussed that given her tumor size is > 4 cm, this can be an indication for GIRON but since she has had issues with dry mouth and will need upcoming dental surgery, I will hold on GIRON as long as her thyroglobulin is < 5.  TSH 1.07 and TG <2.0 and TG ab 4.6 in 12/23 so increased synthroid to 125 mcg daily and TSH 0.34, TG <2.0 and TG ab 4.9 in 2/24 so kept dose the same.   TSH 0.40, TG 4.7 and TG ab 5.7 in 5/24 so changed to brand

## 2024-12-30 RX ORDER — INSULIN GLARGINE 100 [IU]/ML
INJECTION, SOLUTION SUBCUTANEOUS
Qty: 45 ML | Refills: 0 | Status: SHIPPED | OUTPATIENT
Start: 2024-12-30

## 2025-01-07 RX ORDER — AMLODIPINE BESYLATE 5 MG/1
5 TABLET ORAL DAILY
Qty: 90 TABLET | Refills: 0 | Status: SHIPPED | OUTPATIENT
Start: 2025-01-07

## 2025-01-15 RX ORDER — ATORVASTATIN CALCIUM 40 MG/1
40 TABLET, FILM COATED ORAL DAILY
Qty: 90 TABLET | Refills: 3 | Status: SHIPPED | OUTPATIENT
Start: 2025-01-15

## 2025-03-04 NOTE — TELEPHONE ENCOUNTER
----- Message from Edy Alston NP sent at 4/20/2018  4:58 PM EDT -----  Regarding: Ranexa denial  Please call patient will change Ranexa to 2.5mg amlodipine daily. 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

## 2025-03-05 DIAGNOSIS — E11.9 DIABETES MELLITUS WITHOUT COMPLICATION (HCC): ICD-10-CM

## 2025-03-05 DIAGNOSIS — E03.9 ACQUIRED HYPOTHYROIDISM: ICD-10-CM

## 2025-03-05 DIAGNOSIS — I10 PRIMARY HYPERTENSION: ICD-10-CM

## 2025-03-05 DIAGNOSIS — E78.00 HYPERCHOLESTEREMIA: ICD-10-CM

## 2025-03-05 DIAGNOSIS — E53.8 B12 DEFICIENCY: ICD-10-CM

## 2025-03-07 LAB
ANION GAP SERPL CALC-SCNC: 7 MMOL/L (ref 2–12)
BUN SERPL-MCNC: 11 MG/DL (ref 6–20)
BUN/CREAT SERPL: 13 (ref 12–20)
CALCIUM SERPL-MCNC: 9.7 MG/DL (ref 8.5–10.1)
CHLORIDE SERPL-SCNC: 98 MMOL/L (ref 97–108)
CHOLEST SERPL-MCNC: 126 MG/DL
CO2 SERPL-SCNC: 28 MMOL/L (ref 21–32)
CREAT SERPL-MCNC: 0.82 MG/DL (ref 0.55–1.02)
CREAT UR-MCNC: 65.1 MG/DL
EST. AVERAGE GLUCOSE BLD GHB EST-MCNC: 134 MG/DL
GLUCOSE SERPL-MCNC: 106 MG/DL (ref 65–100)
HBA1C MFR BLD: 6.3 % (ref 4–5.6)
HDLC SERPL-MCNC: 55 MG/DL
HDLC SERPL: 2.3 (ref 0–5)
LDLC SERPL CALC-MCNC: 59.4 MG/DL (ref 0–100)
MICROALBUMIN UR-MCNC: 8.58 MG/DL
MICROALBUMIN/CREAT UR-RTO: 132 MG/G (ref 0–30)
POTASSIUM SERPL-SCNC: 4.1 MMOL/L (ref 3.5–5.1)
SODIUM SERPL-SCNC: 133 MMOL/L (ref 136–145)
TRIGL SERPL-MCNC: 58 MG/DL
VIT B12 SERPL-MCNC: 1974 PG/ML (ref 193–986)
VLDLC SERPL CALC-MCNC: 11.6 MG/DL

## 2025-03-09 SDOH — ECONOMIC STABILITY: FOOD INSECURITY: WITHIN THE PAST 12 MONTHS, YOU WORRIED THAT YOUR FOOD WOULD RUN OUT BEFORE YOU GOT MONEY TO BUY MORE.: NEVER TRUE

## 2025-03-09 SDOH — ECONOMIC STABILITY: FOOD INSECURITY: WITHIN THE PAST 12 MONTHS, THE FOOD YOU BOUGHT JUST DIDN'T LAST AND YOU DIDN'T HAVE MONEY TO GET MORE.: NEVER TRUE

## 2025-03-09 SDOH — ECONOMIC STABILITY: TRANSPORTATION INSECURITY
IN THE PAST 12 MONTHS, HAS THE LACK OF TRANSPORTATION KEPT YOU FROM MEDICAL APPOINTMENTS OR FROM GETTING MEDICATIONS?: NO

## 2025-03-09 SDOH — ECONOMIC STABILITY: INCOME INSECURITY: IN THE LAST 12 MONTHS, WAS THERE A TIME WHEN YOU WERE NOT ABLE TO PAY THE MORTGAGE OR RENT ON TIME?: NO

## 2025-03-09 SDOH — ECONOMIC STABILITY: TRANSPORTATION INSECURITY
IN THE PAST 12 MONTHS, HAS LACK OF TRANSPORTATION KEPT YOU FROM MEETINGS, WORK, OR FROM GETTING THINGS NEEDED FOR DAILY LIVING?: NO

## 2025-03-12 ENCOUNTER — OFFICE VISIT (OUTPATIENT)
Age: 70
End: 2025-03-12
Payer: MEDICARE

## 2025-03-12 VITALS
HEART RATE: 59 BPM | BODY MASS INDEX: 29.65 KG/M2 | OXYGEN SATURATION: 99 % | HEIGHT: 62 IN | WEIGHT: 161.13 LBS | TEMPERATURE: 98.1 F | DIASTOLIC BLOOD PRESSURE: 68 MMHG | SYSTOLIC BLOOD PRESSURE: 122 MMHG

## 2025-03-12 DIAGNOSIS — E83.51 HYPOCALCEMIA: ICD-10-CM

## 2025-03-12 DIAGNOSIS — I10 PRIMARY HYPERTENSION: Primary | ICD-10-CM

## 2025-03-12 DIAGNOSIS — I25.10 ASHD (ARTERIOSCLEROTIC HEART DISEASE): ICD-10-CM

## 2025-03-12 DIAGNOSIS — K21.9 GASTROESOPHAGEAL REFLUX DISEASE WITHOUT ESOPHAGITIS: ICD-10-CM

## 2025-03-12 DIAGNOSIS — Z85.850 H/O MALIGNANT NEOPLASM OF THYROID: ICD-10-CM

## 2025-03-12 DIAGNOSIS — E11.9 DIABETES MELLITUS WITHOUT COMPLICATION (HCC): ICD-10-CM

## 2025-03-12 DIAGNOSIS — F33.9 RECURRENT DEPRESSION: ICD-10-CM

## 2025-03-12 DIAGNOSIS — E78.00 HYPERCHOLESTEREMIA: ICD-10-CM

## 2025-03-12 DIAGNOSIS — E87.1 HYPONATREMIA: ICD-10-CM

## 2025-03-12 DIAGNOSIS — E03.9 ACQUIRED HYPOTHYROIDISM: ICD-10-CM

## 2025-03-12 DIAGNOSIS — Z87.19 H/O SMALL BOWEL OBSTRUCTION: ICD-10-CM

## 2025-03-12 DIAGNOSIS — Z00.00 MEDICARE ANNUAL WELLNESS VISIT, SUBSEQUENT: ICD-10-CM

## 2025-03-12 DIAGNOSIS — J45.50 SEVERE PERSISTENT ASTHMA, UNCOMPLICATED (HCC): ICD-10-CM

## 2025-03-12 DIAGNOSIS — N39.3 STRESS INCONTINENCE OF URINE: ICD-10-CM

## 2025-03-12 DIAGNOSIS — J44.9 CHRONIC OBSTRUCTIVE PULMONARY DISEASE, UNSPECIFIED COPD TYPE (HCC): ICD-10-CM

## 2025-03-12 PROCEDURE — 99214 OFFICE O/P EST MOD 30 MIN: CPT | Performed by: INTERNAL MEDICINE

## 2025-03-12 ASSESSMENT — PATIENT HEALTH QUESTIONNAIRE - PHQ9
1. LITTLE INTEREST OR PLEASURE IN DOING THINGS: NOT AT ALL
SUM OF ALL RESPONSES TO PHQ QUESTIONS 1-9: 0
2. FEELING DOWN, DEPRESSED OR HOPELESS: NOT AT ALL
SUM OF ALL RESPONSES TO PHQ QUESTIONS 1-9: 0

## 2025-03-12 ASSESSMENT — LIFESTYLE VARIABLES
HOW MANY STANDARD DRINKS CONTAINING ALCOHOL DO YOU HAVE ON A TYPICAL DAY: 1 OR 2
HOW OFTEN DO YOU HAVE A DRINK CONTAINING ALCOHOL: MONTHLY OR LESS

## 2025-03-12 NOTE — PROGRESS NOTES
\"Have you been to the ER, urgent care clinic since your last visit?  Hospitalized since your last visit?\"    NO    “Have you seen or consulted any other health care providers outside our system since your last visit?”    NO      “Have you had a diabetic eye exam?”    NO; appt scheduled     Date of last diabetic eye exam: 2/28/2024          
by mouth daily Yes Provider, MD Dmitry   ferrous sulfate (SLOW FE) 142 (45 Fe) MG extended release tablet Take 142 mg by mouth daily Yes Provider, MD Dmitry   albuterol sulfate HFA (PROVENTIL;VENTOLIN;PROAIR) 108 (90 Base) MCG/ACT inhaler Inhale 2 puffs into the lungs every 4 hours as needed Yes Automatic Reconciliation, Ar   budesonide-formoterol (SYMBICORT) 160-4.5 MCG/ACT AERO Inhale 2 puffs into the lungs 2 times daily Yes Automatic Reconciliation, Ar   cetirizine (ZYRTEC) 10 MG tablet Take 1 tablet by mouth daily Yes Automatic Reconciliation, Ar   dexlansoprazole (DEXILANT) 60 MG CPDR delayed release capsule Take 1 capsule by mouth daily Yes Automatic Reconciliation, Ar   melatonin 5 MG TABS tablet Take 1 tablet by mouth nightly Yes Automatic Reconciliation, Ar   polyethylene glycol (GLYCOLAX) 17 GM/SCOOP powder Take 17 g by mouth daily Yes Automatic Reconciliation, Ar   pyridoxine (B-6) 100 MG tablet Take 1 tablet by mouth daily Yes Automatic Reconciliation, Ar   blood glucose test strips (ACCU-CHEK GUIDE) strip Tid prn  Patient not taking: Reported on 12/2/2024  Roxana Mike MD   Accu-Chek Softclix Lancets MISC   Roxana Mike MD   Blood Glucose Monitoring Suppl (ACCU-CHEK [place] PLUS) w/Device KIT Using to test blood sugars daily, E11.9  Patient not taking: Reported on 12/2/2024  Roxana Mike MD   blood glucose test strips (ACCU-CHEK CASPER PLUS) strip TEST BLOOD SUGAR TWICE DAILY  Patient not taking: Reported on 12/2/2024  Roxana Mike MD       CareTeam (Including outside providers/suppliers regularly involved in providing care):   Patient Care Team:  Roxana Mike MD as PCP - General  Roxana Mike MD as PCP - Empaneled Provider  Ten Ramos MD as Consulting Physician  Allison Gupta MD as Physician  Claudio Schroeder MD (General Surgery)     Recommendations for Preventive Services Due: see orders and patient 
 General: No swelling, tenderness or deformity.      Cervical back: Normal range of motion and neck supple. No tenderness.      Right lower leg: Edema present.      Left lower leg: Edema present.      Comments: Trace pitting   Lymphadenopathy:      Cervical: No cervical adenopathy.   Skin:     General: Skin is warm and dry.      Findings: No erythema.   Neurological:      General: No focal deficit present.      Mental Status: She is oriented to person, place, and time. Mental status is at baseline.      Gait: Gait normal.   Psychiatric:         Mood and Affect: Mood normal.           ASSESSMENT and PLAN   Diagnosis Orders   1. Primary hypertension           Blood pressure well-controlled on current regimen of losartan Norvasc continue no change dose monitor metabolic panel reviewed recent labs checked prior to follow-up       2. ASHD (arteriosclerotic heart disease)       Medically managed no active signs or symptoms of CAD follows with cardiology routinely annually in June amlodipine helps with her atypical chest pain as well         3. Chronic obstructive pulmonary disease, unspecified COPD type (HCC)       Stable on Symbicort up-to-date with pulmonary         4. Acquired hypothyroidism       On Levoxyl 137 mcg full pill 6 days/week half pill the seventh day    Has history of papillary thyroid cancer as well follows with endocrinology up-to-date         5. Recurrent depression       Controlled without medication     6. H/O small bowel obstruction       Resolved     7. H/O malignant neoplasm of thyroid       Status post thyroidectomy up-to-date with endocrinology     8. Severe persistent asthma, uncomplicated (HCC)       Stable symptoms controlled with Symbicort follows with pulmonary up-to-date         9. Diabetes mellitus without complication (HCC)         A1c at goal though overly well-controlled for being on insulin continues on Lantus 23 units daily and metformin twice daily not needing any short acting

## 2025-03-18 RX ORDER — METFORMIN HYDROCHLORIDE 500 MG/1
500 TABLET, EXTENDED RELEASE ORAL
Qty: 180 TABLET | Refills: 0 | Status: SHIPPED | OUTPATIENT
Start: 2025-03-18

## 2025-04-02 RX ORDER — METFORMIN HYDROCHLORIDE 500 MG/1
500 TABLET, EXTENDED RELEASE ORAL 2 TIMES DAILY
Qty: 180 TABLET | Refills: 0 | Status: SHIPPED | OUTPATIENT
Start: 2025-04-02

## 2025-04-06 RX ORDER — AMLODIPINE BESYLATE 5 MG/1
5 TABLET ORAL DAILY
Qty: 90 TABLET | Refills: 0 | Status: SHIPPED | OUTPATIENT
Start: 2025-04-06

## 2025-04-23 RX ORDER — INSULIN GLARGINE 100 [IU]/ML
INJECTION, SOLUTION SUBCUTANEOUS
Qty: 45 ML | Refills: 0 | Status: SHIPPED | OUTPATIENT
Start: 2025-04-23

## 2025-04-30 RX ORDER — INSULIN GLARGINE 100 [IU]/ML
INJECTION, SOLUTION SUBCUTANEOUS
Qty: 45 ML | Refills: 0 | Status: SHIPPED | OUTPATIENT
Start: 2025-04-30

## 2025-05-07 RX ORDER — FUROSEMIDE 20 MG/1
TABLET ORAL
Qty: 90 TABLET | Refills: 0 | Status: SHIPPED | OUTPATIENT
Start: 2025-05-07

## 2025-05-07 RX ORDER — MONTELUKAST SODIUM 10 MG/1
10 TABLET ORAL DAILY
Qty: 90 TABLET | Refills: 3 | Status: SHIPPED | OUTPATIENT
Start: 2025-05-07

## 2025-05-20 ENCOUNTER — HOSPITAL ENCOUNTER (OUTPATIENT)
Facility: HOSPITAL | Age: 70
Discharge: HOME | End: 2025-05-23

## 2025-05-20 DIAGNOSIS — C73 THYROID CANCER (HCC): ICD-10-CM

## 2025-05-20 DIAGNOSIS — E11.9 DIABETES MELLITUS WITHOUT COMPLICATION (HCC): ICD-10-CM

## 2025-05-20 DIAGNOSIS — I10 PRIMARY HYPERTENSION: ICD-10-CM

## 2025-05-21 ENCOUNTER — RESULTS FOLLOW-UP (OUTPATIENT)
Age: 70
End: 2025-05-21

## 2025-05-21 LAB
ALBUMIN SERPL-MCNC: 4.4 G/DL (ref 3.8–4.8)
ALP SERPL-CCNC: 61 IU/L (ref 44–121)
ALT SERPL-CCNC: 28 IU/L (ref 0–32)
AST SERPL-CCNC: 26 IU/L (ref 0–40)
BILIRUB SERPL-MCNC: 0.2 MG/DL (ref 0–1.2)
BUN SERPL-MCNC: 13 MG/DL (ref 8–27)
BUN/CREAT SERPL: 16 (ref 12–28)
CALCIUM SERPL-MCNC: 8.6 MG/DL (ref 8.7–10.3)
CHLORIDE SERPL-SCNC: 99 MMOL/L (ref 96–106)
CO2 SERPL-SCNC: 23 MMOL/L (ref 20–29)
CREAT SERPL-MCNC: 0.8 MG/DL (ref 0.57–1)
EGFRCR SERPLBLD CKD-EPI 2021: 78 ML/MIN/1.73
GLOBULIN SER CALC-MCNC: 2.2 G/DL (ref 1.5–4.5)
GLUCOSE SERPL-MCNC: 119 MG/DL (ref 70–99)
HBA1C MFR BLD: 6.6 % (ref 4.8–5.6)
POTASSIUM SERPL-SCNC: 4.5 MMOL/L (ref 3.5–5.2)
PROT SERPL-MCNC: 6.6 G/DL (ref 6–8.5)
SODIUM SERPL-SCNC: 138 MMOL/L (ref 134–144)

## 2025-05-22 LAB
ALBUMIN/CREAT UR: 14 MG/G CREAT (ref 0–29)
CREAT UR-MCNC: 114 MG/DL
MICROALBUMIN UR-MCNC: 15.8 UG/ML

## 2025-05-22 RX ORDER — LEVOTHYROXINE SODIUM 137 UG/1
TABLET ORAL
Qty: 90 TABLET | Refills: 3 | Status: SHIPPED | OUTPATIENT
Start: 2025-05-22

## 2025-05-27 RX ORDER — LOSARTAN POTASSIUM 50 MG/1
50 TABLET ORAL DAILY
Qty: 90 TABLET | Refills: 1 | Status: SHIPPED | OUTPATIENT
Start: 2025-05-27

## 2025-05-29 ENCOUNTER — TELEPHONE (OUTPATIENT)
Age: 70
End: 2025-05-29

## 2025-05-29 NOTE — TELEPHONE ENCOUNTER
Lia Ball is calling to request a refill on the following medication(s):    Last Visit Date (If Applicable):  9/81/3558    Next Visit Date:    8/2/2021    Medication Request:  Requested Prescriptions     Pending Prescriptions Disp Refills    pantoprazole (PROTONIX) 40 MG tablet [Pharmacy Med Name: PANTOPRAZOLE SOD DR 40 MG TAB] 90 tablet 1     Sig: TAKE 1 TABLET BY MOUTH EVERY DAY Please call pt to let her know she has an unread message in [place]:  If you reach her, please just read the message to her but if you don't then it's fine to leave a message asking them to check [facility] to read the message at their earliest convenience.    It appears your insurance requires you to have your labs processed through [facility] and sometimes the [facility] lab will draw the labs and send them to [facility] and sometimes they don't.  I don't see that my labs were drawn so I will need you to go to [facility] across the street from the hospital in [place] to have your labs drawn using the order on file under my name sometime today or tomorrow if possible so I'll have the results back by 6/11.  Thanks!      Previous Messages    ----- Message -----       From:Deb Thompson       Sent:5/27/2025  4:17 PM EDT         To:Dr. Ten Ramos MD    Subject:Labs for next visit    I went to the lab in [facility] on May 20 to have all my labs done for you and for Dr Mike. I had all of the paperwork for all the labs   She drawed  only two tubes of bloood. I questioned  her on it if it was enough blood because one test had to be sent away She said yes. All my labs are back for Dr Mike  I don’t see any of my labs for you   So I don’t know if she did them or not. But I am going out of town and will be back 2 days before my appt    I am home until Friday   If Ineed to do them

## 2025-05-30 LAB
T4 FREE SERPL-MCNC: 1.57 NG/DL (ref 0.82–1.77)
TSH SERPL DL<=0.005 MIU/L-ACNC: 0.65 UIU/ML (ref 0.45–4.5)

## 2025-06-03 NOTE — PROGRESS NOTES
swelling, tenderness or deformity.      Cervical back: Normal range of motion and neck supple. No tenderness.      Right lower le+ Pitting Edema present.      Left lower le+ Pitting Edema present.   Lymphadenopathy:      Cervical: No cervical adenopathy.   Skin:     General: Skin is warm and dry.      Findings: No erythema.   Neurological:      General: No focal deficit present.      Mental Status: She is oriented to person, place, and time. Mental status is at baseline.      Gait: Gait normal.   Psychiatric:         Mood and Affect: Mood normal.           ASSESSMENT and PLAN   Diagnosis Orders   1. Primary hypertension  Basic Metabolic Panel    Hemoglobin A1C   Well-controlled on current regimen of losartan 50 mg and Norvasc 5 mg continue no changes check metabolic K creatinine sodium levels   2. ASHD (arteriosclerotic heart disease)         Medically managed up-to-date with cardiology will be following up at the end of the month no active signs or symptoms of CAD amlodipine to help with atypical chest pain       3. Diabetes mellitus without complication (HCC)  Basic Metabolic Panel    Hemoglobin A1C   Well-controlled A1c at goal continue Lantus 20 units and metformin 500 mg twice daily readings good no hypoglycemia       4. Acquired hypothyroidism       On Levoxyl 137 mcg 6 days/week half tablet the seventh history of thyroid cancer follows with endocrinology just had labs done has appoint with them tomorrow   5. Hypercholesteremia         Controlled Lipitor 40 continue       6. Recurrent depression         Controlled on medication       7. H/O malignant neoplasm of esophagus         In remission no longer follows with hematology       8. H/O small bowel obstruction     Resolved   9. H/O malignant neoplasm of thyroid     In remission has follow-up with endocrinology tomorrow   10. Stress incontinence of urine     Controlled with trospium twice daily has follow-up with urogynecology pending       11. Severe

## 2025-06-07 SDOH — HEALTH STABILITY: PHYSICAL HEALTH: ON AVERAGE, HOW MANY DAYS PER WEEK DO YOU ENGAGE IN MODERATE TO STRENUOUS EXERCISE (LIKE A BRISK WALK)?: 2 DAYS

## 2025-06-07 SDOH — HEALTH STABILITY: PHYSICAL HEALTH: ON AVERAGE, HOW MANY MINUTES DO YOU ENGAGE IN EXERCISE AT THIS LEVEL?: 20 MIN

## 2025-06-07 ASSESSMENT — PATIENT HEALTH QUESTIONNAIRE - PHQ9
SUM OF ALL RESPONSES TO PHQ QUESTIONS 1-9: 0
2. FEELING DOWN, DEPRESSED OR HOPELESS: NOT AT ALL
1. LITTLE INTEREST OR PLEASURE IN DOING THINGS: NOT AT ALL

## 2025-06-07 ASSESSMENT — LIFESTYLE VARIABLES
HOW OFTEN DO YOU HAVE A DRINK CONTAINING ALCOHOL: 3
HOW OFTEN DO YOU HAVE SIX OR MORE DRINKS ON ONE OCCASION: 1
HOW OFTEN DO YOU HAVE A DRINK CONTAINING ALCOHOL: 2-4 TIMES A MONTH
HOW MANY STANDARD DRINKS CONTAINING ALCOHOL DO YOU HAVE ON A TYPICAL DAY: 1
HOW MANY STANDARD DRINKS CONTAINING ALCOHOL DO YOU HAVE ON A TYPICAL DAY: 1 OR 2

## 2025-06-10 LAB
THYROGLOB AB SERPL-ACNC: 3.8 IU/ML
THYROGLOB SERPL-MCNC: <2 NG/ML
THYROGLOB SERPL-MCNC: ABNORMAL NG/ML

## 2025-06-11 ENCOUNTER — OFFICE VISIT (OUTPATIENT)
Age: 70
End: 2025-06-11
Payer: MEDICARE

## 2025-06-11 VITALS
HEART RATE: 59 BPM | TEMPERATURE: 97.7 F | BODY MASS INDEX: 29.17 KG/M2 | HEIGHT: 62 IN | SYSTOLIC BLOOD PRESSURE: 131 MMHG | OXYGEN SATURATION: 97 % | DIASTOLIC BLOOD PRESSURE: 66 MMHG | WEIGHT: 158.5 LBS

## 2025-06-11 DIAGNOSIS — I10 PRIMARY HYPERTENSION: Primary | ICD-10-CM

## 2025-06-11 DIAGNOSIS — E83.51 HYPOCALCEMIA: ICD-10-CM

## 2025-06-11 DIAGNOSIS — E11.9 DIABETES MELLITUS WITHOUT COMPLICATION (HCC): ICD-10-CM

## 2025-06-11 DIAGNOSIS — Z87.19 H/O SMALL BOWEL OBSTRUCTION: ICD-10-CM

## 2025-06-11 DIAGNOSIS — K21.9 GASTROESOPHAGEAL REFLUX DISEASE WITHOUT ESOPHAGITIS: ICD-10-CM

## 2025-06-11 DIAGNOSIS — Z85.850 H/O MALIGNANT NEOPLASM OF THYROID: ICD-10-CM

## 2025-06-11 DIAGNOSIS — J44.9 CHRONIC OBSTRUCTIVE PULMONARY DISEASE, UNSPECIFIED COPD TYPE (HCC): ICD-10-CM

## 2025-06-11 DIAGNOSIS — J45.50 SEVERE PERSISTENT ASTHMA, UNCOMPLICATED (HCC): ICD-10-CM

## 2025-06-11 DIAGNOSIS — Z85.01 H/O MALIGNANT NEOPLASM OF ESOPHAGUS: ICD-10-CM

## 2025-06-11 DIAGNOSIS — E03.9 ACQUIRED HYPOTHYROIDISM: ICD-10-CM

## 2025-06-11 DIAGNOSIS — F33.9 RECURRENT DEPRESSION: ICD-10-CM

## 2025-06-11 DIAGNOSIS — I10 PRIMARY HYPERTENSION: ICD-10-CM

## 2025-06-11 DIAGNOSIS — N39.3 STRESS INCONTINENCE OF URINE: ICD-10-CM

## 2025-06-11 DIAGNOSIS — I25.10 ASHD (ARTERIOSCLEROTIC HEART DISEASE): ICD-10-CM

## 2025-06-11 DIAGNOSIS — E78.00 HYPERCHOLESTEREMIA: ICD-10-CM

## 2025-06-11 PROCEDURE — 1123F ACP DISCUSS/DSCN MKR DOCD: CPT | Performed by: INTERNAL MEDICINE

## 2025-06-11 PROCEDURE — 3078F DIAST BP <80 MM HG: CPT | Performed by: INTERNAL MEDICINE

## 2025-06-11 PROCEDURE — 99214 OFFICE O/P EST MOD 30 MIN: CPT | Performed by: INTERNAL MEDICINE

## 2025-06-11 PROCEDURE — 1159F MED LIST DOCD IN RCRD: CPT | Performed by: INTERNAL MEDICINE

## 2025-06-11 PROCEDURE — 3075F SYST BP GE 130 - 139MM HG: CPT | Performed by: INTERNAL MEDICINE

## 2025-06-11 PROCEDURE — 3044F HG A1C LEVEL LT 7.0%: CPT | Performed by: INTERNAL MEDICINE

## 2025-06-11 PROCEDURE — 1160F RVW MEDS BY RX/DR IN RCRD: CPT | Performed by: INTERNAL MEDICINE

## 2025-06-12 ENCOUNTER — RESULTS FOLLOW-UP (OUTPATIENT)
Age: 70
End: 2025-06-12

## 2025-06-12 ENCOUNTER — OFFICE VISIT (OUTPATIENT)
Age: 70
End: 2025-06-12
Payer: MEDICARE

## 2025-06-12 VITALS
SYSTOLIC BLOOD PRESSURE: 138 MMHG | WEIGHT: 159.4 LBS | HEIGHT: 62 IN | BODY MASS INDEX: 29.33 KG/M2 | DIASTOLIC BLOOD PRESSURE: 54 MMHG | HEART RATE: 58 BPM

## 2025-06-12 DIAGNOSIS — C73 THYROID CANCER (HCC): Primary | ICD-10-CM

## 2025-06-12 DIAGNOSIS — E83.51 HYPOCALCEMIA: ICD-10-CM

## 2025-06-12 PROCEDURE — 1126F AMNT PAIN NOTED NONE PRSNT: CPT | Performed by: INTERNAL MEDICINE

## 2025-06-12 PROCEDURE — 1160F RVW MEDS BY RX/DR IN RCRD: CPT | Performed by: INTERNAL MEDICINE

## 2025-06-12 PROCEDURE — G2211 COMPLEX E/M VISIT ADD ON: HCPCS | Performed by: INTERNAL MEDICINE

## 2025-06-12 PROCEDURE — 1123F ACP DISCUSS/DSCN MKR DOCD: CPT | Performed by: INTERNAL MEDICINE

## 2025-06-12 PROCEDURE — 3075F SYST BP GE 130 - 139MM HG: CPT | Performed by: INTERNAL MEDICINE

## 2025-06-12 PROCEDURE — 1159F MED LIST DOCD IN RCRD: CPT | Performed by: INTERNAL MEDICINE

## 2025-06-12 PROCEDURE — 3078F DIAST BP <80 MM HG: CPT | Performed by: INTERNAL MEDICINE

## 2025-06-12 PROCEDURE — 99214 OFFICE O/P EST MOD 30 MIN: CPT | Performed by: INTERNAL MEDICINE

## 2025-06-12 NOTE — PATIENT INSTRUCTIONS
1) Your TSH (thyroid test) is perfect so I will keep your dose the same of levoxyl 6.5 tabs/week.    2) Your thyroglobulin (thyroid cancer blood test) is undetectable so you have no evidence of thyroid cancer 18 months out from your surgery.  Your thyroglobulin ab is stable at 3.8 but down from 5.4 a year ago and I'm not concerned by this finding.    3) I will repeat your calcium along with a vitamin D and PTH (parathyroid hormone) just to ensure there is no issue with your calcium.  Keep taking 2 tabs of calcium daily until you hear back from me.

## 2025-06-12 NOTE — PROGRESS NOTES
changed to brand levoxyl for cost and increased to 137 mcg daily to get her TSH in the 0.1-0.3 range to decrease chance of cancer recurrence and TSH 0.04, TG <2.0 and TG ab 3.7 in 8/24 so decreased to 6.5 tabs/week and TSH 0.55, TG < 2.0 and TG ab 3.8 in 11/24 so kept dose the same and TSH 0.64, TG < 2.0 and TG ab 3.8 in 5/25 so kept dose the same.  Neck ultrasound negative for recurrence in 6/24.   - cont levoxyl 137 mcg 1 tab 6 days a week and 1/2 tab on Wed  - check TSH, free T4 and comprehensive TG panel prior to next visit        2. Hypocalcemia: Did have a decrease in her calcium down to 7.9 after the surgery and was started on calcitriol 0.25 mcg bid. At her f/u visit with Dr. Schroeder on 11/15/23 he decreased her calcitriol to 1 cap in the morning and calcium 9.1 and PTH 21 in 12/23 and calcitriol was stopped and calcium 8.7 in 2/24.  Her level remained normal until slightly low at 8.6 in 5/25 so unclear if this was a fluke or not.  Will check her PTH and vitamin D for completeness.  - cont calcium 2 tabs daily at night  - check bmp, PTH and vitamin D 25-OH today     Patient Instructions   1) Your TSH (thyroid test) is perfect so I will keep your dose the same of levoxyl 6.5 tabs/week.    2) Your thyroglobulin (thyroid cancer blood test) is undetectable so you have no evidence of thyroid cancer 18 months out from your surgery.  Your thyroglobulin ab is stable at 3.8 but down from 5.4 a year ago and I'm not concerned by this finding.    3) I will repeat your calcium along with a vitamin D and PTH (parathyroid hormone) just to ensure there is no issue with your calcium.  Keep taking 2 tabs of calcium daily until you hear back from me.        Return in about 6 months (around 12/12/2025).     Copy sent to:  Roxana Mike MD    The patient (or guardian, if applicable) and other individuals in attendance with the patient were advised that Artificial Intelligence will be utilized during this visit to record,

## 2025-06-13 LAB
25(OH)D3+25(OH)D2 SERPL-MCNC: 60.7 NG/ML (ref 30–100)
BUN SERPL-MCNC: 9 MG/DL (ref 8–27)
BUN/CREAT SERPL: 13 (ref 12–28)
CALCIUM SERPL-MCNC: 9.2 MG/DL (ref 8.7–10.3)
CHLORIDE SERPL-SCNC: 98 MMOL/L (ref 96–106)
CO2 SERPL-SCNC: 23 MMOL/L (ref 20–29)
CREAT SERPL-MCNC: 0.67 MG/DL (ref 0.57–1)
EGFRCR SERPLBLD CKD-EPI 2021: 93 ML/MIN/1.73
GLUCOSE SERPL-MCNC: 109 MG/DL (ref 70–99)
POTASSIUM SERPL-SCNC: 5 MMOL/L (ref 3.5–5.2)
PTH-INTACT SERPL-MCNC: 13 PG/ML (ref 15–65)
SODIUM SERPL-SCNC: 138 MMOL/L (ref 134–144)

## 2025-06-14 ENCOUNTER — RESULTS FOLLOW-UP (OUTPATIENT)
Age: 70
End: 2025-06-14

## 2025-07-03 RX ORDER — AMLODIPINE BESYLATE 5 MG/1
5 TABLET ORAL DAILY
Qty: 90 TABLET | Refills: 0 | Status: SHIPPED | OUTPATIENT
Start: 2025-07-03

## 2025-07-23 RX ORDER — METFORMIN HYDROCHLORIDE 500 MG/1
TABLET, EXTENDED RELEASE ORAL
Qty: 180 TABLET | Refills: 0 | Status: SHIPPED | OUTPATIENT
Start: 2025-07-23

## 2025-08-06 RX ORDER — FUROSEMIDE 20 MG/1
20 TABLET ORAL DAILY PRN
Qty: 90 TABLET | Refills: 0 | Status: SHIPPED | OUTPATIENT
Start: 2025-08-06

## 2025-08-18 ASSESSMENT — ENCOUNTER SYMPTOMS: SHORTNESS OF BREATH: 0

## 2025-08-19 ENCOUNTER — OFFICE VISIT (OUTPATIENT)
Age: 70
End: 2025-08-19
Payer: MEDICARE

## 2025-08-19 VITALS
WEIGHT: 162 LBS | BODY MASS INDEX: 29.81 KG/M2 | HEIGHT: 62 IN | OXYGEN SATURATION: 97 % | TEMPERATURE: 97.2 F | DIASTOLIC BLOOD PRESSURE: 88 MMHG | HEART RATE: 59 BPM | SYSTOLIC BLOOD PRESSURE: 138 MMHG

## 2025-08-19 DIAGNOSIS — J45.50 SEVERE PERSISTENT ASTHMA, UNCOMPLICATED (HCC): ICD-10-CM

## 2025-08-19 DIAGNOSIS — I10 PRIMARY HYPERTENSION: ICD-10-CM

## 2025-08-19 DIAGNOSIS — E78.00 HYPERCHOLESTEREMIA: ICD-10-CM

## 2025-08-19 DIAGNOSIS — I25.10 ASHD (ARTERIOSCLEROTIC HEART DISEASE): ICD-10-CM

## 2025-08-19 DIAGNOSIS — I87.2 VENOUS REFLUX: ICD-10-CM

## 2025-08-19 DIAGNOSIS — N39.3 STRESS INCONTINENCE OF URINE: ICD-10-CM

## 2025-08-19 DIAGNOSIS — J44.9 CHRONIC OBSTRUCTIVE PULMONARY DISEASE, UNSPECIFIED COPD TYPE (HCC): ICD-10-CM

## 2025-08-19 DIAGNOSIS — Z85.850 H/O MALIGNANT NEOPLASM OF THYROID: ICD-10-CM

## 2025-08-19 DIAGNOSIS — E03.9 ACQUIRED HYPOTHYROIDISM: ICD-10-CM

## 2025-08-19 DIAGNOSIS — E83.51 HYPOCALCEMIA: ICD-10-CM

## 2025-08-19 DIAGNOSIS — E11.9 DIABETES MELLITUS WITHOUT COMPLICATION (HCC): ICD-10-CM

## 2025-08-19 DIAGNOSIS — S41.112A LACERATION OF LEFT UPPER EXTREMITY, INITIAL ENCOUNTER: Primary | ICD-10-CM

## 2025-08-19 DIAGNOSIS — F33.9 RECURRENT DEPRESSION: ICD-10-CM

## 2025-08-19 PROCEDURE — 1123F ACP DISCUSS/DSCN MKR DOCD: CPT | Performed by: INTERNAL MEDICINE

## 2025-08-19 PROCEDURE — 3079F DIAST BP 80-89 MM HG: CPT | Performed by: INTERNAL MEDICINE

## 2025-08-19 PROCEDURE — 99214 OFFICE O/P EST MOD 30 MIN: CPT | Performed by: INTERNAL MEDICINE

## 2025-08-19 PROCEDURE — 3044F HG A1C LEVEL LT 7.0%: CPT | Performed by: INTERNAL MEDICINE

## 2025-08-19 PROCEDURE — 1159F MED LIST DOCD IN RCRD: CPT | Performed by: INTERNAL MEDICINE

## 2025-08-19 PROCEDURE — 1160F RVW MEDS BY RX/DR IN RCRD: CPT | Performed by: INTERNAL MEDICINE

## 2025-08-19 PROCEDURE — 3075F SYST BP GE 130 - 139MM HG: CPT | Performed by: INTERNAL MEDICINE

## 2025-08-19 ASSESSMENT — PATIENT HEALTH QUESTIONNAIRE - PHQ9
SUM OF ALL RESPONSES TO PHQ QUESTIONS 1-9: 0
1. LITTLE INTEREST OR PLEASURE IN DOING THINGS: NOT AT ALL
SUM OF ALL RESPONSES TO PHQ QUESTIONS 1-9: 0
SUM OF ALL RESPONSES TO PHQ QUESTIONS 1-9: 0
2. FEELING DOWN, DEPRESSED OR HOPELESS: NOT AT ALL
SUM OF ALL RESPONSES TO PHQ QUESTIONS 1-9: 0

## 2025-08-20 LAB
ALBUMIN SERPL-MCNC: 4.4 G/DL (ref 3.8–4.8)
ALP SERPL-CCNC: 64 IU/L (ref 44–121)
ALT SERPL-CCNC: 23 IU/L (ref 0–32)
AST SERPL-CCNC: 24 IU/L (ref 0–40)
BILIRUB SERPL-MCNC: 0.3 MG/DL (ref 0–1.2)
BUN SERPL-MCNC: 16 MG/DL (ref 8–27)
BUN/CREAT SERPL: 21 (ref 12–28)
CALCIUM SERPL-MCNC: 9.4 MG/DL (ref 8.7–10.3)
CHLORIDE SERPL-SCNC: 98 MMOL/L (ref 96–106)
CHOLEST SERPL-MCNC: 124 MG/DL (ref 100–199)
CO2 SERPL-SCNC: 24 MMOL/L (ref 20–29)
CREAT SERPL-MCNC: 0.78 MG/DL (ref 0.57–1)
EGFRCR SERPLBLD CKD-EPI 2021: 81 ML/MIN/1.73
EST. AVERAGE GLUCOSE BLD GHB EST-MCNC: 160 MG/DL
GLOBULIN SER CALC-MCNC: 1.9 G/DL (ref 1.5–4.5)
GLUCOSE SERPL-MCNC: 88 MG/DL (ref 70–99)
HBA1C MFR BLD: 7.2 % (ref 4.8–5.6)
HDLC SERPL-MCNC: 46 MG/DL
LDLC SERPL CALC-MCNC: 66 MG/DL (ref 0–99)
POTASSIUM SERPL-SCNC: 5.1 MMOL/L (ref 3.5–5.2)
PROT SERPL-MCNC: 6.3 G/DL (ref 6–8.5)
SODIUM SERPL-SCNC: 136 MMOL/L (ref 134–144)
TRIGL SERPL-MCNC: 55 MG/DL (ref 0–149)
VLDLC SERPL CALC-MCNC: 12 MG/DL (ref 5–40)

## 2025-08-25 ENCOUNTER — HOSPITAL ENCOUNTER (OUTPATIENT)
Facility: HOSPITAL | Age: 70
Discharge: HOME | End: 2025-08-28
Payer: MEDICARE

## 2025-08-25 DIAGNOSIS — Z12.31 SCREENING MAMMOGRAM FOR BREAST CANCER: ICD-10-CM

## 2025-08-25 PROCEDURE — 77067 SCR MAMMO BI INCL CAD: CPT

## (undated) DEVICE — TOWEL 4 PLY TISS 19X30 SUE WHT

## (undated) DEVICE — SET GRAV CK VLV NEEDLESS ST 3 GANGED 4WAY STPCOCK HI FLO 10

## (undated) DEVICE — GLOVE ORANGE PI 7 1/2   MSG9075

## (undated) DEVICE — CLIP LIG M BLU TI HRT SHP WIRE HORZ 600 PER BX

## (undated) DEVICE — MAGNETIC INSTR DRAPE 20X16: Brand: MEDLINE INDUSTRIES, INC.

## (undated) DEVICE — CONTAINER SPEC 20 ML LID NEUT BUFF FORMALIN 10 % POLYPR STS

## (undated) DEVICE — SUTURE VCRL SZ 2-0 L27IN ABSRB UD L26MM SH 1/2 CIR J417H

## (undated) DEVICE — FORCEPS BX L240CM JAW DIA2.8MM L CAP W/ NDL MIC MESH TOOTH

## (undated) DEVICE — ENDOSCOPIC KIT COMPLIANCE ENDOKIT

## (undated) DEVICE — IV START KIT: Brand: MEDLINE

## (undated) DEVICE — NEEDLE HYPO 18GA L1.5IN PNK S STL HUB POLYPR SHLD REG BVL

## (undated) DEVICE — SUTURE VCRL SZ 3-0 L27IN ABSRB UD L26MM SH 1/2 CIR J416H

## (undated) DEVICE — SOLUTION IRRIG 1000ML 0.9% SOD CHL USP POUR PLAS BTL

## (undated) DEVICE — FORCEPS BX L240CM JAW DIA2.2MM RAD JAW 4 HOT DISP

## (undated) DEVICE — SYR 3ML LL TIP 1/10ML GRAD --

## (undated) DEVICE — TRAP SUC MUCOUS 70ML -- MEDICHOICE MEDLINE

## (undated) DEVICE — CATHETER IV 24GA L0.75IN OD0.6604-0.7366MM

## (undated) DEVICE — APPLICATOR MEDICATED 10.5 CC SOLUTION HI LT ORNG CHLORAPREP

## (undated) DEVICE — HYPODERMIC SAFETY NEEDLE: Brand: MONOJECT

## (undated) DEVICE — MAJOR LAPAROTOMY-MRMC: Brand: MEDLINE INDUSTRIES, INC.

## (undated) DEVICE — Device

## (undated) DEVICE — BAG SPEC BIOHZRD 10 X 10 IN --

## (undated) DEVICE — SHEET,T,THYROID,STERILE: Brand: MEDLINE

## (undated) DEVICE — TIP SUCT TRNSPAR RIB SURF STD BLB RIG NVENT W/ 5IN1 CONN DYND50138] MEDLINE INDUSTRIES INC]

## (undated) DEVICE — SUTURE PERMAHAND SZ 2-0 L30IN NONABSORBABLE BLK SILK W/O A305H

## (undated) DEVICE — NEONATAL-ADULT SPO2 SENSOR: Brand: NELLCOR

## (undated) DEVICE — CATH IV AUTOGRD BC BLU 22GA 25 -- INSYTE

## (undated) DEVICE — CATHETER IV 22GA L1IN OD0.8382-0.9144MM ID0.6096-0.6858MM 382523

## (undated) DEVICE — SUTURE PERMA-HAND SZ 2-0 L30IN NONABSORBABLE BLK L26MM SH K833H

## (undated) DEVICE — CATHETER IV 18GA L1.16IN OD1.27-1.3462MM ID0.9398-1.016MM

## (undated) DEVICE — FORCEPS BX L240CM JAW DIA2.4MM ORNG L CAP W/ NDL DISP RAD

## (undated) DEVICE — 1200 GUARD II KIT W/5MM TUBE W/O VAC TUBE: Brand: GUARDIAN

## (undated) DEVICE — Z DISCONTINUED PER MEDLINE LINE GAS SAMPLING O2/CO2 LNG AD 13 FT NSL W/ TBNG FILTERLINE

## (undated) DEVICE — KIT,1200CC CANISTER,3/16"X6' TUBING: Brand: MEDLINE INDUSTRIES, INC.

## (undated) DEVICE — SET ADMIN 16ML TBNG L100IN 2 Y INJ SITE IV PIGGY BK DISP

## (undated) DEVICE — BLADE ES ELASTOMERIC COAT INSUL DURABLE BEND UPTO 90DEG

## (undated) DEVICE — SYRINGE MED 10ML LUERLOCK TIP W/O SFTY DISP

## (undated) DEVICE — AGENT HEMSTAT W4XL8IN OXIDIZED REGENERATED CELOS ABSRB

## (undated) DEVICE — SYR 10ML LUER LOK 1/5ML GRAD --

## (undated) DEVICE — LIQUIBAND RAPID ADHESIVE 36/CS 0.8ML: Brand: MEDLINE

## (undated) DEVICE — SYSTEM REPROC CBL 3 LD DISPOSABLE

## (undated) DEVICE — STRAINER URIN CALC RNL MSH -- CONVERT TO ITEM 357634

## (undated) DEVICE — SNARE ENDOSCP M L240CM W27MM SHTH DIA2.4MM CHN 2.8MM OVL

## (undated) DEVICE — BASIN EMSIS 16OZ GRAPHITE PLAS KID SHP MOLD GRAD FOR ORAL

## (undated) DEVICE — SUTURE VCRL SZ 4-0 L18IN ABSRB UD L13MM P-3 3/8 CIR PRIM J494H

## (undated) DEVICE — CATHETER IV 20GA L1.16IN OD1.0414-1.1176MM ID0.762-0.8382MM

## (undated) DEVICE — GOWN,SIRUS,NONRNF,SETINSLV,2XL,18/CS: Brand: MEDLINE

## (undated) DEVICE — KENDALL RADIOLUCENT FOAM MONITORING ELECTRODE RECTANGULAR SHAPE: Brand: KENDALL

## (undated) DEVICE — CUFF BLD PRSS AD CLTH SGL TB W/ BAYNT CONN ROUNDED CORNER

## (undated) DEVICE — SOLIDIFIER MEDC 1200ML -- CONVERT TO 356117